# Patient Record
Sex: MALE | Race: BLACK OR AFRICAN AMERICAN | NOT HISPANIC OR LATINO | Employment: OTHER | URBAN - METROPOLITAN AREA
[De-identification: names, ages, dates, MRNs, and addresses within clinical notes are randomized per-mention and may not be internally consistent; named-entity substitution may affect disease eponyms.]

---

## 2023-01-06 ENCOUNTER — TELEPHONE (OUTPATIENT)
Dept: GASTROENTEROLOGY | Facility: CLINIC | Age: 73
End: 2023-01-06
Payer: MEDICARE

## 2023-01-06 ENCOUNTER — TELEPHONE (OUTPATIENT)
Dept: ENDOSCOPY | Facility: HOSPITAL | Age: 73
End: 2023-01-06
Payer: MEDICARE

## 2023-01-06 NOTE — TELEPHONE ENCOUNTER
Milagros,  with [place] requesting to schedule an appt for EUS.  Notified Dr. Carrizales does not do EUS, AES number given and repeated correctly.

## 2023-01-06 NOTE — TELEPHONE ENCOUNTER
----- Message from Noah Silver sent at 1/6/2023  2:06 PM CST -----  Type:  Needs Medical Advice    Who Called: [facility]/ Case Management  Would the patient rather a call back or a response via [facility]? call  Best Call Back Number: [phone]  Additional Information: EUS to eval for pancreatic and abnormal duct--she needs to schedule asap  She needs to speak to someone today.

## 2023-01-06 NOTE — TELEPHONE ENCOUNTER
----- Message from Vianney Love sent at 1/6/2023 12:04 PM CST -----  .Type: Apoointment Request      Name of Caller:[facility] in [place]  Would the patient rather a call back or a response via [facility]? call  Best Call Back Number:[phone]  Additional Information: requesting an appointment

## 2023-01-08 ENCOUNTER — TELEPHONE (OUTPATIENT)
Dept: ENDOSCOPY | Facility: HOSPITAL | Age: 73
End: 2023-01-08
Payer: MEDICARE

## 2023-01-08 NOTE — TELEPHONE ENCOUNTER
----- Message from Noah Silver sent at 1/6/2023  2:06 PM CST -----  Type:  Needs Medical Advice    Who Called: [facility]/ Case Management  Would the patient rather a call back or a response via [facility]? call  Best Call Back Number: [phone]  Additional Information: EUS to eval for pancreatic and abnormal duct--she needs to schedule asap  She needs to speak to someone today.

## 2023-01-11 ENCOUNTER — TELEPHONE (OUTPATIENT)
Dept: ENDOSCOPY | Facility: HOSPITAL | Age: 73
End: 2023-01-11
Payer: MEDICARE

## 2023-01-12 NOTE — PROGRESS NOTES
"  [facility]    Reason for Visit:  The encounter diagnosis was Idiopathic acute pancreatitis without infection or necrosis.    PCP:   Primary Doctor No   No address on file      Initial HPI   This is a 72 y.o. male referred from [place] [facility] for pancreatitis. He presented to [facility] ED 1/5/23 for abdominal pain and found to have lipase 843 and CT exhibiting edema around the head and neck of pancreas, pancreatic duct prominence and an indeterminate mass within the pancreatic tail. During hospitalization, patient was afebrile with normal LFTs and no leukocytosis. He reports that he has had no abdominal pain, n/v/d, appetite changes, or unexplained weight loss since he was released from the hospital. No personal hx of prior pancreaticobiliary d/o. No fhx of pancreatic cancer    Tobacco use- n/a  ETOH intake- 2-3 beers daily  NSAID use- n/a  Blood thinners- n/a     ROS:  Review of Systems   Constitutional:  Negative for chills, diaphoresis, fever and weight loss.   Eyes:  Negative for discharge and redness.   Gastrointestinal:  Negative for abdominal pain, blood in stool, constipation, diarrhea, nausea and vomiting.   Skin: Negative.    Neurological:  Negative for focal weakness, seizures and loss of consciousness.      Medical History: DM II, gout, HTN, high cholesterol    Surgical History:  has no past surgical history on file.    Family History: family history is not on file..       Review of patient's allergies indicates:  Not on File    No current outpatient medications on file prior to visit.     No current facility-administered medications on file prior to visit.         Objective Findings:    Vital Signs:  /70 (BP Location: Left arm, Patient Position: Sitting, BP Method: Medium (Automatic))   Pulse 66   Ht 6' 2" (1.88 m)   Wt 98.8 kg (217 lb 13 oz)   BMI 27.97 kg/m²   Body mass index is 27.97 kg/m².    Physical Exam:  Physical Exam  Vitals reviewed.   Constitutional:       " General: He is not in acute distress.     Appearance: He is not toxic-appearing or diaphoretic.   Eyes:      General: No scleral icterus.     Conjunctiva/sclera: Conjunctivae normal.   Abdominal:      General: Abdomen is flat. Bowel sounds are normal. There is no distension.      Palpations: Abdomen is soft.      Tenderness: There is no abdominal tenderness. There is no rebound.   Skin:     General: Skin is warm and dry.      Coloration: Skin is not jaundiced.   Neurological:      Mental Status: He is alert and oriented to person, place, and time.           Labs:            Imaging reviewed:      Endoscopy reviewed:      Assessment:  1. Idiopathic acute pancreatitis without infection or necrosis          Recommendations:  CT pancreas protocol in 3 weeks  EUS in 4-5 weeks. I have explained the risks and benefits of endoscopy procedures to the patient including but not limited to bleeding, perforation, infection, and anesthesia complications.  ETOH cessation          Thank you so much for allowing me to participate in the care of Feng Thakkar        Emily Womack, MSN, FNP-C  Advanced Endoscopy Nurse Practitioner  [facility]

## 2023-01-13 ENCOUNTER — OFFICE VISIT (OUTPATIENT)
Dept: GASTROENTEROLOGY | Facility: CLINIC | Age: 73
End: 2023-01-13
Payer: MEDICARE

## 2023-01-13 VITALS
BODY MASS INDEX: 27.95 KG/M2 | SYSTOLIC BLOOD PRESSURE: 122 MMHG | HEART RATE: 66 BPM | DIASTOLIC BLOOD PRESSURE: 70 MMHG | WEIGHT: 217.81 LBS | HEIGHT: 74 IN

## 2023-01-13 DIAGNOSIS — K85.00 IDIOPATHIC ACUTE PANCREATITIS WITHOUT INFECTION OR NECROSIS: Primary | ICD-10-CM

## 2023-01-13 PROBLEM — K85.90 ACUTE PANCREATITIS: Status: ACTIVE | Noted: 2023-01-05

## 2023-01-13 PROCEDURE — 99204 PR OFFICE/OUTPT VISIT, NEW, LEVL IV, 45-59 MIN: ICD-10-PCS | Mod: S$PBB,,,

## 2023-01-13 PROCEDURE — 99204 OFFICE O/P NEW MOD 45 MIN: CPT | Mod: S$PBB,,,

## 2023-01-13 PROCEDURE — 99213 OFFICE O/P EST LOW 20 MIN: CPT | Mod: PBBFAC

## 2023-01-13 PROCEDURE — 99999 PR PBB SHADOW E&M-EST. PATIENT-LVL III: CPT | Mod: PBBFAC,,,

## 2023-01-13 PROCEDURE — 99999 PR PBB SHADOW E&M-EST. PATIENT-LVL III: ICD-10-PCS | Mod: PBBFAC,,,

## 2023-01-13 RX ORDER — ALLOPURINOL 100 MG/1
100 TABLET ORAL DAILY
COMMUNITY

## 2023-01-13 RX ORDER — FINASTERIDE 5 MG/1
5 TABLET, FILM COATED ORAL DAILY
COMMUNITY

## 2023-01-13 RX ORDER — AMLODIPINE BESYLATE 2.5 MG/1
2.5 TABLET ORAL DAILY
COMMUNITY

## 2023-01-13 RX ORDER — EZETIMIBE 10 MG/1
10 TABLET ORAL DAILY
COMMUNITY

## 2023-01-13 RX ORDER — ROSUVASTATIN CALCIUM 40 MG/1
10 TABLET, COATED ORAL NIGHTLY
COMMUNITY

## 2023-01-13 RX ORDER — METOPROLOL SUCCINATE 100 MG/1
100 TABLET, EXTENDED RELEASE ORAL DAILY
COMMUNITY

## 2023-01-13 RX ORDER — CLONIDINE HYDROCHLORIDE 0.2 MG/1
0.2 TABLET ORAL ONCE
COMMUNITY

## 2023-02-24 ENCOUNTER — TELEPHONE (OUTPATIENT)
Dept: ENDOSCOPY | Facility: HOSPITAL | Age: 73
End: 2023-02-24
Payer: MEDICARE

## 2023-02-24 ENCOUNTER — HOSPITAL ENCOUNTER (OUTPATIENT)
Dept: RADIOLOGY | Facility: HOSPITAL | Age: 73
Discharge: HOME | End: 2023-02-24
Payer: MEDICARE

## 2023-02-24 ENCOUNTER — TELEPHONE (OUTPATIENT)
Dept: GASTROENTEROLOGY | Facility: HOSPITAL | Age: 73
End: 2023-02-24
Payer: MEDICARE

## 2023-02-24 ENCOUNTER — TELEPHONE (OUTPATIENT)
Dept: ENDOSCOPY | Facility: HOSPITAL | Age: 73
End: 2023-02-24

## 2023-02-24 DIAGNOSIS — K85.00 IDIOPATHIC ACUTE PANCREATITIS WITHOUT INFECTION OR NECROSIS: ICD-10-CM

## 2023-02-24 DIAGNOSIS — R91.8 LUNG MASS: Primary | ICD-10-CM

## 2023-02-24 PROCEDURE — 74177 CT ABDOMEN PELVIS WITH CONTRAST: ICD-10-PCS | Mod: 26,,, | Performed by: RADIOLOGY

## 2023-02-24 PROCEDURE — 74177 CT ABD & PELVIS W/CONTRAST: CPT | Mod: 26,,, | Performed by: RADIOLOGY

## 2023-02-24 PROCEDURE — 25500020 PHARM REV CODE 255: Mod: PO

## 2023-02-24 PROCEDURE — 74177 CT ABD & PELVIS W/CONTRAST: CPT | Mod: TC,PO

## 2023-02-24 RX ADMIN — IOHEXOL 100 ML: 350 INJECTION, SOLUTION INTRAVENOUS at 01:02

## 2023-02-24 NOTE — TELEPHONE ENCOUNTER
Emily Womack, JERAMY Diaz Staff    Mass previously noted to pancreas on outside imaging was again seen on this CT. Recommend EUS with biopsy as soon as possible to rule out neoplasm. Lung mass previously noted on outside imaging study was additionally seen. Recommend follow up with Oncology.

## 2023-02-24 NOTE — TELEPHONE ENCOUNTER
Telephoned pt to schedule EUS.  Spoke with pt and procedure scheduled for 3/3/23 at 9:30am at [facility].  Reviewed history and medications.  Instructed on procedure and preparation.  Pt verbalized understanding.  Instructions mailed to address on file.

## 2023-02-27 ENCOUNTER — TELEPHONE (OUTPATIENT)
Dept: CARDIOTHORACIC SURGERY | Facility: CLINIC | Age: 73
End: 2023-02-27
Payer: MEDICARE

## 2023-02-27 DIAGNOSIS — R91.1 SOLITARY PULMONARY NODULE: Primary | ICD-10-CM

## 2023-03-01 ENCOUNTER — HOSPITAL ENCOUNTER (OUTPATIENT)
Dept: RADIOLOGY | Facility: HOSPITAL | Age: 73
Discharge: HOME | End: 2023-03-01
Attending: INTERNAL MEDICINE
Payer: MEDICARE

## 2023-03-01 ENCOUNTER — TELEPHONE (OUTPATIENT)
Dept: ENDOSCOPY | Facility: HOSPITAL | Age: 73
End: 2023-03-01
Payer: MEDICARE

## 2023-03-01 ENCOUNTER — OFFICE VISIT (OUTPATIENT)
Dept: PULMONOLOGY | Facility: CLINIC | Age: 73
End: 2023-03-01
Payer: MEDICARE

## 2023-03-01 VITALS
OXYGEN SATURATION: 98 % | WEIGHT: 217.81 LBS | BODY MASS INDEX: 27.95 KG/M2 | SYSTOLIC BLOOD PRESSURE: 130 MMHG | HEART RATE: 61 BPM | HEIGHT: 74 IN | DIASTOLIC BLOOD PRESSURE: 70 MMHG

## 2023-03-01 DIAGNOSIS — R91.8 MASS OF LEFT LUNG: Primary | ICD-10-CM

## 2023-03-01 DIAGNOSIS — R91.1 SOLITARY PULMONARY NODULE: ICD-10-CM

## 2023-03-01 DIAGNOSIS — J43.2 CENTRILOBULAR EMPHYSEMA: ICD-10-CM

## 2023-03-01 PROCEDURE — 99204 OFFICE O/P NEW MOD 45 MIN: CPT | Mod: S$PBB,,, | Performed by: INTERNAL MEDICINE

## 2023-03-01 PROCEDURE — 71250 CT THORAX DX C-: CPT | Mod: TC

## 2023-03-01 PROCEDURE — 71250 CT CHEST WITHOUT CONTRAST: ICD-10-PCS | Mod: 26,,, | Performed by: RADIOLOGY

## 2023-03-01 PROCEDURE — 99215 OFFICE O/P EST HI 40 MIN: CPT | Mod: PBBFAC,25 | Performed by: INTERNAL MEDICINE

## 2023-03-01 PROCEDURE — 99204 PR OFFICE/OUTPT VISIT, NEW, LEVL IV, 45-59 MIN: ICD-10-PCS | Mod: S$PBB,,, | Performed by: INTERNAL MEDICINE

## 2023-03-01 PROCEDURE — 71250 CT THORAX DX C-: CPT | Mod: 26,,, | Performed by: RADIOLOGY

## 2023-03-01 PROCEDURE — 99999 PR PBB SHADOW E&M-EST. PATIENT-LVL V: CPT | Mod: PBBFAC,,, | Performed by: INTERNAL MEDICINE

## 2023-03-01 PROCEDURE — 99999 PR PBB SHADOW E&M-EST. PATIENT-LVL V: ICD-10-PCS | Mod: PBBFAC,,, | Performed by: INTERNAL MEDICINE

## 2023-03-01 NOTE — TELEPHONE ENCOUNTER
Spoke with patient about arrival time @ 0830.   Upper EUS    NPO status reviewed: Patient may eat until 7:00pm.  After 7pm, pt may have CLEAR liquids ONLY until 4 hrs prior to procedure per [facility] scheduling inst.    Medications: Do not take Insulin or oral diabetic medications the day of the procedure.    Take as prescribed: heart, seizure and blood pressure medication in the morning with a sip of water (less than an ounce).  Take any breathing medications and bring inhalers to hospital with you.     Leave all valuables and jewelry at home. Wear comfortable clothes to procedure to change into hospital gown.   You cannot drive for 24 hours after your procedure because you will receive sedation for your procedure to make you comfortable.    A ride must be provided at discharge.

## 2023-03-01 NOTE — H&P (VIEW-ONLY)
"Subjective:       Patient ID: Feng Thakkar is a 72 y.o. male.    Chief Complaint: Pulmonary Nodules    72 year old with a history of splenectomy for two cysts.  Perhaps left with splenosis.  Recently hospitalized with pancreatitis and found an incidental lung mass.  Retired from [employer] and now does intermittent yard work.  No magdaleno, no chronic cough, no h/o bronchitis.  Down to 2 beers a day.      No nausea/vomiting.  QUit smoking in 2012    Review of Systems   Constitutional:  Negative for fever, weight loss and weight gain.   HENT:  Negative for voice change.    Respiratory:  Negative for choking, chest tightness and wheezing.    Cardiovascular:  Negative for chest pain and leg swelling.   Endocrine:  Negative for cold intolerance and heat intolerance.    Musculoskeletal:  Positive for arthralgias (bilateral hip).   Gastrointestinal:  Negative for acid reflux.   Neurological:  Negative for headaches.   Hematological:  Negative for adenopathy.   Psychiatric/Behavioral:  Negative for confusion.        Past medical and surgical history reviewed.  Social and family history reviewed.  Allergies and medications reviewed.   Objective:      Vitals:    03/01/23 0919   BP: 130/70   Pulse: 61   SpO2: 98%   Weight: 98.8 kg (217 lb 13 oz)   Height: 6' 2" (1.88 m)      Physical Exam   Constitutional: He is oriented to person, place, and time. He appears well-developed and well-nourished.   HENT:   Right Ear: External ear normal.   Left Ear: External ear normal.   Nose: Nose normal.   Neck: No thyromegaly present.   Cardiovascular: Normal rate and regular rhythm.   Pulmonary/Chest: Normal expansion, effort normal and breath sounds normal. He has no wheezes. He has no rales.   Musculoskeletal:         General: No edema. Normal range of motion.      Cervical back: Normal range of motion and neck supple.   Lymphadenopathy: No supraclavicular adenopathy is present.     He has no cervical adenopathy.   Neurological: He is " alert and oriented to person, place, and time.   Skin: Skin is warm and dry.   Psychiatric: He has a normal mood and affect.     Personal Diagnostic Review  CT of chest performed on 3/1/2023 without contrast revealed LLL mass measuring 4.5 cm.  No visualized adenopathy..    Mild emphysmea.    Possible splenosis adjacent to pancreas.   No flowsheet data found.      Assessment:       1. Mass of left lung    2. Centrilobular emphysema          Outpatient Encounter Medications as of 3/1/2023   Medication Sig Dispense Refill    allopurinoL (ZYLOPRIM) 100 MG tablet Take 100 mg by mouth once daily.      amLODIPine (NORVASC) 2.5 MG tablet Take 2.5 mg by mouth once daily.      cloNIDine (CATAPRES) 0.2 MG tablet Take 0.2 mg by mouth once.      ezetimibe (ZETIA) 10 mg tablet Take 10 mg by mouth once daily.      finasteride (PROSCAR) 5 mg tablet Take 5 mg by mouth once daily.      metoprolol succinate (TOPROL-XL) 100 MG 24 hr tablet Take 100 mg by mouth once daily.      rosuvastatin (CRESTOR) 40 MG Tab Take 10 mg by mouth every evening.       No facility-administered encounter medications on file as of 3/1/2023.     Orders Placed This Encounter   Procedures    Full code     Standing Status:   Standing     Number of Occurrences:   1    Pulse Oximetry Q4H     Standing Status:   Standing     Number of Occurrences:   21    Spirometry with/without bronchodilator     Standing Status:   Future     Standing Expiration Date:   2/29/2024     Order Specific Question:   Release to patient     Answer:   Immediate    DLCO-Carbon Monoxide Diffusing Capacity     Standing Status:   Future     Standing Expiration Date:   2/29/2024     Order Specific Question:   Release to patient     Answer:   Immediate    Case Request Operating Room: ROBOTIC BRONCHOSCOPY     Standing Status:   Standing     Number of Occurrences:   1     Order Specific Question:   Medical Necessity:     Answer:   Medically Urgent [101]     Order Specific Question:   CPT Code:      Answer:   UT BRONCH W/ EBUS, SAMPLING 3 OR MORE NODES, INCL GUIDE [16764]     Order Specific Question:   CPT Code:     Answer:   UT BRONCH W/ EBUS, DIAG OR THERA INTERVENTION PERIPHERAL LESION(S), INCL GUID, ADD ON CODE [30446]     Order Specific Question:   CPT Code:     Answer:   UT BRONCHOSCOPY,TRANSBRON ASPIR BX [21305]     Order Specific Question:   CPT Code:     Answer:   UT BRONCHOSCOPY,COMPUTER ASSIST/IMAGE-GUIDED NAVIGATION [31657]     Order Specific Question:   Post-Procedure Disposition:     Answer:   Amb Surgery/DOSC [2]     Order Specific Question:   Is an on-site pathologist required for this procedure?     Answer:   N/A     Plan:     Problem List Items Addressed This Visit       Centrilobular emphysema    Overview     Mild.  PFTs ordered         Mass of left lung - Primary    Overview     Robotic bronchoscopy scheduled for 3/17/23    I have explained the risks, benefits and alternatives of the procedure in detail.  The patient voices understanding and all questions have been answered.  The patient agrees to proceed as planned.         Relevant Orders    Spirometry with/without bronchodilator    DLCO-Carbon Monoxide Diffusing Capacity    Pulse Oximetry Q4H    Full code    Case Request Operating Room: ROBOTIC BRONCHOSCOPY (Completed)    POCT glucose

## 2023-03-01 NOTE — PROGRESS NOTES
"Subjective:       Patient ID: Feng Thakkar is a 72 y.o. male.    Chief Complaint: Pulmonary Nodules    72 year old with a history of splenectomy for two cysts.  Perhaps left with splenosis.  Recently hospitalized with pancreatitis and found an incidental lung mass.  Retired from [employer] and now does intermittent yard work.  No magdaleno, no chronic cough, no h/o bronchitis.  Down to 2 beers a day.      No nausea/vomiting.  QUit smoking in 2012    Review of Systems   Constitutional:  Negative for fever, weight loss and weight gain.   HENT:  Negative for voice change.    Respiratory:  Negative for choking, chest tightness and wheezing.    Cardiovascular:  Negative for chest pain and leg swelling.   Endocrine:  Negative for cold intolerance and heat intolerance.    Musculoskeletal:  Positive for arthralgias (bilateral hip).   Gastrointestinal:  Negative for acid reflux.   Neurological:  Negative for headaches.   Hematological:  Negative for adenopathy.   Psychiatric/Behavioral:  Negative for confusion.        Past medical and surgical history reviewed.  Social and family history reviewed.  Allergies and medications reviewed.   Objective:      Vitals:    03/01/23 0919   BP: 130/70   Pulse: 61   SpO2: 98%   Weight: 98.8 kg (217 lb 13 oz)   Height: 6' 2" (1.88 m)      Physical Exam   Constitutional: He is oriented to person, place, and time. He appears well-developed and well-nourished.   HENT:   Right Ear: External ear normal.   Left Ear: External ear normal.   Nose: Nose normal.   Neck: No thyromegaly present.   Cardiovascular: Normal rate and regular rhythm.   Pulmonary/Chest: Normal expansion, effort normal and breath sounds normal. He has no wheezes. He has no rales.   Musculoskeletal:         General: No edema. Normal range of motion.      Cervical back: Normal range of motion and neck supple.   Lymphadenopathy: No supraclavicular adenopathy is present.     He has no cervical adenopathy.   Neurological: He is " alert and oriented to person, place, and time.   Skin: Skin is warm and dry.   Psychiatric: He has a normal mood and affect.     Personal Diagnostic Review  CT of chest performed on 3/1/2023 without contrast revealed LLL mass measuring 4.5 cm.  No visualized adenopathy..    Mild emphysmea.    Possible splenosis adjacent to pancreas.   No flowsheet data found.      Assessment:       1. Mass of left lung    2. Centrilobular emphysema          Outpatient Encounter Medications as of 3/1/2023   Medication Sig Dispense Refill    allopurinoL (ZYLOPRIM) 100 MG tablet Take 100 mg by mouth once daily.      amLODIPine (NORVASC) 2.5 MG tablet Take 2.5 mg by mouth once daily.      cloNIDine (CATAPRES) 0.2 MG tablet Take 0.2 mg by mouth once.      ezetimibe (ZETIA) 10 mg tablet Take 10 mg by mouth once daily.      finasteride (PROSCAR) 5 mg tablet Take 5 mg by mouth once daily.      metoprolol succinate (TOPROL-XL) 100 MG 24 hr tablet Take 100 mg by mouth once daily.      rosuvastatin (CRESTOR) 40 MG Tab Take 10 mg by mouth every evening.       No facility-administered encounter medications on file as of 3/1/2023.     Orders Placed This Encounter   Procedures    Full code     Standing Status:   Standing     Number of Occurrences:   1    Pulse Oximetry Q4H     Standing Status:   Standing     Number of Occurrences:   21    Spirometry with/without bronchodilator     Standing Status:   Future     Standing Expiration Date:   2/29/2024     Order Specific Question:   Release to patient     Answer:   Immediate    DLCO-Carbon Monoxide Diffusing Capacity     Standing Status:   Future     Standing Expiration Date:   2/29/2024     Order Specific Question:   Release to patient     Answer:   Immediate    Case Request Operating Room: ROBOTIC BRONCHOSCOPY     Standing Status:   Standing     Number of Occurrences:   1     Order Specific Question:   Medical Necessity:     Answer:   Medically Urgent [101]     Order Specific Question:   CPT Code:      Answer:   NH BRONCH W/ EBUS, SAMPLING 3 OR MORE NODES, INCL GUIDE [55296]     Order Specific Question:   CPT Code:     Answer:   NH BRONCH W/ EBUS, DIAG OR THERA INTERVENTION PERIPHERAL LESION(S), INCL GUID, ADD ON CODE [01301]     Order Specific Question:   CPT Code:     Answer:   NH BRONCHOSCOPY,TRANSBRON ASPIR BX [13894]     Order Specific Question:   CPT Code:     Answer:   NH BRONCHOSCOPY,COMPUTER ASSIST/IMAGE-GUIDED NAVIGATION [14364]     Order Specific Question:   Post-Procedure Disposition:     Answer:   Amb Surgery/DOSC [2]     Order Specific Question:   Is an on-site pathologist required for this procedure?     Answer:   N/A     Plan:     Problem List Items Addressed This Visit       Centrilobular emphysema    Overview     Mild.  PFTs ordered         Mass of left lung - Primary    Overview     Robotic bronchoscopy scheduled for 3/17/23    I have explained the risks, benefits and alternatives of the procedure in detail.  The patient voices understanding and all questions have been answered.  The patient agrees to proceed as planned.         Relevant Orders    Spirometry with/without bronchodilator    DLCO-Carbon Monoxide Diffusing Capacity    Pulse Oximetry Q4H    Full code    Case Request Operating Room: ROBOTIC BRONCHOSCOPY (Completed)    POCT glucose

## 2023-03-03 ENCOUNTER — HOSPITAL ENCOUNTER (OUTPATIENT)
Facility: HOSPITAL | Age: 73
Discharge: HOME | End: 2023-03-03
Attending: INTERNAL MEDICINE | Admitting: INTERNAL MEDICINE
Payer: MEDICARE

## 2023-03-03 ENCOUNTER — ANESTHESIA EVENT (OUTPATIENT)
Dept: ENDOSCOPY | Facility: HOSPITAL | Age: 73
End: 2023-03-03
Payer: MEDICARE

## 2023-03-03 ENCOUNTER — ANESTHESIA (OUTPATIENT)
Dept: ENDOSCOPY | Facility: HOSPITAL | Age: 73
End: 2023-03-03
Payer: MEDICARE

## 2023-03-03 DIAGNOSIS — K86.9 LESION OF PANCREAS: ICD-10-CM

## 2023-03-03 DIAGNOSIS — K85.90 ACUTE PANCREATITIS WITHOUT INFECTION OR NECROSIS, UNSPECIFIED PANCREATITIS TYPE: Primary | ICD-10-CM

## 2023-03-03 LAB — POCT GLUCOSE: 75 MG/DL (ref 70–110)

## 2023-03-03 PROCEDURE — 63600175 PHARM REV CODE 636 W HCPCS: Performed by: NURSE ANESTHETIST, CERTIFIED REGISTERED

## 2023-03-03 PROCEDURE — D9220A PRA ANESTHESIA: ICD-10-PCS | Mod: CRNA,,, | Performed by: NURSE ANESTHETIST, CERTIFIED REGISTERED

## 2023-03-03 PROCEDURE — 27202131 HC NEEDLE, FNB SINGLE (ANY): Performed by: INTERNAL MEDICINE

## 2023-03-03 PROCEDURE — 88341 IMHCHEM/IMCYTCHM EA ADD ANTB: CPT | Mod: 26,,, | Performed by: PATHOLOGY

## 2023-03-03 PROCEDURE — 43242 EGD US FINE NEEDLE BX/ASPIR: CPT | Performed by: INTERNAL MEDICINE

## 2023-03-03 PROCEDURE — 88341 IMHCHEM/IMCYTCHM EA ADD ANTB: CPT | Mod: 59 | Performed by: PATHOLOGY

## 2023-03-03 PROCEDURE — 25000003 PHARM REV CODE 250: Performed by: INTERNAL MEDICINE

## 2023-03-03 PROCEDURE — 88173 PR  INTERPRETATION OF FNA SMEAR: ICD-10-PCS | Mod: 26,,, | Performed by: PATHOLOGY

## 2023-03-03 PROCEDURE — 88342 CHG IMMUNOCYTOCHEMISTRY: ICD-10-PCS | Mod: 26,,, | Performed by: PATHOLOGY

## 2023-03-03 PROCEDURE — 88177 CYTP FNA EVAL EA ADDL: CPT | Mod: 26,,, | Performed by: PATHOLOGY

## 2023-03-03 PROCEDURE — 37000009 HC ANESTHESIA EA ADD 15 MINS: Performed by: INTERNAL MEDICINE

## 2023-03-03 PROCEDURE — 88342 IMHCHEM/IMCYTCHM 1ST ANTB: CPT | Mod: 26,,, | Performed by: PATHOLOGY

## 2023-03-03 PROCEDURE — 88305 TISSUE EXAM BY PATHOLOGIST: ICD-10-PCS | Mod: 26,,, | Performed by: PATHOLOGY

## 2023-03-03 PROCEDURE — 88177 PR  EVALUATION OF FNA SMEAR TO DETERMINE ADEQUACY, EA ADD EVAL: ICD-10-PCS | Mod: 26,,, | Performed by: PATHOLOGY

## 2023-03-03 PROCEDURE — 88172 PR  EVALUATION OF FNA SMEAR TO DETERMINE ADEQUACY, FIRST EVAL: ICD-10-PCS | Mod: 26,,, | Performed by: PATHOLOGY

## 2023-03-03 PROCEDURE — 25000003 PHARM REV CODE 250: Performed by: NURSE ANESTHETIST, CERTIFIED REGISTERED

## 2023-03-03 PROCEDURE — 88341 PR IHC OR ICC EACH ADD'L SINGLE ANTIBODY  STAINPR: ICD-10-PCS | Mod: 26,,, | Performed by: PATHOLOGY

## 2023-03-03 PROCEDURE — 43242 PR UPGI ENDOSCOPY,FN NEEDLE BX,GUIDED: ICD-10-PCS | Mod: ,,, | Performed by: INTERNAL MEDICINE

## 2023-03-03 PROCEDURE — 88305 TISSUE EXAM BY PATHOLOGIST: CPT | Mod: 26,,, | Performed by: PATHOLOGY

## 2023-03-03 PROCEDURE — 88177 CYTP FNA EVAL EA ADDL: CPT | Performed by: PATHOLOGY

## 2023-03-03 PROCEDURE — D9220A PRA ANESTHESIA: Mod: ANES,,, | Performed by: ANESTHESIOLOGY

## 2023-03-03 PROCEDURE — 88305 TISSUE EXAM BY PATHOLOGIST: CPT | Performed by: PATHOLOGY

## 2023-03-03 PROCEDURE — 43242 EGD US FINE NEEDLE BX/ASPIR: CPT | Mod: ,,, | Performed by: INTERNAL MEDICINE

## 2023-03-03 PROCEDURE — 88173 CYTOPATH EVAL FNA REPORT: CPT | Performed by: PATHOLOGY

## 2023-03-03 PROCEDURE — 88342 IMHCHEM/IMCYTCHM 1ST ANTB: CPT | Performed by: PATHOLOGY

## 2023-03-03 PROCEDURE — 88172 CYTP DX EVAL FNA 1ST EA SITE: CPT | Mod: 26,,, | Performed by: PATHOLOGY

## 2023-03-03 PROCEDURE — 37000008 HC ANESTHESIA 1ST 15 MINUTES: Performed by: INTERNAL MEDICINE

## 2023-03-03 PROCEDURE — D9220A PRA ANESTHESIA: ICD-10-PCS | Mod: ANES,,, | Performed by: ANESTHESIOLOGY

## 2023-03-03 PROCEDURE — 88172 CYTP DX EVAL FNA 1ST EA SITE: CPT | Performed by: PATHOLOGY

## 2023-03-03 PROCEDURE — D9220A PRA ANESTHESIA: Mod: CRNA,,, | Performed by: NURSE ANESTHETIST, CERTIFIED REGISTERED

## 2023-03-03 PROCEDURE — 88173 CYTOPATH EVAL FNA REPORT: CPT | Mod: 26,,, | Performed by: PATHOLOGY

## 2023-03-03 RX ORDER — HALOPERIDOL 5 MG/ML
0.5 INJECTION INTRAMUSCULAR EVERY 10 MIN PRN
Status: DISCONTINUED | OUTPATIENT
Start: 2023-03-03 | End: 2023-03-03 | Stop reason: HOSPADM

## 2023-03-03 RX ORDER — SODIUM CHLORIDE 0.9 % (FLUSH) 0.9 %
10 SYRINGE (ML) INJECTION
Status: DISCONTINUED | OUTPATIENT
Start: 2023-03-03 | End: 2023-03-03 | Stop reason: HOSPADM

## 2023-03-03 RX ORDER — LIDOCAINE HYDROCHLORIDE 20 MG/ML
INJECTION INTRAVENOUS
Status: DISCONTINUED | OUTPATIENT
Start: 2023-03-03 | End: 2023-03-03

## 2023-03-03 RX ORDER — SODIUM CHLORIDE 9 MG/ML
INJECTION, SOLUTION INTRAVENOUS CONTINUOUS
Status: DISCONTINUED | OUTPATIENT
Start: 2023-03-03 | End: 2023-03-03 | Stop reason: HOSPADM

## 2023-03-03 RX ORDER — PROPOFOL 10 MG/ML
VIAL (ML) INTRAVENOUS CONTINUOUS PRN
Status: DISCONTINUED | OUTPATIENT
Start: 2023-03-03 | End: 2023-03-03

## 2023-03-03 RX ORDER — PROPOFOL 10 MG/ML
VIAL (ML) INTRAVENOUS
Status: DISCONTINUED | OUTPATIENT
Start: 2023-03-03 | End: 2023-03-03

## 2023-03-03 RX ORDER — ONDANSETRON 2 MG/ML
4 INJECTION INTRAMUSCULAR; INTRAVENOUS ONCE AS NEEDED
Status: DISCONTINUED | OUTPATIENT
Start: 2023-03-03 | End: 2023-03-03 | Stop reason: HOSPADM

## 2023-03-03 RX ADMIN — PROPOFOL 50 MG: 10 INJECTION, EMULSION INTRAVENOUS at 11:03

## 2023-03-03 RX ADMIN — PROPOFOL 175 MCG/KG/MIN: 10 INJECTION, EMULSION INTRAVENOUS at 11:03

## 2023-03-03 RX ADMIN — LIDOCAINE HYDROCHLORIDE 50 MG: 20 INJECTION, SOLUTION INTRAVENOUS at 11:03

## 2023-03-03 RX ADMIN — SODIUM CHLORIDE: 0.9 INJECTION, SOLUTION INTRAVENOUS at 11:03

## 2023-03-03 NOTE — TRANSFER OF CARE
"Anesthesia Transfer of Care Note    Patient: Feng Thakkar    Procedure(s) Performed: Procedure(s) (LRB):  ULTRASOUND, UPPER GI TRACT, ENDOSCOPIC (N/A)    Patient location: GI    Anesthesia Type: general    Transport from OR: Transported from OR on room air with adequate spontaneous ventilation    Post pain: adequate analgesia    Post assessment: no apparent anesthetic complications and tolerated procedure well    Post vital signs: stable    Level of consciousness: responds to stimulation    Nausea/Vomiting: no nausea/vomiting    Complications: none    Transfer of care protocol was followed      Last vitals:   Visit Vitals  BP (!) 186/86   Pulse 67   Temp 36.7 °C (98.1 °F)   Resp 18   Ht 6' 2" (1.88 m)   Wt 99.3 kg (219 lb)   SpO2 100%   BMI 28.12 kg/m²     "

## 2023-03-03 NOTE — H&P
Short Stay Endoscopy History and Physical    PCP - Primary Doctor No  Referring Physician - Emily Womack NP  [address] [place] [address]    Procedure - EUS  ASA - per anesthesia  Mallampati - per anesthesia  History of Anesthesia problems - per anesthesia  Family history Anesthesia problems -  per anesthesia   Plan of anesthesia - per anesthesia    HPI:  This is a 72 y.o. male here for evaluation of: EUS pancreas tail lesion on recent imaging; h/o idiopathic pancreatitis    Reflux - no  Dysphagia - no  Abdominal pain - no  Diarrhea - no    ROS:  Constitutional: No fevers, chills, stable weight recently  CV: No chest pain  Pulm: No cough, No shortness of breath  Ophtho: No vision changes  GI: see HPI  Derm: No rash    Medical History:  has a past medical history of Gout, unspecified, High cholesterol, and HTN (hypertension).    Surgical History:  has a past surgical history that includes Pancreatectomy.    Family History: family history is not on file..    Social History:  reports that he quit smoking about 10 years ago. His smoking use included cigarettes and vaping with nicotine. He started smoking about 54 years ago. He has a 81.00 pack-year smoking history. He has quit using smokeless tobacco. He reports current alcohol use of about 3.0 standard drinks per week. He reports that he does not use drugs.    Review of patient's allergies indicates:  No Known Allergies    Medications:   Medications Prior to Admission   Medication Sig Dispense Refill Last Dose    allopurinoL (ZYLOPRIM) 100 MG tablet Take 100 mg by mouth once daily.   3/2/2023    amLODIPine (NORVASC) 2.5 MG tablet Take 2.5 mg by mouth once daily.   3/2/2023    cloNIDine (CATAPRES) 0.2 MG tablet Take 0.2 mg by mouth once.   3/2/2023    ezetimibe (ZETIA) 10 mg tablet Take 10 mg by mouth once daily.   3/2/2023    finasteride (PROSCAR) 5 mg tablet Take 5 mg by mouth once daily.   3/2/2023    metoprolol succinate (TOPROL-XL) 100 MG  24 hr tablet Take 100 mg by mouth once daily.   3/2/2023    rosuvastatin (CRESTOR) 40 MG Tab Take 10 mg by mouth every evening.   3/2/2023       Physical Exam:    Vital Signs:   Vitals:    03/03/23 0857   BP: (!) 186/86   Pulse: 67   Resp: 18   Temp: 98.1 °F (36.7 °C)       General Appearance: Well appearing in no acute distress    Labs:  Lab Results   Component Value Date    CREATININE 1.4 02/24/2023       I have explained the risks and benefits of this endoscopic procedure to the patient including but not limited to bleeding, inflammation, infection, perforation, pancreatitis, missing a lesion and death.      Cora Mcgrath MD

## 2023-03-03 NOTE — PROVATION PATIENT INSTRUCTIONS
Discharge Summary/Instructions after an Endoscopic Procedure  Patient Name: Feng Thakkar  Patient MRN: [id number]  Patient YOB: 1950  Friday, March 3, 2023  Cora Mcgrath MD  Dear patient,  As a result of recent federal legislation (The Federal Cures Act), you may   receive lab or pathology results from your procedure in your [facility]   account before your physician is able to contact you. Your physician or   their representative will relay the results to you with their   recommendations at their soonest availability.  Thank you,  Your health is very important to us during the Covid Crisis. Following your   procedure today, you will receive a daily text for 2 weeks asking about   signs or symptoms of Covid 19.  Please respond to this text when you   receive it so we can follow up and keep you as safe as possible.   RESTRICTIONS:  During your procedure today, you received medications for sedation.  These   medications may affect your judgment, balance and coordination.  Therefore,   for 24 hours, you have the following restrictions:   - DO NOT drive a car, operate machinery, make legal/financial decisions,   sign important papers or drink alcohol.    ACTIVITY:  Today: no heavy lifting, straining or running due to procedural   sedation/anesthesia.  The following day: return to full activity including work.  DIET:  Eat and drink normally unless instructed otherwise.     TREATMENT FOR COMMON SIDE EFFECTS:  - Mild abdominal pain, nausea, belching, bloating or excessive gas:  rest,   eat lightly and use a heating pad.  - Sore Throat: treat with throat lozenges and/or gargle with warm salt   water.  - Because air was used during the procedure, expelling large amounts of air   from your rectum or belching is normal.  - If a bowel prep was taken, you may not have a bowel movement for 1-3 days.    This is normal.  SYMPTOMS TO WATCH FOR AND REPORT TO YOUR PHYSICIAN:  1. Abdominal pain or bloating, other than gas  cramps.  2. Chest pain.  3. Back pain.  4. Signs of infection such as: chills or fever occurring within 24 hours   after the procedure.  5. Rectal bleeding, which would show as bright red, maroon, or black stools.   (A tablespoon of blood from the rectum is not serious, especially if   hemorrhoids are present.)  6. Vomiting.  7. Weakness or dizziness.  GO DIRECTLY TO THE NEAREST EMERGENCY ROOM IF YOU HAVE ANY OF THE FOLLOWING:      Difficulty breathing              Chills and/or fever over 101 F   Persistent vomiting and/or vomiting blood   Severe abdominal pain   Severe chest pain   Black, tarry stools   Bleeding- more than one tablespoon   Any other symptom or condition that you feel may need urgent attention  Your doctor recommends these additional instructions:  If any biopsies were taken, your doctors clinic will contact you in 1 to 2   weeks with any results.  - Discharge patient to home (ambulatory).   - Patient has a contact number available for emergencies.  The signs and   symptoms of potential delayed complications were discussed with the   patient.  Return to normal activities tomorrow.  Written discharge   instructions were provided to the patient.   - Resume previous diet.   - Await path results.   - Return to referring physician.   - Abstain from alcohol and smoking given above appearance of pancreas   suggestive of chronic injury.  For questions, problems or results please call your physician - Cora Mcgrath MD.  EMERGENCY PHONE NUMBER: [phone],  LAB RESULTS: [phone]  IF A COMPLICATION OR EMERGENCY SITUATION ARISES AND YOU ARE UNABLE TO REACH   YOUR PHYSICIAN - GO DIRECTLY TO THE EMERGENCY ROOM.  Cora Mcgrath MD  3/3/2023 12:42:54 PM  This report has been verified and signed electronically.  Dear patient,  As a result of recent federal legislation (The Federal Cures Act), you may   receive lab or pathology results from your procedure in your [facility]   account before your  physician is able to contact you. Your physician or   their representative will relay the results to you with their   recommendations at their soonest availability.  Thank you,  PROVATION

## 2023-03-03 NOTE — ANESTHESIA PREPROCEDURE EVALUATION
03/03/2023  Feng Thakkar is a 72 y.o., male   Pre-operative evaluation for Procedure(s) (LRB):  ULTRASOUND, UPPER GI TRACT, ENDOSCOPIC (N/A)    Feng Thakkar is a 72 y.o. male     Patient Active Problem List   Diagnosis    Acute pancreatitis    Diabetes mellitus without complication    Essential hypertension    Testicular abscess    Prostate cancer screening    Centrilobular emphysema    Mass of left lung       Review of patient's allergies indicates:  No Known Allergies    No current facility-administered medications on file prior to encounter.     Current Outpatient Medications on File Prior to Encounter   Medication Sig Dispense Refill    allopurinoL (ZYLOPRIM) 100 MG tablet Take 100 mg by mouth once daily.      amLODIPine (NORVASC) 2.5 MG tablet Take 2.5 mg by mouth once daily.      cloNIDine (CATAPRES) 0.2 MG tablet Take 0.2 mg by mouth once.      ezetimibe (ZETIA) 10 mg tablet Take 10 mg by mouth once daily.      finasteride (PROSCAR) 5 mg tablet Take 5 mg by mouth once daily.      metoprolol succinate (TOPROL-XL) 100 MG 24 hr tablet Take 100 mg by mouth once daily.      rosuvastatin (CRESTOR) 40 MG Tab Take 10 mg by mouth every evening.         Past Surgical History:   Procedure Laterality Date    PANCREATECTOMY         Pre-op Assessment    I have reviewed the Patient Summary Reports.     I have reviewed the Nursing Notes. I have reviewed the NPO Status.   I have reviewed the Medications.     Review of Systems  Anesthesia Hx:  No problems with previous Anesthesia  History of prior surgery of interest to airway management or planning: Denies Family Hx of Anesthesia complications.   Denies Personal Hx of Anesthesia complications.   Social:  Alcohol Use, Non-Smoker    Hematology/Oncology:  Hematology Normal   Oncology Normal     EENT/Dental:EENT/Dental Normal   Cardiovascular:   Exercise  tolerance: good Hypertension    Pulmonary:   COPD    Renal/:  Renal/ Normal     Hepatic/GI:  Hepatic/GI Normal    Neurological:  Neurology Normal    Endocrine:   Diabetes, type 2    Psych:  Psychiatric Normal           Physical Exam  General: Well nourished, Cooperative, Alert and Oriented    Airway:  Mallampati: II   Mouth Opening: Normal  TM Distance: Normal  Tongue: Normal  Neck ROM: Normal ROM    Dental:  Intact    Chest/Lungs:  Clear to auscultation, Normal Respiratory Rate    Heart:  Rate: Normal  Rhythm: Regular Rhythm        Anesthesia Plan  Type of Anesthesia, risks & benefits discussed:    Anesthesia Type: Gen ETT, Gen Natural Airway  Intra-op Monitoring Plan: Standard ASA Monitors  Post Op Pain Control Plan: multimodal analgesia and IV/PO Opioids PRN  Induction:  IV  Airway Plan: Direct  Informed Consent: Informed consent signed with the Patient and all parties understand the risks and agree with anesthesia plan.  All questions answered. Patient consented to blood products? No  ASA Score: 3  Day of Surgery Review of History & Physical: H&P Update referred to the surgeon/provider.    Ready For Surgery From Anesthesia Perspective.     .

## 2023-03-03 NOTE — ANESTHESIA POSTPROCEDURE EVALUATION
Anesthesia Post Evaluation    Patient: Feng Thakkar    Procedure(s) Performed: Procedure(s) (LRB):  ULTRASOUND, UPPER GI TRACT, ENDOSCOPIC (N/A)    Final Anesthesia Type: general      Patient location during evaluation: GI PACU  Patient participation: Yes- Able to Participate  Level of consciousness: awake and alert and oriented  Post-procedure vital signs: reviewed and stable  Pain management: adequate  Airway patency: patent    PONV status at discharge: No PONV  Anesthetic complications: no      Cardiovascular status: blood pressure returned to baseline and hemodynamically stable  Respiratory status: unassisted, spontaneous ventilation and room air  Hydration status: euvolemic  Follow-up not needed.          Vitals Value Taken Time   /64 03/03/23 1242   Temp 36.5 °C (97.7 °F) 03/03/23 1215   Pulse 64 03/03/23 1242   Resp 13 03/03/23 1242   SpO2 98 % 03/03/23 1242         Event Time   Out of Recovery 12:45:00         Pain/Genny Score: Genny Score: 10 (3/3/2023 12:42 PM)

## 2023-03-06 VITALS
HEART RATE: 64 BPM | TEMPERATURE: 98 F | SYSTOLIC BLOOD PRESSURE: 119 MMHG | RESPIRATION RATE: 13 BRPM | WEIGHT: 219 LBS | BODY MASS INDEX: 28.11 KG/M2 | HEIGHT: 74 IN | DIASTOLIC BLOOD PRESSURE: 64 MMHG | OXYGEN SATURATION: 98 %

## 2023-03-08 ENCOUNTER — HOSPITAL ENCOUNTER (OUTPATIENT)
Dept: PULMONOLOGY | Facility: CLINIC | Age: 73
Discharge: HOME | End: 2023-03-08
Payer: MEDICARE

## 2023-03-08 DIAGNOSIS — R91.8 MASS OF LEFT LUNG: ICD-10-CM

## 2023-03-08 LAB
DLCO SINGLE BREATH LLN: 22.39
DLCO SINGLE BREATH PRE REF: 58.4 %
DLCO SINGLE BREATH REF: 29.32
DLCOC SBVA LLN: 2.73
DLCOC SBVA REF: 3.79
DLCOC SINGLE BREATH LLN: 22.39
DLCOC SINGLE BREATH REF: 29.32
DLCOCSBVAULN: 4.85
DLCOCSINGLEBREATHULN: 36.24
DLCOSINGLEBREATHULN: 36.24
DLCOVA LLN: 2.73
DLCOVA PRE REF: 90 %
DLCOVA PRE: 3.41 ML/(MIN*MMHG*L) (ref 2.73–4.85)
DLCOVA REF: 3.79
DLCOVAULN: 4.85
FEF 25 75 LLN: 0.75
FEF 25 75 PRE REF: 54.7 %
FEF 25 75 REF: 2.18
FET100 CHG: -0.2 %
FEV05 LLN: 1.76
FEV05 REF: 2.89
FEV1 CHG: 11.1 %
FEV1 FVC LLN: 63
FEV1 FVC PRE REF: 89.5 %
FEV1 FVC REF: 76
FEV1 LLN: 2
FEV1 PRE REF: 72.9 %
FEV1 REF: 2.93
FEV1 VOL CHG: 0.24
FVC CHG: 4.7 %
FVC LLN: 2.78
FVC PRE REF: 81 %
FVC REF: 3.89
FVC VOL CHG: 0.15
IVC PRE: 3 L (ref 2.78–5.01)
IVC SINGLE BREATH LLN: 2.78
IVC SINGLE BREATH PRE REF: 77.1 %
IVC SINGLE BREATH REF: 3.89
IVCSINGLEBREATHULN: 5.01
PEF LLN: 5.8
PEF PRE REF: 83.7 %
PEF REF: 8.68
PHYSICIAN COMMENT: ABNORMAL
POST FEF 25 75: 1.49 L/S (ref 0.75–4.36)
POST FET 100: 6.75 SEC
POST FEV1 FVC: 71.96 % (ref 62.55–87.51)
POST FEV1: 2.37 L (ref 2–3.8)
POST FEV5: 1.84 L (ref 1.76–4.03)
POST FVC: 3.3 L (ref 2.78–5.01)
POST PEF: 7.67 L/S (ref 5.8–11.57)
PRE DLCO: 17.13 ML/(MIN*MMHG) (ref 22.39–36.24)
PRE FEF 25 75: 1.19 L/S (ref 0.75–4.36)
PRE FET 100: 6.77 SEC
PRE FEV05 REF: 59.4 %
PRE FEV1 FVC: 67.8 % (ref 62.55–87.51)
PRE FEV1: 2.14 L (ref 2–3.8)
PRE FEV5: 1.72 L (ref 1.76–4.03)
PRE FVC: 3.15 L (ref 2.78–5.01)
PRE PEF: 7.27 L/S (ref 5.8–11.57)
VA PRE: 5.02 L (ref 7.59–7.59)
VA SINGLE BREATH LLN: 7.59
VA SINGLE BREATH PRE REF: 66.2 %
VA SINGLE BREATH REF: 7.59
VASINGLEBREATHULN: 7.59

## 2023-03-08 PROCEDURE — 94060 EVALUATION OF WHEEZING: CPT | Mod: PBBFAC | Performed by: INTERNAL MEDICINE

## 2023-03-08 PROCEDURE — 94729 PR C02/MEMBANE DIFFUSE CAPACITY: ICD-10-PCS | Mod: 26,S$PBB,, | Performed by: INTERNAL MEDICINE

## 2023-03-08 PROCEDURE — 94729 DIFFUSING CAPACITY: CPT | Mod: PBBFAC | Performed by: INTERNAL MEDICINE

## 2023-03-08 PROCEDURE — 94729 DIFFUSING CAPACITY: CPT | Mod: 26,S$PBB,, | Performed by: INTERNAL MEDICINE

## 2023-03-08 PROCEDURE — 94060 PR EVAL OF BRONCHOSPASM: ICD-10-PCS | Mod: 26,S$PBB,, | Performed by: INTERNAL MEDICINE

## 2023-03-08 PROCEDURE — 94060 EVALUATION OF WHEEZING: CPT | Mod: 26,S$PBB,, | Performed by: INTERNAL MEDICINE

## 2023-03-09 LAB
ADEQUACY: NORMAL
FINAL PATHOLOGIC DIAGNOSIS: NORMAL
Lab: NORMAL

## 2023-03-16 ENCOUNTER — ANESTHESIA EVENT (OUTPATIENT)
Dept: SURGERY | Facility: HOSPITAL | Age: 73
End: 2023-03-16
Payer: MEDICARE

## 2023-03-16 ENCOUNTER — TELEPHONE (OUTPATIENT)
Dept: PULMONOLOGY | Facility: CLINIC | Age: 73
End: 2023-03-16
Payer: MEDICARE

## 2023-03-16 NOTE — ANESTHESIA PREPROCEDURE EVALUATION
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  Recently hospitalized with pancreatitis and found an incidental lung mass.     Patient Active Problem List   Diagnosis    Acute pancreatitis    Diabetes mellitus without complication    Essential hypertension    Testicular abscess    Prostate cancer screening    Centrilobular emphysema    Mass of left lung       Review of patient's allergies indicates:  No Known Allergies    No current facility-administered medications on file prior to encounter.     Current Outpatient Medications on File Prior to Encounter   Medication Sig Dispense Refill    allopurinoL (ZYLOPRIM) 100 MG tablet Take 100 mg by mouth once daily.      amLODIPine (NORVASC) 2.5 MG tablet Take 2.5 mg by mouth once daily.      cloNIDine (CATAPRES) 0.2 MG tablet Take 0.2 mg by mouth once.      ezetimibe (ZETIA) 10 mg tablet Take 10 mg by mouth once daily.      finasteride (PROSCAR) 5 mg tablet Take 5 mg by mouth once daily.      metoprolol succinate (TOPROL-XL) 100 MG 24 hr tablet Take 100 mg by mouth once daily.      rosuvastatin (CRESTOR) 40 MG Tab Take 10 mg by mouth every evening.         Past Surgical History:   Procedure Laterality Date    ENDOSCOPIC ULTRASOUND OF UPPER GASTROINTESTINAL TRACT N/A 3/3/2023    Procedure: ULTRASOUND, UPPER GI TRACT, ENDOSCOPIC;  Surgeon: Cora Mcgrath MD;  Location: [facility];  Service: Endoscopy;  Laterality: N/A;  2/24/23-Instructions mailed to address on file-DS    PANCREATECTOMY         Social History     Socioeconomic History    Marital status: Unknown   Tobacco Use    Smoking status: Former     Packs/day: 1.50     Years: 54.00     Pack years: 81.00     Types: Cigarettes, Vaping with nicotine      Start date: 1968     Quit date: 10/20/2012     Years since quitting: 10.4    Smokeless tobacco: Former   Substance and Sexual Activity    Alcohol use: Yes     Alcohol/week: 3.0 standard drinks     Types: 3 Cans of beer per week     Comment: 3 a day    Drug use: Never         CBC: No results for input(s): WBC, RBC, HGB, HCT, PLT, MCV, MCH, MCHC in the last 72 hours.    CMP: No results for input(s): NA, K, CL, CO2, BUN, CREATININE, GLU, MG, PHOS, CALCIUM, ALBUMIN, PROT, ALKPHOS, ALT, AST, BILITOT in the last 72 hours.    INR  No results for input(s): PT, INR, PROTIME, APTT in the last 72 hours.        Diagnostic Studies:    PFT   Latest Reference Range & Units 23 16:42   Pre FVC 2.78 - 5.01 L 3.15   Pre FEV1 2.00 - 3.80 L 2.14   Pre FEV1 FVC 62.55 - 87.51 % 67.80   Post FVC 2.78 - 5.01 L 3.30   Post FEV1 2.00 - 3.80 L 2.37   Pre DLCO 22.39 - 36.24 ml/(min*mmHg) 17.13 (L)   (L): Data is abnormally low  Spirometry is normal. Spirometry remains unimproved following bronchodilator. DLCO is moderately decreased.     EKD Echo:  No results found for this or any previous visit.        Pre-op Assessment    I have reviewed the Patient Summary Reports.     I have reviewed the Nursing Notes. I have reviewed the NPO Status.   I have reviewed the Medications.     Review of Systems  Anesthesia Hx:  No problems with previous Anesthesia  History of prior surgery of interest to airway management or planning: Denies Family Hx of Anesthesia complications.   Denies Personal Hx of Anesthesia complications.   Social:  Alcohol Use, Non-Smoker    Hematology/Oncology:  Hematology Normal   Oncology Normal     EENT/Dental:EENT/Dental Normal   Cardiovascular:   Exercise tolerance: good Hypertension    Pulmonary:   COPD    Renal/:  Renal/ Normal     Hepatic/GI:  Hepatic/GI Normal    Neurological:  Neurology Normal    Endocrine:   Diabetes, type 2    Psych:  Psychiatric Normal           Physical Exam  General: Well  nourished, Cooperative, Alert and Oriented    Airway:  Mallampati: II   Mouth Opening: Normal  TM Distance: Normal  Tongue: Normal  Neck ROM: Normal ROM    Dental:  Intact        Anesthesia Plan  Type of Anesthesia, risks & benefits discussed:    Anesthesia Type: Gen ETT  Intra-op Monitoring Plan: Standard ASA Monitors  Induction:  IV  ASA Score: 3  Day of Surgery Review of History & Physical: H&P Update referred to the surgeon/provider.    Ready For Surgery From Anesthesia Perspective.     .

## 2023-03-16 NOTE — TELEPHONE ENCOUNTER
Patient advised that he has to arrive for 5:30am for his procedure. Patient states he has to call his ride to make sure he can come in for that time. Patient reminded not to have anything to eat or drink after midnight. Patient verbalized understanding. When asked about ASA and blood thinners patient denied the use.

## 2023-03-17 ENCOUNTER — ANESTHESIA (OUTPATIENT)
Dept: SURGERY | Facility: HOSPITAL | Age: 73
End: 2023-03-17
Payer: MEDICARE

## 2023-03-17 ENCOUNTER — HOSPITAL ENCOUNTER (OUTPATIENT)
Facility: HOSPITAL | Age: 73
Discharge: HOME | End: 2023-03-17
Attending: INTERNAL MEDICINE | Admitting: INTERNAL MEDICINE
Payer: MEDICARE

## 2023-03-17 VITALS
WEIGHT: 218.94 LBS | RESPIRATION RATE: 18 BRPM | HEART RATE: 64 BPM | TEMPERATURE: 98 F | SYSTOLIC BLOOD PRESSURE: 134 MMHG | HEIGHT: 74 IN | OXYGEN SATURATION: 98 % | DIASTOLIC BLOOD PRESSURE: 71 MMHG | BODY MASS INDEX: 28.1 KG/M2

## 2023-03-17 DIAGNOSIS — R91.8 MASS OF LEFT LUNG: ICD-10-CM

## 2023-03-17 LAB
GRAM STN SPEC: NORMAL
GRAM STN SPEC: NORMAL
POCT GLUCOSE: 111 MG/DL (ref 70–110)

## 2023-03-17 PROCEDURE — 87015 SPECIMEN INFECT AGNT CONCNTJ: CPT | Performed by: INTERNAL MEDICINE

## 2023-03-17 PROCEDURE — 31629 BRONCHOSCOPY/NEEDLE BX EACH: CPT | Mod: 59,LT,GC, | Performed by: INTERNAL MEDICINE

## 2023-03-17 PROCEDURE — D9220A PRA ANESTHESIA: Mod: ,,, | Performed by: ANESTHESIOLOGY

## 2023-03-17 PROCEDURE — 31652 BRONCH EBUS SAMPLNG 1/2 NODE: CPT | Mod: GC,,, | Performed by: INTERNAL MEDICINE

## 2023-03-17 PROCEDURE — 87070 CULTURE OTHR SPECIMN AEROBIC: CPT | Performed by: INTERNAL MEDICINE

## 2023-03-17 PROCEDURE — 37000009 HC ANESTHESIA EA ADD 15 MINS: Performed by: INTERNAL MEDICINE

## 2023-03-17 PROCEDURE — 31654 BRONCH EBUS IVNTJ PERPH LES: CPT | Mod: GC,,, | Performed by: INTERNAL MEDICINE

## 2023-03-17 PROCEDURE — 36000709 HC OR TIME LEV III EA ADD 15 MIN: Performed by: INTERNAL MEDICINE

## 2023-03-17 PROCEDURE — 36000708 HC OR TIME LEV III 1ST 15 MIN: Performed by: INTERNAL MEDICINE

## 2023-03-17 PROCEDURE — 31627 NAVIGATIONAL BRONCHOSCOPY: CPT | Mod: GC,,, | Performed by: INTERNAL MEDICINE

## 2023-03-17 PROCEDURE — 31624 DX BRONCHOSCOPE/LAVAGE: CPT | Mod: 51,LT,GC, | Performed by: INTERNAL MEDICINE

## 2023-03-17 PROCEDURE — 31652 PR BRONCH W/ EBUS, SAMPLING 1 OR 2 NODES, INCL GUIDE: ICD-10-PCS | Mod: GC,,, | Performed by: INTERNAL MEDICINE

## 2023-03-17 PROCEDURE — 31628 PR BRONCHOSCOPY,TRANSBRONCH BIOPSY: ICD-10-PCS | Mod: 51,LT,GC, | Performed by: INTERNAL MEDICINE

## 2023-03-17 PROCEDURE — D9220A PRA ANESTHESIA: ICD-10-PCS | Mod: ,,, | Performed by: ANESTHESIOLOGY

## 2023-03-17 PROCEDURE — 63600175 PHARM REV CODE 636 W HCPCS: Performed by: ANESTHESIOLOGY

## 2023-03-17 PROCEDURE — 31624 PR BRONCHOSCOPY,DIAG2STIC W LAVAGE: ICD-10-PCS | Mod: 51,LT,GC, | Performed by: INTERNAL MEDICINE

## 2023-03-17 PROCEDURE — 87206 SMEAR FLUORESCENT/ACID STAI: CPT | Performed by: INTERNAL MEDICINE

## 2023-03-17 PROCEDURE — 87205 SMEAR GRAM STAIN: CPT | Performed by: INTERNAL MEDICINE

## 2023-03-17 PROCEDURE — 82962 GLUCOSE BLOOD TEST: CPT | Performed by: INTERNAL MEDICINE

## 2023-03-17 PROCEDURE — 87116 MYCOBACTERIA CULTURE: CPT | Performed by: INTERNAL MEDICINE

## 2023-03-17 PROCEDURE — 37000008 HC ANESTHESIA 1ST 15 MINUTES: Performed by: INTERNAL MEDICINE

## 2023-03-17 PROCEDURE — 31629 PR BRONCHOSCOPY,TRANSBRON ASPIR BX: ICD-10-PCS | Mod: 59,LT,GC, | Performed by: INTERNAL MEDICINE

## 2023-03-17 PROCEDURE — 31628 BRONCHOSCOPY/LUNG BX EACH: CPT | Mod: 51,LT,GC, | Performed by: INTERNAL MEDICINE

## 2023-03-17 PROCEDURE — 87075 CULTR BACTERIA EXCEPT BLOOD: CPT | Performed by: INTERNAL MEDICINE

## 2023-03-17 PROCEDURE — 87102 FUNGUS ISOLATION CULTURE: CPT | Performed by: INTERNAL MEDICINE

## 2023-03-17 PROCEDURE — 31627 PR BRONCHOSCOPY,COMPUTER ASSIST/IMAGE-GUIDED NAVIGATION: ICD-10-PCS | Mod: GC,,, | Performed by: INTERNAL MEDICINE

## 2023-03-17 PROCEDURE — 71000044 HC DOSC ROUTINE RECOVERY FIRST HOUR: Performed by: INTERNAL MEDICINE

## 2023-03-17 PROCEDURE — 27201423 OPTIME MED/SURG SUP & DEVICES STERILE SUPPLY: Performed by: INTERNAL MEDICINE

## 2023-03-17 PROCEDURE — 25000003 PHARM REV CODE 250: Performed by: ANESTHESIOLOGY

## 2023-03-17 PROCEDURE — 31654 PR BRONCH W/ EBUS, DIAG OR THERA INTERVENTION PERIPHERAL LESION(S), INCL GUID, ADD ON CODE: ICD-10-PCS | Mod: GC,,, | Performed by: INTERNAL MEDICINE

## 2023-03-17 PROCEDURE — 71000015 HC POSTOP RECOV 1ST HR: Performed by: INTERNAL MEDICINE

## 2023-03-17 PROCEDURE — C1726 CATH, BAL DIL, NON-VASCULAR: HCPCS | Performed by: INTERNAL MEDICINE

## 2023-03-17 RX ORDER — FENTANYL CITRATE 50 UG/ML
25 INJECTION, SOLUTION INTRAMUSCULAR; INTRAVENOUS EVERY 5 MIN PRN
Status: DISCONTINUED | OUTPATIENT
Start: 2023-03-17 | End: 2023-03-17 | Stop reason: HOSPADM

## 2023-03-17 RX ORDER — HALOPERIDOL 5 MG/ML
0.5 INJECTION INTRAMUSCULAR EVERY 10 MIN PRN
Status: DISCONTINUED | OUTPATIENT
Start: 2023-03-17 | End: 2023-03-17 | Stop reason: HOSPADM

## 2023-03-17 RX ORDER — DEXAMETHASONE SODIUM PHOSPHATE 4 MG/ML
INJECTION, SOLUTION INTRA-ARTICULAR; INTRALESIONAL; INTRAMUSCULAR; INTRAVENOUS; SOFT TISSUE
Status: DISCONTINUED | OUTPATIENT
Start: 2023-03-17 | End: 2023-03-17

## 2023-03-17 RX ORDER — ONDANSETRON 2 MG/ML
INJECTION INTRAMUSCULAR; INTRAVENOUS
Status: DISCONTINUED | OUTPATIENT
Start: 2023-03-17 | End: 2023-03-17

## 2023-03-17 RX ORDER — PROPOFOL 10 MG/ML
VIAL (ML) INTRAVENOUS CONTINUOUS PRN
Status: DISCONTINUED | OUTPATIENT
Start: 2023-03-17 | End: 2023-03-17

## 2023-03-17 RX ORDER — FENTANYL CITRATE 50 UG/ML
INJECTION, SOLUTION INTRAMUSCULAR; INTRAVENOUS
Status: DISCONTINUED | OUTPATIENT
Start: 2023-03-17 | End: 2023-03-17

## 2023-03-17 RX ORDER — MIDAZOLAM HYDROCHLORIDE 1 MG/ML
INJECTION, SOLUTION INTRAMUSCULAR; INTRAVENOUS
Status: DISCONTINUED | OUTPATIENT
Start: 2023-03-17 | End: 2023-03-17

## 2023-03-17 RX ORDER — PROPOFOL 10 MG/ML
VIAL (ML) INTRAVENOUS
Status: DISCONTINUED | OUTPATIENT
Start: 2023-03-17 | End: 2023-03-17

## 2023-03-17 RX ORDER — LIDOCAINE HYDROCHLORIDE 20 MG/ML
INJECTION INTRAVENOUS
Status: DISCONTINUED | OUTPATIENT
Start: 2023-03-17 | End: 2023-03-17

## 2023-03-17 RX ORDER — SODIUM CHLORIDE 0.9 % (FLUSH) 0.9 %
10 SYRINGE (ML) INJECTION
Status: DISCONTINUED | OUTPATIENT
Start: 2023-03-17 | End: 2023-03-17 | Stop reason: HOSPADM

## 2023-03-17 RX ORDER — METOCLOPRAMIDE HYDROCHLORIDE 5 MG/ML
10 INJECTION INTRAMUSCULAR; INTRAVENOUS EVERY 10 MIN PRN
Status: DISCONTINUED | OUTPATIENT
Start: 2023-03-17 | End: 2023-03-17 | Stop reason: HOSPADM

## 2023-03-17 RX ORDER — ROCURONIUM BROMIDE 10 MG/ML
INJECTION, SOLUTION INTRAVENOUS
Status: DISCONTINUED | OUTPATIENT
Start: 2023-03-17 | End: 2023-03-17

## 2023-03-17 RX ADMIN — ROCURONIUM BROMIDE 50 MG: 10 INJECTION INTRAVENOUS at 07:03

## 2023-03-17 RX ADMIN — DEXAMETHASONE SODIUM PHOSPHATE 8 MG: 4 INJECTION, SOLUTION INTRAMUSCULAR; INTRAVENOUS at 07:03

## 2023-03-17 RX ADMIN — FENTANYL CITRATE 100 MCG: 50 INJECTION, SOLUTION INTRAMUSCULAR; INTRAVENOUS at 07:03

## 2023-03-17 RX ADMIN — LIDOCAINE HYDROCHLORIDE 100 MG: 20 INJECTION INTRAVENOUS at 07:03

## 2023-03-17 RX ADMIN — SUGAMMADEX 200 MG: 100 INJECTION, SOLUTION INTRAVENOUS at 08:03

## 2023-03-17 RX ADMIN — Medication 150 MCG/KG/MIN: at 07:03

## 2023-03-17 RX ADMIN — MIDAZOLAM HYDROCHLORIDE 2 MG: 1 INJECTION, SOLUTION INTRAMUSCULAR; INTRAVENOUS at 06:03

## 2023-03-17 RX ADMIN — PROPOFOL 180 MG: 10 INJECTION, EMULSION INTRAVENOUS at 07:03

## 2023-03-17 RX ADMIN — SODIUM CHLORIDE: 0.9 INJECTION, SOLUTION INTRAVENOUS at 06:03

## 2023-03-17 RX ADMIN — ONDANSETRON 4 MG: 2 INJECTION INTRAMUSCULAR; INTRAVENOUS at 07:03

## 2023-03-17 NOTE — DISCHARGE SUMMARY
[facility] - [facility] (2nd Fl)  Discharge Note  Short Stay    Procedure(s) (LRB):  ROBOTIC BRONCHOSCOPY (N/A)      OUTCOME: Patient tolerated treatment/procedure well without complication and is now ready for discharge.    Patient came in for navigational bronchoscopy with TBNA and TBBx EBUS with TBNA. Patient tolerated procedure well without complications.    DISPOSITION: Home or Self Care    FINAL DIAGNOSIS:  <principal problem not specified>    FOLLOWUP:  Will call patient with results    DISCHARGE INSTRUCTIONS:    Discharge Procedure Orders   Diet Adult Regular     Activity as tolerated        Tamiko Martinez MD  Pulmonary and Critical Care Fellow  03/17/2023  8:43 AM

## 2023-03-17 NOTE — TRANSFER OF CARE
"Anesthesia Transfer of Care Note    Patient: Feng Thakkar    Procedure(s) Performed: Procedure(s) (LRB):  ROBOTIC BRONCHOSCOPY (N/A)    Patient [facility]: [facility]    Anesthesia Type: general    Transport from OR: Transported from OR on 6-10 L/min O2 by face mask with adequate spontaneous ventilation    Post pain: adequate analgesia    Post assessment: no apparent anesthetic complications    Post vital signs: stable    Level of consciousness: awake and alert    Nausea/Vomiting: no nausea/vomiting    Complications: none    Transfer of care protocol was followed      Last vitals:   Visit Vitals  BP (!) 161/90   Pulse 66   Temp 36.7 °C (98.1 °F) (Oral)   Resp 16   Ht 6' 2" (1.88 m)   Wt 99.3 kg (218 lb 14.7 oz)   SpO2 99%   BMI 28.11 kg/m²     "

## 2023-03-17 NOTE — INTERVAL H&P NOTE
The patient has been examined and the H&P has been reviewed:    I concur with the findings and no changes have occurred since H&P was written.    Anesthesia/Surgery risks, benefits and alternative options discussed and understood by patient/family.          I have explained the risks, benefits and alternatives of the procedure in detail.  The patient voices understanding and all questions have been answered.  The patient agrees to proceed as planned.

## 2023-03-17 NOTE — ANESTHESIA PROCEDURE NOTES
Intubation    Date/Time: 3/17/2023 7:11 AM  Performed by: Bill Singletary III, MD  Authorized by: Bill Singletary III, MD     Intubation:     Induction:  Intravenous    Intubated:  Postinduction    Mask Ventilation:  Easy mask    Attempts:  1    Attempted By:  Staff anesthesiologist    Method of Intubation:  Video laryngoscopy    Blade:  Hidalgo 3    Laryngeal View Grade: Grade I - full view of cords      Difficult Airway Encountered?: No      Complications:  None    Airway Device:  Oral endotracheal tube    Airway Device Size:  9.0    Style/Cuff Inflation:  Cuffed (inflated to minimal occlusive pressure)    Secured at:  The lips    Placement Verified By:  Capnometry    Complicating Factors:  None    Findings Post-Intubation:  BS equal bilateral and atraumatic/condition of teeth unchanged

## 2023-03-20 LAB — BACTERIA SPEC AEROBE CULT: NORMAL

## 2023-03-20 NOTE — ANESTHESIA POSTPROCEDURE EVALUATION
Anesthesia Post Evaluation    Patient: Feng Thakkar    Procedure(s) Performed: Procedure(s) (LRB):  ROBOTIC BRONCHOSCOPY (N/A)    Final Anesthesia Type: general      Patient location during evaluation: PACU  Patient participation: Yes- Able to Participate  Level of consciousness: awake and alert  Post-procedure vital signs: reviewed and stable  Pain management: adequate  Airway patency: patent    PONV status at discharge: No PONV  Anesthetic complications: no      Cardiovascular status: blood pressure returned to baseline  Respiratory status: unassisted  Hydration status: euvolemic  Follow-up not needed.          Vitals Value Taken Time   /71 03/17/23 0946   Temp  03/20/23 1013   Pulse 60 03/17/23 0951   Resp 18 03/17/23 0945   SpO2 100 % 03/17/23 0951   Vitals shown include unvalidated device data.      No case tracking events are documented in the log.      Pain/Genny Score: No data recorded

## 2023-03-21 LAB
ADEQUACY: ABNORMAL
BACTERIA SPEC ANAEROBE CULT: NORMAL
FINAL PATHOLOGIC DIAGNOSIS: ABNORMAL
Lab: ABNORMAL

## 2023-03-23 DIAGNOSIS — R91.8 MASS OF LEFT LUNG: Primary | ICD-10-CM

## 2023-03-24 ENCOUNTER — TELEPHONE (OUTPATIENT)
Dept: HEMATOLOGY/ONCOLOGY | Facility: CLINIC | Age: 73
End: 2023-03-24
Payer: MEDICARE

## 2023-03-28 ENCOUNTER — TELEPHONE (OUTPATIENT)
Dept: PULMONOLOGY | Facility: CLINIC | Age: 73
End: 2023-03-28
Payer: MEDICARE

## 2023-03-28 ENCOUNTER — TELEPHONE (OUTPATIENT)
Dept: HEMATOLOGY/ONCOLOGY | Facility: CLINIC | Age: 73
End: 2023-03-28
Payer: MEDICARE

## 2023-03-28 DIAGNOSIS — C34.90 SQUAMOUS CELL CARCINOMA OF LUNG, UNSPECIFIED LATERALITY: Primary | ICD-10-CM

## 2023-03-28 NOTE — TELEPHONE ENCOUNTER
Patient notified of results.  Stage III squamous cell lung cancer.  Patient would prefer to be seen in [facility].  Consult placed for heme/onc and rad/onc.

## 2023-03-29 ENCOUNTER — TELEPHONE (OUTPATIENT)
Dept: HEMATOLOGY/ONCOLOGY | Facility: CLINIC | Age: 73
End: 2023-03-29
Payer: MEDICARE

## 2023-03-29 NOTE — NURSING
[facility] Dr[facility] Alb[facility].  Patient accepted first available appt of Friday, 3/31 at 1400 with Dr. Lawrence.  All questions and concerns were addressed; patient is aware he will be contacted about [facility]  Date, time, and location confirmed.     Oncology Navigation   Intake  Date of Diagnosis: 23  Cancer Type: Thoracic  Internal / External Referral: Internal  Date of Referral: 23  Initial Nurse Navigator Contact: 23  Referral to Initial Contact Timeline (days): 0  Date Worked: 23  First Appointment Available: 23  Appointment Date: 23  First Available Date vs. Scheduled Date (days): 0  Reason if booked > 7 days after scheduling: Specific provider / access     Treatment  Current Status: Active       Medical Oncologist: Dr. Lawrence  Consult Date: 23       Procedures: Bronchoscopy; Genomic testing  Bronchoscopy Schedule Date: 23  CT Schedule Date: 23  EUS / EGD Date: 23  Genomic Testing Date Sent: 23       EGFR mutation: Pending  ALK mutation: Pending  ROS-1: Pending  PD-L1: Positive  BRAF V600 mutations: Pending  NTRK: Pending       Barriers of Care: Other  Other Barriers: Education     Acuity  Treatment Tolerability: Has not started treatment yet/treatment fully completed and side effects resolved  ECO  Comorbidities in Medical History: 1  Hospitalization Within the Past Month: 1   Needed: 0  Support: 1  Verbalizes Financial Concerns: 0  Transportation: 0  History of noncompliance/frequent no shows and cancellations: 2  Verbalizes the need for more education: 1  Navigation Acuity: 5     Follow Up  No follow-ups on file.

## 2023-03-31 ENCOUNTER — OFFICE VISIT (OUTPATIENT)
Dept: HEMATOLOGY/ONCOLOGY | Facility: CLINIC | Age: 73
End: 2023-03-31
Payer: MEDICARE

## 2023-03-31 ENCOUNTER — LAB VISIT (OUTPATIENT)
Dept: LAB | Facility: HOSPITAL | Age: 73
End: 2023-03-31
Attending: INTERNAL MEDICINE
Payer: MEDICARE

## 2023-03-31 VITALS
RESPIRATION RATE: 14 BRPM | WEIGHT: 219.38 LBS | SYSTOLIC BLOOD PRESSURE: 114 MMHG | BODY MASS INDEX: 28.15 KG/M2 | DIASTOLIC BLOOD PRESSURE: 70 MMHG | HEART RATE: 66 BPM | HEIGHT: 74 IN | TEMPERATURE: 97 F | OXYGEN SATURATION: 98 %

## 2023-03-31 DIAGNOSIS — C34.90 SQUAMOUS CELL CARCINOMA OF LUNG, UNSPECIFIED LATERALITY: ICD-10-CM

## 2023-03-31 DIAGNOSIS — I10 HYPERTENSION, UNSPECIFIED TYPE: ICD-10-CM

## 2023-03-31 DIAGNOSIS — Z79.4 TYPE 2 DIABETES MELLITUS WITH DIABETIC POLYNEUROPATHY, WITH LONG-TERM CURRENT USE OF INSULIN: ICD-10-CM

## 2023-03-31 DIAGNOSIS — E11.42 TYPE 2 DIABETES MELLITUS WITH DIABETIC POLYNEUROPATHY, WITH LONG-TERM CURRENT USE OF INSULIN: ICD-10-CM

## 2023-03-31 DIAGNOSIS — M10.9 GOUT, UNSPECIFIED CAUSE, UNSPECIFIED CHRONICITY, UNSPECIFIED SITE: ICD-10-CM

## 2023-03-31 DIAGNOSIS — G62.9 NEUROPATHY: ICD-10-CM

## 2023-03-31 DIAGNOSIS — C34.90 SQUAMOUS CELL CARCINOMA OF LUNG, UNSPECIFIED LATERALITY: Primary | ICD-10-CM

## 2023-03-31 DIAGNOSIS — R91.8 LUNG MASS: ICD-10-CM

## 2023-03-31 DIAGNOSIS — E78.5 HYPERLIPIDEMIA, UNSPECIFIED HYPERLIPIDEMIA TYPE: ICD-10-CM

## 2023-03-31 DIAGNOSIS — C34.90 MALIGNANT NEOPLASM OF UNSPECIFIED PART OF UNSPECIFIED BRONCHUS OR LUNG: ICD-10-CM

## 2023-03-31 DIAGNOSIS — K86.89 PANCREATIC MASS: ICD-10-CM

## 2023-03-31 LAB
ALBUMIN SERPL BCP-MCNC: 3.7 G/DL (ref 3.5–5.2)
ALP SERPL-CCNC: 54 U/L (ref 55–135)
ALT SERPL W/O P-5'-P-CCNC: 27 U/L (ref 10–44)
ANION GAP SERPL CALC-SCNC: 10 MMOL/L (ref 8–16)
AST SERPL-CCNC: 20 U/L (ref 10–40)
BASOPHILS # BLD AUTO: 0.02 K/UL (ref 0–0.2)
BASOPHILS NFR BLD: 0.2 % (ref 0–1.9)
BILIRUB SERPL-MCNC: 0.3 MG/DL (ref 0.1–1)
BUN SERPL-MCNC: 16 MG/DL (ref 8–23)
CALCIUM SERPL-MCNC: 10.4 MG/DL (ref 8.7–10.5)
CHLORIDE SERPL-SCNC: 104 MMOL/L (ref 95–110)
CO2 SERPL-SCNC: 24 MMOL/L (ref 23–29)
CREAT SERPL-MCNC: 1.5 MG/DL (ref 0.5–1.4)
DIFFERENTIAL METHOD: ABNORMAL
EOSINOPHIL # BLD AUTO: 0.4 K/UL (ref 0–0.5)
EOSINOPHIL NFR BLD: 3.3 % (ref 0–8)
ERYTHROCYTE [DISTWIDTH] IN BLOOD BY AUTOMATED COUNT: 14.9 % (ref 11.5–14.5)
EST. GFR  (NO RACE VARIABLE): 49.2 ML/MIN/1.73 M^2
GLUCOSE SERPL-MCNC: 99 MG/DL (ref 70–110)
HCT VFR BLD AUTO: 43.2 % (ref 40–54)
HGB BLD-MCNC: 13.8 G/DL (ref 14–18)
IMM GRANULOCYTES # BLD AUTO: 0.03 K/UL (ref 0–0.04)
IMM GRANULOCYTES NFR BLD AUTO: 0.3 % (ref 0–0.5)
LYMPHOCYTES # BLD AUTO: 4.6 K/UL (ref 1–4.8)
LYMPHOCYTES NFR BLD: 41.6 % (ref 18–48)
MCH RBC QN AUTO: 27.4 PG (ref 27–31)
MCHC RBC AUTO-ENTMCNC: 31.9 G/DL (ref 32–36)
MCV RBC AUTO: 86 FL (ref 82–98)
MONOCYTES # BLD AUTO: 0.8 K/UL (ref 0.3–1)
MONOCYTES NFR BLD: 7.6 % (ref 4–15)
NEUTROPHILS # BLD AUTO: 5.2 K/UL (ref 1.8–7.7)
NEUTROPHILS NFR BLD: 47 % (ref 38–73)
NRBC BLD-RTO: 0 /100 WBC
PLATELET # BLD AUTO: 382 K/UL (ref 150–450)
PMV BLD AUTO: 9.6 FL (ref 9.2–12.9)
POTASSIUM SERPL-SCNC: 4.6 MMOL/L (ref 3.5–5.1)
PROT SERPL-MCNC: 8.1 G/DL (ref 6–8.4)
RBC # BLD AUTO: 5.04 M/UL (ref 4.6–6.2)
SODIUM SERPL-SCNC: 138 MMOL/L (ref 136–145)
WBC # BLD AUTO: 10.99 K/UL (ref 3.9–12.7)

## 2023-03-31 PROCEDURE — 99215 OFFICE O/P EST HI 40 MIN: CPT | Mod: PBBFAC,PN | Performed by: INTERNAL MEDICINE

## 2023-03-31 PROCEDURE — 99205 PR OFFICE/OUTPT VISIT, NEW, LEVL V, 60-74 MIN: ICD-10-PCS | Mod: S$PBB,,, | Performed by: INTERNAL MEDICINE

## 2023-03-31 PROCEDURE — 85025 COMPLETE CBC W/AUTO DIFF WBC: CPT | Mod: PN | Performed by: INTERNAL MEDICINE

## 2023-03-31 PROCEDURE — 99205 OFFICE O/P NEW HI 60 MIN: CPT | Mod: S$PBB,,, | Performed by: INTERNAL MEDICINE

## 2023-03-31 PROCEDURE — 80053 COMPREHEN METABOLIC PANEL: CPT | Mod: PN | Performed by: INTERNAL MEDICINE

## 2023-03-31 PROCEDURE — 99999 PR PBB SHADOW E&M-EST. PATIENT-LVL V: CPT | Mod: PBBFAC,,, | Performed by: INTERNAL MEDICINE

## 2023-03-31 PROCEDURE — 99999 PR PBB SHADOW E&M-EST. PATIENT-LVL V: ICD-10-PCS | Mod: PBBFAC,,, | Performed by: INTERNAL MEDICINE

## 2023-03-31 PROCEDURE — 36415 COLL VENOUS BLD VENIPUNCTURE: CPT | Mod: PN | Performed by: INTERNAL MEDICINE

## 2023-03-31 RX ORDER — DAPAGLIFLOZIN 10 MG/1
10 TABLET, FILM COATED ORAL EVERY MORNING
COMMUNITY
Start: 2023-03-06

## 2023-03-31 RX ORDER — BLOOD SUGAR DIAGNOSTIC
STRIP MISCELLANEOUS
COMMUNITY
Start: 2023-01-02

## 2023-03-31 RX ORDER — EXENATIDE 2 MG/.85ML
2 INJECTION, SUSPENSION, EXTENDED RELEASE SUBCUTANEOUS
COMMUNITY
Start: 2023-03-28

## 2023-03-31 RX ORDER — INSULIN GLARGINE 300 U/ML
30 INJECTION, SOLUTION SUBCUTANEOUS NIGHTLY
COMMUNITY
Start: 2022-12-14 | End: 2023-12-26 | Stop reason: SDUPTHER

## 2023-03-31 NOTE — PROGRESS NOTES
Path returned benign and suggestive of splenic tissue - likely a splenule at tail of pancreas. Probably does not need further evaluation given lack of alarming findings on pathology.

## 2023-03-31 NOTE — PROGRESS NOTES
PATIENT: Feng Thakkar  MRN: [id number]  DATE: 3/31/2023      Diagnosis:   1. Squamous cell carcinoma of lung, unspecified laterality    2. Malignant neoplasm of unspecified part of unspecified bronchus or lung    3. Pancreatic mass    4. Hypertension, unspecified type    5. Gout, unspecified cause, unspecified chronicity, unspecified site    6. Hyperlipidemia, unspecified hyperlipidemia type    7. Type 2 diabetes mellitus with diabetic polyneuropathy, with long-term current use of insulin    8. Neuropathy        Chief Complaint: Establish Care (New pt referred for squamous cell carcinoma of lung, unspecified laterality)      Oncologic History:      Oncologic History     Oncologic Treatment     Pathology           Subjective:    Initial History: Mr. Thakkar is a 72 y.o. male with Gout, HLD, HTN who presents for work up of NSCLC.  The patient initially developed abdominal pain on 01/05/2023 and underwent CT of the abdomen showing a mass in the left lung base measuring 3.9 x 2.9 cm; 3 x 3.8 cm mass along the pancreatic tail; and shotty retroperitoneal lymph nodes.  The patient underwent repeat CT abdomen and pelvis on 02/24/2023 showing a 4.3 x 4 solid enhancing mass of the left lung base; thickened bladder wall; prostate gland measuring 45.1 x 4.6 cm; abnormal sclerosis in the left femoral head measuring 2.8 x 1.4 x 1.3 cm; and additional soft tissue densities in the posterior left subdiaphragmatic region measuring up to 16 mm.  CT chest on 03/01/2023 showed a 2 cm low-density lesion in the right lobe of the thyroid gland; 4.5 x 4.3 x 3.8 cm mass in the left lower lobe; several micro nodules; Na soft tissue mass adjacent to the pancreatic tail.  Patient underwent EUS on 03/03/2023 showing a 3.5 cm and 1.8 cm round pancreatic tail lesion.  Biopsy returned results showing no evidence of malignancy and abundant hematopoietic elements and dendritic cells.  Patient then underwent EBUS under the care of Dr. Albright on 03/17/2023  showing squamous cell carcinoma in biopsy of the left lower lung lesion; squamous cell carcinoma and station 7 lymph node; positive 11 L lymph node for squamous cell carcinoma.    Review of Systems   Constitutional:  Negative for chills and fever.   HENT:  Positive for congestion. Negative for sore throat and trouble swallowing.    Eyes:  Negative for photophobia and visual disturbance.   Respiratory:  Positive for cough (on occasion). Negative for chest tightness and shortness of breath.    Cardiovascular:  Negative for chest pain, palpitations and leg swelling.   Gastrointestinal:  Negative for abdominal pain, constipation, diarrhea, nausea and vomiting.   Endocrine: Negative for cold intolerance and heat intolerance.   Genitourinary:  Negative for difficulty urinating, dysuria and hematuria.   Musculoskeletal:  Negative for arthralgias, back pain and myalgias.   Skin:  Negative for color change and rash.   Neurological:  Positive for numbness (feet). Negative for dizziness, light-headedness and headaches.   Hematological:  Negative for adenopathy. Does not bruise/bleed easily.     Past Medical History:   Past Medical History:   Diagnosis Date    Gout, unspecified     High cholesterol     HTN (hypertension)     Lung cancer        Past Surgical HIstory:   Past Surgical History:   Procedure Laterality Date    ENDOSCOPIC ULTRASOUND OF UPPER GASTROINTESTINAL TRACT N/A 3/3/2023    Procedure: ULTRASOUND, UPPER GI TRACT, ENDOSCOPIC;  Surgeon: Cora Mcgrath MD;  Location: [facility];  Service: Endoscopy;  Laterality: N/A;  2/24/23-Instructions mailed to address on file-DS    PANCREATECTOMY      ROBOTIC BRONCHOSCOPY N/A 3/17/2023    Procedure: ROBOTIC BRONCHOSCOPY;  Surgeon: Cristiane Albright MD;  Location: [address];  Service: Pulmonary;  Laterality: N/A;       Family History:   Family History   Problem Relation Age of Onset    Breast cancer Sister         60       Social History:  reports that he quit smoking about 10  years ago. His smoking use included cigarettes and vaping with nicotine. He started smoking about 54 years ago. He has a 81.00 pack-year smoking history. He has quit using smokeless tobacco. He reports current alcohol use of about 3.0 standard drinks per week. He reports that he does not use drugs.    Allergies:  Review of patient's allergies indicates:  No Known Allergies    Medications:  Current Outpatient Medications   Medication Sig Dispense Refill    allopurinoL (ZYLOPRIM) 100 MG tablet Take 100 mg by mouth once daily.      amLODIPine (NORVASC) 2.5 MG tablet Take 2.5 mg by mouth once daily.      BYDUREON BCISE 2 mg/0.85 mL AtIn Inject 2 mg into the skin every 7 days.      cloNIDine (CATAPRES) 0.2 MG tablet Take 0.2 mg by mouth once.      ezetimibe (ZETIA) 10 mg tablet Take 10 mg by mouth once daily.      FARXIGA 10 mg tablet Take 10 mg by mouth every morning.      finasteride (PROSCAR) 5 mg tablet Take 5 mg by mouth once daily.      FREESTYLE TEST Strp USE 1 STRIP TO CHECK GLUCOSE ONCE DAILY      metoprolol succinate (TOPROL-XL) 100 MG 24 hr tablet Take 100 mg by mouth once daily.      rosuvastatin (CRESTOR) 40 MG Tab Take 10 mg by mouth every evening.      TOUJEO MAX U-300 SOLOSTAR 300 unit/mL (3 mL) insulin pen Inject 30 Units into the skin.       No current facility-administered medications for this visit.       Review of Systems   Constitutional:  Negative for chills and fever.   HENT:  Positive for congestion. Negative for sore throat and trouble swallowing.    Eyes:  Negative for photophobia and visual disturbance.   Respiratory:  Positive for cough (on occasion). Negative for chest tightness and shortness of breath.    Cardiovascular:  Negative for chest pain, palpitations and leg swelling.   Gastrointestinal:  Negative for abdominal pain, constipation, diarrhea, nausea and vomiting.   Endocrine: Negative for cold intolerance and heat intolerance.   Genitourinary:  Negative for difficulty urinating,  "dysuria and hematuria.   Musculoskeletal:  Negative for arthralgias, back pain and myalgias.   Skin:  Negative for color change and rash.   Neurological:  Positive for numbness (feet). Negative for dizziness, light-headedness and headaches.   Hematological:  Negative for adenopathy. Does not bruise/bleed easily.     ECOG Performance Status: 1   Objective:      Vitals:   Vitals:    03/31/23 1351   BP: 114/70   BP Location: Right arm   Patient Position: Sitting   BP Method: Medium (Manual)   Pulse: 66   Resp: 14   Temp: 97.4 °F (36.3 °C)   TempSrc: Temporal   SpO2: 98%   Weight: 99.5 kg (219 lb 5.7 oz)   Height: 6' 2" (1.88 m)     Physical Exam  Constitutional:       General: He is not in acute distress.     Appearance: He is well-developed. He is not diaphoretic.   HENT:      Head: Normocephalic and atraumatic.   Eyes:      General: No scleral icterus.        Right eye: No discharge.         Left eye: No discharge.   Cardiovascular:      Rate and Rhythm: Normal rate and regular rhythm.      Heart sounds: Normal heart sounds. No murmur heard.    No friction rub. No gallop.   Pulmonary:      Effort: Pulmonary effort is normal. No respiratory distress.      Breath sounds: Normal breath sounds. No wheezing or rales.   Chest:      Chest wall: No tenderness.   Abdominal:      General: Bowel sounds are normal. There is no distension.      Palpations: Abdomen is soft. There is no mass.      Tenderness: There is no abdominal tenderness. There is no rebound.   Musculoskeletal:         General: No tenderness. Normal range of motion.   Lymphadenopathy:      Cervical: No cervical adenopathy.      Upper Body:      Right upper body: No supraclavicular or axillary adenopathy.      Left upper body: No supraclavicular or axillary adenopathy.   Skin:     General: Skin is warm and dry.      Findings: No erythema or rash.   Neurological:      Mental Status: He is alert and oriented to person, place, and time.      Coordination: " Coordination normal.   Psychiatric:         Behavior: Behavior normal.       Laboratory Data:  Lab Visit on 03/31/2023   Component Date Value Ref Range Status    WBC 03/31/2023 10.99  3.90 - 12.70 K/uL Final    RBC 03/31/2023 5.04  4.60 - 6.20 M/uL Final    Hemoglobin 03/31/2023 13.8 (L)  14.0 - 18.0 g/dL Final    Hematocrit 03/31/2023 43.2  40.0 - 54.0 % Final    MCV 03/31/2023 86  82 - 98 fL Final    MCH 03/31/2023 27.4  27.0 - 31.0 pg Final    MCHC 03/31/2023 31.9 (L)  32.0 - 36.0 g/dL Final    RDW 03/31/2023 14.9 (H)  11.5 - 14.5 % Final    Platelets 03/31/2023 382  150 - 450 K/uL Final    MPV 03/31/2023 9.6  9.2 - 12.9 fL Final    Immature Granulocytes 03/31/2023 0.3  0.0 - 0.5 % Final    Gran # (ANC) 03/31/2023 5.2  1.8 - 7.7 K/uL Final    Immature Grans (Abs) 03/31/2023 0.03  0.00 - 0.04 K/uL Final    Comment: Mild elevation in immature granulocytes is non specific and   can be seen in a variety of conditions including stress response,   acute inflammation, trauma and pregnancy. Correlation with other   laboratory and clinical findings is essential.      Lymph # 03/31/2023 4.6  1.0 - 4.8 K/uL Final    Mono # 03/31/2023 0.8  0.3 - 1.0 K/uL Final    Eos # 03/31/2023 0.4  0.0 - 0.5 K/uL Final    Baso # 03/31/2023 0.02  0.00 - 0.20 K/uL Final    nRBC 03/31/2023 0  0 /100 WBC Final    Gran % 03/31/2023 47.0  38.0 - 73.0 % Final    Lymph % 03/31/2023 41.6  18.0 - 48.0 % Final    Mono % 03/31/2023 7.6  4.0 - 15.0 % Final    Eosinophil % 03/31/2023 3.3  0.0 - 8.0 % Final    Basophil % 03/31/2023 0.2  0.0 - 1.9 % Final    Differential Method 03/31/2023 Automated   Final    Sodium 03/31/2023 138  136 - 145 mmol/L Final    Potassium 03/31/2023 4.6  3.5 - 5.1 mmol/L Final    Chloride 03/31/2023 104  95 - 110 mmol/L Final    CO2 03/31/2023 24  23 - 29 mmol/L Final    Glucose 03/31/2023 99  70 - 110 mg/dL Final    BUN 03/31/2023 16  8 - 23 mg/dL Final    Creatinine 03/31/2023 1.5 (H)  0.5 - 1.4 mg/dL Final    Calcium  03/31/2023 10.4  8.7 - 10.5 mg/dL Final    Total Protein 03/31/2023 8.1  6.0 - 8.4 g/dL Final    Albumin 03/31/2023 3.7  3.5 - 5.2 g/dL Final    Total Bilirubin 03/31/2023 0.3  0.1 - 1.0 mg/dL Final    Comment: For infants and newborns, interpretation of results should be based  on gestational age, weight and in agreement with clinical  observations.    Premature Infant recommended reference ranges:  Up to 24 hours.............<8.0 mg/dL  Up to 48 hours............<12.0 mg/dL  3-5 days..................<15.0 mg/dL  6-29 days.................<15.0 mg/dL      Alkaline Phosphatase 03/31/2023 54 (L)  55 - 135 U/L Final    AST 03/31/2023 20  10 - 40 U/L Final    ALT 03/31/2023 27  10 - 44 U/L Final    Anion Gap 03/31/2023 10  8 - 16 mmol/L Final    eGFR 03/31/2023 49.2 (A)  >60 mL/min/1.73 m^2 Final         Imaging:     CT abdomen 1/05/23    Lower Chest:   There is a mass in the left lung base measuring 3.9 x 2.9 cm (image 3, series 3). There is pleural contact. The heart is enlarged.     Abdomen/Pelvis:   The liver contains a low-density focus in the right hepatic lobe favored to reflect a cyst. The liver is likely mildly fatty. The gallbladder is unremarkable. There is surgical absence of the spleen. There is a mass along the pancreatic tail measuring   3.0 x 3.8 cm (image 21, series 3,). There is a small splenule within the left upper quadrant. The pancreatic uncinate, head, and neck demonstrate edema with surrounding stranding. There is also mild dilation of the pancreatic duct. Distal dilation   terminates fairly precipitously within the pancreatic neck. There is no well-defined fluid collection. The adrenal glands are unremarkable. There is lobulation of the kidneys with low-density renal lesions favored to reflect cysts. There is scattered   scarring about the kidneys.     The colon is fluid filled but not clearly obstructed, may reflect a diarrheal illness.     The bladder demonstrates mild wall thickening, and  the prostate is enlarged. There is severe atherosclerosis. There are shotty retroperitoneal lymph nodes. The venous system appears patent. There are presumably reactive mesenteric lymph nodes. There is   no clear evidence of a small bowel obstruction. The osseous structures demonstrate DJD. 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     CT abdomen and pelvis 2/24/23    In the left lung base there is a solid irregular enhancing lung mass measuring 4.3 x 4.0 cm.  This is highly concerning for primary bronchogenic neoplasm.  Calcified pleural plaque formation is noted in the left base suggesting previous asbestos exposure.  The heart is mildly enlarged.  There is no evidence of a basal pleural effusion.  Is the patient has reportedly had recent outside imaging and correlation with this would be useful.  The spleen is absent.  There is a solid enhancing irregular pancreatic tail mass which is exophytic and overall measures 4.7 x 3.9 cm.  The pancreatic duct is minimally prominent 2-3 mm and there is no evidence of a pancreatic head or neck mass.  There is some vague increased density in the root of the mesentery.  There is reported history of recent pancreatitis.  There is cortical scarring in both kidneys.  Prominent aortoiliac atherosclerosis is present.  Small renal cysts are seen bilaterally.  The bladder wall is thickened diffusely and there is indentation of the bladder base by a large prostate gland which measures 5.1 x 4.6 cm.  Degenerative changes are seen in the spine.  There is abnormal sclerosis in the nonweightbearing portion of the left femoral head.  This area measures 2.8 by 1.4 by 1.3 cm.  This is not a typical appearance for avascular necrosis.  Prior injury and sclerotic metastatic disease should be considered.  Degenerative changes are seen about the sacroiliac joints.  Prominent degenerative changes are seen in the spine.  Correlation with PET-CT scanning may be useful.  The  adrenal glands are unremarkable.  Surgical clips are seen in the left subdiaphragmatic region.  In the posterior left subdiaphragmatic region additional small soft tissue densities are seen measuring as large as 16 mm.  This could relate to splenosis but given the adjacent pancreatic tail associated soft tissue mass this could also relate to neoplasm.  There is no evidence of regional adenopathy. This report was flagged in Epic as abnormal.     CT Chest 3/01/23  No intravenous contrast was administered for this examination.  Therefore, it may have diminished sensitivity for detection of certain abnormalities.     Thyroid gland: The right lobe is enlarged, and there is a 2.0 cm low-density lesion.     Trachea: Within normal limits.     Esophagus: Mildly patulous     Cardiovascular: Normal heart size.Aortic and coronary artery calcific disease ranging from mild to moderate.     Lymph nodes: None abnormally enlarged.     Lungs/pleura/airways:     Left lower lobe 4.5 x 4.3 x 3.8 cm mass with irregular/lobulated margins.     Scattered pleural calcifications, correlate with previous asbestos exposure.     There are several micro nodules, some with location and morphology consistent with intrapulmonary lymph nodes; others are indeterminate     Mild upper lobe predominant emphysema.     Upper abdomen:     Partially imaged.     The spleen is absent.     There is a soft tissue mass adjacent to the pancreatic tail.  It is unclear whether this represents splenosis or other structure.  Multiphase imaging of the abdomen may be helpful for clarification.     Bones: Unremarkable for stated age.     Other: N/A   Assessment:       1. Squamous cell carcinoma of lung, unspecified laterality    2. Malignant neoplasm of unspecified part of unspecified bronchus or lung    3. Pancreatic mass    4. Hypertension, unspecified type    5. Gout, unspecified cause, unspecified chronicity, unspecified site    6. Hyperlipidemia, unspecified  hyperlipidemia type    7. Type 2 diabetes mellitus with diabetic polyneuropathy, with long-term current use of insulin    8. Neuropathy           Plan:     NSCLC - patient was recently diagnosed with at least stage III [id number] SCC of the left lung  -Will obtain PET/CT and MRI Brain  -[facility] NGS from tissue requested 3/23/23  -Will send for [facility] blood testing  -Pt to see general surgery for PORT placement  -Pt with radiation oncology appt next week with Dr Hadley    Pancreatic Mass - seen on CT scans and biopsy during EUS on 03/03/2023 with path showing no evidence of malignancy  Will assess on PET-CT    HTN - pt on amlodipine, clonidine, metoprolol  -BP controlled  -Will monitor    Gout - pt on allopurinol  -Currently asymptomatic  -Will monitor    HLD - pt on rosuvastatin, zetia  -Management per PCP    DMII - pt on toujeo, farxiga, and bydureon  -Management per PCP    Neuropathy - numbness in feet due to diabetes  -Will monitor    Route Chart for Scheduling    Med Onc Chart Routing      Follow up with physician Other. Pt needs blood work today with [facility] blood test, CBC, CMP.  Pt needs an appt with surgery for PORT palcement.  PT needs a PET/CT adn MRI brain with an appt with me once the scans are done.   Follow up with [facility]    Infusion scheduling note    Injection scheduling note    Labs    Imaging    Pharmacy appointment    Other referrals              Lisandro Lawrence MD  [facility]  Hematology and Oncology  [facility]   [address] [place] [address]   O: [phone]  F: [phone]

## 2023-04-04 ENCOUNTER — OFFICE VISIT (OUTPATIENT)
Dept: RADIATION ONCOLOGY | Facility: CLINIC | Age: 73
End: 2023-04-04
Payer: MEDICARE

## 2023-04-04 VITALS
OXYGEN SATURATION: 98 % | TEMPERATURE: 98 F | HEART RATE: 66 BPM | HEIGHT: 74 IN | BODY MASS INDEX: 27.81 KG/M2 | DIASTOLIC BLOOD PRESSURE: 71 MMHG | SYSTOLIC BLOOD PRESSURE: 142 MMHG | RESPIRATION RATE: 18 BRPM | WEIGHT: 216.69 LBS

## 2023-04-04 DIAGNOSIS — C34.90 SQUAMOUS CELL CARCINOMA OF LUNG, UNSPECIFIED LATERALITY: ICD-10-CM

## 2023-04-04 PROCEDURE — 99213 OFFICE O/P EST LOW 20 MIN: CPT | Mod: PBBFAC,PN | Performed by: RADIOLOGY

## 2023-04-04 PROCEDURE — 99205 OFFICE O/P NEW HI 60 MIN: CPT | Mod: S$PBB,,, | Performed by: RADIOLOGY

## 2023-04-04 PROCEDURE — 99205 PR OFFICE/OUTPT VISIT, NEW, LEVL V, 60-74 MIN: ICD-10-PCS | Mod: S$PBB,,, | Performed by: RADIOLOGY

## 2023-04-04 PROCEDURE — 99999 PR PBB SHADOW E&M-EST. PATIENT-LVL III: ICD-10-PCS | Mod: PBBFAC,,, | Performed by: RADIOLOGY

## 2023-04-04 PROCEDURE — 99999 PR PBB SHADOW E&M-EST. PATIENT-LVL III: CPT | Mod: PBBFAC,,, | Performed by: RADIOLOGY

## 2023-04-04 NOTE — PROGRESS NOTES
04/04/2023    [facility]   Radiation Oncology Consultation    ASSESSMENT  This is a 72 y.o. 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       PLAN    Treatment options were discussed with the patient including concurrent chemotherapy-radiation.  We discussed the goals of treatment to be curatie.  The risks, benefits, scheduling, alternatives to and rationale of radiation therapy were explained in detail.  Potential toxicities were reviewed, including but not exclusive to fatigue, local skin reaction, odynophagia/dysphagia, dyspepsia, cough, increased risk of pulmonary or cardiac disease, spinal cord injury  After this discussion, he elected to proceed with staging work up and treatment planning.    Consent was obtained and all questions were answered to the best of my ability  A CT simulation will be performed on 4/19/23 to begin the planning process for the patient's radiation therapy.     He was given our contact information, and he was told that he could call our clinic at any time if he has any questions or concerns.      Radiation Treatment Details:   Final recommendations pending staging work up  We plan to treat left lower lobe and involved nodes to a dose of 60 Gy in 30 fractions at 2 Gy per fraction delivered daily with concurrent chemotherapy  We will utilize a(n) IMRT technique.    IMRT is medically necessary to treat complex dose painted target volumes in the lung region with concave and convex isodose lines with steep isodose gradients to spare multiple adjacent organs at risk including the heart, normal lung   We will utilize daily CT or orthogonal image guidance due to the need for accurate daily patient alignment to treat the target volumes accurately and avoid radiation overdose to multiple regional organs at risk since we are treating the patient with complex target volumes with multiple steep isodose gradients.         Chief  Complaint   Patient presents with    Lung Cancer       Oncology History   Squamous cell carcinoma of lung   3/31/2023 Initial Diagnosis    Squamous cell carcinoma of lung       3/31/2023 Cancer Staged    Staging form: Lung, AJCC 8th Edition  - Clinical: Stage IIIA (cT2b, cN2, cM0)           HPI: The patient is a 72 year old male how presented in January 2023 with complaint of abdominal pain. Outside imaging noted pancreatic mass and lungmass.      2/24/23 CT A/P: 4.7cm soft tissue mass associated with tail of pancreas; 4.3cm enhancing mass at left lung base.    3/1/23 CT Chest: 4.5 x 4.3 x 3.8cm mass with irregular lobulated margins; no enlarged lymph nodes, several micronodules which could represent intrapulmonary lymph nodes    3/3/23 Pancreatic body mass FNA: no evidence of malignancy; abundant hematopoetic and dendritic cells consistent with accessory splenic tissue    3/17/23 EBUS FNA:  Left lower lobe mass: +squamous cell carcinoma, moderately diff  + metastases Station 7 and 11L    PET and MRI brain pending  TEMPUS (NGS) pending    Possibility of pregnancy: No  History of prior irradiation: No  History of prior systemic anti-cancer therapy: No  History of collagen vascular disease: No  Implanted electronic device (pacer/defib/nerve stimulator): No    Past Medical History:   Diagnosis Date    Gout, unspecified     High cholesterol     HTN (hypertension)     Lung cancer        Past Surgical History:   Procedure Laterality Date    ENDOSCOPIC ULTRASOUND OF UPPER GASTROINTESTINAL TRACT N/A 3/3/2023    Procedure: ULTRASOUND, UPPER GI TRACT, ENDOSCOPIC;  Surgeon: Cora Mcgrath MD;  Location: [facility];  Service: Endoscopy;  Laterality: N/A;  2/24/23-Instructions mailed to address on file-DS    PANCREATECTOMY      ROBOTIC BRONCHOSCOPY N/A 3/17/2023    Procedure: ROBOTIC BRONCHOSCOPY;  Surgeon: Cristiane Albright MD;  Location: [address];  Service: Pulmonary;  Laterality: N/A;       Social History     Tobacco Use     Smoking status: Former     Packs/day: 1.50     Years: 54.00     Pack years: 81.00     Types: Cigarettes, Vaping with nicotine     Start date: 6/1/1968     Quit date: 10/20/2012     Years since quitting: 10.4    Smokeless tobacco: Former   Substance Use Topics    Alcohol use: Yes     Alcohol/week: 3.0 standard drinks     Types: 3 Cans of beer per week     Comment: 3 a day    Drug use: Never       Cancer-related family history includes Breast cancer in his sister.    Current Outpatient Medications on File Prior to Visit   Medication Sig Dispense Refill    allopurinoL (ZYLOPRIM) 100 MG tablet Take 100 mg by mouth once daily.      amLODIPine (NORVASC) 2.5 MG tablet Take 2.5 mg by mouth once daily.      BYDUREON BCISE 2 mg/0.85 mL AtIn Inject 2 mg into the skin every 7 days.      cloNIDine (CATAPRES) 0.2 MG tablet Take 0.2 mg by mouth once.      ezetimibe (ZETIA) 10 mg tablet Take 10 mg by mouth once daily.      FARXIGA 10 mg tablet Take 10 mg by mouth every morning.      finasteride (PROSCAR) 5 mg tablet Take 5 mg by mouth once daily.      FREESTYLE TEST Strp USE 1 STRIP TO CHECK GLUCOSE ONCE DAILY      metoprolol succinate (TOPROL-XL) 100 MG 24 hr tablet Take 100 mg by mouth once daily.      rosuvastatin (CRESTOR) 40 MG Tab Take 10 mg by mouth every evening.      TOUJEO MAX U-300 SOLOSTAR 300 unit/mL (3 mL) insulin pen Inject 30 Units into the skin.       No current facility-administered medications on file prior to visit.       Review of patient's allergies indicates:  No Known Allergies    Review of Systems   Constitutional:  Positive for weight loss (8-9 lb).   Eyes:  Negative for blurred vision and double vision.   Respiratory:  Positive for cough (occasional (mainly since scopes)). Negative for hemoptysis, sputum production, shortness of breath and wheezing.    Cardiovascular:  Negative for chest pain.   Gastrointestinal:  Negative for nausea.   Neurological:  Positive for headaches (tension HA, on waking,  "3x/week). Negative for sensory change and focal weakness.      Vital Signs: BP (!) 142/71 (BP Location: Right arm, Patient Position: Sitting, BP Method: Medium (Automatic))   Pulse 66   Temp 98.4 °F (36.9 °C) (Temporal)   Resp 18   Ht 6' 2" (1.88 m)   Wt 98.3 kg (216 lb 11.4 oz)   SpO2 98%   BMI 27.82 kg/m²     ECOG Performance Status: 0 - Fully Active    Physical Exam  Constitutional:       General: He is not in acute distress.     Appearance: Normal appearance. He is normal weight. He is not ill-appearing or toxic-appearing.   HENT:      Head: Normocephalic and atraumatic.      Nose: Nose normal.   Eyes:      Conjunctiva/sclera: Conjunctivae normal.      Pupils: Pupils are equal, round, and reactive to light.   Pulmonary:      Breath sounds: Examination of the left-middle field reveals wheezing. Wheezing present.   Neurological:      Mental Status: He is alert and oriented to person, place, and time.      Cranial Nerves: No cranial nerve deficit, dysarthria or facial asymmetry.      Sensory: Sensation is intact. No sensory deficit.      Motor: No weakness.      Coordination: Romberg sign positive (slight). Coordination normal. Finger-Nose-Finger Test normal. Rapid alternating movements normal.          Imaging: I have personally reviewed the patient's available images and reports and summarized pertinent findings above in HPI.     Pathology: I have personally reviewed the patient's available pathology and summarized pertinent findings above in HPI.         - Thank you for allowing me to participate in the care of your patient.    Leonor Hadley MD    "

## 2023-04-10 LAB
DNA RANGE(S) EXAMINED NAR: NORMAL
GENE DIS ANL INTERP-IMP: POSITIVE
GENE DIS ASSESSED: NORMAL
MSI CA SPEC-IMP: NOT DETECTED
REASON FOR STUDY: NORMAL
TEMPUS LCA: NORMAL
TEMPUS PORTAL: NORMAL

## 2023-04-13 DIAGNOSIS — C34.90 SQUAMOUS CELL CARCINOMA OF LUNG, UNSPECIFIED LATERALITY: Primary | ICD-10-CM

## 2023-04-13 NOTE — PLAN OF CARE
START ON PATHWAY REGIMEN - Non-Small Cell Lung    [id number]        Paclitaxel       Carboplatin           Additional Orders: * All AUC calculations intended to be used in Glo   formula    **Always confirm dose/schedule in your pharmacy ordering system**    Patient Characteristics:  Preoperative or Nonsurgical Candidate (Clinical Staging), Stage III -   Nonsurgical Candidate (Nonsquamous and Squamous), PS = 0, 1  Therapeutic Status: Preoperative or Nonsurgical Candidate (Clinical Staging)  AJCC T Category: cT2b  AJCC N Category: cN2  AJCC M Category: cM0  AJCC 8 Stage Grouping: IIIA  ECOG Performance Status: 1  Intent of Therapy:  Curative Intent, Not Discussed with Patient

## 2023-04-18 ENCOUNTER — HOSPITAL ENCOUNTER (OUTPATIENT)
Dept: RADIOLOGY | Facility: HOSPITAL | Age: 73
Discharge: HOME | End: 2023-04-18
Attending: INTERNAL MEDICINE
Payer: MEDICARE

## 2023-04-18 DIAGNOSIS — C34.90 MALIGNANT NEOPLASM OF UNSPECIFIED PART OF UNSPECIFIED BRONCHUS OR LUNG: ICD-10-CM

## 2023-04-18 LAB — GLUCOSE SERPL-MCNC: 102 MG/DL (ref 70–110)

## 2023-04-18 PROCEDURE — 70553 MRI BRAIN STEM W/O & W/DYE: CPT | Mod: TC,PO

## 2023-04-18 PROCEDURE — 78815 NM PET CT ROUTINE: ICD-10-PCS | Mod: 26,PI,, | Performed by: RADIOLOGY

## 2023-04-18 PROCEDURE — A9585 GADOBUTROL INJECTION: HCPCS | Mod: PO | Performed by: INTERNAL MEDICINE

## 2023-04-18 PROCEDURE — 25500020 PHARM REV CODE 255: Mod: PO | Performed by: INTERNAL MEDICINE

## 2023-04-18 PROCEDURE — 70553 MRI BRAIN W WO CONTRAST: ICD-10-PCS | Mod: 26,,, | Performed by: RADIOLOGY

## 2023-04-18 PROCEDURE — 70553 MRI BRAIN STEM W/O & W/DYE: CPT | Mod: 26,,, | Performed by: RADIOLOGY

## 2023-04-18 PROCEDURE — 78815 PET IMAGE W/CT SKULL-THIGH: CPT | Mod: 26,PI,, | Performed by: RADIOLOGY

## 2023-04-18 PROCEDURE — A9552 F18 FDG: HCPCS | Mod: PN

## 2023-04-18 RX ORDER — GADOBUTROL 604.72 MG/ML
10 INJECTION INTRAVENOUS
Status: COMPLETED | OUTPATIENT
Start: 2023-04-18 | End: 2023-04-18

## 2023-04-18 RX ADMIN — GADOBUTROL 10 ML: 604.72 INJECTION INTRAVENOUS at 10:04

## 2023-04-18 NOTE — PROGRESS NOTES
PET Imaging Questionnaire    Are you a Diabetic? Recent Blood Sugar level? Yes    Are you anemic? Bone Marrow Stimulation Meds? No    Have you had a CT Scan, if so when & where was your last one? Yes -     Have you had a PET Scan, if so when & where was your last one? No    Chemotherapy or currently on Chemotherapy? No    Radiation therapy? No    Surgical History:   Past Surgical History:   Procedure Laterality Date    ENDOSCOPIC ULTRASOUND OF UPPER GASTROINTESTINAL TRACT N/A 3/3/2023    Procedure: ULTRASOUND, UPPER GI TRACT, ENDOSCOPIC;  Surgeon: Cora Mcgrath MD;  Location: [facility];  Service: Endoscopy;  Laterality: N/A;  2/24/23-Instructions mailed to address on file-DS    PANCREATECTOMY      ROBOTIC BRONCHOSCOPY N/A 3/17/2023    Procedure: ROBOTIC BRONCHOSCOPY;  Surgeon: Cristiane Albright MD;  Location: [address];  Service: Pulmonary;  Laterality: N/A;        Have you been fasting for at least 6 hours? Yes    Is there any chance you may be pregnant or breastfeeding? No    Assay: 13.92 MCi@:8.04   Injection Site:rt ac     Residual: .853 mCi@: 8.06   Technologist: Aisha Navarro Injected:13.06mCi

## 2023-04-19 ENCOUNTER — HOSPITAL ENCOUNTER (OUTPATIENT)
Dept: RADIATION THERAPY | Facility: HOSPITAL | Age: 73
Discharge: HOME | End: 2023-04-19
Attending: INTERNAL MEDICINE
Payer: MEDICARE

## 2023-04-19 LAB — FUNGUS SPEC CULT: NORMAL

## 2023-04-19 PROCEDURE — 77014 PR  CT GUIDANCE PLACEMENT RAD THERAPY FIELDS: CPT | Mod: 26,,, | Performed by: RADIOLOGY

## 2023-04-19 PROCEDURE — 77334 PR  RADN TREATMENT AID(S) COMPLX: ICD-10-PCS | Mod: 26,,, | Performed by: RADIOLOGY

## 2023-04-19 PROCEDURE — 77263 THER RADIOLOGY TX PLNG CPLX: CPT | Mod: ,,, | Performed by: RADIOLOGY

## 2023-04-19 PROCEDURE — 77334 RADIATION TREATMENT AID(S): CPT | Mod: TC,PN | Performed by: RADIOLOGY

## 2023-04-19 PROCEDURE — 77263 PR  RADIATION THERAPY PLAN COMPLEX: ICD-10-PCS | Mod: ,,, | Performed by: RADIOLOGY

## 2023-04-19 PROCEDURE — 77334 RADIATION TREATMENT AID(S): CPT | Mod: 26,,, | Performed by: RADIOLOGY

## 2023-04-19 PROCEDURE — 77014 HC CT GUIDANCE RADIATION THERAPY FLDS PLACEMENT: CPT | Mod: TC,PN | Performed by: RADIOLOGY

## 2023-04-19 PROCEDURE — 77014 PR  CT GUIDANCE PLACEMENT RAD THERAPY FIELDS: ICD-10-PCS | Mod: 26,,, | Performed by: RADIOLOGY

## 2023-04-20 ENCOUNTER — TELEPHONE (OUTPATIENT)
Dept: HEMATOLOGY/ONCOLOGY | Facility: CLINIC | Age: 73
End: 2023-04-20
Payer: MEDICARE

## 2023-04-20 DIAGNOSIS — C34.90 SQUAMOUS CELL CARCINOMA OF LUNG, UNSPECIFIED LATERALITY: Primary | ICD-10-CM

## 2023-04-20 NOTE — NURSING
Spoke with patient and patient's daughter, Shanna, and confirmed upcoming appts. Patient's port is being placed on Monday, 4/24. Chemo school confirmed for Wednesday, 4/26 at 1030. 1st chemo infusion confirmed for Friday, 4/28.

## 2023-04-20 NOTE — PROGRESS NOTES
PATIENT: Feng Thakkar  MRN: [id number]  DATE: 4/21/2023      Diagnosis:   1. Squamous cell carcinoma of lung, unspecified laterality    2. Malignant neoplasm of unspecified part of unspecified bronchus or lung    3. Pancreatic mass    4. Hypertension, unspecified type    5. Gout, unspecified cause, unspecified chronicity, unspecified site    6. Hyperlipidemia, unspecified hyperlipidemia type    7. Type 2 diabetes mellitus with diabetic polyneuropathy, with long-term current use of insulin    8. Neuropathy          Chief Complaint: Follow-up (Squamous cell carcinoma of lung, unspecified laterality)      Oncologic History:      Oncologic History     Oncologic Treatment     Pathology           Subjective:    Initial History: Mr. Thakkar is a 72 y.o. male with Gout, HLD, HTN who presents for work up of NSCLC.  Since the last clinic visit the patient underwent PET-CT on 04/18/2023 showing a markedly hypermetabolic lobulated subpleural left lower lobe mass measuring 4.5 x 3.9 cm with hypermetabolic lymph nodes in the left hilar region measuring 2.2 x 1.9 cm; and a 4.2 x 3.6 walk circumscribed mass in the pancreatic tail.  MRI brain on 04/18/2023 showed no acute findings.  The patient denies CP, cough, SOB, abdominal pain, nausea, vomiting, constipation, diarrhea.  The patient denies fever, chills, night sweats, weight loss, new lumps or bumps, easy bruising or bleeding.    Prior History:  The patient initially developed abdominal pain on 01/05/2023 and underwent CT of the abdomen showing a mass in the left lung base measuring 3.9 x 2.9 cm; 3 x 3.8 cm mass along the pancreatic tail; and shotty retroperitoneal lymph nodes.  The patient underwent repeat CT abdomen and pelvis on 02/24/2023 showing a 4.3 x 4 solid enhancing mass of the left lung base; thickened bladder wall; prostate gland measuring 45.1 x 4.6 cm; abnormal sclerosis in the left femoral head measuring 2.8 x 1.4 x 1.3 cm; and additional soft tissue densities in  the posterior left subdiaphragmatic region measuring up to 16 mm.  CT chest on 03/01/2023 showed a 2 cm low-density lesion in the right lobe of the thyroid gland; 4.5 x 4.3 x 3.8 cm mass in the left lower lobe; several micro nodules; Na soft tissue mass adjacent to the pancreatic tail.  Patient underwent EUS on 03/03/2023 showing a 3.5 cm and 1.8 cm round pancreatic tail lesion.  Biopsy returned results showing no evidence of malignancy and abundant hematopoietic elements and dendritic cells.  Patient then underwent EBUS under the care of Dr. Albright on 03/17/2023 showing squamous cell carcinoma in biopsy of the left lower lung lesion; squamous cell carcinoma and station 7 lymph node; positive 11 L lymph node for squamous cell carcinoma.    Past Medical History:   Past Medical History:   Diagnosis Date    Diabetes mellitus     Gout, unspecified     High cholesterol     HTN (hypertension)     Lung cancer        Past Surgical HIstory:   Past Surgical History:   Procedure Laterality Date    ENDOSCOPIC ULTRASOUND OF UPPER GASTROINTESTINAL TRACT N/A 3/3/2023    Procedure: ULTRASOUND, UPPER GI TRACT, ENDOSCOPIC;  Surgeon: Cora Mcgrath MD;  Location: [facility];  Service: Endoscopy;  Laterality: N/A;  2/24/23-Instructions mailed to address on file-DS    PANCREATECTOMY      ROBOTIC BRONCHOSCOPY N/A 3/17/2023    Procedure: ROBOTIC BRONCHOSCOPY;  Surgeon: Cristiane Albright MD;  Location: [address];  Service: Pulmonary;  Laterality: N/A;       Family History:   Family History   Problem Relation Age of Onset    Breast cancer Sister         60       Social History:  reports that he quit smoking about 10 years ago. His smoking use included cigarettes and vaping with nicotine. He started smoking about 54 years ago. He has a 81.00 pack-year smoking history. He has quit using smokeless tobacco. He reports current alcohol use of about 3.0 standard drinks per week. He reports that he does not use drugs.    Allergies:  Review of  patient's allergies indicates:  No Known Allergies    Medications:  Current Outpatient Medications   Medication Sig Dispense Refill    allopurinoL (ZYLOPRIM) 100 MG tablet Take 100 mg by mouth once daily.      amLODIPine (NORVASC) 2.5 MG tablet Take 2.5 mg by mouth once daily.      BYDUREON BCISE 2 mg/0.85 mL AtIn Inject 2 mg into the skin every 7 days. Every Friday      [START ON 4/22/2023] chlorhexidine (PERIDEX) 0.12 % solution Use as directed 15 mLs in the mouth or throat 2 (two) times daily.      cloNIDine (CATAPRES) 0.2 MG tablet Take 0.2 mg by mouth once. Daily      ezetimibe (ZETIA) 10 mg tablet Take 10 mg by mouth once daily.      FARXIGA 10 mg tablet Take 10 mg by mouth every morning.      finasteride (PROSCAR) 5 mg tablet Take 5 mg by mouth once daily.      FREESTYLE TEST Strp USE 1 STRIP TO CHECK GLUCOSE ONCE DAILY      metoprolol succinate (TOPROL-XL) 100 MG 24 hr tablet Take 100 mg by mouth once daily.      [START ON 4/22/2023] mupirocin (BACTROBAN) 2 % ointment by Nasal route 2 (two) times daily.      rosuvastatin (CRESTOR) 40 MG Tab Take 10 mg by mouth every evening.      TOUJEO MAX U-300 SOLOSTAR 300 unit/mL (3 mL) insulin pen Inject 30 Units into the skin every evening.       No current facility-administered medications for this visit.       Review of Systems   Constitutional:  Negative for chills, diaphoresis, fatigue, fever and unexpected weight change.   Respiratory:  Negative for cough and shortness of breath.    Cardiovascular:  Negative for chest pain and palpitations.   Gastrointestinal:  Negative for abdominal pain, constipation, diarrhea, nausea and vomiting.   Skin:  Negative for color change and rash.   Neurological:  Negative for headaches.   Hematological:  Negative for adenopathy. Does not bruise/bleed easily.     ECOG Performance Status: 1   Objective:      Vitals:   Vitals:    04/21/23 1250   BP: 128/70   BP Location: Left arm   Patient Position: Sitting   BP Method: Large (Manual)  "  Pulse: 64   Temp: 97 °F (36.1 °C)   TempSrc: Temporal   SpO2: 99%   Weight: 99.6 kg (219 lb 9.3 oz)   Height: 5' 10.5" (1.791 m)       Physical Exam  Constitutional:       General: He is not in acute distress.     Appearance: He is well-developed. He is not diaphoretic.   HENT:      Head: Normocephalic and atraumatic.   Cardiovascular:      Rate and Rhythm: Normal rate and regular rhythm.      Heart sounds: Normal heart sounds. No murmur heard.    No friction rub. No gallop.   Pulmonary:      Effort: Pulmonary effort is normal. No respiratory distress.      Breath sounds: Normal breath sounds. No wheezing or rales.   Chest:      Chest wall: No tenderness.   Abdominal:      General: Bowel sounds are normal. There is no distension.      Palpations: Abdomen is soft. There is no mass.      Tenderness: There is no abdominal tenderness. There is no rebound.   Musculoskeletal:      Cervical back: Normal range of motion.   Lymphadenopathy:      Cervical: No cervical adenopathy.      Upper Body:      Right upper body: No supraclavicular or axillary adenopathy.      Left upper body: No supraclavicular or axillary adenopathy.   Skin:     General: Skin is warm and dry.      Findings: No erythema or rash.   Neurological:      Mental Status: He is alert and oriented to person, place, and time.   Psychiatric:         Behavior: Behavior normal.       Laboratory Data:  Hospital Outpatient Visit on 04/18/2023   Component Date Value Ref Range Status    POC Glucose 04/18/2023 102  70 - 110 MG/DL Final         Imaging:     PET/CT 4/18/23  Head/neck:     No significant abnormal hypermetabolic foci are identified within the head and neck region.  No lymphadenopathy is present.     Chest:     There is a markedly hypermetabolic lobulated subpleural left lower lobe mass consistent with the patient's known squamous cell carcinoma.  The mass is best measured on the fused PET-CT axial images and on image 115 measures 4.5 x 3.9 cm with a max " SUV of 14.0.  There also hypermetabolic lymph nodes in the left hilar region and subcarinal region most consistent with neoplastic adenopathy.  A left hilar node on image 111 measures 2.2 x 1.9 cm with a max SUV of 14.3.     Abdomen/pelvis:     There is a well-circumscribed bulb this hypermetabolic mass arising from the pancreatic tail.  On image 142 the mass measures 4.2 x 3.6 cm and has a max SUV of 5.7.  No other significant abnormal hypermetabolic foci are identified within the abdomen and pelvis.  No lymphadenopathy is present.  The adrenal glands are normal.     Skeleton:     No significant abnormal hypermetabolic foci are identified within the skeleton.  There are no findings to suggest osseous metastatic disease.    MRI Brain 4/18/23  Prominence of the ventricles and sulci compatible with mild generalized cerebral volume loss.  No hydrocephalus.     Moderate confluent and scattered T2/FLAIR hyperintense signal within the periventricular deep subcortical and pontine white matter, nonspecific and may reflect sequelae of chronic microvascular ischemic change.    Diffusion-weighted images demonstrate no evidence of an acute infarct.   Susceptibility weighted images demonstrate no evidence of acute or chronic hemorrhage. No mass effect or midline shift.     No abnormal intracranial enhancement.     Normal vascular flow voids are preserved.     Bone marrow signal intensity is normal.  The paranasal sinuses are normal.  The visualized portions of the mastoids are unremarkable.  Orbits are unremarkable.   Assessment:       1. Squamous cell carcinoma of lung, unspecified laterality    2. Malignant neoplasm of unspecified part of unspecified bronchus or lung    3. Pancreatic mass    4. Hypertension, unspecified type    5. Gout, unspecified cause, unspecified chronicity, unspecified site    6. Hyperlipidemia, unspecified hyperlipidemia type    7. Type 2 diabetes mellitus with diabetic polyneuropathy, with long-term  current use of insulin    8. Neuropathy             Plan:     NSCLC - patient was recently diagnosed with at least stage III [id number] SCC of the left lung  -PET/CT 4/18/23 shows continued left lung mass, mediastinal LAD  -MRI brain 4/18/23 showed no acute findings  -TEMPUS Blood testing showed TP 53 mutation  -TEMPUS tissue testing showed PD-L1 of 20%, TP53, KDM6A, CDKN2a and MTAP  -PORT to be placed 4/24/23    Pancreatic Mass - seen on CT scans and biopsy during EUS on 03/03/2023 with path showing no evidence of malignancy  -PET/CT 4/18/23 showed uptake in the tail of the pancreas  -Possibly due to chronic pancreatitis    HTN - pt on amlodipine, clonidine, metoprolol  -BP controlled  -Will monitor    Gout - pt on allopurinol  -Currently asymptomatic  -Will monitor    HLD - pt on rosuvastatin, zetia  -Management per PCP    DMII - pt on toujeo, farxiga, and bydureon  -Management per PCP    Neuropathy - numbness in feet due to diabetes  -Will monitor    Route Chart for Scheduling    Med Onc Chart Routing      Follow up with physician 1 week. Pt needs to knw where to go and at what time for his PORT palcement on 4/24/23.  The patient needs a CBC adn CMP with appt with me on 4/27/23 with chemo the next day.   Follow up with [facility]KA    Infusion scheduling note    Injection scheduling note    Labs    Imaging    Pharmacy appointment    Other referrals         Treatment Plan Information   OP NSCLC PACLITAXEL + CARBOPLATIN (AUC) QW + RADIATION   Lisandro Lawrence MD   Upcoming Treatment Dates - OP NSCLC PACLITAXEL + CARBOPLATIN (AUC) QW + RADIATION    4/24/2023       Pre-Medications       diphenhydrAMINE (BENADRYL) 50 mg in NS 50 mL IVPB       famotidine (PF) injection 20 mg       Chemotherapy       PACLitaxeL (TAXOL) 45 mg/m2 = 102 mg in sodium chloride 0.9% chemo infusion       CARBOplatin (PARAPLATIN) in sodium chloride 0.9% 250 mL chemo infusion       Antiemetics       palonosetron 0.25mg/dexAMETHasone 12mg in NS IVPB 0.25 mg  50 mL  5/1/2023       Pre-Medications       DIPHENHYDRAMINE IV ORDERABLE       FAMOTIDINE (PF) 20 MG/2 ML IV SOLN       Chemotherapy       PACLitaxeL (TAXOL) 45 mg/m2 = 102 mg in sodium chloride 0.9% chemo infusion       CARBOplatin (PARAPLATIN) in sodium chloride 0.9% 250 mL chemo infusion       Antiemetics       PALONOSETRON 0.25MG/DEXAMETHASONE 12MG ORDERABLE  5/8/2023       Pre-Medications       DIPHENHYDRAMINE IV ORDERABLE       FAMOTIDINE (PF) 20 MG/2 ML IV SOLN       Chemotherapy       PACLitaxeL (TAXOL) 45 mg/m2 = 102 mg in sodium chloride 0.9% chemo infusion       CARBOplatin (PARAPLATIN) in sodium chloride 0.9% 250 mL chemo infusion       Antiemetics       PALONOSETRON 0.25MG/DEXAMETHASONE 12MG ORDERABLE  5/15/2023       Pre-Medications       DIPHENHYDRAMINE IV ORDERABLE       FAMOTIDINE (PF) 20 MG/2 ML IV SOLN       Chemotherapy       PACLitaxeL (TAXOL) 45 mg/m2 = 102 mg in sodium chloride 0.9% chemo infusion       CARBOplatin (PARAPLATIN) in sodium chloride 0.9% 250 mL chemo infusion       Antiemetics       PALONOSETRON 0.25MG/DEXAMETHASONE 12MG ORDERABLE      Lisandro Lawrence MD  [facility]  Hematology and Oncology  [facility]   [address] [facility] [place] [address]   O: [phone]  F: [phone]

## 2023-04-20 NOTE — NURSING
Spoke with patient to see if he has a port surgery date yet. Patient states his daughter is supposed to be getting that scheduled. Made patient aware that I have his treatment start date tentatively booked for next week in the hopes he gets his port next week.   Patient states he is going to call his daughter and give me a call back.  Contact information provided.

## 2023-04-21 ENCOUNTER — OFFICE VISIT (OUTPATIENT)
Dept: HEMATOLOGY/ONCOLOGY | Facility: CLINIC | Age: 73
End: 2023-04-21
Payer: MEDICARE

## 2023-04-21 VITALS
DIASTOLIC BLOOD PRESSURE: 70 MMHG | BODY MASS INDEX: 30.74 KG/M2 | WEIGHT: 219.56 LBS | HEART RATE: 64 BPM | SYSTOLIC BLOOD PRESSURE: 128 MMHG | OXYGEN SATURATION: 99 % | HEIGHT: 71 IN | TEMPERATURE: 97 F

## 2023-04-21 DIAGNOSIS — Z79.4 TYPE 2 DIABETES MELLITUS WITH DIABETIC POLYNEUROPATHY, WITH LONG-TERM CURRENT USE OF INSULIN: ICD-10-CM

## 2023-04-21 DIAGNOSIS — G62.9 NEUROPATHY: ICD-10-CM

## 2023-04-21 DIAGNOSIS — C34.90 MALIGNANT NEOPLASM OF UNSPECIFIED PART OF UNSPECIFIED BRONCHUS OR LUNG: ICD-10-CM

## 2023-04-21 DIAGNOSIS — E78.5 HYPERLIPIDEMIA, UNSPECIFIED HYPERLIPIDEMIA TYPE: ICD-10-CM

## 2023-04-21 DIAGNOSIS — C34.90 SQUAMOUS CELL CARCINOMA OF LUNG, UNSPECIFIED LATERALITY: Primary | ICD-10-CM

## 2023-04-21 DIAGNOSIS — E11.42 TYPE 2 DIABETES MELLITUS WITH DIABETIC POLYNEUROPATHY, WITH LONG-TERM CURRENT USE OF INSULIN: ICD-10-CM

## 2023-04-21 DIAGNOSIS — I10 HYPERTENSION, UNSPECIFIED TYPE: ICD-10-CM

## 2023-04-21 DIAGNOSIS — M10.9 GOUT, UNSPECIFIED CAUSE, UNSPECIFIED CHRONICITY, UNSPECIFIED SITE: ICD-10-CM

## 2023-04-21 DIAGNOSIS — K86.89 PANCREATIC MASS: ICD-10-CM

## 2023-04-21 PROCEDURE — 99214 PR OFFICE/OUTPT VISIT, EST, LEVL IV, 30-39 MIN: ICD-10-PCS | Mod: S$PBB,,, | Performed by: INTERNAL MEDICINE

## 2023-04-21 PROCEDURE — 99999 PR PBB SHADOW E&M-EST. PATIENT-LVL III: ICD-10-PCS | Mod: PBBFAC,,, | Performed by: INTERNAL MEDICINE

## 2023-04-21 PROCEDURE — 99213 OFFICE O/P EST LOW 20 MIN: CPT | Mod: PBBFAC,PN | Performed by: INTERNAL MEDICINE

## 2023-04-21 PROCEDURE — 99214 OFFICE O/P EST MOD 30 MIN: CPT | Mod: S$PBB,,, | Performed by: INTERNAL MEDICINE

## 2023-04-21 PROCEDURE — 99999 PR PBB SHADOW E&M-EST. PATIENT-LVL III: CPT | Mod: PBBFAC,,, | Performed by: INTERNAL MEDICINE

## 2023-04-25 NOTE — PROGRESS NOTES
PATIENT: Feng Thakkar  MRN: [id number]  DATE: 4/26/2023      Diagnosis:   1. Squamous cell carcinoma of lung, unspecified laterality    2. Malignant neoplasm of unspecified part of unspecified bronchus or lung        Chief Complaint: Squamous cell carcinoma of lung, unspecified laterality (Chemo class)    Subjective:   HPI: Mr. Thakkar is a 72 y.o. male with Gout, HLD, HTN who presents for education and discussion prior to starting treatment with Carboplatin/Paclitaxel for a diagnosis of NSCLC. He is accompanied by his daughter and daughter-in-law.     Patient's port was placed 4/24/23 and is healing well, steri-strips in place. He is ready to get started on treatment.   The patient denies CP, cough, SOB, abdominal pain, nausea, vomiting, constipation, diarrhea.  The patient denies fever, chills, night sweats, weight loss, new lumps or bumps, easy bruising or bleeding.       Prior History:    01/05/2023 The patient initially developed abdominal pain on and underwent CT of the abdomen showing a mass in the left lung base measuring 3.9 x 2.9 cm; 3 x 3.8 cm mass along the pancreatic tail; and shotty retroperitoneal lymph nodes.    02/24/2023 The patient underwent repeat CT abdomen and pelvis on howing a 4.3 x 4 solid enhancing mass of the left lung base; thickened bladder wall; prostate gland measuring 45.1 x 4.6 cm; abnormal sclerosis in the left femoral head measuring 2.8 x 1.4 x 1.3 cm; and additional soft tissue densities in the posterior left subdiaphragmatic region measuring up to 16 mm.    03/01/2023 CT chest showed a 2 cm low-density lesion in the right lobe of the thyroid gland; 4.5 x 4.3 x 3.8 cm mass in the left lower lobe; several micro nodules; Na soft tissue mass adjacent to the pancreatic tail.  Patient underwent 03/03/2023 EUS showing a 3.5 cm and 1.8 cm round pancreatic tail lesion.  Biopsy returned results showing no evidence of malignancy and abundant hematopoietic elements and dendritic cells.     03/17/2023 Patient then underwent EBUS under the care of Dr. Albright showing squamous cell carcinoma in biopsy of the left lower lung lesion; squamous cell carcinoma and station 7 lymph node; positive 11 L lymph node for squamous cell carcinoma.  04/18/2023 PET/CT showing a markedly hypermetabolic lobulated subpleural left lower lobe mass measuring 4.5 x 3.9 cm with hypermetabolic lymph nodes in the left hilar region measuring 2.2 x 1.9 cm; and a 4.2 x 3.6 walk circumscribed mass in the pancreatic tail.    04/18/2023  MRI brain showed no acute findings.  Oncology History   Squamous cell carcinoma of lung   3/31/2023 Initial Diagnosis    Squamous cell carcinoma of lung       3/31/2023 Cancer Staged    Staging form: Lung, AJCC 8th Edition  - Clinical: Stage IIIA (cT2b, cN2, cM0)       4/24/2023 -  Chemotherapy    Treatment Summary   Plan Name: OP NSCLC PACLITAXEL + CARBOPLATIN (AUC) QW + RADIATION  Treatment Goal: Curative  Status: Active  Start Date: 4/24/2023 (Planned)  End Date: 5/29/2023 (Planned)  Provider: Lisandro Lawrence MD  Chemotherapy: CARBOplatin (PARAPLATIN) in sodium chloride 0.9% 250 mL chemo infusion, , Intravenous, Clinic/HOD 1 time, 0 of 6 cycles  PACLitaxeL (TAXOL) 45 mg/m2 = 102 mg in sodium chloride 0.9% chemo infusion, 45 mg/m2, Intravenous, Clinic/HOD 1 time, 0 of 6 cycles          Past Medical History:   Past Medical History:   Diagnosis Date    Diabetes mellitus     Gout, unspecified     High cholesterol     HTN (hypertension)     Lung cancer        Past Surgical HIstory:   Past Surgical History:   Procedure Laterality Date    ENDOSCOPIC ULTRASOUND OF UPPER GASTROINTESTINAL TRACT N/A 3/3/2023    Procedure: ULTRASOUND, UPPER GI TRACT, ENDOSCOPIC;  Surgeon: Cora Mcgrath MD;  Location: [facility];  Service: Endoscopy;  Laterality: N/A;  2/24/23-Instructions mailed to address on file-DS    INSERTION OF TUNNELED CENTRAL VENOUS CATHETER (CVC) WITH SUBCUTANEOUS PORT N/A 4/24/2023    Procedure:  [id number];  Surgeon: Paulino Love MD;  Location: [facility] OR;  Service: General;  Laterality: N/A;    PANCREATECTOMY      ROBOTIC BRONCHOSCOPY N/A 3/17/2023    Procedure: ROBOTIC BRONCHOSCOPY;  Surgeon: Cristiane Albright MD;  [facility];  Service: Pulmonary;  Laterality: N/A;       Family History:   Family History   Problem Relation Age of Onset    Breast cancer Sister         60       Social History:  reports that he quit smoking about 10 years ago. His smoking use included cigarettes and vaping with nicotine. He started smoking about 54 years ago. He has a 81.00 pack-year smoking history. He has quit using smokeless tobacco. He reports current alcohol use of about 3.0 standard drinks per week. He reports that he does not use drugs.    Allergies:  Review of patient's allergies indicates:  No Known Allergies    Medications:  Current Outpatient Medications   Medication Sig Dispense Refill    allopurinoL (ZYLOPRIM) 100 MG tablet Take 100 mg by mouth once daily.      amLODIPine (NORVASC) 2.5 MG tablet Take 2.5 mg by mouth once daily.      BYDUREON BCISE 2 mg/0.85 mL AtIn Inject 2 mg into the skin every 7 days. Every Friday      cloNIDine (CATAPRES) 0.2 MG tablet Take 0.2 mg by mouth once. Daily      ezetimibe (ZETIA) 10 mg tablet Take 10 mg by mouth once daily.      FARXIGA 10 mg tablet Take 10 mg by mouth every morning.      finasteride (PROSCAR) 5 mg tablet Take 5 mg by mouth once daily.      FREESTYLE TEST Strp USE 1 STRIP TO CHECK GLUCOSE ONCE DAILY      HYDROcodone-acetaminophen (NORCO) 5-325 mg per tablet Take 1 tablet by mouth every 6 (six) hours as needed for Pain. 10 tablet 0    metoprolol succinate (TOPROL-XL) 100 MG 24 hr tablet Take 100 mg by mouth once daily.      rosuvastatin (CRESTOR) 40 MG Tab Take 10 mg by mouth every evening.      TOUJEO MAX U-300 SOLOSTAR 300 unit/mL (3 mL) insulin pen Inject 30 Units into the skin every evening.      duke's soln (benadryl 30 mL, mylanta  "30 mL, LIDOcaine 30 mL, nystatin 30 mL) 120mL Take 10 mLs by mouth 4 (four) times daily. 120 mL 1    LIDOcaine-prilocaine (EMLA) cream Apply topically to port site one hour prior to access, cover 30 g 1    ondansetron (ZOFRAN) 8 MG tablet Take 1 tablet (8 mg total) by mouth every 8 (eight) hours as needed for Nausea. 30 tablet 2    promethazine (PHENERGAN) 25 MG tablet Take 1 tablet (25 mg total) by mouth every 6 (six) hours as needed for Nausea. 30 tablet 0     No current facility-administered medications for this visit.     Facility-Administered Medications Ordered in Other Visits   Medication Dose Route Frequency Provider Last Rate Last Admin    chlorhexidine 0.12 % solution 15 mL  15 mL Mouth/Throat BID Paulino Love MD        mupirocin 2 % ointment   Nasal BID Paulino Love MD           Review of Systems   Constitutional:  Negative for appetite change, fatigue and fever.   Respiratory:  Negative for cough and shortness of breath.    Cardiovascular:  Negative for chest pain.   Gastrointestinal:  Negative for abdominal pain, constipation, diarrhea and nausea.   Genitourinary:  Negative for dysuria.   Musculoskeletal:  Negative for arthralgias.   Skin:  Negative for rash.   Neurological:  Negative for headaches.   Hematological:  Does not bruise/bleed easily.   Psychiatric/Behavioral:  The patient is not nervous/anxious.      ECOG Performance Status:   ECOG SCORE    0 - Fully active-able to carry on all pre-disease performance without restriction         Objective:      Vitals:   Vitals:    04/26/23 1016   BP: 118/75   BP Location: Left arm   Patient Position: Sitting   BP Method: Medium (Automatic)   Pulse: 66   Resp: 16   Temp: 97.8 °F (36.6 °C)   TempSrc: Temporal   SpO2: 97%   Weight: 99.1 kg (218 lb 7.6 oz)   Height: 6' 2" (1.88 m)     BMI: Body mass index is 28.05 kg/m².    Physical Exam  Vitals reviewed.   Constitutional:       Appearance: He is not diaphoretic.   HENT:      Head: " Normocephalic and atraumatic.   Eyes:      General: No scleral icterus.        Right eye: No discharge.         Left eye: No discharge.   Pulmonary:      Effort: Pulmonary effort is normal. No respiratory distress.   Musculoskeletal:         General: No swelling.   Skin:     General: Skin is dry.      Coloration: Skin is not jaundiced.      Findings: No rash.   Neurological:      Mental Status: He is alert and oriented to person, place, and time.   Psychiatric:         Mood and Affect: Mood normal.         Behavior: Behavior normal.       Laboratory Data:  Lab Results   Component Value Date    WBC 9.92 2023    HGB 13.9 (L) 2023    HCT 43.7 2023    MCV 85 2023     2023          Imagin/1823  EXAMINATION:  NM PET CT ROUTINE     CLINICAL HISTORY:  Non-small cell lung cancer (NSCLC), staging;  Malignant neoplasm of unspecified part of unspecified bronchus or lung     72-year-old male with left lower lobe squamous cell carcinoma.  The patient also has a pancreatic tail mass which is undergone biopsy which yielded benign results.     TECHNIQUE:  Following IV injection of 13.06 mCi F-18 FDG, 3D PET imaging was performed from the skull base to the mid thighs.  A noncontrast non breath hold CT was obtained in conjunction with the PET scan for attenuation correction and anatomic correlation.  PET-CT fusion images were generated.     COMPARISON:  Correlation is made with the CT of the chest dated 2023 and the CT of the abdomen/pelvis dated 2023.     FINDINGS:  Head/neck:     No significant abnormal hypermetabolic foci are identified within the head and neck region.  No lymphadenopathy is present.     Chest:     There is a markedly hypermetabolic lobulated subpleural left lower lobe mass consistent with the patient's known squamous cell carcinoma.  The mass is best measured on the fused PET-CT axial images and on image 115 measures 4.5 x 3.9 cm with a max SUV of 14.0.  There  also hypermetabolic lymph nodes in the left hilar region and subcarinal region most consistent with neoplastic adenopathy.  A left hilar node on image 111 measures 2.2 x 1.9 cm with a max SUV of 14.3.     Abdomen/pelvis:     There is a well-circumscribed bulb this hypermetabolic mass arising from the pancreatic tail.  On image 142 the mass measures 4.2 x 3.6 cm and has a max SUV of 5.7.  No other significant abnormal hypermetabolic foci are identified within the abdomen and pelvis.  No lymphadenopathy is present.  The adrenal glands are normal.     Skeleton:     No significant abnormal hypermetabolic foci are identified within the skeleton.  There are no findings to suggest osseous metastatic disease.     Impression:     1. Markedly hypermetabolic left lower lobe mass consistent with the patient's known squamous cell carcinoma.  2. Hypermetabolic lymphadenopathy involving the left pulmonary hilum and subcarinal region most consistent with neoplastic adenopathy.  3. Hypermetabolic pancreatic tail mass.  Though this mass has undergone biopsy which yielded benign results, it must still be regarded as suspicious for primary or metastatic neoplasm.        Electronically signed by: Clemente Armstrong MD  Date:                                            04/18/2023  Time:                                           11:01   Assessment:       1. Squamous cell carcinoma of lung, unspecified laterality    2. Malignant neoplasm of unspecified part of unspecified bronchus or lung         Plan:   NSCLC  Chemotherapy education   -Treatment plan of weekly Carboplatin/Paclitaxel with XRT discussed with patient and family.  -Handouts provided on chemotherapy agents.    -Discussed the mechanism of action, potential side effects of this treatment as well as ways to manage them at home. Some of these side effects include but not limited to fever, nausea, vomiting, decreased appetite, fatigue, weakness, cytopenia's, myalgia/arthralgia,  constipation, diarrhea, bleeding, headache, nail changes, taste change, hair thinning/loss, peripheral neuropathy, kidney toxicity, or ototoxicity.   -Dietary modifications discussed.  Detail instructions to be provided by dietician.   -Chemotherapy Binder supplied with contact information.   -Discussed follow-up with the physician for toxicity monitoring throughout treatment.    -Prescriptions for EMLA cream to be used topically one hour prior to port access, Ondansetron 8 mg po every 8 hours PRN N/V, Phenergan 25 mg po every 6 hours PRN N/V, [place] MW to be used 4 x daily for tenderness in mouth/mucositis all sent to  in [place] per patient request.   -Chemo  referral was not placed, patient would like to think this over  -Referral to Integrative Oncology   -The patient and family verbalized understanding. Consented the patient to the treatment plan and the patient was educated on the planned duration of the treatment and schedule of the treatment administration.      60 minutes were spent in coordination of patient's care, record review and counseling.    [facility]    Integrative Referral placed 4/26/23   Palliative Care    Psychology    ACP    Chemo Care Companion Declined 4/26/23; will think over       Route Chart for Scheduling    Med Onc Chart Routing      Follow up with physician Other. keep appointments   Follow up with [facility]    Infusion scheduling note    Injection scheduling note    Labs    Imaging    Pharmacy appointment    Other referrals           Treatment Plan Information   OP NSCLC PACLITAXEL + CARBOPLATIN (AUC) QW + RADIATION   Lisandro Lawrence MD   Upcoming Treatment Dates - OP NSCLC PACLITAXEL + CARBOPLATIN (AUC) QW + RADIATION    4/24/2023       Pre-Medications       diphenhydrAMINE (BENADRYL) 50 mg in NS 50 mL IVPB       famotidine (PF) injection 20 mg       Chemotherapy       PACLitaxeL (TAXOL) 45 mg/m2 = 102 mg in sodium chloride 0.9% chemo infusion        CARBOplatin (PARAPLATIN) in sodium chloride 0.9% 250 mL chemo infusion       Antiemetics       palonosetron 0.25mg/dexAMETHasone 12mg in NS IVPB 0.25 mg 50 mL  5/1/2023       Pre-Medications       DIPHENHYDRAMINE IV ORDERABLE       FAMOTIDINE (PF) 20 MG/2 ML IV SOLN       Chemotherapy       PACLitaxeL (TAXOL) 45 mg/m2 = 102 mg in sodium chloride 0.9% chemo infusion       CARBOplatin (PARAPLATIN) in sodium chloride 0.9% 250 mL chemo infusion       Antiemetics       PALONOSETRON 0.25MG/DEXAMETHASONE 12MG ORDERABLE  5/8/2023       Pre-Medications       DIPHENHYDRAMINE IV ORDERABLE       FAMOTIDINE (PF) 20 MG/2 ML IV SOLN       Chemotherapy       PACLitaxeL (TAXOL) 45 mg/m2 = 102 mg in sodium chloride 0.9% chemo infusion       CARBOplatin (PARAPLATIN) in sodium chloride 0.9% 250 mL chemo infusion       Antiemetics       PALONOSETRON 0.25MG/DEXAMETHASONE 12MG ORDERABLE  5/15/2023       Pre-Medications       DIPHENHYDRAMINE IV ORDERABLE       FAMOTIDINE (PF) 20 MG/2 ML IV SOLN       Chemotherapy       PACLitaxeL (TAXOL) 45 mg/m2 = 102 mg in sodium chloride 0.9% chemo infusion       CARBOplatin (PARAPLATIN) in sodium chloride 0.9% 250 mL chemo infusion       Antiemetics       PALONOSETRON 0.25MG/DEXAMETHASONE 12MG ORDERABLE

## 2023-04-26 ENCOUNTER — CLINICAL SUPPORT (OUTPATIENT)
Dept: HEMATOLOGY/ONCOLOGY | Facility: CLINIC | Age: 73
End: 2023-04-26
Payer: MEDICARE

## 2023-04-26 ENCOUNTER — DOCUMENTATION ONLY (OUTPATIENT)
Dept: INFUSION THERAPY | Facility: HOSPITAL | Age: 73
End: 2023-04-26
Payer: MEDICARE

## 2023-04-26 VITALS
SYSTOLIC BLOOD PRESSURE: 118 MMHG | TEMPERATURE: 98 F | OXYGEN SATURATION: 97 % | HEIGHT: 74 IN | WEIGHT: 218.5 LBS | HEART RATE: 66 BPM | BODY MASS INDEX: 28.04 KG/M2 | DIASTOLIC BLOOD PRESSURE: 75 MMHG | RESPIRATION RATE: 16 BRPM

## 2023-04-26 DIAGNOSIS — C34.90 MALIGNANT NEOPLASM OF UNSPECIFIED PART OF UNSPECIFIED BRONCHUS OR LUNG: ICD-10-CM

## 2023-04-26 DIAGNOSIS — C34.90 SQUAMOUS CELL CARCINOMA OF LUNG, UNSPECIFIED LATERALITY: ICD-10-CM

## 2023-04-26 DIAGNOSIS — C34.90 SQUAMOUS CELL CARCINOMA OF LUNG, UNSPECIFIED LATERALITY: Primary | ICD-10-CM

## 2023-04-26 PROCEDURE — 99215 OFFICE O/P EST HI 40 MIN: CPT | Mod: S$PBB,,,

## 2023-04-26 PROCEDURE — 99999 PR PBB SHADOW E&M-EST. PATIENT-LVL V: ICD-10-PCS | Mod: PBBFAC,,,

## 2023-04-26 PROCEDURE — 99215 PR OFFICE/OUTPT VISIT, EST, LEVL V, 40-54 MIN: ICD-10-PCS | Mod: S$PBB,,,

## 2023-04-26 PROCEDURE — 99999 PR PBB SHADOW E&M-EST. PATIENT-LVL V: CPT | Mod: PBBFAC,,,

## 2023-04-26 PROCEDURE — 77301 PR  INTEN MOD RADIOTHER PLAN W/DOSE VOL HIST: ICD-10-PCS | Mod: 26,,, | Performed by: RADIOLOGY

## 2023-04-26 PROCEDURE — 99215 OFFICE O/P EST HI 40 MIN: CPT | Mod: PBBFAC,PN

## 2023-04-26 PROCEDURE — 77301 RADIOTHERAPY DOSE PLAN IMRT: CPT | Mod: 26,,, | Performed by: RADIOLOGY

## 2023-04-26 PROCEDURE — 77301 RADIOTHERAPY DOSE PLAN IMRT: CPT | Mod: TC,PN | Performed by: RADIOLOGY

## 2023-04-26 RX ORDER — LIDOCAINE AND PRILOCAINE 25; 25 MG/G; MG/G
CREAM TOPICAL
Qty: 30 G | Refills: 1 | Status: SHIPPED | OUTPATIENT
Start: 2023-04-26

## 2023-04-26 RX ORDER — PROMETHAZINE HYDROCHLORIDE 25 MG/1
25 TABLET ORAL EVERY 6 HOURS PRN
Qty: 30 TABLET | Refills: 0 | Status: SHIPPED | OUTPATIENT
Start: 2023-04-26 | End: 2023-12-26

## 2023-04-26 RX ORDER — ONDANSETRON HYDROCHLORIDE 8 MG/1
8 TABLET, FILM COATED ORAL EVERY 8 HOURS PRN
Qty: 30 TABLET | Refills: 2 | Status: SHIPPED | OUTPATIENT
Start: 2023-04-26 | End: 2023-12-26

## 2023-04-26 NOTE — TELEPHONE ENCOUNTER
----- Message from Homa Blunt sent at 4/26/2023  4:20 PM CDT -----  Type: Need Medical Advice   Who Called: [facility]  Best callback number:   Additional Information: [facility] called stating they can fill the prescription duke's soln (benadryl 30 mL, mylanta 30 mL, LIDOcaine 30 mL, nystatin 30 mL) 120mL and would like for it to be sent to [facility] fax    Please call to further assist, Thanks

## 2023-04-26 NOTE — PROGRESS NOTES
Oncology Nutrition   New Patient Education  Feng Thakkar   1950    Nutrition Education   This is a 72 y.o.male with a medical diagnosis of lung cancer.     Met w/ pt to discuss current nutritional status and nutrition as it relates to cancer and cancer treatment. Pt currently low risk. Pt present with daughter and daughter in law for education. Pt reports he does drink ~3-4 beers/day. Reinforced importance of hydration during chemotherapy treatment. Pt VU. Pt denies any nutrition concerns at this time. RD discussed role in POC.     RD offered pt TFP. Pt denied need at this time. Explained to pt if anything changes to please ask to speak to RD.     RD and LCSW, Lyn, provided pt and family a tour of infusion.     Wt Readings from Last 10 Encounters:   04/26/23 99.1 kg (218 lb 7.6 oz)   04/24/23 98 kg (216 lb)   04/21/23 99.6 kg (219 lb 9.3 oz)   04/06/23 98.3 kg (216 lb 11.4 oz)   04/04/23 98.3 kg (216 lb 11.4 oz)   03/31/23 99.5 kg (219 lb 5.7 oz)   03/17/23 99.3 kg (218 lb 14.7 oz)   03/03/23 99.3 kg (219 lb)   03/01/23 98.8 kg (217 lb 13 oz)   01/13/23 98.8 kg (217 lb 13 oz)      [x] PMHx reviewed  [x] Labs reviewed    Educated on food safety and common nutrition impact symptoms associated with chemotherapy treatment. Reinforced the importance of good hydration. Handouts provided.    Answered all nutrition related questions.     Patient provided with dietitian contact number and advised to call with questions or make future appointment if further intervention is needed. RD to follow throughout tx prn.    Machelle Gruber, CAROL, LDN  04/26/2023  1:27 PM

## 2023-04-26 NOTE — PROGRESS NOTES
Oncology   Chemotherapy School Education  Feng Thakkar   1950    Social Service Education   This is a 72 y.o.male with a medical diagnosis of lung cancer.    Current Support System: The pt was accompanied by his daughter, Ruthann and daughter-in-law.   The pt reported his not having an issue with transportation bc his daughter-in-law plans to bring him to his treatments.     Met with pt for new pt education. Reviewed role of  during cancer treatment. Educated on role of SW on care team and resources available at the cancer center.     Answered all psychosocial/socioeconomic related questions.     The pt was given a tour of the infusion suite and provided with a port pillow.    Patient provided with social contact number and advised to call with questions or make future appointment if further intervention is needed. SW to follow throughout tx.    Lyn Contreras LCSW  04/26/2023  2:04 PM

## 2023-04-27 ENCOUNTER — TELEPHONE (OUTPATIENT)
Dept: HEMATOLOGY/ONCOLOGY | Facility: CLINIC | Age: 73
End: 2023-04-27
Payer: MEDICARE

## 2023-04-27 ENCOUNTER — DOCUMENTATION ONLY (OUTPATIENT)
Dept: PHARMACY | Facility: AMBULARY SURGERY CENTER | Age: 73
End: 2023-04-27
Payer: MEDICARE

## 2023-04-27 ENCOUNTER — LAB VISIT (OUTPATIENT)
Dept: LAB | Facility: HOSPITAL | Age: 73
End: 2023-04-27
Attending: INTERNAL MEDICINE
Payer: MEDICARE

## 2023-04-27 ENCOUNTER — DOCUMENTATION ONLY (OUTPATIENT)
Dept: HEMATOLOGY/ONCOLOGY | Facility: CLINIC | Age: 73
End: 2023-04-27
Payer: MEDICARE

## 2023-04-27 DIAGNOSIS — C34.90 SQUAMOUS CELL CARCINOMA OF LUNG, UNSPECIFIED LATERALITY: ICD-10-CM

## 2023-04-27 LAB
ALBUMIN SERPL BCP-MCNC: 3.7 G/DL (ref 3.5–5.2)
ALP SERPL-CCNC: 62 U/L (ref 55–135)
ALT SERPL W/O P-5'-P-CCNC: 20 U/L (ref 10–44)
ANION GAP SERPL CALC-SCNC: 14 MMOL/L (ref 8–16)
AST SERPL-CCNC: 19 U/L (ref 10–40)
BASOPHILS # BLD AUTO: 0.04 K/UL (ref 0–0.2)
BASOPHILS NFR BLD: 0.4 % (ref 0–1.9)
BILIRUB SERPL-MCNC: 0.4 MG/DL (ref 0.1–1)
BUN SERPL-MCNC: 16 MG/DL (ref 8–23)
CALCIUM SERPL-MCNC: 10.4 MG/DL (ref 8.7–10.5)
CHLORIDE SERPL-SCNC: 101 MMOL/L (ref 95–110)
CO2 SERPL-SCNC: 22 MMOL/L (ref 23–29)
CREAT SERPL-MCNC: 1.4 MG/DL (ref 0.5–1.4)
DIFFERENTIAL METHOD: ABNORMAL
EOSINOPHIL # BLD AUTO: 0.3 K/UL (ref 0–0.5)
EOSINOPHIL NFR BLD: 3.4 % (ref 0–8)
ERYTHROCYTE [DISTWIDTH] IN BLOOD BY AUTOMATED COUNT: 15 % (ref 11.5–14.5)
EST. GFR  (NO RACE VARIABLE): 53.4 ML/MIN/1.73 M^2
GLUCOSE SERPL-MCNC: 92 MG/DL (ref 70–110)
HCT VFR BLD AUTO: 43.3 % (ref 40–54)
HGB BLD-MCNC: 13.7 G/DL (ref 14–18)
IMM GRANULOCYTES # BLD AUTO: 0.06 K/UL (ref 0–0.04)
IMM GRANULOCYTES NFR BLD AUTO: 0.6 % (ref 0–0.5)
LYMPHOCYTES # BLD AUTO: 3.8 K/UL (ref 1–4.8)
LYMPHOCYTES NFR BLD: 37 % (ref 18–48)
MCH RBC QN AUTO: 27.6 PG (ref 27–31)
MCHC RBC AUTO-ENTMCNC: 31.6 G/DL (ref 32–36)
MCV RBC AUTO: 87 FL (ref 82–98)
MONOCYTES # BLD AUTO: 1.2 K/UL (ref 0.3–1)
MONOCYTES NFR BLD: 11.5 % (ref 4–15)
NEUTROPHILS # BLD AUTO: 4.8 K/UL (ref 1.8–7.7)
NEUTROPHILS NFR BLD: 47.1 % (ref 38–73)
NRBC BLD-RTO: 0 /100 WBC
PLATELET # BLD AUTO: 408 K/UL (ref 150–450)
PMV BLD AUTO: 9.1 FL (ref 9.2–12.9)
POTASSIUM SERPL-SCNC: 4.5 MMOL/L (ref 3.5–5.1)
PROT SERPL-MCNC: 8.4 G/DL (ref 6–8.4)
RBC # BLD AUTO: 4.97 M/UL (ref 4.6–6.2)
SODIUM SERPL-SCNC: 137 MMOL/L (ref 136–145)
WBC # BLD AUTO: 10.13 K/UL (ref 3.9–12.7)

## 2023-04-27 PROCEDURE — 77470 SPECIAL RADIATION TREATMENT: CPT | Mod: 59,TC,PN | Performed by: RADIOLOGY

## 2023-04-27 PROCEDURE — 77014 HC CT GUIDANCE RADIATION THERAPY FLDS PLACEMENT: CPT | Mod: TC,PN | Performed by: RADIOLOGY

## 2023-04-27 PROCEDURE — 77290 PR  SET RADN THERAPY FIELD COMPLEX: ICD-10-PCS | Mod: 26,,, | Performed by: RADIOLOGY

## 2023-04-27 PROCEDURE — 77338 DESIGN MLC DEVICE FOR IMRT: CPT | Mod: 26,,, | Performed by: RADIOLOGY

## 2023-04-27 PROCEDURE — 77300 RADIATION THERAPY DOSE PLAN: CPT | Mod: TC,PN | Performed by: RADIOLOGY

## 2023-04-27 PROCEDURE — 77300 PR RADIATION THERAPY,DOSIMETRY PLAN: ICD-10-PCS | Mod: 26,,, | Performed by: RADIOLOGY

## 2023-04-27 PROCEDURE — 77338 DESIGN MLC DEVICE FOR IMRT: CPT | Mod: TC,59,PN | Performed by: RADIOLOGY

## 2023-04-27 PROCEDURE — 85025 COMPLETE CBC W/AUTO DIFF WBC: CPT | Mod: PN | Performed by: INTERNAL MEDICINE

## 2023-04-27 PROCEDURE — 77300 RADIATION THERAPY DOSE PLAN: CPT | Mod: 26,,, | Performed by: RADIOLOGY

## 2023-04-27 PROCEDURE — 77290 THER RAD SIMULAJ FIELD CPLX: CPT | Mod: 26,,, | Performed by: RADIOLOGY

## 2023-04-27 PROCEDURE — 77470 PR  SPECIAL RADIATION TREATMENT: ICD-10-PCS | Mod: 26,59,, | Performed by: RADIOLOGY

## 2023-04-27 PROCEDURE — 77338 PR  MLC IMRT DESIGN & CONSTRUCTION PER IMRT PLAN: ICD-10-PCS | Mod: 26,,, | Performed by: RADIOLOGY

## 2023-04-27 PROCEDURE — 77290 THER RAD SIMULAJ FIELD CPLX: CPT | Mod: TC,PN | Performed by: RADIOLOGY

## 2023-04-27 PROCEDURE — 80053 COMPREHEN METABOLIC PANEL: CPT | Mod: PN | Performed by: INTERNAL MEDICINE

## 2023-04-27 PROCEDURE — 77386 HC IMRT, COMPLEX: CPT | Mod: PN | Performed by: RADIOLOGY

## 2023-04-27 PROCEDURE — 36415 COLL VENOUS BLD VENIPUNCTURE: CPT | Mod: PN | Performed by: INTERNAL MEDICINE

## 2023-04-27 PROCEDURE — 77470 SPECIAL RADIATION TREATMENT: CPT | Mod: 26,59,, | Performed by: RADIOLOGY

## 2023-04-27 NOTE — PLAN OF CARE
Completed 1/30 outpatient radiation treatments without difficulty, visited with Dr Hadley and all questions and concerns addressed

## 2023-04-27 NOTE — PROGRESS NOTES
Pharmacy Treatment Plan Review    Patient  Feng Thakkar, 72 y.o.  1950    Indication: NSCLC     History:    recently diagnosed with at least stage III [id number] SCC of the left lung    Labs:  CBC  Lab Results   Component Value Date    WBC 9.92 04/24/2023    HGB 13.9 (L) 04/24/2023    HCT 43.7 04/24/2023    MCV 85 04/24/2023     04/24/2023       BMP  Lab Results   Component Value Date     03/31/2023    K 4.6 03/31/2023     03/31/2023    CO2 24 03/31/2023    BUN 16 03/31/2023    CREATININE 1.5 (H) 03/31/2023    CALCIUM 10.4 03/31/2023    ANIONGAP 10 03/31/2023       LFTs  Lab Results   Component Value Date    ALT 27 03/31/2023    AST 20 03/31/2023    ALKPHOS 54 (L) 03/31/2023    BILITOT 0.3 03/31/2023       The patient is scheduled to start the following infusion:   OP NSCLC PACLITAXEL + CARBOPLATIN (AUC) QW + RADIATION    7-day cycle for 6 cycles of:    -Paclitaxel 45 mg/m2 in sodium chloride 0.9% 100 mL, infusion, intravneous, on day 1    -Carboplatin AUC of 2 in sodium chloride 0.9% 250 mL, infusion, intravneous, on day 1    Premeds  -Diphenhydramine 50 mg IVPB  -Famotidine 20 mg IV  -Palonosetron 0.25 mg/Dexamethasone 12 mg     Supportive meds  -Ondansetron and promethazine prn home nausea medicaitons    The treatment plan is per NCCN guidelines    Ab Mae  PharmD

## 2023-04-27 NOTE — TELEPHONE ENCOUNTER
I spoke with the patient about getting an IO appt scheduled per the recommendation of his Hem/Onc MD. I explained in detail what we offer and listed some symptoms/side effects that we can help address using our support services. He verbalized understanding and accepted appt chairside tomorrow.

## 2023-04-27 NOTE — PROGRESS NOTES
PATIENT: Feng Thakkar  MRN: [id number]  DATE: 4/28/2023      Diagnosis:   1. Squamous cell carcinoma of lung, unspecified laterality    2. Malignant neoplasm of unspecified part of unspecified bronchus or lung    3. Pancreatic mass    4. Hypertension, unspecified type    5. Gout, unspecified cause, unspecified chronicity, unspecified site    6. Hyperlipidemia, unspecified hyperlipidemia type    7. Type 2 diabetes mellitus with diabetic polyneuropathy, with long-term current use of insulin    8. Neuropathy    9. Normocytic anemia            Chief Complaint: Lung Cancer (Squamous cell carcinoma of lung, unspecified laterality, 1 week follow up )      Oncologic History:      Oncologic History     Oncologic Treatment     Pathology           Subjective:    Initial History: Mr. Thakkar is a 72 y.o. male with Gout, HLD, HTN who presents for work up of NSCLC.  Since the last clinic visit the patient states he feels well.  The patient denies CP, cough, SOB, abdominal pain, nausea, vomiting, constipation, diarrhea.  The patient denies fever, chills, night sweats, weight loss, new lumps or bumps, easy bruising or bleeding.    Prior History:  The patient initially developed abdominal pain on 01/05/2023 and underwent CT of the abdomen showing a mass in the left lung base measuring 3.9 x 2.9 cm; 3 x 3.8 cm mass along the pancreatic tail; and shotty retroperitoneal lymph nodes.  The patient underwent repeat CT abdomen and pelvis on 02/24/2023 showing a 4.3 x 4 solid enhancing mass of the left lung base; thickened bladder wall; prostate gland measuring 45.1 x 4.6 cm; abnormal sclerosis in the left femoral head measuring 2.8 x 1.4 x 1.3 cm; and additional soft tissue densities in the posterior left subdiaphragmatic region measuring up to 16 mm.  CT chest on 03/01/2023 showed a 2 cm low-density lesion in the right lobe of the thyroid gland; 4.5 x 4.3 x 3.8 cm mass in the left lower lobe; several micro nodules; Na soft tissue mass  adjacent to the pancreatic tail.  Patient underwent EUS on 03/03/2023 showing a 3.5 cm and 1.8 cm round pancreatic tail lesion.  Biopsy returned results showing no evidence of malignancy and abundant hematopoietic elements and dendritic cells.  Patient then underwent EBUS under the care of Dr. Albright on 03/17/2023 showing squamous cell carcinoma in biopsy of the left lower lung lesion; squamous cell carcinoma and station 7 lymph node; positive 11 L lymph node for squamous cell carcinoma.   The patient underwent PET-CT on 04/18/2023 showing a markedly hypermetabolic lobulated subpleural left lower lobe mass measuring 4.5 x 3.9 cm with hypermetabolic lymph nodes in the left hilar region measuring 2.2 x 1.9 cm; and a 4.2 x 3.6 walk circumscribed mass in the pancreatic tail.  MRI brain on 04/18/2023 showed no acute findings.      Past Medical History:   Past Medical History:   Diagnosis Date    Diabetes mellitus     Gout, unspecified     High cholesterol     HTN (hypertension)     Lung cancer        Past Surgical HIstory:   Past Surgical History:   Procedure Laterality Date    ENDOSCOPIC ULTRASOUND OF UPPER GASTROINTESTINAL TRACT N/A 3/3/2023    Procedure: ULTRASOUND, UPPER GI TRACT, ENDOSCOPIC;  Surgeon: Cora Mcgrath MD;  Location: [facility];  Service: Endoscopy;  Laterality: N/A;  2/24/23-Instructions mailed to address on file-    INSERTION OF TUNNELED CENTRAL VENOUS CATHETER (CVC) WITH SUBCUTANEOUS PORT N/A 4/24/2023    Procedure: [id number];  Surgeon: Paulino Love MD;  Location: [facility];  Service: General;  Laterality: N/A;    PANCREATECTOMY      ROBOTIC BRONCHOSCOPY N/A 3/17/2023    Procedure: ROBOTIC BRONCHOSCOPY;  Surgeon: Cristiane Albright MD;  Location: [address];  Service: Pulmonary;  Laterality: N/A;       Family History:   Family History   Problem Relation Age of Onset    Breast cancer Sister         60       Social History:  reports that he quit smoking about 10 years ago. His  smoking use included cigarettes. He started smoking about 54 years ago. He has a 81.00 pack-year smoking history. He has quit using smokeless tobacco. He reports current alcohol use of about 3.0 standard drinks per week. He reports that he does not use drugs.    Allergies:  Review of patient's allergies indicates:  No Known Allergies    Medications:  Current Outpatient Medications   Medication Sig Dispense Refill    allopurinoL (ZYLOPRIM) 100 MG tablet Take 100 mg by mouth once daily.      amLODIPine (NORVASC) 2.5 MG tablet Take 2.5 mg by mouth once daily.      BYDUREON BCISE 2 mg/0.85 mL AtIn Inject 2 mg into the skin every 7 days. Every Friday      cloNIDine (CATAPRES) 0.2 MG tablet Take 0.2 mg by mouth once. Daily      duke's soln (benadryl 30 mL, mylanta 30 mL, LIDOcaine 30 mL, nystatin 30 mL) 120mL Take 10 mLs by mouth 4 (four) times daily. 120 mL 1    ezetimibe (ZETIA) 10 mg tablet Take 10 mg by mouth once daily.      FARXIGA 10 mg tablet Take 10 mg by mouth every morning.      finasteride (PROSCAR) 5 mg tablet Take 5 mg by mouth once daily.      FREESTYLE TEST Strp USE 1 STRIP TO CHECK GLUCOSE ONCE DAILY      HYDROcodone-acetaminophen (NORCO) 5-325 mg per tablet Take 1 tablet by mouth every 6 (six) hours as needed for Pain. 10 tablet 0    LIDOCAINE VISCOUS 2 % solution       LIDOcaine-prilocaine (EMLA) cream Apply topically to port site one hour prior to access, cover 30 g 1    metoprolol succinate (TOPROL-XL) 100 MG 24 hr tablet Take 100 mg by mouth once daily.      ondansetron (ZOFRAN) 8 MG tablet Take 1 tablet (8 mg total) by mouth every 8 (eight) hours as needed for Nausea. 30 tablet 2    promethazine (PHENERGAN) 25 MG tablet Take 1 tablet (25 mg total) by mouth every 6 (six) hours as needed for Nausea. 30 tablet 0    rosuvastatin (CRESTOR) 40 MG Tab Take 10 mg by mouth every evening.      TOUJEO MAX U-300 SOLOSTAR 300 unit/mL (3 mL) insulin pen Inject 30 Units into the skin every evening.       No  "current facility-administered medications for this visit.     Facility-Administered Medications Ordered in Other Visits   Medication Dose Route Frequency Provider Last Rate Last Admin    alteplase injection 2 mg  2 mg Intra-Catheter PRN Lisandro Lawrence MD        CARBOplatin (PARAPLATIN) 185 mg in sodium chloride 0.9% 303.5 mL chemo infusion  185 mg Intravenous 1 time in Clinic/HOD Lisandro Lawrence .5 mL/hr at 04/28/23 1235 185 mg at 04/28/23 1235    chlorhexidine 0.12 % solution 15 mL  15 mL Mouth/Throat BID Paulino Love MD        diphenhydrAMINE injection 50 mg  50 mg Intravenous Once PRN Lisandro Lawrence MD        EPINEPHrine (EPIPEN) 0.3 mg/0.3 mL pen injection 0.3 mg  0.3 mg Intramuscular Once PRN Lisandro Lawrence MD        heparin, porcine (PF) 100 unit/mL injection flush 500 Units  500 Units Intravenous PRN Lisandro Lawrence MD        hydrocortisone sodium succinate injection 100 mg  100 mg Intravenous Once PRN Lisandro Lawrence MD        mupirocin 2 % ointment   Nasal BID Paulino Love MD        sodium chloride 0.9% flush 10 mL  10 mL Intravenous PRN Lisandro Lawrence MD           Review of Systems   Constitutional:  Negative for chills, diaphoresis, fatigue, fever and unexpected weight change.   Respiratory:  Negative for cough and shortness of breath.    Cardiovascular:  Negative for chest pain and palpitations.   Gastrointestinal:  Negative for abdominal pain, constipation, diarrhea, nausea and vomiting.   Skin:  Negative for color change and rash.   Neurological:  Negative for headaches.   Hematological:  Negative for adenopathy. Does not bruise/bleed easily.     ECOG Performance Status: 1   Objective:      Vitals:   Vitals:    04/28/23 0900   BP: 122/70   BP Location: Right arm   Patient Position: Sitting   BP Method: Large (Manual)   Pulse: 69   Resp: 16   Temp: 96.9 °F (36.1 °C)   TempSrc: Temporal   SpO2: 98%   Weight: 98.8 kg (217 lb 13 oz)   Height: 6' 2" (1.88 m) "         Physical Exam  Constitutional:       General: He is not in acute distress.     Appearance: He is well-developed. He is not diaphoretic.   HENT:      Head: Normocephalic and atraumatic.   Cardiovascular:      Rate and Rhythm: Normal rate and regular rhythm.      Heart sounds: Normal heart sounds. No murmur heard.    No friction rub. No gallop.   Pulmonary:      Effort: Pulmonary effort is normal. No respiratory distress.      Breath sounds: Normal breath sounds. No wheezing or rales.   Chest:      Chest wall: No tenderness.   Abdominal:      General: Bowel sounds are normal. There is no distension.      Palpations: Abdomen is soft. There is no mass.      Tenderness: There is no abdominal tenderness. There is no rebound.   Musculoskeletal:      Cervical back: Normal range of motion.   Lymphadenopathy:      Cervical: No cervical adenopathy.      Upper Body:      Right upper body: No supraclavicular or axillary adenopathy.      Left upper body: No supraclavicular or axillary adenopathy.   Skin:     General: Skin is warm and dry.      Findings: No erythema or rash.   Neurological:      Mental Status: He is alert and oriented to person, place, and time.   Psychiatric:         Behavior: Behavior normal.       Laboratory Data:  Lab Visit on 04/27/2023   Component Date Value Ref Range Status    WBC 04/27/2023 10.13  3.90 - 12.70 K/uL Final    RBC 04/27/2023 4.97  4.60 - 6.20 M/uL Final    Hemoglobin 04/27/2023 13.7 (L)  14.0 - 18.0 g/dL Final    Hematocrit 04/27/2023 43.3  40.0 - 54.0 % Final    MCV 04/27/2023 87  82 - 98 fL Final    MCH 04/27/2023 27.6  27.0 - 31.0 pg Final    MCHC 04/27/2023 31.6 (L)  32.0 - 36.0 g/dL Final    RDW 04/27/2023 15.0 (H)  11.5 - 14.5 % Final    Platelets 04/27/2023 408  150 - 450 K/uL Final    MPV 04/27/2023 9.1 (L)  9.2 - 12.9 fL Final    Immature Granulocytes 04/27/2023 0.6 (H)  0.0 - 0.5 % Final    Gran # (ANC) 04/27/2023 4.8  1.8 - 7.7 K/uL Final    Immature Grans (Abs) 04/27/2023  0.06 (H)  0.00 - 0.04 K/uL Final    Comment: Mild elevation in immature granulocytes is non specific and   can be seen in a variety of conditions including stress response,   acute inflammation, trauma and pregnancy. Correlation with other   laboratory and clinical findings is essential.      Lymph # 04/27/2023 3.8  1.0 - 4.8 K/uL Final    Mono # 04/27/2023 1.2 (H)  0.3 - 1.0 K/uL Final    Eos # 04/27/2023 0.3  0.0 - 0.5 K/uL Final    Baso # 04/27/2023 0.04  0.00 - 0.20 K/uL Final    nRBC 04/27/2023 0  0 /100 WBC Final    Gran % 04/27/2023 47.1  38.0 - 73.0 % Final    Lymph % 04/27/2023 37.0  18.0 - 48.0 % Final    Mono % 04/27/2023 11.5  4.0 - 15.0 % Final    Eosinophil % 04/27/2023 3.4  0.0 - 8.0 % Final    Basophil % 04/27/2023 0.4  0.0 - 1.9 % Final    Differential Method 04/27/2023 Automated   Final    Sodium 04/27/2023 137  136 - 145 mmol/L Final    Potassium 04/27/2023 4.5  3.5 - 5.1 mmol/L Final    Chloride 04/27/2023 101  95 - 110 mmol/L Final    CO2 04/27/2023 22 (L)  23 - 29 mmol/L Final    Glucose 04/27/2023 92  70 - 110 mg/dL Final    BUN 04/27/2023 16  8 - 23 mg/dL Final    Creatinine 04/27/2023 1.4  0.5 - 1.4 mg/dL Final    Calcium 04/27/2023 10.4  8.7 - 10.5 mg/dL Final    Total Protein 04/27/2023 8.4  6.0 - 8.4 g/dL Final    Albumin 04/27/2023 3.7  3.5 - 5.2 g/dL Final    Total Bilirubin 04/27/2023 0.4  0.1 - 1.0 mg/dL Final    Comment: For infants and newborns, interpretation of results should be based  on gestational age, weight and in agreement with clinical  observations.    Premature Infant recommended reference ranges:  Up to 24 hours.............<8.0 mg/dL  Up to 48 hours............<12.0 mg/dL  3-5 days..................<15.0 mg/dL  6-29 days.................<15.0 mg/dL      Alkaline Phosphatase 04/27/2023 62  55 - 135 U/L Final    AST 04/27/2023 19  10 - 40 U/L Final    ALT 04/27/2023 20  10 - 44 U/L Final    Anion Gap 04/27/2023 14  8 - 16 mmol/L Final    eGFR 04/27/2023 53.4 (A)  >60  mL/min/1.73 m^2 Final   Admission on 04/24/2023, Discharged on 04/24/2023   Component Date Value Ref Range Status    aPTT 04/24/2023 31.3  24.6 - 36.7 sec Final    PTT normal range is not established for pediatrics.    PT 04/24/2023 13.4  11.8 - 14.7 sec Final    PT normal range is not established for pediatrics.    INR 04/24/2023 1.0   Final    WBC 04/24/2023 9.92  3.90 - 12.70 K/uL Final    RBC 04/24/2023 5.17  4.60 - 6.20 M/uL Final    Hemoglobin 04/24/2023 13.9 (L)  14.0 - 18.0 g/dL Final    Hematocrit 04/24/2023 43.7  40.0 - 54.0 % Final    MCV 04/24/2023 85  82 - 98 fL Final    MCH 04/24/2023 26.9 (L)  27.0 - 31.0 pg Final    MCHC 04/24/2023 31.8 (L)  32.0 - 36.0 g/dL Final    RDW 04/24/2023 14.6 (H)  11.5 - 14.5 % Final    Platelets 04/24/2023 431  150 - 450 K/uL Final    MPV 04/24/2023 9.2  9.2 - 12.9 fL Final    Hemoglobin A1C 04/24/2023 7.9 (H)  0.0 - 5.6 % Final    Comment: Reference Interval:  5.0 - 5.6 Normal   5.7 - 6.4 High Risk   > 6.5 Diabetic       Hgb A1c results are standardized based on the (NGSP) National   Glycohemoglobin Standardization Program.      Hemoglobin A1C levels are related to mean serum/plasma glucose   during the preceding 2-3 months.          Estimated Avg Glucose 04/24/2023 180 (H)  68 - 131 mg/dL Final    POCT Glucose 04/24/2023 88  70 - 110 mg/dL Final    POCT Glucose 04/24/2023 74  70 - 110 mg/dL Final         Imaging:     PET/CT 4/18/23  Head/neck:     No significant abnormal hypermetabolic foci are identified within the head and neck region.  No lymphadenopathy is present.     Chest:     There is a markedly hypermetabolic lobulated subpleural left lower lobe mass consistent with the patient's known squamous cell carcinoma.  The mass is best measured on the fused PET-CT axial images and on image 115 measures 4.5 x 3.9 cm with a max SUV of 14.0.  There also hypermetabolic lymph nodes in the left hilar region and subcarinal region most consistent with neoplastic adenopathy.  A  left hilar node on image 111 measures 2.2 x 1.9 cm with a max SUV of 14.3.     Abdomen/pelvis:     There is a well-circumscribed bulb this hypermetabolic mass arising from the pancreatic tail.  On image 142 the mass measures 4.2 x 3.6 cm and has a max SUV of 5.7.  No other significant abnormal hypermetabolic foci are identified within the abdomen and pelvis.  No lymphadenopathy is present.  The adrenal glands are normal.     Skeleton:     No significant abnormal hypermetabolic foci are identified within the skeleton.  There are no findings to suggest osseous metastatic disease.    MRI Brain 4/18/23  Prominence of the ventricles and sulci compatible with mild generalized cerebral volume loss.  No hydrocephalus.     Moderate confluent and scattered T2/FLAIR hyperintense signal within the periventricular deep subcortical and pontine white matter, nonspecific and may reflect sequelae of chronic microvascular ischemic change.    Diffusion-weighted images demonstrate no evidence of an acute infarct.   Susceptibility weighted images demonstrate no evidence of acute or chronic hemorrhage. No mass effect or midline shift.     No abnormal intracranial enhancement.     Normal vascular flow voids are preserved.     Bone marrow signal intensity is normal.  The paranasal sinuses are normal.  The visualized portions of the mastoids are unremarkable.  Orbits are unremarkable.   Assessment:       1. Squamous cell carcinoma of lung, unspecified laterality    2. Malignant neoplasm of unspecified part of unspecified bronchus or lung    3. Pancreatic mass    4. Hypertension, unspecified type    5. Gout, unspecified cause, unspecified chronicity, unspecified site    6. Hyperlipidemia, unspecified hyperlipidemia type    7. Type 2 diabetes mellitus with diabetic polyneuropathy, with long-term current use of insulin    8. Neuropathy    9. Normocytic anemia               Plan:     NSCLC - patient was recently diagnosed with at least stage  III [id number] SCC of the left lung  -PET/CT 4/18/23 shows continued left lung mass, mediastinal LAD  -MRI brain 4/18/23 showed no acute findings  -[facility] Blood testing showed TP 53 mutation  -[facility] tissue testing showed PD-L1 of 20%, TP53, KDM6A, CDKN2a and MTAP  -PORT placed 4/24/23 under the care of Dr. Love  -Will proceed with treatment with Carboplatin/Paclitaxel and radiation    Pancreatic Mass - seen on CT scans and biopsy during EUS on 03/03/2023 with path showing no evidence of malignancy  -PET/CT 4/18/23 showed uptake in the tail of the pancreas  -Possibly due to chronic pancreatitis  -Will monitor    HTN - pt on amlodipine, clonidine, metoprolol  -BP controlled  -Will monitor    Gout - pt on allopurinol  -Currently asymptomatic  -Will monitor    HLD - pt on rosuvastatin, zetia  -Management per PCP    DMII - pt on toujeo, farxiga, and bydureon  -Management per PCP    Neuropathy - numbness in feet due to diabetes  -Will monitor    Normocytic Anemia - hemoglobin 13.7g/dL on 4/27/23  -Possibly due to chronic disease  -Will monitor    Route Chart for Scheduling    Med Onc Chart Routing      Follow up with physician 2 weeks. Pt can proceed with treatment.  Pt will need a CBC, Mg and CMP prior to his appt in a week with Patrick Levine.  Pt needs an appt with me in 2 weeks with labs and an appt.  Pt needs alternatign appts between me and NP in the next 6 weeks.  Please schedule.   Follow up with RADHIKA 1 week.   Infusion scheduling note    Injection scheduling note    Labs    Imaging    Pharmacy appointment    Other referrals         Treatment Plan Information   OP NSCLC PACLITAXEL + CARBOPLATIN (AUC) QW + RADIATION   Lisandro Lawrence MD   Upcoming Treatment Dates - OP NSCLC PACLITAXEL + CARBOPLATIN (AUC) QW + RADIATION    5/5/2023       Pre-Medications       diphenhydrAMINE (BENADRYL) 50 mg in NS 50 mL IVPB       FAMOTIDINE (PF) 20 MG/2 ML IV SOLN       Chemotherapy       PACLitaxeL (TAXOL) 45 mg/m2 = 102 mg in  sodium chloride 0.9% 250 mL chemo infusion       CARBOplatin (PARAPLATIN) 185 mg in sodium chloride 0.9% 268.5 mL chemo infusion       Antiemetics       palonosetron 0.25mg/dexAMETHasone 12mg in NS IVPB 0.25 mg 50 mL  5/12/2023       Pre-Medications       diphenhydrAMINE (BENADRYL) 50 mg in NS 50 mL IVPB       FAMOTIDINE (PF) 20 MG/2 ML IV SOLN       Chemotherapy       PACLitaxeL (TAXOL) 45 mg/m2 = 102 mg in sodium chloride 0.9% 250 mL chemo infusion       CARBOplatin (PARAPLATIN) 185 mg in sodium chloride 0.9% 268.5 mL chemo infusion       Antiemetics       palonosetron 0.25mg/dexAMETHasone 12mg in NS IVPB 0.25 mg 50 mL  5/19/2023       Pre-Medications       diphenhydrAMINE (BENADRYL) 50 mg in NS 50 mL IVPB       FAMOTIDINE (PF) 20 MG/2 ML IV SOLN       Chemotherapy       PACLitaxeL (TAXOL) 45 mg/m2 = 102 mg in sodium chloride 0.9% 250 mL chemo infusion       CARBOplatin (PARAPLATIN) 185 mg in sodium chloride 0.9% 268.5 mL chemo infusion       Antiemetics       palonosetron 0.25mg/dexAMETHasone 12mg in NS IVPB 0.25 mg 50 mL  5/26/2023       Pre-Medications       diphenhydrAMINE (BENADRYL) 50 mg in NS 50 mL IVPB       FAMOTIDINE (PF) 20 MG/2 ML IV SOLN       Chemotherapy       PACLitaxeL (TAXOL) 45 mg/m2 = 102 mg in sodium chloride 0.9% 250 mL chemo infusion       CARBOplatin (PARAPLATIN) 185 mg in sodium chloride 0.9% 268.5 mL chemo infusion       Antiemetics       palonosetron 0.25mg/dexAMETHasone 12mg in NS IVPB 0.25 mg 50 mL      Lisandro Lawrence MD  [facility]  Hematology and Oncology  [facility]   [address] [facility] [place] [address]   O: [phone]  F: [phone]

## 2023-04-28 ENCOUNTER — OFFICE VISIT (OUTPATIENT)
Dept: HEMATOLOGY/ONCOLOGY | Facility: CLINIC | Age: 73
End: 2023-04-28
Payer: MEDICARE

## 2023-04-28 ENCOUNTER — INFUSION (OUTPATIENT)
Dept: INFUSION THERAPY | Facility: HOSPITAL | Age: 73
End: 2023-04-28
Attending: INTERNAL MEDICINE
Payer: MEDICARE

## 2023-04-28 ENCOUNTER — DOCUMENTATION ONLY (OUTPATIENT)
Dept: INFUSION THERAPY | Facility: HOSPITAL | Age: 73
End: 2023-04-28
Payer: MEDICARE

## 2023-04-28 ENCOUNTER — TELEPHONE (OUTPATIENT)
Dept: HEMATOLOGY/ONCOLOGY | Facility: CLINIC | Age: 73
End: 2023-04-28
Payer: MEDICARE

## 2023-04-28 VITALS
BODY MASS INDEX: 27.95 KG/M2 | TEMPERATURE: 97 F | OXYGEN SATURATION: 98 % | HEART RATE: 69 BPM | RESPIRATION RATE: 16 BRPM | HEIGHT: 74 IN | SYSTOLIC BLOOD PRESSURE: 122 MMHG | WEIGHT: 217.81 LBS | DIASTOLIC BLOOD PRESSURE: 70 MMHG

## 2023-04-28 VITALS
HEART RATE: 69 BPM | WEIGHT: 217 LBS | RESPIRATION RATE: 16 BRPM | SYSTOLIC BLOOD PRESSURE: 122 MMHG | TEMPERATURE: 97 F | DIASTOLIC BLOOD PRESSURE: 70 MMHG | BODY MASS INDEX: 27.85 KG/M2 | HEIGHT: 74 IN | OXYGEN SATURATION: 98 %

## 2023-04-28 VITALS
DIASTOLIC BLOOD PRESSURE: 87 MMHG | HEART RATE: 74 BPM | RESPIRATION RATE: 16 BRPM | BODY MASS INDEX: 27.95 KG/M2 | HEIGHT: 74 IN | WEIGHT: 217.81 LBS | SYSTOLIC BLOOD PRESSURE: 173 MMHG | TEMPERATURE: 97 F

## 2023-04-28 DIAGNOSIS — D64.9 NORMOCYTIC ANEMIA: ICD-10-CM

## 2023-04-28 DIAGNOSIS — E78.5 HYPERLIPIDEMIA, UNSPECIFIED HYPERLIPIDEMIA TYPE: ICD-10-CM

## 2023-04-28 DIAGNOSIS — G62.9 NEUROPATHY: ICD-10-CM

## 2023-04-28 DIAGNOSIS — C34.90 SQUAMOUS CELL CARCINOMA OF LUNG, UNSPECIFIED LATERALITY: Primary | ICD-10-CM

## 2023-04-28 DIAGNOSIS — M10.9 GOUT, UNSPECIFIED CAUSE, UNSPECIFIED CHRONICITY, UNSPECIFIED SITE: ICD-10-CM

## 2023-04-28 DIAGNOSIS — E11.42 TYPE 2 DIABETES MELLITUS WITH DIABETIC POLYNEUROPATHY, WITH LONG-TERM CURRENT USE OF INSULIN: ICD-10-CM

## 2023-04-28 DIAGNOSIS — C34.90 MALIGNANT NEOPLASM OF UNSPECIFIED PART OF UNSPECIFIED BRONCHUS OR LUNG: ICD-10-CM

## 2023-04-28 DIAGNOSIS — C34.90 SQUAMOUS CELL CARCINOMA OF LUNG, UNSPECIFIED LATERALITY: ICD-10-CM

## 2023-04-28 DIAGNOSIS — I10 HYPERTENSION, UNSPECIFIED TYPE: ICD-10-CM

## 2023-04-28 DIAGNOSIS — K86.89 PANCREATIC MASS: ICD-10-CM

## 2023-04-28 DIAGNOSIS — Z79.4 TYPE 2 DIABETES MELLITUS WITH DIABETIC POLYNEUROPATHY, WITH LONG-TERM CURRENT USE OF INSULIN: ICD-10-CM

## 2023-04-28 PROCEDURE — 96375 TX/PRO/DX INJ NEW DRUG ADDON: CPT | Mod: PN

## 2023-04-28 PROCEDURE — 99999 PR PBB SHADOW E&M-EST. PATIENT-LVL IV: CPT | Mod: PBBFAC,,, | Performed by: INTERNAL MEDICINE

## 2023-04-28 PROCEDURE — 77386 HC IMRT, COMPLEX: CPT | Mod: PN | Performed by: RADIOLOGY

## 2023-04-28 PROCEDURE — 77014 PR  CT GUIDANCE PLACEMENT RAD THERAPY FIELDS: ICD-10-PCS | Mod: 26,,, | Performed by: RADIOLOGY

## 2023-04-28 PROCEDURE — 99999 PR PBB SHADOW E&M-EST. PATIENT-LVL IV: ICD-10-PCS | Mod: PBBFAC,,, | Performed by: INTERNAL MEDICINE

## 2023-04-28 PROCEDURE — 96413 CHEMO IV INFUSION 1 HR: CPT | Mod: PN

## 2023-04-28 PROCEDURE — 77014 PR  CT GUIDANCE PLACEMENT RAD THERAPY FIELDS: CPT | Mod: 26,,, | Performed by: RADIOLOGY

## 2023-04-28 PROCEDURE — 99999 PR PBB SHADOW E&M-EST. PATIENT-LVL V: CPT | Mod: PBBFAC,,, | Performed by: NURSE PRACTITIONER

## 2023-04-28 PROCEDURE — 96417 CHEMO IV INFUS EACH ADDL SEQ: CPT | Mod: PN

## 2023-04-28 PROCEDURE — 63600175 PHARM REV CODE 636 W HCPCS: Mod: PN | Performed by: INTERNAL MEDICINE

## 2023-04-28 PROCEDURE — 99215 PR OFFICE/OUTPT VISIT, EST, LEVL V, 40-54 MIN: ICD-10-PCS | Mod: S$PBB,,, | Performed by: NURSE PRACTITIONER

## 2023-04-28 PROCEDURE — 77014 HC CT GUIDANCE RADIATION THERAPY FLDS PLACEMENT: CPT | Mod: TC,PN | Performed by: RADIOLOGY

## 2023-04-28 PROCEDURE — 99215 OFFICE O/P EST HI 40 MIN: CPT | Mod: PBBFAC,25,PN | Performed by: NURSE PRACTITIONER

## 2023-04-28 PROCEDURE — 96367 TX/PROPH/DG ADDL SEQ IV INF: CPT | Mod: PN

## 2023-04-28 PROCEDURE — 99999 PR PBB SHADOW E&M-EST. PATIENT-LVL V: ICD-10-PCS | Mod: PBBFAC,,, | Performed by: NURSE PRACTITIONER

## 2023-04-28 PROCEDURE — 99214 OFFICE O/P EST MOD 30 MIN: CPT | Mod: PBBFAC,PN,25 | Performed by: INTERNAL MEDICINE

## 2023-04-28 PROCEDURE — 99215 OFFICE O/P EST HI 40 MIN: CPT | Mod: S$PBB,,, | Performed by: NURSE PRACTITIONER

## 2023-04-28 PROCEDURE — 99215 PR OFFICE/OUTPT VISIT, EST, LEVL V, 40-54 MIN: ICD-10-PCS | Mod: S$PBB,,, | Performed by: INTERNAL MEDICINE

## 2023-04-28 PROCEDURE — 99215 OFFICE O/P EST HI 40 MIN: CPT | Mod: S$PBB,,, | Performed by: INTERNAL MEDICINE

## 2023-04-28 PROCEDURE — 25000003 PHARM REV CODE 250: Mod: PN | Performed by: INTERNAL MEDICINE

## 2023-04-28 RX ORDER — HEPARIN 100 UNIT/ML
500 SYRINGE INTRAVENOUS
Status: CANCELLED | OUTPATIENT
Start: 2023-04-28

## 2023-04-28 RX ORDER — EPINEPHRINE 0.3 MG/.3ML
0.3 INJECTION SUBCUTANEOUS ONCE AS NEEDED
Status: CANCELLED | OUTPATIENT
Start: 2023-04-28

## 2023-04-28 RX ORDER — FAMOTIDINE 10 MG/ML
20 INJECTION INTRAVENOUS
Status: COMPLETED | OUTPATIENT
Start: 2023-04-28 | End: 2023-04-28

## 2023-04-28 RX ORDER — FAMOTIDINE 10 MG/ML
20 INJECTION INTRAVENOUS
Status: CANCELLED | OUTPATIENT
Start: 2023-04-28

## 2023-04-28 RX ORDER — HEPARIN 100 UNIT/ML
500 SYRINGE INTRAVENOUS
Status: DISCONTINUED | OUTPATIENT
Start: 2023-04-28 | End: 2023-04-28 | Stop reason: HOSPADM

## 2023-04-28 RX ORDER — SODIUM CHLORIDE 0.9 % (FLUSH) 0.9 %
10 SYRINGE (ML) INJECTION
Status: DISCONTINUED | OUTPATIENT
Start: 2023-04-28 | End: 2023-04-28 | Stop reason: HOSPADM

## 2023-04-28 RX ORDER — LIDOCAINE HYDROCHLORIDE 20 MG/ML
SOLUTION ORAL; TOPICAL
COMMUNITY
Start: 2023-04-27 | End: 2023-12-26 | Stop reason: ALTCHOICE

## 2023-04-28 RX ORDER — DIPHENHYDRAMINE HYDROCHLORIDE 50 MG/ML
50 INJECTION INTRAMUSCULAR; INTRAVENOUS ONCE AS NEEDED
Status: DISCONTINUED | OUTPATIENT
Start: 2023-04-28 | End: 2023-04-28 | Stop reason: HOSPADM

## 2023-04-28 RX ORDER — DIPHENHYDRAMINE HYDROCHLORIDE 50 MG/ML
50 INJECTION INTRAMUSCULAR; INTRAVENOUS ONCE AS NEEDED
Status: CANCELLED | OUTPATIENT
Start: 2023-04-28

## 2023-04-28 RX ORDER — EPINEPHRINE 0.3 MG/.3ML
0.3 INJECTION SUBCUTANEOUS ONCE AS NEEDED
Status: DISCONTINUED | OUTPATIENT
Start: 2023-04-28 | End: 2023-04-28 | Stop reason: HOSPADM

## 2023-04-28 RX ORDER — SODIUM CHLORIDE 0.9 % (FLUSH) 0.9 %
10 SYRINGE (ML) INJECTION
Status: CANCELLED | OUTPATIENT
Start: 2023-04-28

## 2023-04-28 RX ADMIN — PALONOSETRON 0.25 MG: 0.05 INJECTION, SOLUTION INTRAVENOUS at 10:04

## 2023-04-28 RX ADMIN — DIPHENHYDRAMINE HYDROCHLORIDE 50 MG: 50 INJECTION INTRAMUSCULAR; INTRAVENOUS at 10:04

## 2023-04-28 RX ADMIN — FAMOTIDINE 20 MG: 10 INJECTION INTRAVENOUS at 10:04

## 2023-04-28 RX ADMIN — CARBOPLATIN 185 MG: 10 INJECTION, SOLUTION INTRAVENOUS at 12:04

## 2023-04-28 RX ADMIN — SODIUM CHLORIDE: 9 INJECTION, SOLUTION INTRAVENOUS at 10:04

## 2023-04-28 RX ADMIN — PACLITAXEL 102 MG: 6 INJECTION, SOLUTION, CONCENTRATE INTRAVENOUS at 11:04

## 2023-04-28 NOTE — PROGRESS NOTES
Feng Thakkar  72 y.o. is here to seek an integrative approach to discuss side effects related to lung cancer treatment. Feng Thakkar  was referred by MARGARET Morales NP     HPI  Mr. Thakkar is here today for his first chemotherapy. He reports he only sleeps approximately 4-5 hours at night and he naps during the day after his meals. This has been his routine for about 7-8 years. He reports he is fine with his sleep routine and he has the energy to do what he wants. He reports a good appetite, but he does eat less. He reports he is active, but not as active as he used to be.   His wife of 21 years  in 2019. He has 2 children. He has a good support sytem. He is retired.     Pillars Assessment    Sleep  How many hours of sleep per night? 5 hours  Do you have trouble falling asleep, staying asleep or waking up earlier than you need to? yes  Do you have daytime fatigue? no  Do you need medication for sleep? no  Do you use any supplements or other interventions for sleep? no    Resilience  Rate your current level of stress- low    Environment  Any exposures:no known exposures    Spirituality-  Judaism, he reads his Bible    Nutrition   Food allergies or sensitivities: no  Do you adhere to a particular type of diet? no  Do you have any concerns with your eating habits? no    Exercise  How would you describe your physical activity level? moderate  What do you do for physical activity? Lift weights and jogs in place     Past Medical History  Past Medical History:   Diagnosis Date    Diabetes mellitus     Gout, unspecified     High cholesterol     HTN (hypertension)     Lung cancer         Past Surgical History   Past Surgical History:   Procedure Laterality Date    ENDOSCOPIC ULTRASOUND OF UPPER GASTROINTESTINAL TRACT N/A 3/3/2023    Procedure: ULTRASOUND, UPPER GI TRACT, ENDOSCOPIC;  Surgeon: Cora Mcgrath MD;  Location: [facility] ENDO;  Service: Endoscopy;  Laterality: N/A;  23-Instructions mailed to address on file-DS     INSERTION OF TUNNELED CENTRAL VENOUS CATHETER (CVC) WITH SUBCUTANEOUS PORT N/A 4/24/2023    Procedure: [id number];  Surgeon: Paulino Love MD;  Location: [facility] OR;  Service: General;  Laterality: N/A;    PANCREATECTOMY      ROBOTIC BRONCHOSCOPY N/A 3/17/2023    Procedure: ROBOTIC BRONCHOSCOPY;  Surgeon: Cristiane Albright MD;  Location: [facility] OR 2ND FLR;  Service: Pulmonary;  Laterality: N/A;      Family History   Family History   Problem Relation Age of Onset    Breast cancer Sister         60      Social History  Social History     Socioeconomic History    Marital status:    Tobacco Use    Smoking status: Former     Packs/day: 1.50     Years: 54.00     Pack years: 81.00     Types: Cigarettes     Start date: 6/1/1968     Quit date: 10/20/2012     Years since quitting: 10.5    Smokeless tobacco: Former   Substance and Sexual Activity    Alcohol use: Yes     Alcohol/week: 3.0 standard drinks     Types: 3 Cans of beer per week     Comment: 3 a day    Drug use: Never      Allergies  Review of patient's allergies indicates:  No Known Allergies   Current Medications:    Current Outpatient Medications:     allopurinoL (ZYLOPRIM) 100 MG tablet, Take 100 mg by mouth once daily., Disp: , Rfl:     amLODIPine (NORVASC) 2.5 MG tablet, Take 2.5 mg by mouth once daily., Disp: , Rfl:     BYDUREON BCISE 2 mg/0.85 mL AtIn, Inject 2 mg into the skin every 7 days. Every Friday, Disp: , Rfl:     cloNIDine (CATAPRES) 0.2 MG tablet, Take 0.2 mg by mouth once. Daily, Disp: , Rfl:     duke's soln (benadryl 30 mL, mylanta 30 mL, LIDOcaine 30 mL, nystatin 30 mL) 120mL, Take 10 mLs by mouth 4 (four) times daily., Disp: 120 mL, Rfl: 1    ezetimibe (ZETIA) 10 mg tablet, Take 10 mg by mouth once daily., Disp: , Rfl:     FARXIGA 10 mg tablet, Take 10 mg by mouth every morning., Disp: , Rfl:     finasteride (PROSCAR) 5 mg tablet, Take 5 mg by mouth once daily., Disp: , Rfl:     FREESTYLE TEST Strp, USE 1 STRIP TO CHECK  GLUCOSE ONCE DAILY, Disp: , Rfl:     HYDROcodone-acetaminophen (NORCO) 5-325 mg per tablet, Take 1 tablet by mouth every 6 (six) hours as needed for Pain., Disp: 10 tablet, Rfl: 0    LIDOCAINE VISCOUS 2 % solution, , Disp: , Rfl:     LIDOcaine-prilocaine (EMLA) cream, Apply topically to port site one hour prior to access, cover, Disp: 30 g, Rfl: 1    metoprolol succinate (TOPROL-XL) 100 MG 24 hr tablet, Take 100 mg by mouth once daily., Disp: , Rfl:     ondansetron (ZOFRAN) 8 MG tablet, Take 1 tablet (8 mg total) by mouth every 8 (eight) hours as needed for Nausea., Disp: 30 tablet, Rfl: 2    promethazine (PHENERGAN) 25 MG tablet, Take 1 tablet (25 mg total) by mouth every 6 (six) hours as needed for Nausea., Disp: 30 tablet, Rfl: 0    rosuvastatin (CRESTOR) 40 MG Tab, Take 10 mg by mouth every evening., Disp: , Rfl:     TOUJEO MAX U-300 SOLOSTAR 300 unit/mL (3 mL) insulin pen, Inject 30 Units into the skin every evening., Disp: , Rfl:   No current facility-administered medications for this visit.    Facility-Administered Medications Ordered in Other Visits:     alteplase injection 2 mg, 2 mg, Intra-Catheter, PRN, Lisandro Lawrence MD    CARBOplatin (PARAPLATIN) 185 mg in sodium chloride 0.9% 303.5 mL chemo infusion, 185 mg, Intravenous, 1 time in Clinic/HOD, Lisandro Lawrence MD    chlorhexidine 0.12 % solution 15 mL, 15 mL, Mouth/Throat, BID, Paulino Love MD    diphenhydrAMINE (BENADRYL) 50 mg in NS 50 mL IVPB, 50 mg, Intravenous, 1 time in Clinic/HOD, Lisandro Lawrence MD    diphenhydrAMINE injection 50 mg, 50 mg, Intravenous, Once PRN, Lisandro Lawrence MD    EPINEPHrine (EPIPEN) 0.3 mg/0.3 mL pen injection 0.3 mg, 0.3 mg, Intramuscular, Once PRN, Lisandro Lawrence MD    heparin, porcine (PF) 100 unit/mL injection flush 500 Units, 500 Units, Intravenous, PRN, Lisandro Lawrence MD    hydrocortisone sodium succinate injection 100 mg, 100 mg, Intravenous, Once PRN, Lisandro Lawrence MD    mupirocin 2 % ointment,  ", Nasal, BID, Paulino Love MD    PACLitaxeL (TAXOL) 45 mg/m2 = 102 mg in sodium chloride 0.9% 250 mL chemo infusion, 45 mg/m2 (Treatment Plan Recorded), Intravenous, 1 time in Clinic/HOD, Lisandro Lawrence MD    sodium chloride 0.9% flush 10 mL, 10 mL, Intravenous, PRN, Lisandro Lawrence MD     Review of Systems  Review of Systems   Constitutional: Negative.    HENT: Negative.     Eyes: Negative.    Respiratory: Negative.     Cardiovascular: Negative.    Gastrointestinal: Negative.    Genitourinary: Negative.    Musculoskeletal: Negative.    Skin: Negative.    Neurological: Negative.    Endo/Heme/Allergies: Negative.    Psychiatric/Behavioral: Negative.        Physical Exam      Vitals:    04/28/23 1525   BP: 122/70   Pulse: 69   Resp: 16   Temp: 96.9 °F (36.1 °C)   SpO2: 98%   Weight: 98.4 kg (217 lb)   Height: 6' 2" (1.88 m)   Body mass index is 27.86 kg/m².        Physical Exam  Vitals reviewed.   Constitutional:       Appearance: Normal appearance.   Neurological:      Mental Status: He is alert.   Psychiatric:         Mood and Affect: Mood normal.         Behavior: Behavior normal.        ASSESSMENT :  1. Squamous cell carcinoma of lung, unspecified laterality         PLAN:  Reviewed all information discussed at today's visit and all questions were answered.    Counseled on healthy lifestyle and behavior modifications: Encouraged an increase in physical activity, encouraged light weights/resistance, walking. Encouraged to Maintain a healthy weight, Limit red meat to no more than 2-3x per week, Reduce processed foods and meat. Also encouraged wide variety of fruits and vegetables    See Nutrition as needed during treatment.    I discussed and recommended the following support services:  [employer] [employer] [facility] [employer]   Music and Relaxation therapy   Meditation    Follow up with Integrative Services in 4-6 weeks chairside    I spent a total of 45 minutes on the day of the visit.This includes face to face time and " non-face to face time preparing to see the patient (eg, review of tests), obtaining and/or reviewing separately obtained history, documenting clinical information in the electronic or other health record, independently interpreting results and communicating results to the patient/family/caregiver, or care coordinator.

## 2023-04-28 NOTE — PLAN OF CARE
Problem: Adult Inpatient Plan of Care  Goal: Patient-Specific Goal (Individualized)  Outcome: Ongoing, Progressing  Flowsheets (Taken 4/28/2023 1003)  Anxieties, Fears or Concerns: none  Individualized Care Needs: recliner     Problem: Fatigue  Goal: Improved Activity Tolerance  Outcome: Ongoing, Progressing  Intervention: Promote Improved Energy  Flowsheets (Taken 4/28/2023 1003)  Fatigue Management: frequent rest breaks encouraged  Sleep/Rest Enhancement: relaxation techniques promoted  Activity Management: Ambulated -L4

## 2023-04-28 NOTE — PLAN OF CARE
Problem: Adult Inpatient Plan of Care  Goal: Plan of Care Review  Outcome: Ongoing, Progressing  Flowsheets (Taken 4/28/2023 1353)  Plan of Care Reviewed With:   patient   family   Pt tolerated taxol/carbo infusion well.  No adverse reaction noted.   PAD flushed with NS and de-accessed per protocol.  Patient left clinic in no acute distress.

## 2023-04-28 NOTE — NURSING
Met with patient and relative in infusion today during his first treatment.  Explained my role as navigator.  Reviewed plan of care and answered questions.  My contact information was provided and pt was encouraged to call with any questions or concerns.  Pt and relative verbalized an understanding.  Will continue to monitor for any navigational needs.    Oncology Navigation   Intake  Date of Diagnosis: 23  Cancer Type: Thoracic  Internal / External Referral: Internal  Date of Referral: 23  Initial Nurse Navigator Contact: 23  Referral to Initial Contact Timeline (days): 0  Date Worked: 23  First Appointment Available: 23  Appointment Date: 23  First Available Date vs. Scheduled Date (days): 0  Reason if booked > 7 days after scheduling: Specific provider / access     Treatment  Current Status: Active       Medical Oncologist: Dr. Lawrence  Consult Date: 23  Chemotherapy: Initiated  Chemotherapy Regimen: taxol/ carbo    Radiation Therapy: Planned  Radiation Oncologist: Dr. Hadley  Start Date: 23  Total Treatments: 30    Procedures: PET scan; MRI  Bronchoscopy Schedule Date: 23  CT Schedule Date: 23  EUS / EGD Date: 23  MRI Schedule Date: 23  Genomic Testing Date Sent: 23  PET Scan Schedule Date: 23       EGFR mutation: Pending  ALK mutation: Pending  ROS-1: Pending  PD-L1: Positive  BRAF V600 mutations: Pending  NTRK: Pending    Radiation Oncologist: Dr. Hadley    Support Systems: Family members  Barriers of Care: Other  Other Barriers: Education     Acuity  Stage: 2  Systemic Treatment - predicted or initiated: More than one treatment modality concurrently (chemotherapy, radiation, etc.) (+2)  Treatment Tolerability: Minimal symptoms  ECO  Comorbidities in Medical History: 1  Hospitalization Within the Past Month: 0   Needed: 0  Support: 1  Verbalizes Financial Concerns: 0  Transportation: 0  History of  noncompliance/frequent no shows and cancellations: 2  Verbalizes the need for more education: 1  Navigation Acuity: 10     Follow Up  No follow-ups on file.

## 2023-04-28 NOTE — PROGRESS NOTES
Nutrition Note  RD met with pt at chairside on first day of chemotherapy infusion tx. RD reinforced role in POC. RD answered all questions to patients satisfactions. RD will follow pt PRN throughout tx.    Machelle Gruber, CAROL, LDN

## 2023-05-01 ENCOUNTER — HOSPITAL ENCOUNTER (OUTPATIENT)
Dept: RADIATION THERAPY | Facility: HOSPITAL | Age: 73
Discharge: HOME | End: 2023-05-01
Attending: RADIOLOGY
Payer: MEDICARE

## 2023-05-01 PROCEDURE — 77014 PR  CT GUIDANCE PLACEMENT RAD THERAPY FIELDS: ICD-10-PCS | Mod: 26,,, | Performed by: RADIOLOGY

## 2023-05-01 PROCEDURE — 77386 HC IMRT, COMPLEX: CPT | Mod: PN | Performed by: RADIOLOGY

## 2023-05-01 PROCEDURE — 77014 PR  CT GUIDANCE PLACEMENT RAD THERAPY FIELDS: CPT | Mod: 26,,, | Performed by: RADIOLOGY

## 2023-05-01 PROCEDURE — 77014 HC CT GUIDANCE RADIATION THERAPY FLDS PLACEMENT: CPT | Mod: TC,PN | Performed by: RADIOLOGY

## 2023-05-02 ENCOUNTER — DOCUMENTATION ONLY (OUTPATIENT)
Dept: RADIATION ONCOLOGY | Facility: CLINIC | Age: 73
End: 2023-05-02
Payer: MEDICARE

## 2023-05-02 PROCEDURE — 77014 PR  CT GUIDANCE PLACEMENT RAD THERAPY FIELDS: ICD-10-PCS | Mod: 26,,, | Performed by: RADIOLOGY

## 2023-05-02 PROCEDURE — 77014 HC CT GUIDANCE RADIATION THERAPY FLDS PLACEMENT: CPT | Mod: TC,PN | Performed by: RADIOLOGY

## 2023-05-02 PROCEDURE — 77386 HC IMRT, COMPLEX: CPT | Mod: PN | Performed by: RADIOLOGY

## 2023-05-02 PROCEDURE — 77014 PR  CT GUIDANCE PLACEMENT RAD THERAPY FIELDS: CPT | Mod: 26,,, | Performed by: RADIOLOGY

## 2023-05-02 NOTE — PLAN OF CARE
Completed 4th outpatient radiation treatment without difficulty, visited with Dr Hadley and all questions and concerns addressed

## 2023-05-03 PROCEDURE — 77014 HC CT GUIDANCE RADIATION THERAPY FLDS PLACEMENT: CPT | Mod: TC,PN | Performed by: RADIOLOGY

## 2023-05-03 PROCEDURE — 77014 PR  CT GUIDANCE PLACEMENT RAD THERAPY FIELDS: ICD-10-PCS | Mod: 26,,, | Performed by: RADIOLOGY

## 2023-05-03 PROCEDURE — 77014 PR  CT GUIDANCE PLACEMENT RAD THERAPY FIELDS: CPT | Mod: 26,,, | Performed by: RADIOLOGY

## 2023-05-03 PROCEDURE — 77336 RADIATION PHYSICS CONSULT: CPT | Mod: PN | Performed by: RADIOLOGY

## 2023-05-03 PROCEDURE — 77386 HC IMRT, COMPLEX: CPT | Mod: PN | Performed by: RADIOLOGY

## 2023-05-04 PROCEDURE — 77014 HC CT GUIDANCE RADIATION THERAPY FLDS PLACEMENT: CPT | Mod: TC,PN | Performed by: RADIOLOGY

## 2023-05-04 PROCEDURE — 77338 PR  MLC IMRT DESIGN & CONSTRUCTION PER IMRT PLAN: ICD-10-PCS | Mod: 26,,, | Performed by: RADIOLOGY

## 2023-05-04 PROCEDURE — 77014 PR  CT GUIDANCE PLACEMENT RAD THERAPY FIELDS: ICD-10-PCS | Mod: 26,,, | Performed by: RADIOLOGY

## 2023-05-04 PROCEDURE — 77338 DESIGN MLC DEVICE FOR IMRT: CPT | Mod: 26,,, | Performed by: RADIOLOGY

## 2023-05-04 PROCEDURE — 77338 DESIGN MLC DEVICE FOR IMRT: CPT | Mod: TC,PN | Performed by: RADIOLOGY

## 2023-05-04 PROCEDURE — 77386 HC IMRT, COMPLEX: CPT | Mod: PN | Performed by: RADIOLOGY

## 2023-05-04 PROCEDURE — 77300 RADIATION THERAPY DOSE PLAN: CPT | Mod: 26,,, | Performed by: RADIOLOGY

## 2023-05-04 PROCEDURE — 77300 PR RADIATION THERAPY,DOSIMETRY PLAN: ICD-10-PCS | Mod: 26,,, | Performed by: RADIOLOGY

## 2023-05-04 PROCEDURE — 77014 PR  CT GUIDANCE PLACEMENT RAD THERAPY FIELDS: CPT | Mod: 26,,, | Performed by: RADIOLOGY

## 2023-05-04 PROCEDURE — 77300 RADIATION THERAPY DOSE PLAN: CPT | Mod: TC,PN | Performed by: RADIOLOGY

## 2023-05-05 ENCOUNTER — INFUSION (OUTPATIENT)
Dept: INFUSION THERAPY | Facility: HOSPITAL | Age: 73
End: 2023-05-05
Attending: INTERNAL MEDICINE
Payer: MEDICARE

## 2023-05-05 ENCOUNTER — DOCUMENTATION ONLY (OUTPATIENT)
Dept: INFUSION THERAPY | Facility: HOSPITAL | Age: 73
End: 2023-05-05
Payer: MEDICARE

## 2023-05-05 ENCOUNTER — OFFICE VISIT (OUTPATIENT)
Dept: HEMATOLOGY/ONCOLOGY | Facility: CLINIC | Age: 73
End: 2023-05-05
Payer: MEDICARE

## 2023-05-05 VITALS
TEMPERATURE: 97 F | OXYGEN SATURATION: 98 % | SYSTOLIC BLOOD PRESSURE: 115 MMHG | DIASTOLIC BLOOD PRESSURE: 76 MMHG | HEART RATE: 79 BPM | WEIGHT: 217.63 LBS | RESPIRATION RATE: 18 BRPM | BODY MASS INDEX: 27.93 KG/M2 | HEIGHT: 74 IN

## 2023-05-05 VITALS
WEIGHT: 217.63 LBS | SYSTOLIC BLOOD PRESSURE: 153 MMHG | HEIGHT: 74 IN | BODY MASS INDEX: 27.93 KG/M2 | DIASTOLIC BLOOD PRESSURE: 91 MMHG | HEART RATE: 75 BPM | RESPIRATION RATE: 18 BRPM | TEMPERATURE: 97 F

## 2023-05-05 DIAGNOSIS — E87.1 HYPONATREMIA: ICD-10-CM

## 2023-05-05 DIAGNOSIS — E11.9 DIABETES MELLITUS WITHOUT COMPLICATION: ICD-10-CM

## 2023-05-05 DIAGNOSIS — D64.81 ANEMIA ASSOCIATED WITH CHEMOTHERAPY: ICD-10-CM

## 2023-05-05 DIAGNOSIS — T45.1X5A ANEMIA ASSOCIATED WITH CHEMOTHERAPY: ICD-10-CM

## 2023-05-05 DIAGNOSIS — C34.90 SQUAMOUS CELL CARCINOMA OF LUNG, UNSPECIFIED LATERALITY: Primary | ICD-10-CM

## 2023-05-05 LAB
ACID FAST MOD KINY STN SPEC: NORMAL
MYCOBACTERIUM SPEC QL CULT: NORMAL

## 2023-05-05 PROCEDURE — 96413 CHEMO IV INFUSION 1 HR: CPT | Mod: PN

## 2023-05-05 PROCEDURE — 99213 PR OFFICE/OUTPT VISIT, EST, LEVL III, 20-29 MIN: ICD-10-PCS | Mod: S$PBB,,, | Performed by: NURSE PRACTITIONER

## 2023-05-05 PROCEDURE — 99214 OFFICE O/P EST MOD 30 MIN: CPT | Mod: PBBFAC,PN | Performed by: NURSE PRACTITIONER

## 2023-05-05 PROCEDURE — 77386 HC IMRT, COMPLEX: CPT | Mod: PN | Performed by: RADIOLOGY

## 2023-05-05 PROCEDURE — 77014 HC CT GUIDANCE RADIATION THERAPY FLDS PLACEMENT: CPT | Mod: TC,PN | Performed by: RADIOLOGY

## 2023-05-05 PROCEDURE — 96417 CHEMO IV INFUS EACH ADDL SEQ: CPT | Mod: PN

## 2023-05-05 PROCEDURE — 25000003 PHARM REV CODE 250: Mod: PN | Performed by: NURSE PRACTITIONER

## 2023-05-05 PROCEDURE — 96367 TX/PROPH/DG ADDL SEQ IV INF: CPT | Mod: PN

## 2023-05-05 PROCEDURE — 96375 TX/PRO/DX INJ NEW DRUG ADDON: CPT | Mod: PN

## 2023-05-05 PROCEDURE — A4216 STERILE WATER/SALINE, 10 ML: HCPCS | Mod: PN | Performed by: NURSE PRACTITIONER

## 2023-05-05 PROCEDURE — 99999 PR PBB SHADOW E&M-EST. PATIENT-LVL IV: ICD-10-PCS | Mod: PBBFAC,,, | Performed by: NURSE PRACTITIONER

## 2023-05-05 PROCEDURE — 77014 PR  CT GUIDANCE PLACEMENT RAD THERAPY FIELDS: ICD-10-PCS | Mod: 26,,, | Performed by: RADIOLOGY

## 2023-05-05 PROCEDURE — 77014 PR  CT GUIDANCE PLACEMENT RAD THERAPY FIELDS: CPT | Mod: 26,,, | Performed by: RADIOLOGY

## 2023-05-05 PROCEDURE — 99999 PR PBB SHADOW E&M-EST. PATIENT-LVL IV: CPT | Mod: PBBFAC,,, | Performed by: NURSE PRACTITIONER

## 2023-05-05 PROCEDURE — 99213 OFFICE O/P EST LOW 20 MIN: CPT | Mod: S$PBB,,, | Performed by: NURSE PRACTITIONER

## 2023-05-05 PROCEDURE — 63600175 PHARM REV CODE 636 W HCPCS: Mod: PN | Performed by: NURSE PRACTITIONER

## 2023-05-05 RX ORDER — DIPHENHYDRAMINE HYDROCHLORIDE 50 MG/ML
50 INJECTION INTRAMUSCULAR; INTRAVENOUS ONCE AS NEEDED
Status: CANCELLED | OUTPATIENT
Start: 2023-05-05

## 2023-05-05 RX ORDER — HEPARIN 100 UNIT/ML
500 SYRINGE INTRAVENOUS
Status: CANCELLED | OUTPATIENT
Start: 2023-05-05

## 2023-05-05 RX ORDER — EPINEPHRINE 0.3 MG/.3ML
0.3 INJECTION SUBCUTANEOUS ONCE AS NEEDED
Status: DISCONTINUED | OUTPATIENT
Start: 2023-05-05 | End: 2023-05-05 | Stop reason: HOSPADM

## 2023-05-05 RX ORDER — FAMOTIDINE 10 MG/ML
20 INJECTION INTRAVENOUS
Status: CANCELLED | OUTPATIENT
Start: 2023-05-05

## 2023-05-05 RX ORDER — FAMOTIDINE 10 MG/ML
20 INJECTION INTRAVENOUS
Status: COMPLETED | OUTPATIENT
Start: 2023-05-05 | End: 2023-05-05

## 2023-05-05 RX ORDER — EPINEPHRINE 0.3 MG/.3ML
0.3 INJECTION SUBCUTANEOUS ONCE AS NEEDED
Status: CANCELLED | OUTPATIENT
Start: 2023-05-05

## 2023-05-05 RX ORDER — SODIUM CHLORIDE 0.9 % (FLUSH) 0.9 %
10 SYRINGE (ML) INJECTION
Status: CANCELLED | OUTPATIENT
Start: 2023-05-05

## 2023-05-05 RX ORDER — SODIUM CHLORIDE 0.9 % (FLUSH) 0.9 %
10 SYRINGE (ML) INJECTION
Status: DISCONTINUED | OUTPATIENT
Start: 2023-05-05 | End: 2023-05-05 | Stop reason: HOSPADM

## 2023-05-05 RX ORDER — DIPHENHYDRAMINE HYDROCHLORIDE 50 MG/ML
50 INJECTION INTRAMUSCULAR; INTRAVENOUS ONCE AS NEEDED
Status: DISCONTINUED | OUTPATIENT
Start: 2023-05-05 | End: 2023-05-05 | Stop reason: HOSPADM

## 2023-05-05 RX ADMIN — DIPHENHYDRAMINE HYDROCHLORIDE 50 MG: 50 INJECTION INTRAMUSCULAR; INTRAVENOUS at 10:05

## 2023-05-05 RX ADMIN — CARBOPLATIN 185 MG: 10 INJECTION, SOLUTION INTRAVENOUS at 11:05

## 2023-05-05 RX ADMIN — PACLITAXEL 102 MG: 6 INJECTION, SOLUTION, CONCENTRATE INTRAVENOUS at 10:05

## 2023-05-05 RX ADMIN — PALONOSETRON 0.25 MG: 0.05 INJECTION, SOLUTION INTRAVENOUS at 09:05

## 2023-05-05 RX ADMIN — SODIUM CHLORIDE: 9 INJECTION, SOLUTION INTRAVENOUS at 09:05

## 2023-05-05 RX ADMIN — Medication 10 ML: at 12:05

## 2023-05-05 RX ADMIN — FAMOTIDINE 20 MG: 10 INJECTION INTRAVENOUS at 09:05

## 2023-05-05 NOTE — PLAN OF CARE
Problem: Fatigue (Oncology Care)  Goal: Improved Activity Tolerance  Intervention: Promote Improved Energy  Flowsheets (Taken 5/5/2023 0930)  Fatigue Management:   frequent rest breaks encouraged   activity schedule adjusted   paced activity encouraged   fatigue-related activity identified  Sleep/Rest Enhancement:   relaxation techniques promoted   natural light exposure provided   regular sleep/rest pattern promoted  Activity Management:   Ambulated -L4   Ambulated to bathroom - L4   Ambulated in lutz - L4   Up in stretcher chair - L1     Problem: Adult Inpatient Plan of Care  Goal: Patient-Specific Goal (Individualized)  Outcome: Ongoing, Progressing  Flowsheets (Taken 5/5/2023 0930)  Anxieties, Fears or Concerns: none  Individualized Care Needs: recliner, dim lights, tv     Problem: Adult Inpatient Plan of Care  Goal: Plan of Care Review  Outcome: Ongoing, Progressing  Flowsheets (Taken 5/5/2023 1245)  Plan of Care Reviewed With:   patient   daughter   Pt tolerated his Taxol and Carbo infusions well, NAD. No new c/o voiced. Pt given a schedule and reviewed, pt verbalized understanding. Pt ambulated out of the clinic without difficulty.

## 2023-05-05 NOTE — PROGRESS NOTES
ONCOLOGY NUTRITION   FOLLOW UP VISIT        Feng Thakkar is a 72 y.o. male.  DATE: 05/05/2023        Oncology Diagnosis: Lung cancer     REFERRAL FROM:   [] Integrative Oncology   [] Med/Heme Oncology  [] Radiation Oncology  [] Surgical Oncology   [] Infusion Nurse    [x] Routine Nutrition follow up    TREATMENT PLAN:   [] Full treatment plan pending  [] Chemotherapy  [] Immunotherapy  [] Radiation  [x] Concurrent  [] Surgery  [] Treatment complete/post-treatment    ANTHROPOMETRICS:  Wt Readings from Last 10 Encounters:   05/05/23 98.7 kg (217 lb 9.5 oz)   05/05/23 98.7 kg (217 lb 9.5 oz)   04/28/23 98.4 kg (217 lb)   04/28/23 98.8 kg (217 lb 13 oz)   04/28/23 98.8 kg (217 lb 13 oz)   04/26/23 99.1 kg (218 lb 7.6 oz)   04/24/23 98 kg (216 lb)   04/21/23 99.6 kg (219 lb 9.3 oz)   04/06/23 98.3 kg (216 lb 11.4 oz)   04/04/23 98.3 kg (216 lb 11.4 oz)      Weight Changes: has been stable    PHYSICAL EXAM:  Muscle Wasting Observed:  [] No Deficit   [x] Mild Deficit   [] Moderate   [] Severe    INTAKE:  [x] PO Intake [] TF Intake  Current Diet: regular diet  Dietary Patterns:  Eating meals/snacks as tolerated  [] Oral nutritional supplements:     SYMPTOMS/COMPLAINTS:   [x] No nutritional concerns at current  [] Diarrhea                    [] Constipation           [] Nausea                 [] Vomiting                [] Indigestion                [] Reflux              [] Poor Appetite            [] Anorexia                 [] Early Satiety         [] Gas                       [] Bloating                     [] Dry Mouth    [] Mucositis                   [] Mouth Sores           [] Poor Dentition      [] Difficulty chewing  [] Difficulty Swallowing   [] Pain with swallowing [] Change in taste      [] Change in smell   [] Pain (general)       [] Fatigue                      [] Sleep issues    [] Weight loss  [] other, please specify-     Nutrition Re-Assessment Risk: Low    [x] Labs reviewed   [x] Meds  reviewed    Education Provided:   [x] No Education Needed at this time  [] Diarrhea                                              [] Constipation                          [] Nausea/Vomiting  [] Mucositis                [] Dry Mouth    [] Dealing with changes in Taste/Smell  [] Dealing with Poor Appetite   [] Soft/moist Diet      [] Weight Loss/Gain     [] Weight Maintenance                           [] Indigestion/GERD                 [] Gas/Bloating          [] Foods High/ Low in specific nutrients [] Increasing Calories/Protein   [] Milkshake/Smoothies Recipes   [] Nutrition Supplements                        [] Increasing Fluid Intakes         [] Foods that fight cancer    [] Evidence bases resources                 [] Fermented Foods/Probiotics  [] Mediterranean/Plant Based Diet     [] Other, specify                                   [] Handouts provided      [] Samples provided     RD NOTE:  RD met with pt at chairside during infusion tx. Pt present for cycle 2 taxol/carbo and has completed 7 XRT tx's. Pt states he continues to do well nutritionally. He denies any nutrition related side effects, difficulty chewing or any food intolerances. Pt weight has been stable.       RD Goals:   [x] Weight stable                  [] Weight gain                      [] Weight Loss                               [] Continue adequate Kcal/protein   [] Increase Kcal/protein      [] Adjust Tube-feeding Rx   [] Tolerate Tube Feedings             [] Increase tube feedings to goal     [] Tolerate Supplements     [] Symptom Improvement   [] Understand nutrition Education  [x] Offer supportive visits   [] other, please specify    RECOMMENDATIONS:  Continue current calorie/protein intake to maintain body weight   Continue current fluid intake to maintain proper hydration     Follow up: Next infusion or PRN throughout tx     Machelle Gruber RDN, LDN  05/05/2023  2:18 PM

## 2023-05-05 NOTE — PROGRESS NOTES
PATIENT: Feng Thakkar  MRN: [id number]  DATE: 5/5/2023      Diagnosis:   1. Squamous cell carcinoma of lung, unspecified laterality      2. Malignant neoplasm of unspecified part of unspecified bronchus or lung    3. Pancreatic mass    4. Hypertension, unspecified type    5. Gout, unspecified cause, unspecified chronicity, unspecified site    6. Hyperlipidemia, unspecified hyperlipidemia type    7. Type 2 diabetes mellitus with diabetic polyneuropathy, with long-term current use of insulin    8. Neuropathy    9. Normocytic anemia        Chief Complaint: Week #2 clearance for chemo/XRT    Subjective:   HPI: Mr. Thakkar is a 72 y.o. male with Gout, HLD, HTN who presents to the clinic today with his DIL for clearance prior to Week #2 of Carboplatin/Paclitaxel for a diagnosis of NSCLC.   He denies any difficulties with his first tx of chemo.  Tolerating radiation well.  Remains active; appetite good.  Remains with a non-productive cough.  He denies CP, SOB, abdominal pain, nausea, vomiting, constipation, diarrhea.  The patient denies fever, chills, night sweats, weight loss, new lumps or bumps, easy bruising or bleeding.       Prior History:    01/05/2023 The patient initially developed abdominal pain on and underwent CT of the abdomen showing a mass in the left lung base measuring 3.9 x 2.9 cm; 3 x 3.8 cm mass along the pancreatic tail; and shotty retroperitoneal lymph nodes.    02/24/2023 The patient underwent repeat CT abdomen and pelvis on howing a 4.3 x 4 solid enhancing mass of the left lung base; thickened bladder wall; prostate gland measuring 45.1 x 4.6 cm; abnormal sclerosis in the left femoral head measuring 2.8 x 1.4 x 1.3 cm; and additional soft tissue densities in the posterior left subdiaphragmatic region measuring up to 16 mm.    03/01/2023 CT chest showed a 2 cm low-density lesion in the right lobe of the thyroid gland; 4.5 x 4.3 x 3.8 cm mass in the left lower lobe; several micro nodules; Na soft tissue  mass adjacent to the pancreatic tail.  Patient underwent 03/03/2023 EUS showing a 3.5 cm and 1.8 cm round pancreatic tail lesion.  Biopsy returned results showing no evidence of malignancy and abundant hematopoietic elements and dendritic cells.    03/17/2023 Patient then underwent EBUS under the care of Dr. Albright showing squamous cell carcinoma in biopsy of the left lower lung lesion; squamous cell carcinoma and station 7 lymph node; positive 11 L lymph node for squamous cell carcinoma.  04/18/2023 PET/CT showing a markedly hypermetabolic lobulated subpleural left lower lobe mass measuring 4.5 x 3.9 cm with hypermetabolic lymph nodes in the left hilar region measuring 2.2 x 1.9 cm; and a 4.2 x 3.6 walk circumscribed mass in the pancreatic tail.    04/18/2023  MRI brain showed no acute findings.  Oncology History   Squamous cell carcinoma of lung   3/31/2023 Initial Diagnosis    Squamous cell carcinoma of lung     3/31/2023 Cancer Staged    Staging form: Lung, AJCC 8th Edition  - Clinical: Stage IIIA (cT2b, cN2, cM0)     4/28/2023 -  Chemotherapy    Treatment Summary   Plan Name: OP NSCLC PACLITAXEL + CARBOPLATIN (AUC) QW + RADIATION  Treatment Goal: Curative  Status: Active  Start Date: 4/28/2023  End Date: 6/2/2023 (Planned)  Provider: Lisandro Lawrence MD  Chemotherapy: CARBOplatin (PARAPLATIN) 185 mg in sodium chloride 0.9% 303.5 mL chemo infusion, 185 mg (100 % of original dose 183.4 mg), Intravenous, Clinic/HOD 1 time, 1 of 6 cycles  Dose modification:   (original dose 183.4 mg, Cycle 1)  Administration: 185 mg (4/28/2023)  PACLitaxeL (TAXOL) 45 mg/m2 = 102 mg in sodium chloride 0.9% 250 mL chemo infusion, 45 mg/m2 = 102 mg, Intravenous, Clinic/HOD 1 time, 1 of 6 cycles  Administration: 102 mg (4/28/2023)        Past Medical History:   Past Medical History:   Diagnosis Date    Diabetes mellitus     Gout, unspecified     High cholesterol     HTN (hypertension)     Lung cancer        Past Surgical HIstory:   Past  Surgical History:   Procedure Laterality Date    ENDOSCOPIC ULTRASOUND OF UPPER GASTROINTESTINAL TRACT N/A 3/3/2023    Procedure: ULTRASOUND, UPPER GI TRACT, ENDOSCOPIC;  Surgeon: Cora Mcgrath MD;  Location: [facility] ENDO;  Service: Endoscopy;  Laterality: N/A;  2/24/23-Instructions mailed to address on file-DS    INSERTION OF TUNNELED CENTRAL VENOUS CATHETER (CVC) WITH SUBCUTANEOUS PORT N/A 4/24/2023    Procedure: [id number];  Surgeon: Paulino Love MD;  Location: [facility] OR;  Service: General;  Laterality: N/A;    PANCREATECTOMY      ROBOTIC BRONCHOSCOPY N/A 3/17/2023    Procedure: ROBOTIC BRONCHOSCOPY;  Surgeon: Cristiane Albright MD;  Location: [facility] OR [address] [place] [address];  Service: Pulmonary;  Laterality: N/A;       Family History:   Family History   Problem Relation Age of Onset    Breast cancer Sister         60       Social History:  reports that he quit smoking about 10 years ago. His smoking use included cigarettes. He started smoking about 54 years ago. He has a 81.00 pack-year smoking history. He has quit using smokeless tobacco. He reports current alcohol use of about 3.0 standard drinks per week. He reports that he does not use drugs.    Allergies:  Review of patient's allergies indicates:  No Known Allergies    Medications:  Current Outpatient Medications   Medication Sig Dispense Refill    allopurinoL (ZYLOPRIM) 100 MG tablet Take 100 mg by mouth once daily.      amLODIPine (NORVASC) 2.5 MG tablet Take 2.5 mg by mouth once daily.      BYDUREON BCISE 2 mg/0.85 mL AtIn Inject 2 mg into the skin every 7 days. Every Friday      cloNIDine (CATAPRES) 0.2 MG tablet Take 0.2 mg by mouth once. Daily      duke's soln (benadryl 30 mL, mylanta 30 mL, LIDOcaine 30 mL, nystatin 30 mL) 120mL Take 10 mLs by mouth 4 (four) times daily. 120 mL 1    ezetimibe (ZETIA) 10 mg tablet Take 10 mg by mouth once daily.      FARXIGA 10 mg tablet Take 10 mg by mouth every morning.      finasteride (PROSCAR) 5 mg tablet Take 5 mg  "by mouth once daily.      FREESTYLE TEST Strp USE 1 STRIP TO CHECK GLUCOSE ONCE DAILY      HYDROcodone-acetaminophen (NORCO) 5-325 mg per tablet Take 1 tablet by mouth every 6 (six) hours as needed for Pain. 10 tablet 0    LIDOCAINE VISCOUS 2 % solution       LIDOcaine-prilocaine (EMLA) cream Apply topically to port site one hour prior to access, cover 30 g 1    metoprolol succinate (TOPROL-XL) 100 MG 24 hr tablet Take 100 mg by mouth once daily.      ondansetron (ZOFRAN) 8 MG tablet Take 1 tablet (8 mg total) by mouth every 8 (eight) hours as needed for Nausea. 30 tablet 2    promethazine (PHENERGAN) 25 MG tablet Take 1 tablet (25 mg total) by mouth every 6 (six) hours as needed for Nausea. 30 tablet 0    rosuvastatin (CRESTOR) 40 MG Tab Take 10 mg by mouth every evening.      TOUJEO MAX U-300 SOLOSTAR 300 unit/mL (3 mL) insulin pen Inject 30 Units into the skin every evening.       No current facility-administered medications for this visit.       Review of Systems   Constitutional:  Negative for appetite change, fatigue and fever.   Respiratory:  Negative for cough and shortness of breath.    Cardiovascular:  Negative for chest pain.   Gastrointestinal:  Negative for abdominal pain, constipation, diarrhea and nausea.   Genitourinary:  Negative for dysuria.   Musculoskeletal:  Negative for arthralgias.   Skin:  Negative for rash.   Neurological:  Negative for headaches.   Hematological:  Does not bruise/bleed easily.   Psychiatric/Behavioral:  The patient is not nervous/anxious.      ECOG Performance Status: 0    Objective:      Vitals:   Weight:  stable  Vitals:    05/05/23 0834   BP: 115/76   BP Location: Left arm   Patient Position: Sitting   BP Method: Large (Automatic)   Pulse: 79   Resp: 18   Temp: 97.2 °F (36.2 °C)   TempSrc: Tympanic   SpO2: 98%   Weight: 98.7 kg (217 lb 9.5 oz)   Height: 6' 2" (1.88 m)       BMI: Body mass index is 27.94 kg/m².    Physical Exam  Vitals reviewed.   Constitutional:       " General: He is not in acute distress.     Appearance: He is not diaphoretic.   HENT:      Head: Normocephalic and atraumatic.      Mouth/Throat:      Comments: Tongue with white coating.  Eyes:      General: No scleral icterus.        Right eye: No discharge.         Left eye: No discharge.      Conjunctiva/sclera: Conjunctivae normal.   Cardiovascular:      Rate and Rhythm: Normal rate and regular rhythm.      Pulses: Normal pulses.   Pulmonary:      Effort: Pulmonary effort is normal. No respiratory distress.      Breath sounds: No wheezing.   Abdominal:      General: Bowel sounds are normal. There is no distension.      Palpations: Abdomen is soft.      Tenderness: There is no abdominal tenderness.   Musculoskeletal:         General: No swelling.      Cervical back: Neck supple.      Right lower leg: No edema.      Left lower leg: No edema.   Lymphadenopathy:      Cervical: No cervical adenopathy.      Upper Body:      Right upper body: No supraclavicular or axillary adenopathy.      Left upper body: No supraclavicular or axillary adenopathy.      Lower Body: No right inguinal adenopathy. No left inguinal adenopathy.   Skin:     General: Skin is dry.      Coloration: Skin is not jaundiced.      Findings: No rash.   Neurological:      Mental Status: He is alert and oriented to person, place, and time.   Psychiatric:         Mood and Affect: Mood normal.         Behavior: Behavior normal.         Thought Content: Thought content normal.       Laboratory Data:  Lab Results   Component Value Date    WBC 6.97 05/05/2023    HGB 12.8 (L) 05/05/2023    HCT 40.2 05/05/2023    MCV 85 05/05/2023     05/05/2023      ANC = 4349    CMP  Sodium   Date Value Ref Range Status   05/05/2023 132 (L) 136 - 145 mmol/L Final     Potassium   Date Value Ref Range Status   05/05/2023 5.0 3.5 - 5.1 mmol/L Final     Chloride   Date Value Ref Range Status   05/05/2023 102 95 - 110 mmol/L Final     CO2   Date Value Ref Range Status    2023 22 (L) 23 - 29 mmol/L Final     Glucose   Date Value Ref Range Status   2023 120 (H) 70 - 110 mg/dL Final     BUN   Date Value Ref Range Status   2023 13 8 - 23 mg/dL Final     Creatinine   Date Value Ref Range Status   2023 1.4 0.5 - 1.4 mg/dL Final     Calcium   Date Value Ref Range Status   2023 9.8 8.7 - 10.5 mg/dL Final     Total Protein   Date Value Ref Range Status   2023 7.6 6.0 - 8.4 g/dL Final     Albumin   Date Value Ref Range Status   2023 3.3 (L) 3.5 - 5.2 g/dL Final     Total Bilirubin   Date Value Ref Range Status   2023 0.3 0.1 - 1.0 mg/dL Final     Comment:     For infants and newborns, interpretation of results should be based  on gestational age, weight and in agreement with clinical  observations.    Premature Infant recommended reference ranges:  Up to 24 hours.............<8.0 mg/dL  Up to 48 hours............<12.0 mg/dL  3-5 days..................<15.0 mg/dL  6-29 days.................<15.0 mg/dL       Alkaline Phosphatase   Date Value Ref Range Status   2023 62 55 - 135 U/L Final     AST   Date Value Ref Range Status   2023 18 10 - 40 U/L Final     ALT   Date Value Ref Range Status   2023 19 10 - 44 U/L Final     Anion Gap   Date Value Ref Range Status   2023 8 8 - 16 mmol/L Final     eGFR   Date Value Ref Range Status   2023 53.4 (A) >60 mL/min/1.73 m^2 Final     Magnesium:  2.1      Imagin/1823  EXAMINATION:  NM PET CT ROUTINE     CLINICAL HISTORY:  Non-small cell lung cancer (NSCLC), staging;  Malignant neoplasm of unspecified part of unspecified bronchus or lung     72-year-old male with left lower lobe squamous cell carcinoma.  The patient also has a pancreatic tail mass which is undergone biopsy which yielded benign results.     TECHNIQUE:  Following IV injection of 13.06 mCi F-18 FDG, 3D PET imaging was performed from the skull base to the mid thighs.  A noncontrast non breath hold CT was  obtained in conjunction with the PET scan for attenuation correction and anatomic correlation.  PET-CT fusion images were generated.     COMPARISON:  Correlation is made with the CT of the chest dated 03/01/2023 and the CT of the abdomen/pelvis dated 02/24/2023.     FINDINGS:  Head/neck:     No significant abnormal hypermetabolic foci are identified within the head and neck region.  No lymphadenopathy is present.     Chest:     There is a markedly hypermetabolic lobulated subpleural left lower lobe mass consistent with the patient's known squamous cell carcinoma.  The mass is best measured on the fused PET-CT axial images and on image 115 measures 4.5 x 3.9 cm with a max SUV of 14.0.  There also hypermetabolic lymph nodes in the left hilar region and subcarinal region most consistent with neoplastic adenopathy.  A left hilar node on image 111 measures 2.2 x 1.9 cm with a max SUV of 14.3.     Abdomen/pelvis:     There is a well-circumscribed bulb this hypermetabolic mass arising from the pancreatic tail.  On image 142 the mass measures 4.2 x 3.6 cm and has a max SUV of 5.7.  No other significant abnormal hypermetabolic foci are identified within the abdomen and pelvis.  No lymphadenopathy is present.  The adrenal glands are normal.     Skeleton:     No significant abnormal hypermetabolic foci are identified within the skeleton.  There are no findings to suggest osseous metastatic disease.     Impression:     1. Markedly hypermetabolic left lower lobe mass consistent with the patient's known squamous cell carcinoma.  2. Hypermetabolic lymphadenopathy involving the left pulmonary hilum and subcarinal region most consistent with neoplastic adenopathy.  3. Hypermetabolic pancreatic tail mass.  Though this mass has undergone biopsy which yielded benign results, it must still be regarded as suspicious for primary or metastatic neoplasm.        Electronically signed by: Clemente Armstrong MD  Date:                                             04/18/2023  Time:                                           11:01   Assessment:       1. Squamous cell carcinoma of lung, unspecified laterality    2. Diabetes mellitus without complication    3. Anemia associated with chemotherapy    4. Hyponatremia         Plan:   NSCLC   NSCLC - patient was recently diagnosed with at least stage III [id number] SCC of the left lung  -PET/CT 4/18/23 shows continued left lung mass, mediastinal LAD  -MRI brain 4/18/23 showed no acute findings  -[facility] Blood testing showed TP 53 mutation  -[facility] tissue testing showed PD-L1 of 20%, TP53, KDM6A, CDKN2a and MTAP  -PORT placed 4/24/23 under the care of Dr. Love  -Will proceed with Week #2 with Carboplatin/Paclitaxel and radiation     Pancreatic Mass - seen on CT scans and biopsy during EUS on 03/03/2023 with path showing no evidence of malignancy  -PET/CT 4/18/23 showed uptake in the tail of the pancreas  -Possibly due to chronic pancreatitis  -Will monitor     HTN - pt on amlodipine, clonidine, metoprolol  -BP controlled  -Will monitor     Gout - pt on allopurinol  -Currently asymptomatic  -Will monitor     HLD - pt on rosuvastatin, zetia  -Management per PCP     DMII - pt on toujeo, farxiga, and bydureon  -Management per PCP     Neuropathy - numbness in feet due to diabetes  -Will monitor     Normocytic Anemia - hemoglobin 12.8g/dL on 05/05/23  -Possibly due to chronic disease/chemotherapy  -Will monitor    Hyponatremia - mild; asymptomatic.    Oral candidiasis  -start Magic Mouthwash every 4 to 6 hours until clear.     Treatment plan approved by Dr. Lawrence     Route Chart for Scheduling    Med Onc Chart Routing      Follow up with physician 1 week. f/u already scheduled with Dr. Lawrence on 05/12 with labs prior   Follow up with [facility]    Infusion scheduling note Proceed with Cycle 2 of tx   Injection scheduling note    Labs    Imaging    Pharmacy appointment    Other referrals           Treatment Plan Information   OP NSCLC  PACLITAXEL + CARBOPLATIN (AUC) QW + RADIATION   Lisandro Lawrence MD   Upcoming Treatment Dates - OP NSCLC PACLITAXEL + CARBOPLATIN (AUC) QW + RADIATION    5/5/2023       Pre-Medications       diphenhydrAMINE (BENADRYL) 50 mg in NS 50 mL IVPB       famotidine (PF) injection 20 mg       Chemotherapy       PACLitaxeL (TAXOL) 45 mg/m2 = 102 mg in sodium chloride 0.9% 250 mL chemo infusion       CARBOplatin (PARAPLATIN) 185 mg in sodium chloride 0.9% 268.5 mL chemo infusion       Antiemetics       palonosetron 0.25mg/dexAMETHasone 12mg in NS IVPB 0.25 mg 50 mL  5/12/2023       Pre-Medications       diphenhydrAMINE (BENADRYL) 50 mg in NS 50 mL IVPB       famotidine (PF) injection 20 mg       Chemotherapy       PACLitaxeL (TAXOL) 45 mg/m2 = 102 mg in sodium chloride 0.9% 250 mL chemo infusion       CARBOplatin (PARAPLATIN) 185 mg in sodium chloride 0.9% 268.5 mL chemo infusion       Antiemetics       palonosetron 0.25mg/dexAMETHasone 12mg in NS IVPB 0.25 mg 50 mL  5/19/2023       Pre-Medications       diphenhydrAMINE (BENADRYL) 50 mg in NS 50 mL IVPB       famotidine (PF) injection 20 mg       Chemotherapy       PACLitaxeL (TAXOL) 45 mg/m2 = 102 mg in sodium chloride 0.9% 250 mL chemo infusion       CARBOplatin (PARAPLATIN) 185 mg in sodium chloride 0.9% 268.5 mL chemo infusion       Antiemetics       palonosetron 0.25mg/dexAMETHasone 12mg in NS IVPB 0.25 mg 50 mL  5/26/2023       Pre-Medications       diphenhydrAMINE (BENADRYL) 50 mg in NS 50 mL IVPB       famotidine (PF) injection 20 mg       Chemotherapy       PACLitaxeL (TAXOL) 45 mg/m2 = 102 mg in sodium chloride 0.9% 250 mL chemo infusion       CARBOplatin (PARAPLATIN) 185 mg in sodium chloride 0.9% 268.5 mL chemo infusion       Antiemetics       palonosetron 0.25mg/dexAMETHasone 12mg in NS IVPB 0.25 mg 50 mL     22 minutes were spent in coordination of patient's care, record review and counseling.

## 2023-05-08 PROCEDURE — 77014 HC CT GUIDANCE RADIATION THERAPY FLDS PLACEMENT: CPT | Mod: TC,PN | Performed by: RADIOLOGY

## 2023-05-08 PROCEDURE — 77014 PR  CT GUIDANCE PLACEMENT RAD THERAPY FIELDS: CPT | Mod: 26,,, | Performed by: RADIOLOGY

## 2023-05-08 PROCEDURE — 77386 HC IMRT, COMPLEX: CPT | Mod: PN | Performed by: RADIOLOGY

## 2023-05-08 PROCEDURE — 77014 PR  CT GUIDANCE PLACEMENT RAD THERAPY FIELDS: ICD-10-PCS | Mod: 26,,, | Performed by: RADIOLOGY

## 2023-05-09 PROCEDURE — 77386 HC IMRT, COMPLEX: CPT | Mod: PN | Performed by: RADIOLOGY

## 2023-05-09 PROCEDURE — 77014 PR  CT GUIDANCE PLACEMENT RAD THERAPY FIELDS: CPT | Mod: 26,,, | Performed by: RADIOLOGY

## 2023-05-09 PROCEDURE — 77014 HC CT GUIDANCE RADIATION THERAPY FLDS PLACEMENT: CPT | Mod: TC,PN | Performed by: RADIOLOGY

## 2023-05-09 PROCEDURE — 77014 PR  CT GUIDANCE PLACEMENT RAD THERAPY FIELDS: ICD-10-PCS | Mod: 26,,, | Performed by: RADIOLOGY

## 2023-05-10 PROCEDURE — 77014 PR  CT GUIDANCE PLACEMENT RAD THERAPY FIELDS: ICD-10-PCS | Mod: 26,,, | Performed by: RADIOLOGY

## 2023-05-10 PROCEDURE — 77014 PR  CT GUIDANCE PLACEMENT RAD THERAPY FIELDS: CPT | Mod: 26,,, | Performed by: RADIOLOGY

## 2023-05-10 PROCEDURE — 77386 HC IMRT, COMPLEX: CPT | Mod: PN | Performed by: RADIOLOGY

## 2023-05-10 PROCEDURE — 77336 RADIATION PHYSICS CONSULT: CPT | Mod: PN | Performed by: RADIOLOGY

## 2023-05-10 PROCEDURE — 77014 HC CT GUIDANCE RADIATION THERAPY FLDS PLACEMENT: CPT | Mod: TC,PN | Performed by: RADIOLOGY

## 2023-05-11 ENCOUNTER — DOCUMENTATION ONLY (OUTPATIENT)
Dept: RADIATION ONCOLOGY | Facility: CLINIC | Age: 73
End: 2023-05-11
Payer: MEDICARE

## 2023-05-11 PROCEDURE — 77386 HC IMRT, COMPLEX: CPT | Mod: PN | Performed by: RADIOLOGY

## 2023-05-11 PROCEDURE — 77014 PR  CT GUIDANCE PLACEMENT RAD THERAPY FIELDS: ICD-10-PCS | Mod: 26,,, | Performed by: RADIOLOGY

## 2023-05-11 PROCEDURE — 77014 PR  CT GUIDANCE PLACEMENT RAD THERAPY FIELDS: CPT | Mod: 26,,, | Performed by: RADIOLOGY

## 2023-05-11 PROCEDURE — 77014 HC CT GUIDANCE RADIATION THERAPY FLDS PLACEMENT: CPT | Mod: TC,PN | Performed by: RADIOLOGY

## 2023-05-11 NOTE — PROGRESS NOTES
PATIENT: Feng Thakkar  MRN: [id number]  DATE: 5/12/2023      Diagnosis:   1. Squamous cell carcinoma of lung, unspecified laterality    2. Malignant neoplasm of unspecified part of unspecified bronchus or lung    3. Pancreatic mass    4. Hypertension, unspecified type    5. Gout, unspecified cause, unspecified chronicity, unspecified site    6. Hyperlipidemia, unspecified hyperlipidemia type    7. Type 2 diabetes mellitus with diabetic polyneuropathy, with long-term current use of insulin    8. Neuropathy    9. Normocytic anemia              Chief Complaint: Squamous Cell Carcinoma (2 week follow up )      Oncologic History:      Oncologic History     Oncologic Treatment     Pathology           Subjective:    Initial History: Mr. Thakkar is a 72 y.o. male with Gout, HLD, HTN who presents for work up of NSCLC.  Since the last clinic visit the patient states he feels well.  The patient denies CP, cough, SOB, abdominal pain, nausea, vomiting, constipation, diarrhea.  The patient denies fever, chills, night sweats, weight loss, new lumps or bumps, easy bruising or bleeding.    Prior History:  The patient initially developed abdominal pain on 01/05/2023 and underwent CT of the abdomen showing a mass in the left lung base measuring 3.9 x 2.9 cm; 3 x 3.8 cm mass along the pancreatic tail; and shotty retroperitoneal lymph nodes.  The patient underwent repeat CT abdomen and pelvis on 02/24/2023 showing a 4.3 x 4 solid enhancing mass of the left lung base; thickened bladder wall; prostate gland measuring 45.1 x 4.6 cm; abnormal sclerosis in the left femoral head measuring 2.8 x 1.4 x 1.3 cm; and additional soft tissue densities in the posterior left subdiaphragmatic region measuring up to 16 mm.  CT chest on 03/01/2023 showed a 2 cm low-density lesion in the right lobe of the thyroid gland; 4.5 x 4.3 x 3.8 cm mass in the left lower lobe; several micro nodules; Na soft tissue mass adjacent to the pancreatic tail.  Patient  underwent EUS on 03/03/2023 showing a 3.5 cm and 1.8 cm round pancreatic tail lesion.  Biopsy returned results showing no evidence of malignancy and abundant hematopoietic elements and dendritic cells.  Patient then underwent EBUS under the care of Dr. Albright on 03/17/2023 showing squamous cell carcinoma in biopsy of the left lower lung lesion; squamous cell carcinoma and station 7 lymph node; positive 11 L lymph node for squamous cell carcinoma.   The patient underwent PET-CT on 04/18/2023 showing a markedly hypermetabolic lobulated subpleural left lower lobe mass measuring 4.5 x 3.9 cm with hypermetabolic lymph nodes in the left hilar region measuring 2.2 x 1.9 cm; and a 4.2 x 3.6 walk circumscribed mass in the pancreatic tail.  MRI brain on 04/18/2023 showed no acute findings.      Past Medical History:   Past Medical History:   Diagnosis Date    Diabetes mellitus     Gout, unspecified     High cholesterol     HTN (hypertension)     Lung cancer        Past Surgical HIstory:   Past Surgical History:   Procedure Laterality Date    ENDOSCOPIC ULTRASOUND OF UPPER GASTROINTESTINAL TRACT N/A 3/3/2023    Procedure: ULTRASOUND, UPPER GI TRACT, ENDOSCOPIC;  Surgeon: Cora Mcgrath MD;  Location: [facility];  Service: Endoscopy;  Laterality: N/A;  2/24/23-Instructions mailed to address on file-    INSERTION OF TUNNELED CENTRAL VENOUS CATHETER (CVC) WITH SUBCUTANEOUS PORT N/A 4/24/2023    Procedure: [id number];  Surgeon: Paulino Love MD;  Location: [facility];  Service: General;  Laterality: N/A;    PANCREATECTOMY      ROBOTIC BRONCHOSCOPY N/A 3/17/2023    Procedure: ROBOTIC BRONCHOSCOPY;  Surgeon: Cristiane Albright MD;  Location: [address];  Service: Pulmonary;  Laterality: N/A;       Family History:   Family History   Problem Relation Age of Onset    Breast cancer Sister         60       Social History:  reports that he quit smoking about 10 years ago. His smoking use included cigarettes. He started  smoking about 54 years ago. He has a 81.00 pack-year smoking history. He has quit using smokeless tobacco. He reports current alcohol use of about 3.0 standard drinks per week. He reports that he does not use drugs.    Allergies:  Review of patient's allergies indicates:  No Known Allergies    Medications:  Current Outpatient Medications   Medication Sig Dispense Refill    allopurinoL (ZYLOPRIM) 100 MG tablet Take 100 mg by mouth once daily.      amLODIPine (NORVASC) 2.5 MG tablet Take 2.5 mg by mouth once daily.      BYDUREON BCISE 2 mg/0.85 mL AtIn Inject 2 mg into the skin every 7 days. Every Friday      cloNIDine (CATAPRES) 0.2 MG tablet Take 0.2 mg by mouth once. Daily      duke's soln (benadryl 30 mL, mylanta 30 mL, LIDOcaine 30 mL, nystatin 30 mL) 120mL Take 10 mLs by mouth 4 (four) times daily. 120 mL 1    ezetimibe (ZETIA) 10 mg tablet Take 10 mg by mouth once daily.      FARXIGA 10 mg tablet Take 10 mg by mouth every morning.      finasteride (PROSCAR) 5 mg tablet Take 5 mg by mouth once daily.      FREESTYLE TEST Strp USE 1 STRIP TO CHECK GLUCOSE ONCE DAILY      HYDROcodone-acetaminophen (NORCO) 5-325 mg per tablet Take 1 tablet by mouth every 6 (six) hours as needed for Pain. 10 tablet 0    LIDOCAINE VISCOUS 2 % solution       LIDOcaine-prilocaine (EMLA) cream Apply topically to port site one hour prior to access, cover 30 g 1    metoprolol succinate (TOPROL-XL) 100 MG 24 hr tablet Take 100 mg by mouth once daily.      ondansetron (ZOFRAN) 8 MG tablet Take 1 tablet (8 mg total) by mouth every 8 (eight) hours as needed for Nausea. 30 tablet 2    promethazine (PHENERGAN) 25 MG tablet Take 1 tablet (25 mg total) by mouth every 6 (six) hours as needed for Nausea. 30 tablet 0    rosuvastatin (CRESTOR) 40 MG Tab Take 10 mg by mouth every evening.      TOUJEO MAX U-300 SOLOSTAR 300 unit/mL (3 mL) insulin pen Inject 30 Units into the skin every evening.       No current facility-administered medications for this  "visit.       Review of Systems   Constitutional:  Negative for chills, diaphoresis, fatigue, fever and unexpected weight change.   Respiratory:  Negative for cough and shortness of breath.    Cardiovascular:  Negative for chest pain and palpitations.   Gastrointestinal:  Negative for abdominal pain, constipation, diarrhea, nausea and vomiting.   Skin:  Negative for color change and rash.   Neurological:  Negative for headaches.   Hematological:  Negative for adenopathy. Does not bruise/bleed easily.     ECOG Performance Status: 1   Objective:      Vitals:   Vitals:    05/12/23 0946   BP: 112/80   BP Location: Right arm   Patient Position: Sitting   BP Method: Large (Manual)   Pulse: 83   Resp: 14   Temp: 97.1 °F (36.2 °C)   TempSrc: Temporal   SpO2: 97%   Weight: 97.7 kg (215 lb 6.2 oz)   Height: 6' 2" (1.88 m)           Physical Exam  Constitutional:       General: He is not in acute distress.     Appearance: He is well-developed. He is not diaphoretic.   HENT:      Head: Normocephalic and atraumatic.   Cardiovascular:      Rate and Rhythm: Normal rate and regular rhythm.      Heart sounds: Normal heart sounds. No murmur heard.    No friction rub. No gallop.   Pulmonary:      Effort: Pulmonary effort is normal. No respiratory distress.      Breath sounds: Normal breath sounds. No wheezing or rales.   Chest:      Chest wall: No tenderness.   Abdominal:      General: Bowel sounds are normal. There is no distension.      Palpations: Abdomen is soft. There is no mass.      Tenderness: There is no abdominal tenderness. There is no rebound.   Musculoskeletal:      Cervical back: Normal range of motion.   Lymphadenopathy:      Cervical: No cervical adenopathy.      Upper Body:      Right upper body: No supraclavicular or axillary adenopathy.      Left upper body: No supraclavicular or axillary adenopathy.   Skin:     General: Skin is warm and dry.      Findings: No erythema or rash.   Neurological:      Mental Status: He " is alert and oriented to person, place, and time.   Psychiatric:         Behavior: Behavior normal.       Laboratory Data:  Lab Visit on 05/12/2023   Component Date Value Ref Range Status    WBC 05/12/2023 4.80  3.90 - 12.70 K/uL Final    RBC 05/12/2023 4.71  4.60 - 6.20 M/uL Final    Hemoglobin 05/12/2023 13.1 (L)  14.0 - 18.0 g/dL Final    Hematocrit 05/12/2023 39.2 (L)  40.0 - 54.0 % Final    MCV 05/12/2023 83  82 - 98 fL Final    MCH 05/12/2023 27.8  27.0 - 31.0 pg Final    MCHC 05/12/2023 33.4  32.0 - 36.0 g/dL Final    RDW 05/12/2023 14.9 (H)  11.5 - 14.5 % Final    Platelets 05/12/2023 262  150 - 450 K/uL Final    MPV 05/12/2023 9.1 (L)  9.2 - 12.9 fL Final    Immature Granulocytes 05/12/2023 0.6 (H)  0.0 - 0.5 % Final    Gran # (ANC) 05/12/2023 3.0  1.8 - 7.7 K/uL Final    Immature Grans (Abs) 05/12/2023 0.03  0.00 - 0.04 K/uL Final    Comment: Mild elevation in immature granulocytes is non specific and   can be seen in a variety of conditions including stress response,   acute inflammation, trauma and pregnancy. Correlation with other   laboratory and clinical findings is essential.      Lymph # 05/12/2023 1.0  1.0 - 4.8 K/uL Final    Mono # 05/12/2023 0.7  0.3 - 1.0 K/uL Final    Eos # 05/12/2023 0.1  0.0 - 0.5 K/uL Final    Baso # 05/12/2023 0.03  0.00 - 0.20 K/uL Final    nRBC 05/12/2023 0  0 /100 WBC Final    Gran % 05/12/2023 63.3  38.0 - 73.0 % Final    Lymph % 05/12/2023 20.0  18.0 - 48.0 % Final    Mono % 05/12/2023 13.8  4.0 - 15.0 % Final    Eosinophil % 05/12/2023 1.7  0.0 - 8.0 % Final    Basophil % 05/12/2023 0.6  0.0 - 1.9 % Final    Differential Method 05/12/2023 Automated   Final    Sodium 05/12/2023 137  136 - 145 mmol/L Final    Potassium 05/12/2023 4.8  3.5 - 5.1 mmol/L Final    Chloride 05/12/2023 103  95 - 110 mmol/L Final    CO2 05/12/2023 24  23 - 29 mmol/L Final    Glucose 05/12/2023 130 (H)  70 - 110 mg/dL Final    BUN 05/12/2023 13  8 - 23 mg/dL Final    Creatinine 05/12/2023 1.3   0.5 - 1.4 mg/dL Final    Calcium 05/12/2023 10.0  8.7 - 10.5 mg/dL Final    Total Protein 05/12/2023 7.8  6.0 - 8.4 g/dL Final    Albumin 05/12/2023 3.4 (L)  3.5 - 5.2 g/dL Final    Total Bilirubin 05/12/2023 0.3  0.1 - 1.0 mg/dL Final    Comment: For infants and newborns, interpretation of results should be based  on gestational age, weight and in agreement with clinical  observations.    Premature Infant recommended reference ranges:  Up to 24 hours.............<8.0 mg/dL  Up to 48 hours............<12.0 mg/dL  3-5 days..................<15.0 mg/dL  6-29 days.................<15.0 mg/dL      Alkaline Phosphatase 05/12/2023 60  55 - 135 U/L Final    AST 05/12/2023 21  10 - 40 U/L Final    ALT 05/12/2023 24  10 - 44 U/L Final    Anion Gap 05/12/2023 10  8 - 16 mmol/L Final    eGFR 05/12/2023 58.4 (A)  >60 mL/min/1.73 m^2 Final    Magnesium 05/12/2023 1.9  1.6 - 2.6 mg/dL Final         Imaging:     PET/CT 4/18/23  Head/neck:     No significant abnormal hypermetabolic foci are identified within the head and neck region.  No lymphadenopathy is present.     Chest:     There is a markedly hypermetabolic lobulated subpleural left lower lobe mass consistent with the patient's known squamous cell carcinoma.  The mass is best measured on the fused PET-CT axial images and on image 115 measures 4.5 x 3.9 cm with a max SUV of 14.0.  There also hypermetabolic lymph nodes in the left hilar region and subcarinal region most consistent with neoplastic adenopathy.  A left hilar node on image 111 measures 2.2 x 1.9 cm with a max SUV of 14.3.     Abdomen/pelvis:     There is a well-circumscribed bulb this hypermetabolic mass arising from the pancreatic tail.  On image 142 the mass measures 4.2 x 3.6 cm and has a max SUV of 5.7.  No other significant abnormal hypermetabolic foci are identified within the abdomen and pelvis.  No lymphadenopathy is present.  The adrenal glands are normal.     Skeleton:     No significant abnormal  hypermetabolic foci are identified within the skeleton.  There are no findings to suggest osseous metastatic disease.    MRI Brain 4/18/23  Prominence of the ventricles and sulci compatible with mild generalized cerebral volume loss.  No hydrocephalus.     Moderate confluent and scattered T2/FLAIR hyperintense signal within the periventricular deep subcortical and pontine white matter, nonspecific and may reflect sequelae of chronic microvascular ischemic change.    Diffusion-weighted images demonstrate no evidence of an acute infarct.   Susceptibility weighted images demonstrate no evidence of acute or chronic hemorrhage. No mass effect or midline shift.     No abnormal intracranial enhancement.     Normal vascular flow voids are preserved.     Bone marrow signal intensity is normal.  The paranasal sinuses are normal.  The visualized portions of the mastoids are unremarkable.  Orbits are unremarkable.   Assessment:       1. Squamous cell carcinoma of lung, unspecified laterality    2. Malignant neoplasm of unspecified part of unspecified bronchus or lung    3. Pancreatic mass    4. Hypertension, unspecified type    5. Gout, unspecified cause, unspecified chronicity, unspecified site    6. Hyperlipidemia, unspecified hyperlipidemia type    7. Type 2 diabetes mellitus with diabetic polyneuropathy, with long-term current use of insulin    8. Neuropathy    9. Normocytic anemia                 Plan:     NSCLC - patient was recently diagnosed with at least stage III [id number] SCC of the left lung  -PET/CT 4/18/23 shows continued left lung mass, mediastinal LAD  -MRI brain 4/18/23 showed no acute findings  -[facility] Blood testing showed TP 53 mutation  -[facility] tissue testing showed PD-L1 of 20%, TP53, KDM6A, CDKN2a and MTAP  -PORT placed 4/24/23 under the care of Dr. Love  -Will proceed with treatment with Carboplatin/Paclitaxel cycle 3 along with radiation    Pancreatic Mass - seen on CT scans and biopsy during EUS on  03/03/2023 with path showing no evidence of malignancy  -PET/CT 4/18/23 showed uptake in the tail of the pancreas  -Possibly due to chronic pancreatitis  -Will monitor    HTN - pt on amlodipine, clonidine, metoprolol  -BP controlled  -Will monitor    Gout - pt on allopurinol  -Currently asymptomatic  -Will monitor    HLD - pt on rosuvastatin, zetia  -Management per PCP    DMII - pt on toujeo, farxiga, and bydureon  -Management per PCP    Neuropathy - numbness in feet due to diabetes  -Will monitor    Normocytic Anemia - hemoglobin 13.1g/dL on 5/12/23  -Possibly due to chronic disease  -Will monitor    Route Chart for Scheduling    Med Onc Chart Routing      Follow up with physician 2 weeks. PT can proced with treatment.  He will need a CBC, CMP and MG in 1 week with NP visit.  He needs the same labs and appt with me in 2 weeks.   Follow up with RADHIKA 1 week.   Infusion scheduling note    Injection scheduling note    Labs    Imaging    Pharmacy appointment    Other referrals         Treatment Plan Information   OP NSCLC PACLITAXEL + CARBOPLATIN (AUC) QW + RADIATION   Lisandro Lawrence MD   Upcoming Treatment Dates - OP NSCLC PACLITAXEL + CARBOPLATIN (AUC) QW + RADIATION    5/19/2023       Pre-Medications       diphenhydrAMINE (BENADRYL) 50 mg in NS 50 mL IVPB       famotidine (PF) injection 20 mg       Chemotherapy       PACLitaxeL (TAXOL) 45 mg/m2 = 102 mg in sodium chloride 0.9% 250 mL chemo infusion       CARBOplatin (PARAPLATIN) 190 mg in sodium chloride 0.9% 269 mL chemo infusion       Antiemetics       palonosetron 0.25mg/dexAMETHasone 12mg in NS IVPB 0.25 mg 50 mL  5/26/2023       Pre-Medications       diphenhydrAMINE (BENADRYL) 50 mg in NS 50 mL IVPB       famotidine (PF) injection 20 mg       Chemotherapy       PACLitaxeL (TAXOL) 45 mg/m2 = 102 mg in sodium chloride 0.9% 250 mL chemo infusion       CARBOplatin (PARAPLATIN) 190 mg in sodium chloride 0.9% 269 mL chemo infusion       Antiemetics       palonosetron  0.25mg/dexAMETHasone 12mg in NS IVPB 0.25 mg 50 mL  6/2/2023       Pre-Medications       diphenhydrAMINE (BENADRYL) 50 mg in NS 50 mL IVPB       famotidine (PF) injection 20 mg       Chemotherapy       PACLitaxeL (TAXOL) 45 mg/m2 = 102 mg in sodium chloride 0.9% 250 mL chemo infusion       CARBOplatin (PARAPLATIN) 190 mg in sodium chloride 0.9% 269 mL chemo infusion       Antiemetics       palonosetron 0.25mg/dexAMETHasone 12mg in NS IVPB 0.25 mg 50 mL      Lisandro Lawrence MD  [facility]  Hematology and Oncology  [facility]   [address] [place] [address]   O: [phone]  F: [phone]

## 2023-05-11 NOTE — PLAN OF CARE
Completed 11 of 30 outpatient radiation treatments to the lung this visit without difficulty.  No new problems noted.  All questions/concerns addressed by MD.

## 2023-05-12 ENCOUNTER — OFFICE VISIT (OUTPATIENT)
Dept: HEMATOLOGY/ONCOLOGY | Facility: CLINIC | Age: 73
End: 2023-05-12
Payer: MEDICARE

## 2023-05-12 ENCOUNTER — INFUSION (OUTPATIENT)
Dept: INFUSION THERAPY | Facility: HOSPITAL | Age: 73
End: 2023-05-12
Attending: INTERNAL MEDICINE
Payer: MEDICARE

## 2023-05-12 VITALS
HEIGHT: 74 IN | BODY MASS INDEX: 27.87 KG/M2 | TEMPERATURE: 98 F | RESPIRATION RATE: 16 BRPM | WEIGHT: 217.13 LBS | HEART RATE: 73 BPM | SYSTOLIC BLOOD PRESSURE: 144 MMHG | DIASTOLIC BLOOD PRESSURE: 88 MMHG

## 2023-05-12 VITALS
SYSTOLIC BLOOD PRESSURE: 112 MMHG | DIASTOLIC BLOOD PRESSURE: 80 MMHG | RESPIRATION RATE: 14 BRPM | BODY MASS INDEX: 27.64 KG/M2 | OXYGEN SATURATION: 97 % | TEMPERATURE: 97 F | HEIGHT: 74 IN | HEART RATE: 83 BPM | WEIGHT: 215.38 LBS

## 2023-05-12 DIAGNOSIS — E11.42 TYPE 2 DIABETES MELLITUS WITH DIABETIC POLYNEUROPATHY, WITH LONG-TERM CURRENT USE OF INSULIN: ICD-10-CM

## 2023-05-12 DIAGNOSIS — C34.90 SQUAMOUS CELL CARCINOMA OF LUNG, UNSPECIFIED LATERALITY: Primary | ICD-10-CM

## 2023-05-12 DIAGNOSIS — E78.5 HYPERLIPIDEMIA, UNSPECIFIED HYPERLIPIDEMIA TYPE: ICD-10-CM

## 2023-05-12 DIAGNOSIS — Z79.4 TYPE 2 DIABETES MELLITUS WITH DIABETIC POLYNEUROPATHY, WITH LONG-TERM CURRENT USE OF INSULIN: ICD-10-CM

## 2023-05-12 DIAGNOSIS — M10.9 GOUT, UNSPECIFIED CAUSE, UNSPECIFIED CHRONICITY, UNSPECIFIED SITE: ICD-10-CM

## 2023-05-12 DIAGNOSIS — D64.9 NORMOCYTIC ANEMIA: ICD-10-CM

## 2023-05-12 DIAGNOSIS — I10 HYPERTENSION, UNSPECIFIED TYPE: ICD-10-CM

## 2023-05-12 DIAGNOSIS — K86.89 PANCREATIC MASS: ICD-10-CM

## 2023-05-12 DIAGNOSIS — C34.90 MALIGNANT NEOPLASM OF UNSPECIFIED PART OF UNSPECIFIED BRONCHUS OR LUNG: ICD-10-CM

## 2023-05-12 DIAGNOSIS — G62.9 NEUROPATHY: ICD-10-CM

## 2023-05-12 PROCEDURE — 96375 TX/PRO/DX INJ NEW DRUG ADDON: CPT | Mod: PN

## 2023-05-12 PROCEDURE — 77014 PR  CT GUIDANCE PLACEMENT RAD THERAPY FIELDS: ICD-10-PCS | Mod: 26,,, | Performed by: RADIOLOGY

## 2023-05-12 PROCEDURE — 96413 CHEMO IV INFUSION 1 HR: CPT | Mod: PN

## 2023-05-12 PROCEDURE — 99215 OFFICE O/P EST HI 40 MIN: CPT | Mod: S$PBB,,, | Performed by: INTERNAL MEDICINE

## 2023-05-12 PROCEDURE — 77386 HC IMRT, COMPLEX: CPT | Mod: PN | Performed by: RADIOLOGY

## 2023-05-12 PROCEDURE — 25000003 PHARM REV CODE 250: Mod: PN | Performed by: INTERNAL MEDICINE

## 2023-05-12 PROCEDURE — 99214 OFFICE O/P EST MOD 30 MIN: CPT | Mod: PBBFAC,25,PN | Performed by: INTERNAL MEDICINE

## 2023-05-12 PROCEDURE — 77014 HC CT GUIDANCE RADIATION THERAPY FLDS PLACEMENT: CPT | Mod: TC,PN | Performed by: RADIOLOGY

## 2023-05-12 PROCEDURE — 77014 PR  CT GUIDANCE PLACEMENT RAD THERAPY FIELDS: CPT | Mod: 26,,, | Performed by: RADIOLOGY

## 2023-05-12 PROCEDURE — 99999 PR PBB SHADOW E&M-EST. PATIENT-LVL IV: CPT | Mod: PBBFAC,,, | Performed by: INTERNAL MEDICINE

## 2023-05-12 PROCEDURE — 99215 PR OFFICE/OUTPT VISIT, EST, LEVL V, 40-54 MIN: ICD-10-PCS | Mod: S$PBB,,, | Performed by: INTERNAL MEDICINE

## 2023-05-12 PROCEDURE — 99999 PR PBB SHADOW E&M-EST. PATIENT-LVL IV: ICD-10-PCS | Mod: PBBFAC,,, | Performed by: INTERNAL MEDICINE

## 2023-05-12 PROCEDURE — 63600175 PHARM REV CODE 636 W HCPCS: Mod: PN | Performed by: INTERNAL MEDICINE

## 2023-05-12 PROCEDURE — 96367 TX/PROPH/DG ADDL SEQ IV INF: CPT | Mod: PN

## 2023-05-12 PROCEDURE — 96417 CHEMO IV INFUS EACH ADDL SEQ: CPT | Mod: PN

## 2023-05-12 RX ORDER — SODIUM CHLORIDE 0.9 % (FLUSH) 0.9 %
10 SYRINGE (ML) INJECTION
Status: CANCELLED | OUTPATIENT
Start: 2023-05-12

## 2023-05-12 RX ORDER — EPINEPHRINE 0.3 MG/.3ML
0.3 INJECTION SUBCUTANEOUS ONCE AS NEEDED
Status: DISCONTINUED | OUTPATIENT
Start: 2023-05-12 | End: 2023-05-12 | Stop reason: HOSPADM

## 2023-05-12 RX ORDER — HEPARIN 100 UNIT/ML
500 SYRINGE INTRAVENOUS
Status: DISCONTINUED | OUTPATIENT
Start: 2023-05-12 | End: 2023-05-12 | Stop reason: HOSPADM

## 2023-05-12 RX ORDER — EPINEPHRINE 0.3 MG/.3ML
0.3 INJECTION SUBCUTANEOUS ONCE AS NEEDED
Status: CANCELLED | OUTPATIENT
Start: 2023-05-12

## 2023-05-12 RX ORDER — SODIUM CHLORIDE 0.9 % (FLUSH) 0.9 %
10 SYRINGE (ML) INJECTION
Status: DISCONTINUED | OUTPATIENT
Start: 2023-05-12 | End: 2023-05-12 | Stop reason: HOSPADM

## 2023-05-12 RX ORDER — HEPARIN 100 UNIT/ML
500 SYRINGE INTRAVENOUS
Status: CANCELLED | OUTPATIENT
Start: 2023-05-12

## 2023-05-12 RX ORDER — DIPHENHYDRAMINE HYDROCHLORIDE 50 MG/ML
50 INJECTION INTRAMUSCULAR; INTRAVENOUS ONCE AS NEEDED
Status: DISCONTINUED | OUTPATIENT
Start: 2023-05-12 | End: 2023-05-12 | Stop reason: HOSPADM

## 2023-05-12 RX ORDER — FAMOTIDINE 10 MG/ML
20 INJECTION INTRAVENOUS
Status: CANCELLED | OUTPATIENT
Start: 2023-05-12

## 2023-05-12 RX ORDER — DIPHENHYDRAMINE HYDROCHLORIDE 50 MG/ML
50 INJECTION INTRAMUSCULAR; INTRAVENOUS ONCE AS NEEDED
Status: CANCELLED | OUTPATIENT
Start: 2023-05-12

## 2023-05-12 RX ORDER — FAMOTIDINE 10 MG/ML
20 INJECTION INTRAVENOUS
Status: COMPLETED | OUTPATIENT
Start: 2023-05-12 | End: 2023-05-12

## 2023-05-12 RX ADMIN — PACLITAXEL 102 MG: 6 INJECTION, SOLUTION, CONCENTRATE INTRAVENOUS at 11:05

## 2023-05-12 RX ADMIN — DIPHENHYDRAMINE HYDROCHLORIDE 50 MG: 50 INJECTION INTRAMUSCULAR; INTRAVENOUS at 11:05

## 2023-05-12 RX ADMIN — DEXAMETHASONE SODIUM PHOSPHATE 0.25 MG: 10 INJECTION, SOLUTION INTRAMUSCULAR; INTRAVENOUS at 10:05

## 2023-05-12 RX ADMIN — CARBOPLATIN 190 MG: 10 INJECTION, SOLUTION INTRAVENOUS at 12:05

## 2023-05-12 RX ADMIN — SODIUM CHLORIDE: 9 INJECTION, SOLUTION INTRAVENOUS at 10:05

## 2023-05-12 RX ADMIN — FAMOTIDINE 20 MG: 10 INJECTION INTRAVENOUS at 10:05

## 2023-05-12 NOTE — PLAN OF CARE
Problem: Adult Inpatient Plan of Care  Goal: Plan of Care Review  Outcome: Ongoing, Progressing  Goal: Patient-Specific Goal (Individualized)  Outcome: Ongoing, Progressing  Flowsheets (Taken 5/12/2023 1046)  Anxieties, Fears or Concerns: none  Individualized Care Needs: recliner, dim lights, tv, conversation     Problem: Fatigue (Oncology Care)  Goal: Improved Activity Tolerance  Outcome: Ongoing, Progressing  Intervention: Promote Improved Energy  Flowsheets (Taken 5/12/2023 1046)  Fatigue Management:   activity schedule adjusted   paced activity encouraged   frequent rest breaks encouraged  Sleep/Rest Enhancement:   natural light exposure provided   noise level reduced   family presence promoted   regular sleep/rest pattern promoted   room darkened  Activity Management:   Ambulated -L4   Ambulated to bathroom - L4   Up in stretcher chair - L1   Ambulated in lutz - L4

## 2023-05-12 NOTE — PLAN OF CARE
Pt tolerated Taxol/Carbo well today. NAD/no concerns voiced. Reviewed follow-up appointments. All questions were answered, ambulated independently at d/c.

## 2023-05-15 LAB
DNA RANGE(S) EXAMINED NAR: NORMAL
GENE DIS ANL INTERP-IMP: POSITIVE
GENE DIS ASSESSED: NORMAL
GENE MUT TESTED BLD/T: 14.7 M/MB
MSI CA SPEC-IMP: NORMAL
PD-L1 BY 22C3 TISS IMSTN DOC: POSITIVE
REASON FOR STUDY: NORMAL
TEMPUS AMENDMENTNOTE1: NORMAL
TEMPUS FUSIONADDENDUM: NORMAL
TEMPUS PD-L1 (22C3) COMBINED POSITIVE SCORE: 20
TEMPUS PD-L1 (22C3) TUMOR PROPORTION SCORE: 20 %
TEMPUS PERTINENTNEGATIVES: NORMAL
TEMPUS PORTAL: NORMAL
TEMPUS TRIAL1: NORMAL
TEMPUS TRIAL2: NORMAL
TEMPUS TRIAL3: NORMAL
TEMPUS TRIALCOUNT: 3

## 2023-05-15 PROCEDURE — 77386 HC IMRT, COMPLEX: CPT | Mod: PN | Performed by: RADIOLOGY

## 2023-05-15 PROCEDURE — 77014 PR  CT GUIDANCE PLACEMENT RAD THERAPY FIELDS: CPT | Mod: 26,,, | Performed by: RADIOLOGY

## 2023-05-15 PROCEDURE — 77014 PR  CT GUIDANCE PLACEMENT RAD THERAPY FIELDS: ICD-10-PCS | Mod: 26,,, | Performed by: RADIOLOGY

## 2023-05-15 PROCEDURE — 77014 HC CT GUIDANCE RADIATION THERAPY FLDS PLACEMENT: CPT | Mod: TC,PN | Performed by: RADIOLOGY

## 2023-05-16 PROCEDURE — 77014 HC CT GUIDANCE RADIATION THERAPY FLDS PLACEMENT: CPT | Mod: TC,PN | Performed by: RADIOLOGY

## 2023-05-16 PROCEDURE — 77014 PR  CT GUIDANCE PLACEMENT RAD THERAPY FIELDS: CPT | Mod: 26,,, | Performed by: RADIOLOGY

## 2023-05-16 PROCEDURE — 77386 HC IMRT, COMPLEX: CPT | Mod: PN | Performed by: RADIOLOGY

## 2023-05-16 PROCEDURE — 77014 PR  CT GUIDANCE PLACEMENT RAD THERAPY FIELDS: ICD-10-PCS | Mod: 26,,, | Performed by: RADIOLOGY

## 2023-05-17 PROCEDURE — 77014 HC CT GUIDANCE RADIATION THERAPY FLDS PLACEMENT: CPT | Mod: TC,PN | Performed by: RADIOLOGY

## 2023-05-17 PROCEDURE — 77014 PR  CT GUIDANCE PLACEMENT RAD THERAPY FIELDS: ICD-10-PCS | Mod: 26,,, | Performed by: RADIOLOGY

## 2023-05-17 PROCEDURE — 77014 PR  CT GUIDANCE PLACEMENT RAD THERAPY FIELDS: CPT | Mod: 26,,, | Performed by: RADIOLOGY

## 2023-05-17 PROCEDURE — 77386 HC IMRT, COMPLEX: CPT | Mod: PN | Performed by: RADIOLOGY

## 2023-05-18 ENCOUNTER — DOCUMENTATION ONLY (OUTPATIENT)
Dept: RADIATION ONCOLOGY | Facility: CLINIC | Age: 73
End: 2023-05-18
Payer: MEDICARE

## 2023-05-18 PROCEDURE — 77014 PR  CT GUIDANCE PLACEMENT RAD THERAPY FIELDS: ICD-10-PCS | Mod: 26,,, | Performed by: RADIOLOGY

## 2023-05-18 PROCEDURE — 77014 PR  CT GUIDANCE PLACEMENT RAD THERAPY FIELDS: CPT | Mod: 26,,, | Performed by: RADIOLOGY

## 2023-05-18 PROCEDURE — 77014 HC CT GUIDANCE RADIATION THERAPY FLDS PLACEMENT: CPT | Mod: TC,PN | Performed by: RADIOLOGY

## 2023-05-18 PROCEDURE — 77386 HC IMRT, COMPLEX: CPT | Mod: PN | Performed by: RADIOLOGY

## 2023-05-18 NOTE — PLAN OF CARE
Completed 16 of 30 outpatient radiation treatments to the lung without difficulty.  All questions and concerns addressed by MD this visit.

## 2023-05-18 NOTE — PROGRESS NOTES
PATIENT: Feng Thakkar  MRN: [id number]  DATE: 5/19/2023      Diagnosis:   1. Squamous cell carcinoma of lung, unspecified laterality    2. Pancreatic mass    3. Hypertension, unspecified type    4. Gout, unspecified cause, unspecified chronicity, unspecified site    5. Hyperlipidemia, unspecified hyperlipidemia type    6. Type 2 diabetes mellitus with diabetic polyneuropathy, with long-term current use of insulin    7. Neuropathy    8. Normocytic anemia    9. Immunodeficiency due to chemotherapy        Chief Complaint: Follow-up (Follow up with tx and labs)    Subjective:   HPI: Mr. Thakkar is a 72 y.o. male with Gout, HLD, HTN who presents for consideration of his next dose of weekly Carboplatin/Paclitaxel with XRT for a diagnosis of NSCLC. He is accompanied by his daughter.  Tolerating treatment without complaint. Occasional diarrhea that resolves on its own without Imodium; only 1-2 episodes.  Appetite is fair, not eating as much in the evenings. We discussed adding Glucerna which he is not drinking now.   The patient denies CP, cough, SOB, abdominal pain, nausea, vomiting, constipation, diarrhea. The patient denies fever, chills, night sweats, weight loss, new lumps or bumps, easy bruising or bleeding.       Prior History:    01/05/2023 The patient initially developed abdominal pain on and underwent CT of the abdomen showing a mass in the left lung base measuring 3.9 x 2.9 cm; 3 x 3.8 cm mass along the pancreatic tail; and shotty retroperitoneal lymph nodes.    02/24/2023 The patient underwent repeat CT abdomen and pelvis on howing a 4.3 x 4 solid enhancing mass of the left lung base; thickened bladder wall; prostate gland measuring 45.1 x 4.6 cm; abnormal sclerosis in the left femoral head measuring 2.8 x 1.4 x 1.3 cm; and additional soft tissue densities in the posterior left subdiaphragmatic region measuring up to 16 mm.    03/01/2023 CT chest showed a 2 cm low-density lesion in the right lobe of the thyroid  gland; 4.5 x 4.3 x 3.8 cm mass in the left lower lobe; several micro nodules; Na soft tissue mass adjacent to the pancreatic tail.  Patient underwent 03/03/2023 EUS showing a 3.5 cm and 1.8 cm round pancreatic tail lesion.  Biopsy returned results showing no evidence of malignancy and abundant hematopoietic elements and dendritic cells.    03/17/2023 Patient then underwent EBUS under the care of Dr. Albright showing squamous cell carcinoma in biopsy of the left lower lung lesion; squamous cell carcinoma and station 7 lymph node; positive 11 L lymph node for squamous cell carcinoma.  04/18/2023 PET/CT showing a markedly hypermetabolic lobulated subpleural left lower lobe mass measuring 4.5 x 3.9 cm with hypermetabolic lymph nodes in the left hilar region measuring 2.2 x 1.9 cm; and a 4.2 x 3.6 walk circumscribed mass in the pancreatic tail.    04/18/2023  MRI brain showed no acute findings.  -4/28/23 began Carboplatin/Paclitaxel  Oncology History   Squamous cell carcinoma of lung   3/31/2023 Initial Diagnosis    Squamous cell carcinoma of lung       3/31/2023 Cancer Staged    Staging form: Lung, AJCC 8th Edition  - Clinical: Stage IIIA (cT2b, cN2, cM0)       4/28/2023 -  Chemotherapy    Treatment Summary   Plan Name: OP NSCLC PACLITAXEL + CARBOPLATIN (AUC) QW + RADIATION  Treatment Goal: Curative  Status: Active  Start Date: 4/28/2023  End Date: 6/2/2023 (Planned)  Provider: Lisandro Lawrence MD  Chemotherapy: CARBOplatin (PARAPLATIN) 185 mg in sodium chloride 0.9% 303.5 mL chemo infusion, 185 mg (100 % of original dose 183.4 mg), Intravenous, Clinic/HOD 1 time, 3 of 6 cycles  Dose modification:   (original dose 183.4 mg, Cycle 1)  Administration: 185 mg (4/28/2023), 185 mg (5/5/2023), 190 mg (5/12/2023)  PACLitaxeL (TAXOL) 45 mg/m2 = 102 mg in sodium chloride 0.9% 250 mL chemo infusion, 45 mg/m2 = 102 mg, Intravenous, Clinic/HOD 1 time, 3 of 6 cycles  Administration: 102 mg (4/28/2023), 102 mg (5/5/2023), 102 mg  (5/12/2023)          Past Medical History:   Past Medical History:   Diagnosis Date    Diabetes mellitus     Gout, unspecified     High cholesterol     HTN (hypertension)     Lung cancer        Past Surgical HIstory:   Past Surgical History:   Procedure Laterality Date    ENDOSCOPIC ULTRASOUND OF UPPER GASTROINTESTINAL TRACT N/A 3/3/2023    Procedure: ULTRASOUND, UPPER GI TRACT, ENDOSCOPIC;  Surgeon: Cora Mcgrath MD;  Location: [facility];  Service: Endoscopy;  Laterality: N/A;  2/24/23-Instructions mailed to address on file-    INSERTION OF TUNNELED CENTRAL VENOUS CATHETER (CVC) WITH SUBCUTANEOUS PORT N/A 4/24/2023    Procedure: [id number];  Surgeon: Paulino Love MD;  Location: [facility] OR;  Service: General;  Laterality: N/A;    PANCREATECTOMY      ROBOTIC BRONCHOSCOPY N/A 3/17/2023    Procedure: ROBOTIC BRONCHOSCOPY;  Surgeon: Cristiane Albright MD;  Location: [address];  Service: Pulmonary;  Laterality: N/A;       Family History:   Family History   Problem Relation Age of Onset    Breast cancer Sister         60       Social History:  reports that he quit smoking about 10 years ago. His smoking use included cigarettes. He started smoking about 55 years ago. He has a 81.00 pack-year smoking history. He has quit using smokeless tobacco. He reports current alcohol use of about 3.0 standard drinks per week. He reports that he does not use drugs.    Allergies:  Review of patient's allergies indicates:  No Known Allergies    Medications:  Current Outpatient Medications   Medication Sig Dispense Refill    allopurinoL (ZYLOPRIM) 100 MG tablet Take 100 mg by mouth once daily.      amLODIPine (NORVASC) 2.5 MG tablet Take 2.5 mg by mouth once daily.      BYDUREON BCISE 2 mg/0.85 mL AtIn Inject 2 mg into the skin every 7 days. Every Friday      cloNIDine (CATAPRES) 0.2 MG tablet Take 0.2 mg by mouth once. Daily      ezetimibe (ZETIA) 10 mg tablet Take 10 mg by mouth once daily.      FARXIGA 10 mg tablet  Take 10 mg by mouth every morning.      finasteride (PROSCAR) 5 mg tablet Take 5 mg by mouth once daily.      FREESTYLE TEST Strp USE 1 STRIP TO CHECK GLUCOSE ONCE DAILY      LIDOCAINE VISCOUS 2 % solution       LIDOcaine-prilocaine (EMLA) cream Apply topically to port site one hour prior to access, cover 30 g 1    metoprolol succinate (TOPROL-XL) 100 MG 24 hr tablet Take 100 mg by mouth once daily.      ondansetron (ZOFRAN) 8 MG tablet Take 1 tablet (8 mg total) by mouth every 8 (eight) hours as needed for Nausea. 30 tablet 2    promethazine (PHENERGAN) 25 MG tablet Take 1 tablet (25 mg total) by mouth every 6 (six) hours as needed for Nausea. 30 tablet 0    rosuvastatin (CRESTOR) 40 MG Tab Take 10 mg by mouth every evening.      TOUJEO MAX U-300 SOLOSTAR 300 unit/mL (3 mL) insulin pen Inject 30 Units into the skin every evening.      duke's soln (benadryl 30 mL, mylanta 30 mL, LIDOcaine 30 mL, nystatin 30 mL) 120mL Take 10 mLs by mouth 4 (four) times daily. (Patient not taking: Reported on 5/19/2023) 120 mL 1    HYDROcodone-acetaminophen (NORCO) 5-325 mg per tablet Take 1 tablet by mouth every 6 (six) hours as needed for Pain. (Patient not taking: Reported on 5/19/2023) 10 tablet 0     No current facility-administered medications for this visit.       Review of Systems   Constitutional:  Negative for appetite change, fatigue and fever.   Respiratory:  Negative for cough and shortness of breath.    Cardiovascular:  Negative for chest pain.   Gastrointestinal:  Negative for abdominal pain, constipation, diarrhea and nausea.   Genitourinary:  Negative for dysuria.   Musculoskeletal:  Negative for arthralgias.   Skin:  Negative for rash.   Neurological:  Negative for headaches.   Hematological:  Does not bruise/bleed easily.   Psychiatric/Behavioral:  The patient is not nervous/anxious.      ECOG Performance Status:   ECOG SCORE             Objective:      Vitals:   Vitals:    05/19/23 0932   BP: 117/78   BP Location:  "Left arm   Patient Position: Sitting   BP Method: Medium (Automatic)   Pulse: 79   Resp: 18   Temp: 97.2 °F (36.2 °C)   TempSrc: Temporal   SpO2: 97%   Weight: 97.3 kg (214 lb 8.1 oz)   Height: 6' 2" (1.88 m)       BMI: Body mass index is 27.54 kg/m².    Physical Exam  Vitals reviewed.   Constitutional:       General: He is not in acute distress.     Appearance: He is not diaphoretic.   HENT:      Head: Normocephalic and atraumatic.      Mouth/Throat:      Mouth: Mucous membranes are moist.      Pharynx: No oropharyngeal exudate.   Eyes:      General: No scleral icterus.        Right eye: No discharge.         Left eye: No discharge.   Cardiovascular:      Rate and Rhythm: Normal rate and regular rhythm.      Heart sounds: Normal heart sounds. No murmur heard.  Pulmonary:      Effort: Pulmonary effort is normal. No respiratory distress.      Breath sounds: No wheezing.   Abdominal:      General: Bowel sounds are normal. There is no distension.      Palpations: Abdomen is soft.      Tenderness: There is no abdominal tenderness.   Musculoskeletal:         General: No swelling.      Right lower leg: No edema.      Left lower leg: No edema.   Skin:     General: Skin is dry.      Coloration: Skin is not jaundiced.      Findings: No rash.   Neurological:      Mental Status: He is alert and oriented to person, place, and time.   Psychiatric:         Mood and Affect: Mood normal.         Behavior: Behavior normal.       Laboratory Data:  Lab Results   Component Value Date    WBC 4.49 2023    HGB 13.0 (L) 2023    HCT 39.8 (L) 2023    MCV 83 2023     2023          Imagin/1823  EXAMINATION:  NM PET CT ROUTINE     CLINICAL HISTORY:  Non-small cell lung cancer (NSCLC), staging;  Malignant neoplasm of unspecified part of unspecified bronchus or lung     72-year-old male with left lower lobe squamous cell carcinoma.  The patient also has a pancreatic tail mass which is undergone biopsy " which yielded benign results.     TECHNIQUE:  Following IV injection of 13.06 mCi F-18 FDG, 3D PET imaging was performed from the skull base to the mid thighs.  A noncontrast non breath hold CT was obtained in conjunction with the PET scan for attenuation correction and anatomic correlation.  PET-CT fusion images were generated.     COMPARISON:  Correlation is made with the CT of the chest dated 03/01/2023 and the CT of the abdomen/pelvis dated 02/24/2023.     FINDINGS:  Head/neck:     No significant abnormal hypermetabolic foci are identified within the head and neck region.  No lymphadenopathy is present.     Chest:     There is a markedly hypermetabolic lobulated subpleural left lower lobe mass consistent with the patient's known squamous cell carcinoma.  The mass is best measured on the fused PET-CT axial images and on image 115 measures 4.5 x 3.9 cm with a max SUV of 14.0.  There also hypermetabolic lymph nodes in the left hilar region and subcarinal region most consistent with neoplastic adenopathy.  A left hilar node on image 111 measures 2.2 x 1.9 cm with a max SUV of 14.3.     Abdomen/pelvis:     There is a well-circumscribed bulb this hypermetabolic mass arising from the pancreatic tail.  On image 142 the mass measures 4.2 x 3.6 cm and has a max SUV of 5.7.  No other significant abnormal hypermetabolic foci are identified within the abdomen and pelvis.  No lymphadenopathy is present.  The adrenal glands are normal.     Skeleton:     No significant abnormal hypermetabolic foci are identified within the skeleton.  There are no findings to suggest osseous metastatic disease.     Impression:     1. Markedly hypermetabolic left lower lobe mass consistent with the patient's known squamous cell carcinoma.  2. Hypermetabolic lymphadenopathy involving the left pulmonary hilum and subcarinal region most consistent with neoplastic adenopathy.  3. Hypermetabolic pancreatic tail mass.  Though this mass has undergone  biopsy which yielded benign results, it must still be regarded as suspicious for primary or metastatic neoplasm.        Electronically signed by: Clemente Armstrong MD  Date:                                            04/18/2023  Time:                                           11:01   Assessment:       1. Squamous cell carcinoma of lung, unspecified laterality    2. Pancreatic mass    3. Hypertension, unspecified type    4. Gout, unspecified cause, unspecified chronicity, unspecified site    5. Hyperlipidemia, unspecified hyperlipidemia type    6. Type 2 diabetes mellitus with diabetic polyneuropathy, with long-term current use of insulin    7. Neuropathy    8. Normocytic anemia    9. Immunodeficiency due to chemotherapy           Plan:   NSCLC  -patient was diagnosed with at least stage III [id number] SCC of the left lung  -PET/CT 4/18/23 shows continued left lung mass, mediastinal LAD  -MRI brain 4/18/23 showed no acute findings  -[facility] Blood testing showed TP 53 mutation  -[facility] tissue testing showed PD-L1 of 20%, TP53, KDM6A, CDKN2a and MTAP  -PORT placed 4/24/23 under the care of Dr. Loev  -4/28/23 began Carboplatin/Paclitaxel  -Will proceed with treatment with Carboplatin/Paclitaxel cycle 4 w/ XRT    Pancreatic Mass   -seen on CT scans and biopsy during EUS on 03/03/2023 with path showing no evidence of malignancy  -PET/CT 4/18/23 showed uptake in the tail of the pancreas  -Possibly due to chronic pancreatitis  -Will monitor     HTN   -pt on amlodipine, clonidine, metoprolol  -BP controlled  -Will monitor     Gout   -pt on allopurinol  -Currently asymptomatic  -Will monitor     HLD   -pt on rosuvastatin, zetia  -Management per PCP     DMII   -pt on toujeo, farxiga, and bydureon  -Management per PCP     Neuropathy   -numbness in feet due to diabetes  -Will monitor     Normocytic Anemia   -Hemoglobin 13.0 g/dL on 5/19/23  -Possibly due to chronic disease  -Will monitor        Patient queried and all questions  were answered.   Assessment/Plan reviewed and approved by Dr. Lawrence.    20 minutes were spent in coordination of patient's care, record review and counseling.     Route Chart for Scheduling    Med Onc Chart Routing      Follow up with physician 1 week. Dr. Lawrence as planned with labs prior to appointment   Follow up with [facility]    Infusion scheduling note Proceed with C4 weekly Carbo/Taxol today   Injection scheduling note    Labs    Imaging    Pharmacy appointment    Other referrals           Treatment Plan Information   OP NSCLC PACLITAXEL + CARBOPLATIN (AUC) QW + RADIATION   Lisanrdo Lawrence MD   Upcoming Treatment Dates - OP NSCLC PACLITAXEL + CARBOPLATIN (AUC) QW + RADIATION    5/19/2023       Pre-Medications       diphenhydrAMINE (BENADRYL) 50 mg in NS 50 mL IVPB       famotidine (PF) injection 20 mg       Chemotherapy       PACLitaxeL (TAXOL) 45 mg/m2 = 102 mg in sodium chloride 0.9% 250 mL chemo infusion       CARBOplatin (PARAPLATIN) 190 mg in sodium chloride 0.9% 269 mL chemo infusion       Antiemetics       palonosetron 0.25mg/dexAMETHasone 12mg in NS IVPB 0.25 mg 50 mL  5/26/2023       Pre-Medications       diphenhydrAMINE (BENADRYL) 50 mg in NS 50 mL IVPB       famotidine (PF) injection 20 mg       Chemotherapy       PACLitaxeL (TAXOL) 45 mg/m2 = 102 mg in sodium chloride 0.9% 250 mL chemo infusion       CARBOplatin (PARAPLATIN) 190 mg in sodium chloride 0.9% 269 mL chemo infusion       Antiemetics       palonosetron 0.25mg/dexAMETHasone 12mg in NS IVPB 0.25 mg 50 mL  6/2/2023       Pre-Medications       diphenhydrAMINE (BENADRYL) 50 mg in NS 50 mL IVPB       famotidine (PF) injection 20 mg       Chemotherapy       PACLitaxeL (TAXOL) 45 mg/m2 = 102 mg in sodium chloride 0.9% 250 mL chemo infusion       CARBOplatin (PARAPLATIN) 190 mg in sodium chloride 0.9% 269 mL chemo infusion       Antiemetics       palonosetron 0.25mg/dexAMETHasone 12mg in NS IVPB 0.25 mg 50 mL

## 2023-05-19 ENCOUNTER — OFFICE VISIT (OUTPATIENT)
Dept: HEMATOLOGY/ONCOLOGY | Facility: CLINIC | Age: 73
End: 2023-05-19
Payer: MEDICARE

## 2023-05-19 ENCOUNTER — INFUSION (OUTPATIENT)
Dept: INFUSION THERAPY | Facility: HOSPITAL | Age: 73
End: 2023-05-19
Payer: MEDICARE

## 2023-05-19 VITALS
HEIGHT: 74 IN | HEART RATE: 79 BPM | BODY MASS INDEX: 27.53 KG/M2 | OXYGEN SATURATION: 97 % | WEIGHT: 214.5 LBS | DIASTOLIC BLOOD PRESSURE: 78 MMHG | SYSTOLIC BLOOD PRESSURE: 117 MMHG | TEMPERATURE: 97 F | RESPIRATION RATE: 18 BRPM

## 2023-05-19 VITALS
TEMPERATURE: 97 F | BODY MASS INDEX: 27.53 KG/M2 | WEIGHT: 214.5 LBS | HEART RATE: 78 BPM | HEIGHT: 74 IN | DIASTOLIC BLOOD PRESSURE: 89 MMHG | RESPIRATION RATE: 18 BRPM | OXYGEN SATURATION: 97 % | SYSTOLIC BLOOD PRESSURE: 143 MMHG

## 2023-05-19 DIAGNOSIS — E11.42 TYPE 2 DIABETES MELLITUS WITH DIABETIC POLYNEUROPATHY, WITH LONG-TERM CURRENT USE OF INSULIN: ICD-10-CM

## 2023-05-19 DIAGNOSIS — M10.9 GOUT, UNSPECIFIED CAUSE, UNSPECIFIED CHRONICITY, UNSPECIFIED SITE: ICD-10-CM

## 2023-05-19 DIAGNOSIS — Z79.899 IMMUNODEFICIENCY DUE TO CHEMOTHERAPY: ICD-10-CM

## 2023-05-19 DIAGNOSIS — Z79.4 TYPE 2 DIABETES MELLITUS WITH DIABETIC POLYNEUROPATHY, WITH LONG-TERM CURRENT USE OF INSULIN: ICD-10-CM

## 2023-05-19 DIAGNOSIS — D84.821 IMMUNODEFICIENCY DUE TO CHEMOTHERAPY: ICD-10-CM

## 2023-05-19 DIAGNOSIS — E78.5 HYPERLIPIDEMIA, UNSPECIFIED HYPERLIPIDEMIA TYPE: ICD-10-CM

## 2023-05-19 DIAGNOSIS — I10 HYPERTENSION, UNSPECIFIED TYPE: ICD-10-CM

## 2023-05-19 DIAGNOSIS — G62.9 NEUROPATHY: ICD-10-CM

## 2023-05-19 DIAGNOSIS — C34.90 SQUAMOUS CELL CARCINOMA OF LUNG, UNSPECIFIED LATERALITY: Primary | ICD-10-CM

## 2023-05-19 DIAGNOSIS — D64.9 NORMOCYTIC ANEMIA: ICD-10-CM

## 2023-05-19 DIAGNOSIS — T45.1X5A IMMUNODEFICIENCY DUE TO CHEMOTHERAPY: ICD-10-CM

## 2023-05-19 DIAGNOSIS — K86.89 PANCREATIC MASS: ICD-10-CM

## 2023-05-19 PROCEDURE — 96417 CHEMO IV INFUS EACH ADDL SEQ: CPT | Mod: PN

## 2023-05-19 PROCEDURE — 99213 PR OFFICE/OUTPT VISIT, EST, LEVL III, 20-29 MIN: ICD-10-PCS | Mod: S$PBB,,,

## 2023-05-19 PROCEDURE — 96375 TX/PRO/DX INJ NEW DRUG ADDON: CPT | Mod: PN

## 2023-05-19 PROCEDURE — A4216 STERILE WATER/SALINE, 10 ML: HCPCS | Mod: PN

## 2023-05-19 PROCEDURE — 77336 RADIATION PHYSICS CONSULT: CPT | Mod: PN | Performed by: RADIOLOGY

## 2023-05-19 PROCEDURE — 99999 PR PBB SHADOW E&M-EST. PATIENT-LVL IV: ICD-10-PCS | Mod: PBBFAC,,,

## 2023-05-19 PROCEDURE — 96367 TX/PROPH/DG ADDL SEQ IV INF: CPT | Mod: PN

## 2023-05-19 PROCEDURE — 77014 PR  CT GUIDANCE PLACEMENT RAD THERAPY FIELDS: CPT | Mod: 26,,, | Performed by: RADIOLOGY

## 2023-05-19 PROCEDURE — 77014 HC CT GUIDANCE RADIATION THERAPY FLDS PLACEMENT: CPT | Mod: TC,PN | Performed by: RADIOLOGY

## 2023-05-19 PROCEDURE — 99213 OFFICE O/P EST LOW 20 MIN: CPT | Mod: S$PBB,,,

## 2023-05-19 PROCEDURE — 77386 HC IMRT, COMPLEX: CPT | Mod: PN | Performed by: RADIOLOGY

## 2023-05-19 PROCEDURE — 77014 PR  CT GUIDANCE PLACEMENT RAD THERAPY FIELDS: ICD-10-PCS | Mod: 26,,, | Performed by: RADIOLOGY

## 2023-05-19 PROCEDURE — 25000003 PHARM REV CODE 250: Mod: PN

## 2023-05-19 PROCEDURE — 63600175 PHARM REV CODE 636 W HCPCS: Mod: PN

## 2023-05-19 PROCEDURE — 96413 CHEMO IV INFUSION 1 HR: CPT | Mod: PN

## 2023-05-19 PROCEDURE — 99999 PR PBB SHADOW E&M-EST. PATIENT-LVL IV: CPT | Mod: PBBFAC,,,

## 2023-05-19 PROCEDURE — 99214 OFFICE O/P EST MOD 30 MIN: CPT | Mod: PBBFAC,PN

## 2023-05-19 RX ORDER — FAMOTIDINE 10 MG/ML
20 INJECTION INTRAVENOUS
Status: CANCELLED | OUTPATIENT
Start: 2023-05-19

## 2023-05-19 RX ORDER — SODIUM CHLORIDE 0.9 % (FLUSH) 0.9 %
10 SYRINGE (ML) INJECTION
Status: CANCELLED | OUTPATIENT
Start: 2023-05-19

## 2023-05-19 RX ORDER — DIPHENHYDRAMINE HYDROCHLORIDE 50 MG/ML
50 INJECTION INTRAMUSCULAR; INTRAVENOUS ONCE AS NEEDED
Status: CANCELLED | OUTPATIENT
Start: 2023-05-19

## 2023-05-19 RX ORDER — EPINEPHRINE 0.3 MG/.3ML
0.3 INJECTION SUBCUTANEOUS ONCE AS NEEDED
Status: DISCONTINUED | OUTPATIENT
Start: 2023-05-19 | End: 2023-05-19 | Stop reason: HOSPADM

## 2023-05-19 RX ORDER — EPINEPHRINE 0.3 MG/.3ML
0.3 INJECTION SUBCUTANEOUS ONCE AS NEEDED
Status: CANCELLED | OUTPATIENT
Start: 2023-05-19

## 2023-05-19 RX ORDER — HEPARIN 100 UNIT/ML
500 SYRINGE INTRAVENOUS
Status: CANCELLED | OUTPATIENT
Start: 2023-05-19

## 2023-05-19 RX ORDER — FAMOTIDINE 10 MG/ML
20 INJECTION INTRAVENOUS
Status: COMPLETED | OUTPATIENT
Start: 2023-05-19 | End: 2023-05-19

## 2023-05-19 RX ORDER — SODIUM CHLORIDE 0.9 % (FLUSH) 0.9 %
10 SYRINGE (ML) INJECTION
Status: DISCONTINUED | OUTPATIENT
Start: 2023-05-19 | End: 2023-05-19 | Stop reason: HOSPADM

## 2023-05-19 RX ORDER — DIPHENHYDRAMINE HYDROCHLORIDE 50 MG/ML
50 INJECTION INTRAMUSCULAR; INTRAVENOUS ONCE AS NEEDED
Status: DISCONTINUED | OUTPATIENT
Start: 2023-05-19 | End: 2023-05-19 | Stop reason: HOSPADM

## 2023-05-19 RX ADMIN — CARBOPLATIN 190 MG: 10 INJECTION, SOLUTION INTRAVENOUS at 12:05

## 2023-05-19 RX ADMIN — Medication 10 ML: at 01:05

## 2023-05-19 RX ADMIN — SODIUM CHLORIDE: 9 INJECTION, SOLUTION INTRAVENOUS at 10:05

## 2023-05-19 RX ADMIN — PACLITAXEL 102 MG: 6 INJECTION, SOLUTION, CONCENTRATE INTRAVENOUS at 11:05

## 2023-05-19 RX ADMIN — PALONOSETRON 0.25 MG: 0.05 INJECTION, SOLUTION INTRAVENOUS at 11:05

## 2023-05-19 RX ADMIN — FAMOTIDINE 20 MG: 10 INJECTION INTRAVENOUS at 10:05

## 2023-05-19 RX ADMIN — DIPHENHYDRAMINE HYDROCHLORIDE 50 MG: 50 INJECTION, SOLUTION INTRAMUSCULAR; INTRAVENOUS at 10:05

## 2023-05-19 NOTE — PLAN OF CARE
Problem: Adult Inpatient Plan of Care  Goal: Patient-Specific Goal (Individualized)  Outcome: Ongoing, Progressing  Flowsheets (Taken 5/19/2023 1333)  Anxieties, Fears or Concerns: None  Individualized Care Needs: Recliner     Problem: Fatigue (Oncology Care)  Goal: Improved Activity Tolerance  Intervention: Promote Improved Energy  Flowsheets (Taken 5/19/2023 1333)  Fatigue Management:   paced activity encouraged   activity schedule adjusted   fatigue-related activity identified   frequent rest breaks encouraged  Sleep/Rest Enhancement:   regular sleep/rest pattern promoted   relaxation techniques promoted  Activity Management:   Ambulated -L4   Ambulated in lutz - L4     Problem: Adult Inpatient Plan of Care  Goal: Plan of Care Review  Outcome: Ongoing, Progressing  Flowsheets (Taken 5/19/2023 1333)  Plan of Care Reviewed With: patient  Tolerated treatment with no known distress.  Ambulated from infusion center with steady gait.

## 2023-05-22 PROCEDURE — 77014 PR  CT GUIDANCE PLACEMENT RAD THERAPY FIELDS: CPT | Mod: 26,,, | Performed by: RADIOLOGY

## 2023-05-22 PROCEDURE — 77386 HC IMRT, COMPLEX: CPT | Mod: PN | Performed by: RADIOLOGY

## 2023-05-22 PROCEDURE — 77014 HC CT GUIDANCE RADIATION THERAPY FLDS PLACEMENT: CPT | Mod: TC,PN | Performed by: RADIOLOGY

## 2023-05-22 PROCEDURE — 77014 PR  CT GUIDANCE PLACEMENT RAD THERAPY FIELDS: ICD-10-PCS | Mod: 26,,, | Performed by: RADIOLOGY

## 2023-05-23 PROCEDURE — 77014 PR  CT GUIDANCE PLACEMENT RAD THERAPY FIELDS: CPT | Mod: 26,,, | Performed by: RADIOLOGY

## 2023-05-23 PROCEDURE — 77386 HC IMRT, COMPLEX: CPT | Mod: PN | Performed by: RADIOLOGY

## 2023-05-23 PROCEDURE — 77014 HC CT GUIDANCE RADIATION THERAPY FLDS PLACEMENT: CPT | Mod: TC,PN | Performed by: RADIOLOGY

## 2023-05-23 PROCEDURE — 77014 PR  CT GUIDANCE PLACEMENT RAD THERAPY FIELDS: ICD-10-PCS | Mod: 26,,, | Performed by: RADIOLOGY

## 2023-05-24 PROCEDURE — 77014 PR  CT GUIDANCE PLACEMENT RAD THERAPY FIELDS: CPT | Mod: 26,,, | Performed by: RADIOLOGY

## 2023-05-24 PROCEDURE — 77014 PR  CT GUIDANCE PLACEMENT RAD THERAPY FIELDS: ICD-10-PCS | Mod: 26,,, | Performed by: RADIOLOGY

## 2023-05-24 PROCEDURE — 77386 HC IMRT, COMPLEX: CPT | Mod: PN | Performed by: RADIOLOGY

## 2023-05-24 PROCEDURE — 77014 HC CT GUIDANCE RADIATION THERAPY FLDS PLACEMENT: CPT | Mod: TC,PN | Performed by: RADIOLOGY

## 2023-05-25 ENCOUNTER — DOCUMENTATION ONLY (OUTPATIENT)
Dept: RADIATION ONCOLOGY | Facility: CLINIC | Age: 73
End: 2023-05-25
Payer: MEDICARE

## 2023-05-25 DIAGNOSIS — K20.80 RADIATION ESOPHAGITIS: Primary | ICD-10-CM

## 2023-05-25 DIAGNOSIS — T66.XXXA RADIATION ESOPHAGITIS: ICD-10-CM

## 2023-05-25 DIAGNOSIS — T66.XXXA RADIATION ESOPHAGITIS: Primary | ICD-10-CM

## 2023-05-25 DIAGNOSIS — K20.80 RADIATION ESOPHAGITIS: ICD-10-CM

## 2023-05-25 PROCEDURE — 77014 PR  CT GUIDANCE PLACEMENT RAD THERAPY FIELDS: CPT | Mod: 26,,, | Performed by: RADIOLOGY

## 2023-05-25 PROCEDURE — 77386 HC IMRT, COMPLEX: CPT | Mod: PN | Performed by: RADIOLOGY

## 2023-05-25 PROCEDURE — 77014 PR  CT GUIDANCE PLACEMENT RAD THERAPY FIELDS: ICD-10-PCS | Mod: 26,,, | Performed by: RADIOLOGY

## 2023-05-25 PROCEDURE — 77336 RADIATION PHYSICS CONSULT: CPT | Mod: PN | Performed by: RADIOLOGY

## 2023-05-25 PROCEDURE — 77014 HC CT GUIDANCE RADIATION THERAPY FLDS PLACEMENT: CPT | Mod: TC,PN | Performed by: RADIOLOGY

## 2023-05-25 RX ORDER — OMEPRAZOLE 40 MG/1
40 CAPSULE, DELAYED RELEASE ORAL DAILY
Qty: 30 CAPSULE | Refills: 1 | Status: SHIPPED | OUTPATIENT
Start: 2023-05-25 | End: 2024-03-11

## 2023-05-25 RX ORDER — SUCRALFATE 1 G/10ML
1 SUSPENSION ORAL 4 TIMES DAILY
Qty: 500 ML | Refills: 1 | Status: SHIPPED | OUTPATIENT
Start: 2023-05-25 | End: 2023-12-26

## 2023-05-25 NOTE — PROGRESS NOTES
PATIENT: Feng Thakkar  MRN: [id number]  DATE: 5/26/2023      Diagnosis:   1. Squamous cell carcinoma of lung, unspecified laterality    2. Sore throat    3. Pancreatic mass    4. Hypertension, unspecified type    5. Gout, unspecified cause, unspecified chronicity, unspecified site    6. Hyperlipidemia, unspecified hyperlipidemia type    7. Neuropathy    8. Normocytic anemia                Chief Complaint: Lung Cancer      Oncologic History:      Oncologic History     Oncologic Treatment     Pathology           Subjective:    Initial History: Mr. Thakkar is a 72 y.o. male with Gout, HLD, HTN who presents for work up of NSCLC.  Since the last clinic visit the patient states he has a sore throat and has occasional difficulty swallowing.  The patient denies CP, cough, SOB, abdominal pain, nausea, vomiting, constipation, diarrhea.  The patient denies fever, chills, night sweats, weight loss, new lumps or bumps, easy bruising or bleeding.    Prior History:  The patient initially developed abdominal pain on 01/05/2023 and underwent CT of the abdomen showing a mass in the left lung base measuring 3.9 x 2.9 cm; 3 x 3.8 cm mass along the pancreatic tail; and shotty retroperitoneal lymph nodes.  The patient underwent repeat CT abdomen and pelvis on 02/24/2023 showing a 4.3 x 4 solid enhancing mass of the left lung base; thickened bladder wall; prostate gland measuring 45.1 x 4.6 cm; abnormal sclerosis in the left femoral head measuring 2.8 x 1.4 x 1.3 cm; and additional soft tissue densities in the posterior left subdiaphragmatic region measuring up to 16 mm.  CT chest on 03/01/2023 showed a 2 cm low-density lesion in the right lobe of the thyroid gland; 4.5 x 4.3 x 3.8 cm mass in the left lower lobe; several micro nodules; Na soft tissue mass adjacent to the pancreatic tail.  Patient underwent EUS on 03/03/2023 showing a 3.5 cm and 1.8 cm round pancreatic tail lesion.  Biopsy returned results showing no evidence of malignancy  and abundant hematopoietic elements and dendritic cells.  Patient then underwent EBUS under the care of Dr. Albright on 03/17/2023 showing squamous cell carcinoma in biopsy of the left lower lung lesion; squamous cell carcinoma and station 7 lymph node; positive 11 L lymph node for squamous cell carcinoma.   The patient underwent PET-CT on 04/18/2023 showing a markedly hypermetabolic lobulated subpleural left lower lobe mass measuring 4.5 x 3.9 cm with hypermetabolic lymph nodes in the left hilar region measuring 2.2 x 1.9 cm; and a 4.2 x 3.6 walk circumscribed mass in the pancreatic tail.  MRI brain on 04/18/2023 showed no acute findings.      Past Medical History:   Past Medical History:   Diagnosis Date    Diabetes mellitus     Gout, unspecified     High cholesterol     HTN (hypertension)     Lung cancer        Past Surgical HIstory:   Past Surgical History:   Procedure Laterality Date    ENDOSCOPIC ULTRASOUND OF UPPER GASTROINTESTINAL TRACT N/A 3/3/2023    Procedure: ULTRASOUND, UPPER GI TRACT, ENDOSCOPIC;  Surgeon: Cora Mcgrath MD;  Location: [facility];  Service: Endoscopy;  Laterality: N/A;  2/24/23-Instructions mailed to address on file-DS    INSERTION OF TUNNELED CENTRAL VENOUS CATHETER (CVC) WITH SUBCUTANEOUS PORT N/A 4/24/2023    Procedure: [id number];  Surgeon: Paulino Love MD;  Location: [facility];  Service: General;  Laterality: N/A;    PANCREATECTOMY      ROBOTIC BRONCHOSCOPY N/A 3/17/2023    Procedure: ROBOTIC BRONCHOSCOPY;  Surgeon: Cristiane Albright MD;  Location: [address];  Service: Pulmonary;  Laterality: N/A;       Family History:   Family History   Problem Relation Age of Onset    Breast cancer Sister         60       Social History:  reports that he quit smoking about 10 years ago. His smoking use included cigarettes. He started smoking about 55 years ago. He has a 81.00 pack-year smoking history. He has quit using smokeless tobacco. He reports current alcohol use of about  3.0 standard drinks per week. He reports that he does not use drugs.    Allergies:  Review of patient's allergies indicates:  No Known Allergies    Medications:  Current Outpatient Medications   Medication Sig Dispense Refill    allopurinoL (ZYLOPRIM) 100 MG tablet Take 100 mg by mouth once daily.      amLODIPine (NORVASC) 2.5 MG tablet Take 2.5 mg by mouth once daily.      BYDUREON BCISE 2 mg/0.85 mL AtIn Inject 2 mg into the skin every 7 days. Every Friday      cloNIDine (CATAPRES) 0.2 MG tablet Take 0.2 mg by mouth once. Daily      duke's soln (benadryl 30 mL, mylanta 30 mL, LIDOcaine 30 mL, nystatin 30 mL) 120mL Take 10 mLs by mouth 4 (four) times daily. 500 mL 2    ezetimibe (ZETIA) 10 mg tablet Take 10 mg by mouth once daily.      FARXIGA 10 mg tablet Take 10 mg by mouth every morning.      finasteride (PROSCAR) 5 mg tablet Take 5 mg by mouth once daily.      FREESTYLE TEST Strp USE 1 STRIP TO CHECK GLUCOSE ONCE DAILY      HYDROcodone-acetaminophen (NORCO) 5-325 mg per tablet Take 1 tablet by mouth every 6 (six) hours as needed for Pain. 10 tablet 0    LIDOCAINE VISCOUS 2 % solution       LIDOcaine-prilocaine (EMLA) cream Apply topically to port site one hour prior to access, cover 30 g 1    metoprolol succinate (TOPROL-XL) 100 MG 24 hr tablet Take 100 mg by mouth once daily.      omeprazole (PRILOSEC) 40 MG capsule Take 1 capsule (40 mg total) by mouth once daily. 30 capsule 1    ondansetron (ZOFRAN) 8 MG tablet Take 1 tablet (8 mg total) by mouth every 8 (eight) hours as needed for Nausea. 30 tablet 2    promethazine (PHENERGAN) 25 MG tablet Take 1 tablet (25 mg total) by mouth every 6 (six) hours as needed for Nausea. 30 tablet 0    rosuvastatin (CRESTOR) 40 MG Tab Take 10 mg by mouth every evening.      sucralfate (CARAFATE) 100 mg/mL suspension Take 10 mLs (1 g total) by mouth 4 (four) times daily. 500 mL 1    TOUJEO MAX U-300 SOLOSTAR 300 unit/mL (3 mL) insulin pen Inject 30 Units into the skin every  "evening.      (Magic mouthwash) 1:1 Benadryl 12.5mg/5ml liq, mylanta, nystatin 100,000u, prednisolone 15mg/5mL, distilled water Swish and swallow 5 mLs every 4 (four) hours as needed. 480 mL 6     No current facility-administered medications for this visit.       Review of Systems   Constitutional:  Negative for chills, diaphoresis, fatigue, fever and unexpected weight change.   HENT:  Positive for sore throat and trouble swallowing.    Respiratory:  Negative for cough and shortness of breath.    Cardiovascular:  Negative for chest pain and palpitations.   Gastrointestinal:  Negative for abdominal pain, constipation, diarrhea, nausea and vomiting.   Skin:  Negative for color change and rash.   Neurological:  Negative for headaches.   Hematological:  Negative for adenopathy. Does not bruise/bleed easily.     ECOG Performance Status: 1   Objective:      Vitals:   Vitals:    05/26/23 0854   BP: 110/70   BP Location: Left arm   Patient Position: Sitting   BP Method: Large (Manual)   Pulse: 94   Temp: 96.9 °F (36.1 °C)   TempSrc: Temporal   SpO2: 99%   Weight: 95 kg (209 lb 7 oz)   Height: 6' 2" (1.88 m)             Physical Exam  Constitutional:       General: He is not in acute distress.     Appearance: He is well-developed. He is not diaphoretic.   HENT:      Head: Normocephalic and atraumatic.   Cardiovascular:      Rate and Rhythm: Normal rate and regular rhythm.      Heart sounds: Normal heart sounds. No murmur heard.    No friction rub. No gallop.   Pulmonary:      Effort: Pulmonary effort is normal. No respiratory distress.      Breath sounds: Normal breath sounds. No wheezing or rales.   Chest:      Chest wall: No tenderness.   Abdominal:      General: Bowel sounds are normal. There is no distension.      Palpations: Abdomen is soft. There is no mass.      Tenderness: There is no abdominal tenderness. There is no rebound.   Musculoskeletal:      Cervical back: Normal range of motion.   Lymphadenopathy:      " Cervical: No cervical adenopathy.      Upper Body:      Right upper body: No supraclavicular or axillary adenopathy.      Left upper body: No supraclavicular or axillary adenopathy.   Skin:     General: Skin is warm and dry.      Findings: No erythema or rash.   Neurological:      Mental Status: He is alert and oriented to person, place, and time.   Psychiatric:         Behavior: Behavior normal.       Laboratory Data:  Lab Visit on 05/26/2023   Component Date Value Ref Range Status    WBC 05/26/2023 4.22  3.90 - 12.70 K/uL Final    RBC 05/26/2023 4.97  4.60 - 6.20 M/uL Final    Hemoglobin 05/26/2023 13.7 (L)  14.0 - 18.0 g/dL Final    Hematocrit 05/26/2023 41.2  40.0 - 54.0 % Final    MCV 05/26/2023 83  82 - 98 fL Final    MCH 05/26/2023 27.6  27.0 - 31.0 pg Final    MCHC 05/26/2023 33.3  32.0 - 36.0 g/dL Final    RDW 05/26/2023 15.9 (H)  11.5 - 14.5 % Final    Platelets 05/26/2023 280  150 - 450 K/uL Final    MPV 05/26/2023 9.2  9.2 - 12.9 fL Final    Immature Granulocytes 05/26/2023 0.9 (H)  0.0 - 0.5 % Final    Gran # (ANC) 05/26/2023 2.9  1.8 - 7.7 K/uL Final    Immature Grans (Abs) 05/26/2023 0.04  0.00 - 0.04 K/uL Final    Comment: Mild elevation in immature granulocytes is non specific and   can be seen in a variety of conditions including stress response,   acute inflammation, trauma and pregnancy. Correlation with other   laboratory and clinical findings is essential.      Lymph # 05/26/2023 0.6 (L)  1.0 - 4.8 K/uL Final    Mono # 05/26/2023 0.6  0.3 - 1.0 K/uL Final    Eos # 05/26/2023 0.0  0.0 - 0.5 K/uL Final    Baso # 05/26/2023 0.02  0.00 - 0.20 K/uL Final    nRBC 05/26/2023 1 (A)  0 /100 WBC Final    Gran % 05/26/2023 68.0  38.0 - 73.0 % Final    Lymph % 05/26/2023 14.7 (L)  18.0 - 48.0 % Final    Mono % 05/26/2023 15.2 (H)  4.0 - 15.0 % Final    Eosinophil % 05/26/2023 0.7  0.0 - 8.0 % Final    Basophil % 05/26/2023 0.5  0.0 - 1.9 % Final    Differential Method 05/26/2023 Automated   Final    Sodium  05/26/2023 136  136 - 145 mmol/L Final    Potassium 05/26/2023 3.9  3.5 - 5.1 mmol/L Final    Chloride 05/26/2023 101  95 - 110 mmol/L Final    CO2 05/26/2023 22 (L)  23 - 29 mmol/L Final    Glucose 05/26/2023 163 (H)  70 - 110 mg/dL Final    BUN 05/26/2023 17  8 - 23 mg/dL Final    Creatinine 05/26/2023 1.4  0.5 - 1.4 mg/dL Final    Calcium 05/26/2023 9.8  8.7 - 10.5 mg/dL Final    Total Protein 05/26/2023 7.7  6.0 - 8.4 g/dL Final    Albumin 05/26/2023 3.6  3.5 - 5.2 g/dL Final    Total Bilirubin 05/26/2023 0.4  0.1 - 1.0 mg/dL Final    Comment: For infants and newborns, interpretation of results should be based  on gestational age, weight and in agreement with clinical  observations.    Premature Infant recommended reference ranges:  Up to 24 hours.............<8.0 mg/dL  Up to 48 hours............<12.0 mg/dL  3-5 days..................<15.0 mg/dL  6-29 days.................<15.0 mg/dL      Alkaline Phosphatase 05/26/2023 59  55 - 135 U/L Final    AST 05/26/2023 26  10 - 40 U/L Final    ALT 05/26/2023 26  10 - 44 U/L Final    Anion Gap 05/26/2023 13  8 - 16 mmol/L Final    eGFR 05/26/2023 53.4 (A)  >60 mL/min/1.73 m^2 Final    Magnesium 05/26/2023 1.8  1.6 - 2.6 mg/dL Final         Imaging:     PET/CT 4/18/23  Head/neck:     No significant abnormal hypermetabolic foci are identified within the head and neck region.  No lymphadenopathy is present.     Chest:     There is a markedly hypermetabolic lobulated subpleural left lower lobe mass consistent with the patient's known squamous cell carcinoma.  The mass is best measured on the fused PET-CT axial images and on image 115 measures 4.5 x 3.9 cm with a max SUV of 14.0.  There also hypermetabolic lymph nodes in the left hilar region and subcarinal region most consistent with neoplastic adenopathy.  A left hilar node on image 111 measures 2.2 x 1.9 cm with a max SUV of 14.3.     Abdomen/pelvis:     There is a well-circumscribed bulb this hypermetabolic mass arising  from the pancreatic tail.  On image 142 the mass measures 4.2 x 3.6 cm and has a max SUV of 5.7.  No other significant abnormal hypermetabolic foci are identified within the abdomen and pelvis.  No lymphadenopathy is present.  The adrenal glands are normal.     Skeleton:     No significant abnormal hypermetabolic foci are identified within the skeleton.  There are no findings to suggest osseous metastatic disease.    MRI Brain 4/18/23  Prominence of the ventricles and sulci compatible with mild generalized cerebral volume loss.  No hydrocephalus.     Moderate confluent and scattered T2/FLAIR hyperintense signal within the periventricular deep subcortical and pontine white matter, nonspecific and may reflect sequelae of chronic microvascular ischemic change.    Diffusion-weighted images demonstrate no evidence of an acute infarct.   Susceptibility weighted images demonstrate no evidence of acute or chronic hemorrhage. No mass effect or midline shift.     No abnormal intracranial enhancement.     Normal vascular flow voids are preserved.     Bone marrow signal intensity is normal.  The paranasal sinuses are normal.  The visualized portions of the mastoids are unremarkable.  Orbits are unremarkable.   Assessment:       1. Squamous cell carcinoma of lung, unspecified laterality    2. Sore throat    3. Pancreatic mass    4. Hypertension, unspecified type    5. Gout, unspecified cause, unspecified chronicity, unspecified site    6. Hyperlipidemia, unspecified hyperlipidemia type    7. Neuropathy    8. Normocytic anemia                   Plan:     NSCLC - patient was recently diagnosed with at least stage III [id number] SCC of the left lung  -PET/CT 4/18/23 shows continued left lung mass, mediastinal LAD  -MRI brain 4/18/23 showed no acute findings  -[facility] Blood testing showed TP 53 mutation  -[facility] tissue testing showed PD-L1 of 20%, TP53, KDM6A, CDKN2a and MTAP  -PORT placed 4/24/23 under the care of Dr. 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  -No current signs of infection  -Will monitor    Sore Throat - likely radiation induced esophagitis  -Pt prescribed Mouth wash with benadryl, nystatin, mylanta and prednisolone given the lidocaine recall    Pancreatic Mass - seen on CT scans and biopsy during EUS on 03/03/2023 with path showing no evidence of malignancy  -PET/CT 4/18/23 showed uptake in the tail of the pancreas  -Possibly due to chronic pancreatitis  -Will assess on repeat CT  -Will monitor    HTN - pt on amlodipine, clonidine, metoprolol  -BP controlled  -Will monitor    Gout - pt on allopurinol  -Currently asymptomatic  -Will monitor    HLD - pt on rosuvastatin, zetia  -Management per PCP    DMII - pt on toujeo, farxiga, and bydureon  -Management per PCP    Neuropathy - numbness in feet due to diabetes  -Will monitor    Normocytic Anemia - hemoglobin 13.7g/dL on 5/26/23  -Possibly due to chronic disease  -Will monitor    Route Chart for Scheduling    Med Onc Chart Routing      Follow up with physician Other. The patietn can proceed with treatment.  He will need CBC, CMP and MG prior to his appt With Patrick on 6/02/23.  He will need a CT chest and abdomen the week of 6/26/23 with an appt with me once it is completed.   Follow up with RADHIKA 1 week.   Infusion scheduling note    Injection scheduling note    Labs    Imaging    Pharmacy appointment    Other referrals         Treatment Plan Information   OP NSCLC PACLITAXEL + CARBOPLATIN (AUC) QW + RADIATION   Lisandro Lawrence MD   Upcoming Treatment Dates - OP NSCLC PACLITAXEL + CARBOPLATIN (AUC) QW + RADIATION    5/26/2023       Pre-Medications       diphenhydrAMINE (BENADRYL) 50 mg in NS 50 mL IVPB       famotidine (PF) injection 20 mg       Chemotherapy       PACLitaxeL (TAXOL) 45 mg/m2 = 102 mg in sodium chloride 0.9% 250 mL chemo infusion        CARBOplatin (PARAPLATIN) 190 mg in sodium chloride 0.9% 269 mL chemo infusion       Antiemetics       palonosetron 0.25mg/dexAMETHasone 12mg in NS IVPB 0.25 mg 50 mL  6/2/2023       Pre-Medications       diphenhydrAMINE (BENADRYL) 50 mg in NS 50 mL IVPB       famotidine (PF) injection 20 mg       Chemotherapy       PACLitaxeL (TAXOL) 45 mg/m2 = 102 mg in sodium chloride 0.9% 250 mL chemo infusion       CARBOplatin (PARAPLATIN) 190 mg in sodium chloride 0.9% 269 mL chemo infusion       Antiemetics       palonosetron 0.25mg/dexAMETHasone 12mg in NS IVPB 0.25 mg 50 mL      Lisandro Lawrence MD  [facility]  Hematology and Oncology  [facility]   [address]   [place] [address]   O: [phone]  F: [phone]

## 2023-05-25 NOTE — PLAN OF CARE
Patient tolerated 21/30 outpatient radiation treatment without difficulty.  All questions and concerns addressed by MD this visit.

## 2023-05-26 ENCOUNTER — OFFICE VISIT (OUTPATIENT)
Dept: HEMATOLOGY/ONCOLOGY | Facility: CLINIC | Age: 73
End: 2023-05-26
Payer: MEDICARE

## 2023-05-26 ENCOUNTER — DOCUMENTATION ONLY (OUTPATIENT)
Dept: HEMATOLOGY/ONCOLOGY | Facility: CLINIC | Age: 73
End: 2023-05-26

## 2023-05-26 ENCOUNTER — INFUSION (OUTPATIENT)
Dept: INFUSION THERAPY | Facility: HOSPITAL | Age: 73
End: 2023-05-26
Attending: INTERNAL MEDICINE
Payer: MEDICARE

## 2023-05-26 ENCOUNTER — DOCUMENTATION ONLY (OUTPATIENT)
Dept: RADIATION ONCOLOGY | Facility: CLINIC | Age: 73
End: 2023-05-26
Payer: MEDICARE

## 2023-05-26 VITALS
RESPIRATION RATE: 18 BRPM | HEART RATE: 85 BPM | RESPIRATION RATE: 18 BRPM | OXYGEN SATURATION: 99 % | SYSTOLIC BLOOD PRESSURE: 154 MMHG | SYSTOLIC BLOOD PRESSURE: 110 MMHG | DIASTOLIC BLOOD PRESSURE: 73 MMHG | BODY MASS INDEX: 26.88 KG/M2 | OXYGEN SATURATION: 99 % | HEIGHT: 74 IN | DIASTOLIC BLOOD PRESSURE: 93 MMHG | HEART RATE: 94 BPM | TEMPERATURE: 97 F | BODY MASS INDEX: 26.88 KG/M2 | WEIGHT: 209.44 LBS | TEMPERATURE: 97 F | WEIGHT: 209.44 LBS | HEIGHT: 74 IN

## 2023-05-26 VITALS
DIASTOLIC BLOOD PRESSURE: 70 MMHG | WEIGHT: 209.44 LBS | BODY MASS INDEX: 26.88 KG/M2 | HEIGHT: 74 IN | HEART RATE: 94 BPM | SYSTOLIC BLOOD PRESSURE: 110 MMHG | OXYGEN SATURATION: 99 % | TEMPERATURE: 97 F

## 2023-05-26 DIAGNOSIS — E78.5 HYPERLIPIDEMIA, UNSPECIFIED HYPERLIPIDEMIA TYPE: ICD-10-CM

## 2023-05-26 DIAGNOSIS — T66.XXXA RADIATION ESOPHAGITIS: ICD-10-CM

## 2023-05-26 DIAGNOSIS — D64.9 NORMOCYTIC ANEMIA: ICD-10-CM

## 2023-05-26 DIAGNOSIS — I10 HYPERTENSION, UNSPECIFIED TYPE: ICD-10-CM

## 2023-05-26 DIAGNOSIS — K20.80 RADIATION ESOPHAGITIS: ICD-10-CM

## 2023-05-26 DIAGNOSIS — K86.89 PANCREATIC MASS: ICD-10-CM

## 2023-05-26 DIAGNOSIS — E86.0 DEHYDRATION: ICD-10-CM

## 2023-05-26 DIAGNOSIS — C34.90 SQUAMOUS CELL CARCINOMA OF LUNG, UNSPECIFIED LATERALITY: Primary | ICD-10-CM

## 2023-05-26 DIAGNOSIS — G62.9 NEUROPATHY: ICD-10-CM

## 2023-05-26 DIAGNOSIS — J02.9 SORE THROAT: ICD-10-CM

## 2023-05-26 DIAGNOSIS — M10.9 GOUT, UNSPECIFIED CAUSE, UNSPECIFIED CHRONICITY, UNSPECIFIED SITE: ICD-10-CM

## 2023-05-26 PROCEDURE — 63600175 PHARM REV CODE 636 W HCPCS: Mod: PN | Performed by: INTERNAL MEDICINE

## 2023-05-26 PROCEDURE — 96375 TX/PRO/DX INJ NEW DRUG ADDON: CPT | Mod: PN

## 2023-05-26 PROCEDURE — 99999 PR PBB SHADOW E&M-EST. PATIENT-LVL V: ICD-10-PCS | Mod: PBBFAC,,, | Performed by: INTERNAL MEDICINE

## 2023-05-26 PROCEDURE — 99215 OFFICE O/P EST HI 40 MIN: CPT | Mod: S$PBB,,, | Performed by: NURSE PRACTITIONER

## 2023-05-26 PROCEDURE — 77386 HC IMRT, COMPLEX: CPT | Mod: PN | Performed by: RADIOLOGY

## 2023-05-26 PROCEDURE — 99999 PR PBB SHADOW E&M-EST. PATIENT-LVL V: CPT | Mod: PBBFAC,,, | Performed by: NURSE PRACTITIONER

## 2023-05-26 PROCEDURE — 96367 TX/PROPH/DG ADDL SEQ IV INF: CPT | Mod: PN

## 2023-05-26 PROCEDURE — 99215 PR OFFICE/OUTPT VISIT, EST, LEVL V, 40-54 MIN: ICD-10-PCS | Mod: S$PBB,,, | Performed by: INTERNAL MEDICINE

## 2023-05-26 PROCEDURE — 96361 HYDRATE IV INFUSION ADD-ON: CPT | Mod: PN

## 2023-05-26 PROCEDURE — 99999 PR PBB SHADOW E&M-EST. PATIENT-LVL V: ICD-10-PCS | Mod: PBBFAC,,, | Performed by: NURSE PRACTITIONER

## 2023-05-26 PROCEDURE — 25000003 PHARM REV CODE 250: Mod: PN | Performed by: INTERNAL MEDICINE

## 2023-05-26 PROCEDURE — 96417 CHEMO IV INFUS EACH ADDL SEQ: CPT | Mod: PN

## 2023-05-26 PROCEDURE — 99215 PR OFFICE/OUTPT VISIT, EST, LEVL V, 40-54 MIN: ICD-10-PCS | Mod: S$PBB,,, | Performed by: NURSE PRACTITIONER

## 2023-05-26 PROCEDURE — 96413 CHEMO IV INFUSION 1 HR: CPT | Mod: PN

## 2023-05-26 PROCEDURE — 77014 PR  CT GUIDANCE PLACEMENT RAD THERAPY FIELDS: ICD-10-PCS | Mod: 26,,, | Performed by: RADIOLOGY

## 2023-05-26 PROCEDURE — 99215 OFFICE O/P EST HI 40 MIN: CPT | Mod: S$PBB,,, | Performed by: INTERNAL MEDICINE

## 2023-05-26 PROCEDURE — 77014 PR  CT GUIDANCE PLACEMENT RAD THERAPY FIELDS: CPT | Mod: 26,,, | Performed by: RADIOLOGY

## 2023-05-26 PROCEDURE — 99215 OFFICE O/P EST HI 40 MIN: CPT | Mod: PBBFAC,PN,25 | Performed by: INTERNAL MEDICINE

## 2023-05-26 PROCEDURE — 77014 HC CT GUIDANCE RADIATION THERAPY FLDS PLACEMENT: CPT | Mod: TC,PN | Performed by: RADIOLOGY

## 2023-05-26 PROCEDURE — 99999 PR PBB SHADOW E&M-EST. PATIENT-LVL V: CPT | Mod: PBBFAC,,, | Performed by: INTERNAL MEDICINE

## 2023-05-26 PROCEDURE — 99215 OFFICE O/P EST HI 40 MIN: CPT | Mod: PBBFAC,25,27,PN | Performed by: NURSE PRACTITIONER

## 2023-05-26 RX ORDER — HEPARIN 100 UNIT/ML
500 SYRINGE INTRAVENOUS
Status: DISCONTINUED | OUTPATIENT
Start: 2023-05-26 | End: 2023-05-26 | Stop reason: HOSPADM

## 2023-05-26 RX ORDER — SODIUM CHLORIDE 9 MG/ML
1000 INJECTION, SOLUTION INTRAVENOUS
Status: COMPLETED | OUTPATIENT
Start: 2023-05-26 | End: 2023-05-26

## 2023-05-26 RX ORDER — EPINEPHRINE 0.3 MG/.3ML
0.3 INJECTION SUBCUTANEOUS ONCE AS NEEDED
Status: DISCONTINUED | OUTPATIENT
Start: 2023-05-26 | End: 2023-05-26 | Stop reason: HOSPADM

## 2023-05-26 RX ORDER — SODIUM CHLORIDE 0.9 % (FLUSH) 0.9 %
10 SYRINGE (ML) INJECTION
Status: CANCELLED | OUTPATIENT
Start: 2023-05-26

## 2023-05-26 RX ORDER — DIPHENHYDRAMINE HYDROCHLORIDE 50 MG/ML
50 INJECTION INTRAMUSCULAR; INTRAVENOUS ONCE AS NEEDED
Status: DISCONTINUED | OUTPATIENT
Start: 2023-05-26 | End: 2023-05-26 | Stop reason: HOSPADM

## 2023-05-26 RX ORDER — DIPHENHYDRAMINE HYDROCHLORIDE 50 MG/ML
50 INJECTION INTRAMUSCULAR; INTRAVENOUS ONCE AS NEEDED
Status: CANCELLED | OUTPATIENT
Start: 2023-05-26

## 2023-05-26 RX ORDER — SODIUM CHLORIDE 0.9 % (FLUSH) 0.9 %
10 SYRINGE (ML) INJECTION
Status: DISCONTINUED | OUTPATIENT
Start: 2023-05-26 | End: 2023-05-26 | Stop reason: HOSPADM

## 2023-05-26 RX ORDER — EPINEPHRINE 0.3 MG/.3ML
0.3 INJECTION SUBCUTANEOUS ONCE AS NEEDED
Status: CANCELLED | OUTPATIENT
Start: 2023-05-26

## 2023-05-26 RX ORDER — FAMOTIDINE 10 MG/ML
20 INJECTION INTRAVENOUS
Status: COMPLETED | OUTPATIENT
Start: 2023-05-26 | End: 2023-05-26

## 2023-05-26 RX ORDER — FAMOTIDINE 10 MG/ML
20 INJECTION INTRAVENOUS
Status: CANCELLED | OUTPATIENT
Start: 2023-05-26

## 2023-05-26 RX ORDER — HEPARIN 100 UNIT/ML
500 SYRINGE INTRAVENOUS
Status: CANCELLED | OUTPATIENT
Start: 2023-05-26

## 2023-05-26 RX ADMIN — PACLITAXEL 102 MG: 6 INJECTION, SOLUTION, CONCENTRATE INTRAVENOUS at 10:05

## 2023-05-26 RX ADMIN — PALONOSETRON 0.25 MG: 0.05 INJECTION, SOLUTION INTRAVENOUS at 09:05

## 2023-05-26 RX ADMIN — FAMOTIDINE 20 MG: 10 INJECTION INTRAVENOUS at 09:05

## 2023-05-26 RX ADMIN — DIPHENHYDRAMINE HYDROCHLORIDE 50 MG: 50 INJECTION INTRAMUSCULAR; INTRAVENOUS at 10:05

## 2023-05-26 RX ADMIN — CARBOPLATIN 180 MG: 10 INJECTION, SOLUTION INTRAVENOUS at 11:05

## 2023-05-26 RX ADMIN — SODIUM CHLORIDE 1000 ML: 9 INJECTION, SOLUTION INTRAVENOUS at 12:05

## 2023-05-26 RX ADMIN — SODIUM CHLORIDE: 9 INJECTION, SOLUTION INTRAVENOUS at 09:05

## 2023-05-26 NOTE — PLAN OF CARE
Problem: Adult Inpatient Plan of Care  Goal: Plan of Care Review  Outcome: Ongoing, Progressing  Flowsheets (Taken 5/26/2023 1421)  Plan of Care Reviewed With: family   Pt tolerated paclitaxel & carboplatin infusion well.   Pt also received 1 liter of IVF.  No adverse reaction noted.  PAC flushed with NS and de-accessed per protocol.   Pt left clinic in no acute distress.

## 2023-05-26 NOTE — PLAN OF CARE
Completed 22/30 outpatient radiation treatments without difficulty, visited with physician and all questions and concerns addressed

## 2023-05-26 NOTE — NURSING
Chart reviewed for navigational needs. Patient continues with Chemo and XRT. Post treatment scans and follow up is scheduled.  Will continue to monitor for oncology navigational needs.

## 2023-05-26 NOTE — PROGRESS NOTES
Feng Thakkar  72 y.o. is here to seek an integrative approach to discuss side effects related to lung cancer treatment. Feng Thakkar  was referred by MARGARET Chery ref. provider found,NP     HPI  Mr. Thakkar is here today for his first chemotherapy. He reports he only sleeps approximately 4-5 hours at night and he naps during the day after his meals. This has been his routine for about 7-8 years. He reports he is fine with his sleep routine and he has the energy to do what he wants. He reports a good appetite, but he does eat less. He reports he is active, but not as active as he used to be.   His wife of 21 years  in 2019. He has 2 children. He has a good support sytem. He is retired.     Today's Visit  Mr. Thakkar is here today getting chemotherapy. He reports a sore throat which started Tuesday after radiation. He is not eating well due to pain. He states he is drinking a boost daily, although today it took 3 hours to drink one boost. His daughter in law picked up the medications given by Dr. Hadley and Dr. Lawrence today. He states before Tuesday he was doing very well.  He continues sleeping approximately 5 hours nightly, but this is his routine and does not want to change it at this time. He has a higher stress and anxiety level due to pain and the decrease ability to eat and drink.     Pillars Assessment    Sleep  How many hours of sleep per night? 5 hours  Do you have trouble falling asleep, staying asleep or waking up earlier than you need to? yes  Do you have daytime fatigue? no  Do you need medication for sleep? no  Do you use any supplements or other interventions for sleep? no    Resilience  Rate your current level of stress- increased due to pain    Nutrition   Food allergies or sensitivities: no  Do you adhere to a particular type of diet? no  Do you have any concerns with your eating habits? Yes, sore throat    Exercise  How would you describe your physical activity level? low  What do you do for physical  activity? Nothing at this time due to pain to throat and abdomen    Past Medical History  Past Medical History:   Diagnosis Date    Diabetes mellitus     Gout, unspecified     High cholesterol     HTN (hypertension)     Lung cancer         Past Surgical History   Past Surgical History:   Procedure Laterality Date    ENDOSCOPIC ULTRASOUND OF UPPER GASTROINTESTINAL TRACT N/A 3/3/2023    Procedure: ULTRASOUND, UPPER GI TRACT, ENDOSCOPIC;  Surgeon: Cora Mcgrath MD;  Location: [facility];  Service: Endoscopy;  Laterality: N/A;  2/24/23-Instructions mailed to address on file-    INSERTION OF TUNNELED CENTRAL VENOUS CATHETER (CVC) WITH SUBCUTANEOUS PORT N/A 4/24/2023    Procedure: [id number];  Surgeon: Paulino Love MD;  Location: [facility];  Service: General;  Laterality: N/A;    PANCREATECTOMY      ROBOTIC BRONCHOSCOPY N/A 3/17/2023    Procedure: ROBOTIC BRONCHOSCOPY;  Surgeon: Cristiane Albright MD;  Location: [facility] OR [address] [place] [address];  Service: Pulmonary;  Laterality: N/A;      Family History   Family History   Problem Relation Age of Onset    Breast cancer Sister         60      Social History  Social History     Socioeconomic History    Marital status:    Tobacco Use    Smoking status: Former     Packs/day: 1.50     Years: 54.00     Pack years: 81.00     Types: Cigarettes     Start date: 6/1/1968     Quit date: 10/20/2012     Years since quitting: 10.6    Smokeless tobacco: Former   Substance and Sexual Activity    Alcohol use: Yes     Alcohol/week: 3.0 standard drinks     Types: 3 Cans of beer per week     Comment: 3 a day    Drug use: Never      Allergies  Review of patient's allergies indicates:  No Known Allergies   Current Medications:    Current Outpatient Medications:     (Magic mouthwash) 1:1 Benadryl 12.5mg/5ml liq, mylanta, nystatin 100,000u, prednisolone 15mg/5mL, distilled water, Swish and swallow 5 mLs every 4 (four) hours as needed., Disp: 480 mL, Rfl: 6    allopurinoL (ZYLOPRIM) 100 MG  tablet, Take 100 mg by mouth once daily., Disp: , Rfl:     amLODIPine (NORVASC) 2.5 MG tablet, Take 2.5 mg by mouth once daily., Disp: , Rfl:     BYDUREON BCISE 2 mg/0.85 mL AtIn, Inject 2 mg into the skin every 7 days. Every Friday, Disp: , Rfl:     cloNIDine (CATAPRES) 0.2 MG tablet, Take 0.2 mg by mouth once. Daily, Disp: , Rfl:     duke's soln (benadryl 30 mL, mylanta 30 mL, LIDOcaine 30 mL, nystatin 30 mL) 120mL, Take 10 mLs by mouth 4 (four) times daily., Disp: 500 mL, Rfl: 2    ezetimibe (ZETIA) 10 mg tablet, Take 10 mg by mouth once daily., Disp: , Rfl:     FARXIGA 10 mg tablet, Take 10 mg by mouth every morning., Disp: , Rfl:     finasteride (PROSCAR) 5 mg tablet, Take 5 mg by mouth once daily., Disp: , Rfl:     FREESTYLE TEST Strp, USE 1 STRIP TO CHECK GLUCOSE ONCE DAILY, Disp: , Rfl:     HYDROcodone-acetaminophen (NORCO) 5-325 mg per tablet, Take 1 tablet by mouth every 6 (six) hours as needed for Pain., Disp: 10 tablet, Rfl: 0    LIDOCAINE VISCOUS 2 % solution, , Disp: , Rfl:     LIDOcaine-prilocaine (EMLA) cream, Apply topically to port site one hour prior to access, cover, Disp: 30 g, Rfl: 1    metoprolol succinate (TOPROL-XL) 100 MG 24 hr tablet, Take 100 mg by mouth once daily., Disp: , Rfl:     omeprazole (PRILOSEC) 40 MG capsule, Take 1 capsule (40 mg total) by mouth once daily., Disp: 30 capsule, Rfl: 1    ondansetron (ZOFRAN) 8 MG tablet, Take 1 tablet (8 mg total) by mouth every 8 (eight) hours as needed for Nausea., Disp: 30 tablet, Rfl: 2    promethazine (PHENERGAN) 25 MG tablet, Take 1 tablet (25 mg total) by mouth every 6 (six) hours as needed for Nausea., Disp: 30 tablet, Rfl: 0    rosuvastatin (CRESTOR) 40 MG Tab, Take 10 mg by mouth every evening., Disp: , Rfl:     sucralfate (CARAFATE) 100 mg/mL suspension, Take 10 mLs (1 g total) by mouth 4 (four) times daily., Disp: 500 mL, Rfl: 1    TOUJEO MAX U-300 SOLOSTAR 300 unit/mL (3 mL) insulin pen, Inject 30 Units into the skin every evening.,  "Disp: , Rfl:   No current facility-administered medications for this visit.    Facility-Administered Medications Ordered in Other Visits:     CARBOplatin (PARAPLATIN) 180 mg in sodium chloride 0.9% 303 mL chemo infusion, 180 mg, Intravenous, 1 time in Clinic/HOD, Lisandro Lawrence MD    diphenhydrAMINE injection 50 mg, 50 mg, Intravenous, Once PRN, Lisandro Lawrence MD    EPINEPHrine (EPIPEN) 0.3 mg/0.3 mL pen injection 0.3 mg, 0.3 mg, Intramuscular, Once PRN, Lisandro Lawrence MD    heparin, porcine (PF) 100 unit/mL injection flush 500 Units, 500 Units, Intravenous, PRN, Lisandro Lawrence MD    hydrocortisone sodium succinate injection 100 mg, 100 mg, Intravenous, Once PRN, Lisandro Lawrence MD    sodium chloride 0.9% flush 10 mL, 10 mL, Intravenous, PRN, Lisandro Lawrence MD     Review of Systems  Review of Systems   Constitutional: Negative.    HENT: Negative.     Eyes: Negative.    Respiratory: Negative.     Cardiovascular: Negative.    Gastrointestinal: Negative.    Genitourinary: Negative.    Musculoskeletal: Negative.    Skin: Negative.    Neurological: Negative.    Endo/Heme/Allergies: Negative.    Psychiatric/Behavioral: Negative.        Physical Exam      Vitals:    05/26/23 1458   BP: 110/73   Pulse: 94   Resp: 18   Temp: 96.9 °F (36.1 °C)   SpO2: 99%   Weight: 95 kg (209 lb 7 oz)   Height: 6' 2" (1.88 m)   Body mass index is 26.89 kg/m².      Physical Exam  Vitals reviewed.   Constitutional:       Appearance: Normal appearance.   Neurological:      Mental Status: He is alert.   Psychiatric:         Mood and Affect: Mood normal.         Behavior: Behavior normal.        ASSESSMENT :  1. Squamous cell carcinoma of lung, unspecified laterality    2. Radiation esophagitis        PLAN:  Reviewed all information discussed at today's visit and all questions were answered.    Counseled on healthy lifestyle and behavior modifications  Continue at least 1 boost daily  See Nutrition as needed during treatment.    I " discussed and recommended the following support services:  [facility] [employer] [facility] [employer]   Music and Relaxation therapy   Meditation    Follow up with Dr. Hadley Monday. Call with signs of dehydration including weakness, dizziness, lightheadedness, decreased urine output, confusion  over the weekend.     Follow up with [facility] in 3 months    I spent a total of 41 minutes on the day of the visit.This includes face to face time and non-face to face time preparing to see the patient (eg, review of tests), obtaining and/or reviewing separately obtained history, documenting clinical information in the electronic or other health record, independently interpreting results and communicating results to the patient/family/caregiver, or care coordinator.

## 2023-05-26 NOTE — PLAN OF CARE
Problem: Adult Inpatient Plan of Care  Goal: Patient-Specific Goal (Individualized)  Outcome: Ongoing, Progressing  Flowsheets (Taken 5/26/2023 0946)  Anxieties, Fears or Concerns: none  Individualized Care Needs: recliner, pillow, dim lights     Problem: Fatigue  Goal: Improved Activity Tolerance  Intervention: Promote Improved Energy  Flowsheets (Taken 5/26/2023 0946)  Fatigue Management:   frequent rest breaks encouraged   paced activity encouraged  Sleep/Rest Enhancement:   natural light exposure provided   noise level reduced  Activity Management:   Ambulated -L4   Ambulated to bathroom - L4   Ambulated in lutz - L4   Up in chair - L3

## 2023-05-29 DIAGNOSIS — T66.XXXA RADIATION ESOPHAGITIS: Primary | ICD-10-CM

## 2023-05-29 DIAGNOSIS — K20.80 RADIATION ESOPHAGITIS: Primary | ICD-10-CM

## 2023-05-29 PROCEDURE — 77386 HC IMRT, COMPLEX: CPT | Mod: PN | Performed by: RADIOLOGY

## 2023-05-29 PROCEDURE — 77014 PR  CT GUIDANCE PLACEMENT RAD THERAPY FIELDS: ICD-10-PCS | Mod: 26,,, | Performed by: RADIOLOGY

## 2023-05-29 PROCEDURE — 77014 PR  CT GUIDANCE PLACEMENT RAD THERAPY FIELDS: CPT | Mod: 26,,, | Performed by: RADIOLOGY

## 2023-05-29 PROCEDURE — 77014 HC CT GUIDANCE RADIATION THERAPY FLDS PLACEMENT: CPT | Mod: TC,PN | Performed by: RADIOLOGY

## 2023-05-29 RX ORDER — HYDROCODONE BITARTRATE AND ACETAMINOPHEN 5; 325 MG/1; MG/1
1 TABLET ORAL EVERY 6 HOURS PRN
Qty: 90 TABLET | Refills: 0 | Status: SHIPPED | OUTPATIENT
Start: 2023-05-29 | End: 2023-11-28 | Stop reason: ALTCHOICE

## 2023-05-30 PROCEDURE — 77014 HC CT GUIDANCE RADIATION THERAPY FLDS PLACEMENT: CPT | Mod: TC,PN | Performed by: RADIOLOGY

## 2023-05-30 PROCEDURE — 77014 PR  CT GUIDANCE PLACEMENT RAD THERAPY FIELDS: ICD-10-PCS | Mod: 26,,, | Performed by: RADIOLOGY

## 2023-05-30 PROCEDURE — 77386 HC IMRT, COMPLEX: CPT | Mod: PN | Performed by: RADIOLOGY

## 2023-05-30 PROCEDURE — 77014 PR  CT GUIDANCE PLACEMENT RAD THERAPY FIELDS: CPT | Mod: 26,,, | Performed by: RADIOLOGY

## 2023-05-31 PROCEDURE — 77386 HC IMRT, COMPLEX: CPT | Mod: PN | Performed by: RADIOLOGY

## 2023-05-31 PROCEDURE — 77014 HC CT GUIDANCE RADIATION THERAPY FLDS PLACEMENT: CPT | Mod: TC,PN | Performed by: RADIOLOGY

## 2023-05-31 PROCEDURE — 77014 PR  CT GUIDANCE PLACEMENT RAD THERAPY FIELDS: CPT | Mod: 26,,, | Performed by: RADIOLOGY

## 2023-05-31 PROCEDURE — 77014 PR  CT GUIDANCE PLACEMENT RAD THERAPY FIELDS: ICD-10-PCS | Mod: 26,,, | Performed by: RADIOLOGY

## 2023-06-01 ENCOUNTER — HOSPITAL ENCOUNTER (OUTPATIENT)
Dept: RADIATION THERAPY | Facility: HOSPITAL | Age: 73
Discharge: HOME | End: 2023-06-01
Attending: RADIOLOGY
Payer: MEDICARE

## 2023-06-01 ENCOUNTER — DOCUMENTATION ONLY (OUTPATIENT)
Dept: RADIATION ONCOLOGY | Facility: CLINIC | Age: 73
End: 2023-06-01
Payer: MEDICARE

## 2023-06-01 PROCEDURE — 77014 PR  CT GUIDANCE PLACEMENT RAD THERAPY FIELDS: CPT | Mod: 26,,, | Performed by: RADIOLOGY

## 2023-06-01 PROCEDURE — 77336 RADIATION PHYSICS CONSULT: CPT | Mod: PN | Performed by: RADIOLOGY

## 2023-06-01 PROCEDURE — 77014 HC CT GUIDANCE RADIATION THERAPY FLDS PLACEMENT: CPT | Mod: TC,PN | Performed by: RADIOLOGY

## 2023-06-01 PROCEDURE — 77386 HC IMRT, COMPLEX: CPT | Mod: PN | Performed by: RADIOLOGY

## 2023-06-01 PROCEDURE — 77014 PR  CT GUIDANCE PLACEMENT RAD THERAPY FIELDS: ICD-10-PCS | Mod: 26,,, | Performed by: RADIOLOGY

## 2023-06-01 NOTE — PLAN OF CARE
Completed 25 of 30 outpatient radiation treatments to the lung without difficulty.  All questions and concerns addressed by MD this visit.

## 2023-06-02 ENCOUNTER — OFFICE VISIT (OUTPATIENT)
Dept: HEMATOLOGY/ONCOLOGY | Facility: CLINIC | Age: 73
End: 2023-06-02
Payer: MEDICARE

## 2023-06-02 ENCOUNTER — DOCUMENTATION ONLY (OUTPATIENT)
Dept: INFUSION THERAPY | Facility: HOSPITAL | Age: 73
End: 2023-06-02
Payer: MEDICARE

## 2023-06-02 ENCOUNTER — INFUSION (OUTPATIENT)
Dept: INFUSION THERAPY | Facility: HOSPITAL | Age: 73
End: 2023-06-02
Attending: INTERNAL MEDICINE
Payer: MEDICARE

## 2023-06-02 VITALS
WEIGHT: 210.56 LBS | HEART RATE: 77 BPM | TEMPERATURE: 97 F | BODY MASS INDEX: 27.02 KG/M2 | SYSTOLIC BLOOD PRESSURE: 96 MMHG | HEIGHT: 74 IN | DIASTOLIC BLOOD PRESSURE: 60 MMHG | OXYGEN SATURATION: 98 %

## 2023-06-02 VITALS
WEIGHT: 210.56 LBS | BODY MASS INDEX: 27.02 KG/M2 | RESPIRATION RATE: 16 BRPM | TEMPERATURE: 97 F | DIASTOLIC BLOOD PRESSURE: 79 MMHG | HEART RATE: 74 BPM | OXYGEN SATURATION: 98 % | HEIGHT: 74 IN | SYSTOLIC BLOOD PRESSURE: 129 MMHG

## 2023-06-02 DIAGNOSIS — C34.90 SQUAMOUS CELL CARCINOMA OF LUNG, UNSPECIFIED LATERALITY: Primary | ICD-10-CM

## 2023-06-02 PROCEDURE — 77014 PR  CT GUIDANCE PLACEMENT RAD THERAPY FIELDS: CPT | Mod: 26,,, | Performed by: RADIOLOGY

## 2023-06-02 PROCEDURE — 77014 HC CT GUIDANCE RADIATION THERAPY FLDS PLACEMENT: CPT | Mod: TC,PN | Performed by: RADIOLOGY

## 2023-06-02 PROCEDURE — 77014 PR  CT GUIDANCE PLACEMENT RAD THERAPY FIELDS: ICD-10-PCS | Mod: 26,,, | Performed by: RADIOLOGY

## 2023-06-02 PROCEDURE — 99999 PR PBB SHADOW E&M-EST. PATIENT-LVL IV: ICD-10-PCS | Mod: PBBFAC,,, | Performed by: NURSE PRACTITIONER

## 2023-06-02 PROCEDURE — 99214 OFFICE O/P EST MOD 30 MIN: CPT | Mod: PBBFAC,PN,25 | Performed by: NURSE PRACTITIONER

## 2023-06-02 PROCEDURE — 96413 CHEMO IV INFUSION 1 HR: CPT | Mod: PN

## 2023-06-02 PROCEDURE — 96375 TX/PRO/DX INJ NEW DRUG ADDON: CPT | Mod: PN

## 2023-06-02 PROCEDURE — 99213 PR OFFICE/OUTPT VISIT, EST, LEVL III, 20-29 MIN: ICD-10-PCS | Mod: S$PBB,,, | Performed by: NURSE PRACTITIONER

## 2023-06-02 PROCEDURE — 25000003 PHARM REV CODE 250: Mod: PN | Performed by: NURSE PRACTITIONER

## 2023-06-02 PROCEDURE — 77386 HC IMRT, COMPLEX: CPT | Mod: PN | Performed by: RADIOLOGY

## 2023-06-02 PROCEDURE — 99999 PR PBB SHADOW E&M-EST. PATIENT-LVL IV: CPT | Mod: PBBFAC,,, | Performed by: NURSE PRACTITIONER

## 2023-06-02 PROCEDURE — 99213 OFFICE O/P EST LOW 20 MIN: CPT | Mod: S$PBB,,, | Performed by: NURSE PRACTITIONER

## 2023-06-02 PROCEDURE — 63600175 PHARM REV CODE 636 W HCPCS: Mod: PN | Performed by: NURSE PRACTITIONER

## 2023-06-02 PROCEDURE — A4216 STERILE WATER/SALINE, 10 ML: HCPCS | Mod: PN | Performed by: NURSE PRACTITIONER

## 2023-06-02 PROCEDURE — 96367 TX/PROPH/DG ADDL SEQ IV INF: CPT | Mod: PN

## 2023-06-02 PROCEDURE — 96417 CHEMO IV INFUS EACH ADDL SEQ: CPT | Mod: PN

## 2023-06-02 RX ORDER — FAMOTIDINE 10 MG/ML
20 INJECTION INTRAVENOUS
Status: COMPLETED | OUTPATIENT
Start: 2023-06-02 | End: 2023-06-02

## 2023-06-02 RX ORDER — HEPARIN 100 UNIT/ML
500 SYRINGE INTRAVENOUS
Status: CANCELLED | OUTPATIENT
Start: 2023-06-02

## 2023-06-02 RX ORDER — FAMOTIDINE 10 MG/ML
20 INJECTION INTRAVENOUS
Status: CANCELLED | OUTPATIENT
Start: 2023-06-02

## 2023-06-02 RX ORDER — EPINEPHRINE 0.3 MG/.3ML
0.3 INJECTION SUBCUTANEOUS ONCE AS NEEDED
Status: CANCELLED | OUTPATIENT
Start: 2023-06-02

## 2023-06-02 RX ORDER — DIPHENHYDRAMINE HYDROCHLORIDE 50 MG/ML
50 INJECTION INTRAMUSCULAR; INTRAVENOUS ONCE AS NEEDED
Status: CANCELLED | OUTPATIENT
Start: 2023-06-02

## 2023-06-02 RX ORDER — SODIUM CHLORIDE 0.9 % (FLUSH) 0.9 %
10 SYRINGE (ML) INJECTION
Status: DISCONTINUED | OUTPATIENT
Start: 2023-06-02 | End: 2023-06-02 | Stop reason: HOSPADM

## 2023-06-02 RX ORDER — SODIUM CHLORIDE 0.9 % (FLUSH) 0.9 %
10 SYRINGE (ML) INJECTION
Status: CANCELLED | OUTPATIENT
Start: 2023-06-02

## 2023-06-02 RX ORDER — DIPHENHYDRAMINE HYDROCHLORIDE 12.5 MG/5ML
LIQUID ORAL
COMMUNITY
Start: 2023-05-26 | End: 2023-12-26 | Stop reason: ALTCHOICE

## 2023-06-02 RX ADMIN — DEXAMETHASONE SODIUM PHOSPHATE 0.25 MG: 10 INJECTION, SOLUTION INTRAMUSCULAR; INTRAVENOUS at 10:06

## 2023-06-02 RX ADMIN — DIPHENHYDRAMINE HYDROCHLORIDE 50 MG: 50 INJECTION, SOLUTION INTRAMUSCULAR; INTRAVENOUS at 11:06

## 2023-06-02 RX ADMIN — SODIUM CHLORIDE: 9 INJECTION, SOLUTION INTRAVENOUS at 10:06

## 2023-06-02 RX ADMIN — Medication 10 ML: at 01:06

## 2023-06-02 RX ADMIN — CARBOPLATIN 190 MG: 10 INJECTION, SOLUTION INTRAVENOUS at 01:06

## 2023-06-02 RX ADMIN — FAMOTIDINE 20 MG: 10 INJECTION INTRAVENOUS at 11:06

## 2023-06-02 RX ADMIN — PACLITAXEL 102 MG: 6 INJECTION, SOLUTION, CONCENTRATE INTRAVENOUS at 12:06

## 2023-06-02 NOTE — PROGRESS NOTES
PATIENT: Feng Thakkar  MRN: [id number]  DATE: 6/2/2023    Diagnosis:   1. Squamous cell carcinoma of lung, unspecified laterality      Chief Complaint: Squamous Cell Carcinoma (Squamous cell carcinoma of lung; unspecified laterality; 1 month follow up )    Subjective:   HPI: Mr. Thakkar is a 72 y.o. male with Gout, HLD, HTN who presents for consideration of his next dose of weekly Carboplatin/Paclitaxel with XRT for a diagnosis of NSCLC. He is accompanied by his daughter.  Tolerating treatment without complaint.    Appetite has improved slightly.  The patient denies CP, cough, SOB, abdominal pain, nausea, vomiting, constipation, diarrhea. The patient denies fever, chills, night sweats, weight loss, new lumps or bumps, easy bruising or bleeding.       Prior History:    01/05/2023 The patient initially developed abdominal pain on and underwent CT of the abdomen showing a mass in the left lung base measuring 3.9 x 2.9 cm; 3 x 3.8 cm mass along the pancreatic tail; and shotty retroperitoneal lymph nodes.    02/24/2023 The patient underwent repeat CT abdomen and pelvis on howing a 4.3 x 4 solid enhancing mass of the left lung base; thickened bladder wall; prostate gland measuring 45.1 x 4.6 cm; abnormal sclerosis in the left femoral head measuring 2.8 x 1.4 x 1.3 cm; and additional soft tissue densities in the posterior left subdiaphragmatic region measuring up to 16 mm.    03/01/2023 CT chest showed a 2 cm low-density lesion in the right lobe of the thyroid gland; 4.5 x 4.3 x 3.8 cm mass in the left lower lobe; several micro nodules; Na soft tissue mass adjacent to the pancreatic tail.  Patient underwent 03/03/2023 EUS showing a 3.5 cm and 1.8 cm round pancreatic tail lesion.  Biopsy returned results showing no evidence of malignancy and abundant hematopoietic elements and dendritic cells.    03/17/2023 Patient then underwent EBUS under the care of Dr. Albright showing squamous cell carcinoma in biopsy of the left lower lung  lesion; squamous cell carcinoma and station 7 lymph node; positive 11 L lymph node for squamous cell carcinoma.  04/18/2023 PET/CT showing a markedly hypermetabolic lobulated subpleural left lower lobe mass measuring 4.5 x 3.9 cm with hypermetabolic lymph nodes in the left hilar region measuring 2.2 x 1.9 cm; and a 4.2 x 3.6 walk circumscribed mass in the pancreatic tail.    04/18/2023  MRI brain showed no acute findings.  -4/28/23 began Carboplatin/Paclitaxel  Oncology History   Squamous cell carcinoma of lung   3/31/2023 Initial Diagnosis    Squamous cell carcinoma of lung     3/31/2023 Cancer Staged    Staging form: Lung, AJCC 8th Edition  - Clinical: Stage IIIA (cT2b, cN2, cM0)     4/28/2023 -  Chemotherapy    Treatment Summary   Plan Name: OP NSCLC PACLITAXEL + CARBOPLATIN (AUC) QW + RADIATION  Treatment Goal: Curative  Status: Active  Start Date: 4/28/2023  End Date: 6/2/2023 (Planned)  Provider: Lisandro Lawrence MD  Chemotherapy: CARBOplatin (PARAPLATIN) 185 mg in sodium chloride 0.9% 303.5 mL chemo infusion, 185 mg (100 % of original dose 183.4 mg), Intravenous, Clinic/HOD 1 time, 5 of 6 cycles  Dose modification:   (original dose 183.4 mg, Cycle 1)  Administration: 185 mg (4/28/2023), 185 mg (5/5/2023), 190 mg (5/12/2023), 190 mg (5/19/2023), 180 mg (5/26/2023)  PACLitaxeL (TAXOL) 45 mg/m2 = 102 mg in sodium chloride 0.9% 250 mL chemo infusion, 45 mg/m2 = 102 mg, Intravenous, Clinic/HOD 1 time, 5 of 6 cycles  Administration: 102 mg (4/28/2023), 102 mg (5/5/2023), 102 mg (5/12/2023), 102 mg (5/19/2023), 102 mg (5/26/2023)        Past Medical History:   Past Medical History:   Diagnosis Date    Diabetes mellitus     Gout, unspecified     High cholesterol     HTN (hypertension)     Lung cancer        Past Surgical HIstory:   Past Surgical History:   Procedure Laterality Date    ENDOSCOPIC ULTRASOUND OF UPPER GASTROINTESTINAL TRACT N/A 3/3/2023    Procedure: ULTRASOUND, UPPER GI TRACT, ENDOSCOPIC;  Surgeon:  Cora Mcgrath MD;  Location: [facility] ENDO;  Service: Endoscopy;  Laterality: N/A;  2/24/23-Instructions mailed to address on file-DS    INSERTION OF TUNNELED CENTRAL VENOUS CATHETER (CVC) WITH SUBCUTANEOUS PORT N/A 4/24/2023    Procedure: [id number];  Surgeon: Paulino Love MD;  Location: [facility] OR;  Service: General;  Laterality: N/A;    PANCREATECTOMY      ROBOTIC BRONCHOSCOPY N/A 3/17/2023    Procedure: ROBOTIC BRONCHOSCOPY;  Surgeon: Cristiane Albright MD;  Location: [address];  Service: Pulmonary;  Laterality: N/A;       Family History:   Family History   Problem Relation Age of Onset    Breast cancer Sister         60       Social History:  reports that he quit smoking about 10 years ago. His smoking use included cigarettes. He started smoking about 55 years ago. He has a 81.00 pack-year smoking history. He has quit using smokeless tobacco. He reports current alcohol use of about 3.0 standard drinks per week. He reports that he does not use drugs.    Allergies:  Review of patient's allergies indicates:  No Known Allergies    Medications:  Current Outpatient Medications   Medication Sig Dispense Refill    (Magic mouthwash) 1:1 Benadryl 12.5mg/5ml liq, mylanta, nystatin 100,000u, prednisolone 15mg/5mL, distilled water Swish and swallow 5 mLs every 4 (four) hours as needed. 480 mL 6    allopurinoL (ZYLOPRIM) 100 MG tablet Take 100 mg by mouth once daily.      amLODIPine (NORVASC) 2.5 MG tablet Take 2.5 mg by mouth once daily.      BYDUREON BCISE 2 mg/0.85 mL AtIn Inject 2 mg into the skin every 7 days. Every Friday      cloNIDine (CATAPRES) 0.2 MG tablet Take 0.2 mg by mouth once. Daily      duke's soln (benadryl 30 mL, mylanta 30 mL, LIDOcaine 30 mL, nystatin 30 mL) 120mL Take 10 mLs by mouth 4 (four) times daily. 500 mL 2    ezetimibe (ZETIA) 10 mg tablet Take 10 mg by mouth once daily.      FARXIGA 10 mg tablet Take 10 mg by mouth every morning.      finasteride (PROSCAR) 5 mg tablet Take 5 mg  by mouth once daily.      FREESTYLE TEST Strp USE 1 STRIP TO CHECK GLUCOSE ONCE DAILY      HYDROcodone-acetaminophen (NORCO) 5-325 mg per tablet Take 1 tablet by mouth every 6 (six) hours as needed for Pain. 90 tablet 0    LIDOCAINE VISCOUS 2 % solution       LIDOcaine-prilocaine (EMLA) cream Apply topically to port site one hour prior to access, cover 30 g 1    metoprolol succinate (TOPROL-XL) 100 MG 24 hr tablet Take 100 mg by mouth once daily.      omeprazole (PRILOSEC) 40 MG capsule Take 1 capsule (40 mg total) by mouth once daily. 30 capsule 1    ondansetron (ZOFRAN) 8 MG tablet Take 1 tablet (8 mg total) by mouth every 8 (eight) hours as needed for Nausea. 30 tablet 2    promethazine (PHENERGAN) 25 MG tablet Take 1 tablet (25 mg total) by mouth every 6 (six) hours as needed for Nausea. 30 tablet 0    rosuvastatin (CRESTOR) 40 MG Tab Take 10 mg by mouth every evening.      sucralfate (CARAFATE) 100 mg/mL suspension Take 10 mLs (1 g total) by mouth 4 (four) times daily. 500 mL 1    TOUJEO MAX U-300 SOLOSTAR 300 unit/mL (3 mL) insulin pen Inject 30 Units into the skin every evening.      M-DRYL 12.5 mg/5 mL liquid Take by mouth.       No current facility-administered medications for this visit.       Review of Systems   Constitutional:  Negative for appetite change, fatigue and fever.   Respiratory:  Negative for cough and shortness of breath.    Cardiovascular:  Negative for chest pain.   Gastrointestinal:  Negative for abdominal pain, constipation, diarrhea and nausea.   Genitourinary:  Negative for dysuria.   Musculoskeletal:  Negative for arthralgias.   Skin:  Negative for rash.   Neurological:  Negative for headaches.   Hematological:  Does not bruise/bleed easily.   Psychiatric/Behavioral:  The patient is not nervous/anxious.      ECOG Performance Status: 0  ECOG SCORE             Objective:    Weight:  Gain of 1 pound in 1 week  Vitals:   Vitals:    06/02/23 0942   BP: 96/60   BP Location: Left arm  "  Patient Position: Sitting   BP Method: Large (Manual)   Pulse: 77   Temp: 97.3 °F (36.3 °C)   TempSrc: Temporal   SpO2: 98%   Weight: 95.5 kg (210 lb 8.6 oz)   Height: 6' 2" (1.88 m)       BMI: Body mass index is 27.03 kg/m².    Physical Exam  Vitals reviewed.   Constitutional:       General: He is not in acute distress.     Appearance: He is not diaphoretic.   HENT:      Head: Normocephalic and atraumatic.      Mouth/Throat:      Mouth: Mucous membranes are moist.      Pharynx: No oropharyngeal exudate.   Eyes:      General: No scleral icterus.        Right eye: No discharge.         Left eye: No discharge.   Cardiovascular:      Rate and Rhythm: Normal rate and regular rhythm.      Heart sounds: Normal heart sounds. No murmur heard.  Pulmonary:      Effort: Pulmonary effort is normal. No respiratory distress.      Breath sounds: No wheezing.   Abdominal:      General: Bowel sounds are normal. There is no distension.      Palpations: Abdomen is soft.      Tenderness: There is no abdominal tenderness.   Musculoskeletal:         General: No swelling.      Right lower leg: No edema.      Left lower leg: No edema.   Skin:     General: Skin is dry.      Coloration: Skin is not jaundiced.      Findings: No rash.   Neurological:      Mental Status: He is alert and oriented to person, place, and time.   Psychiatric:         Mood and Affect: Mood normal.         Behavior: Behavior normal.       Laboratory Data:  Lab Results   Component Value Date    WBC 2.95 (L) 2023    HGB 11.7 (L) 2023    HCT 35.4 (L) 2023    MCV 84 2023     2023          Imagin/1823  EXAMINATION:  NM PET CT ROUTINE     CLINICAL HISTORY:  Non-small cell lung cancer (NSCLC), staging;  Malignant neoplasm of unspecified part of unspecified bronchus or lung     72-year-old male with left lower lobe squamous cell carcinoma.  The patient also has a pancreatic tail mass which is undergone biopsy which yielded benign " results.     TECHNIQUE:  Following IV injection of 13.06 mCi F-18 FDG, 3D PET imaging was performed from the skull base to the mid thighs.  A noncontrast non breath hold CT was obtained in conjunction with the PET scan for attenuation correction and anatomic correlation.  PET-CT fusion images were generated.     COMPARISON:  Correlation is made with the CT of the chest dated 03/01/2023 and the CT of the abdomen/pelvis dated 02/24/2023.     FINDINGS:  Head/neck:     No significant abnormal hypermetabolic foci are identified within the head and neck region.  No lymphadenopathy is present.     Chest:     There is a markedly hypermetabolic lobulated subpleural left lower lobe mass consistent with the patient's known squamous cell carcinoma.  The mass is best measured on the fused PET-CT axial images and on image 115 measures 4.5 x 3.9 cm with a max SUV of 14.0.  There also hypermetabolic lymph nodes in the left hilar region and subcarinal region most consistent with neoplastic adenopathy.  A left hilar node on image 111 measures 2.2 x 1.9 cm with a max SUV of 14.3.     Abdomen/pelvis:     There is a well-circumscribed bulb this hypermetabolic mass arising from the pancreatic tail.  On image 142 the mass measures 4.2 x 3.6 cm and has a max SUV of 5.7.  No other significant abnormal hypermetabolic foci are identified within the abdomen and pelvis.  No lymphadenopathy is present.  The adrenal glands are normal.     Skeleton:     No significant abnormal hypermetabolic foci are identified within the skeleton.  There are no findings to suggest osseous metastatic disease.     Impression:     1. Markedly hypermetabolic left lower lobe mass consistent with the patient's known squamous cell carcinoma.  2. Hypermetabolic lymphadenopathy involving the left pulmonary hilum and subcarinal region most consistent with neoplastic adenopathy.  3. Hypermetabolic pancreatic tail mass.  Though this mass has undergone biopsy which yielded  benign results, it must still be regarded as suspicious for primary or metastatic neoplasm.        Electronically signed by: Clemente Armstrong MD  Date:                                            04/18/2023  Time:                                           11:01   Assessment:       1. Squamous cell carcinoma of lung, unspecified laterality           Plan:   NSCLC  -patient was diagnosed with at least stage III [id number] SCC of the left lung  -PET/CT 4/18/23 shows continued left lung mass, mediastinal LAD  -MRI brain 4/18/23 showed no acute findings  -[facility] Blood testing showed TP 53 mutation  -[facility] tissue testing showed PD-L1 of 20%, TP53, KDM6A, CDKN2a and MTAP  -PORT placed 4/24/23 under the care of Dr. Love  -4/28/23 began Carboplatin/Paclitaxel  -Will proceed with treatment with Carboplatin/Paclitaxel cycle 6 w/ XRT  Follow up with imaging on 06/26 & Dr. Lawrence on 06/28 with labs    Pancreatic Mass   -seen on CT scans and biopsy during EUS on 03/03/2023 with path showing no evidence of malignancy  -PET/CT 4/18/23 showed uptake in the tail of the pancreas  -Possibly due to chronic pancreatitis  -Will monitor     HTN   -pt on amlodipine, clonidine, metoprolol  -BP controlled  -Will monitor     Gout   -pt on allopurinol  -Currently asymptomatic  -Will monitor     HLD   -pt on rosuvastatin, zetia  -Management per PCP     DMII   -pt on toujeo, farxiga, and bydureon  -Management per PCP     Neuropathy   -numbness in feet due to diabetes  -Will monitor     Normocytic Anemia   -Hemoglobin  11.7g/dL on 06/02  -Possibly due to chronic disease  -Will monitor    Patient queried and all questions were answered.   Assessment/Plan reviewed     20 minutes were spent in coordination of patient's care, record review and counseling.     Route Chart for Scheduling    Med Onc Chart Routing      Follow up with physician . F/u with Dr. Lawrence already scheduled on 06/28 with labs & imaging on 06/26   Follow up with [facility]    Infusion  scheduling note   Proceed with Cycle 6 today   Injection scheduling note    Labs    Imaging    Pharmacy appointment    Other referrals            Treatment Plan Information   OP NSCLC PACLITAXEL + CARBOPLATIN (AUC) QW + RADIATION   Lisandro Lawrence MD   Upcoming Treatment Dates - OP NSCLC PACLITAXEL + CARBOPLATIN (AUC) QW + RADIATION    6/2/2023       Pre-Medications       diphenhydrAMINE (BENADRYL) 50 mg in NS 50 mL IVPB       famotidine (PF) injection 20 mg       Chemotherapy       PACLitaxeL (TAXOL) 45 mg/m2 = 102 mg in sodium chloride 0.9% 250 mL chemo infusion       CARBOplatin (PARAPLATIN) 190 mg in sodium chloride 0.9% 269 mL chemo infusion       Antiemetics       palonosetron 0.25mg/dexAMETHasone 12mg in NS IVPB 0.25 mg 50 mL    Supportive Plan Information  IV FLUIDS AND ELECTROLYTES   Lisandro Lawrence MD   Upcoming Treatment Dates - IV FLUIDS AND ELECTROLYTES    No upcoming days in selected categories.

## 2023-06-02 NOTE — PLAN OF CARE
Pt arrived to clinic today for Taxol and Carbo infusions and tolerated well. No changes throughout therapy. Pt aware of follow up appointments and side effects of drugs. Discharged to home. NAD.

## 2023-06-02 NOTE — PROGRESS NOTES
Oncology Nutrition   Chemotherapy Infusion Visit    Nutrition Follow Up   RD met with pt at chairside during infusion tx. Pt present for cycle 6 and final treatment of taxol/carbo. Pt reports he has 2 days left of XRT. Pt reports some indigestion pain in chest most likely from XRT. Pt also reporting gas pains. Pt states he is taking gas-x for gas pains. Pt says that his swallowing has been affected as well. Pt eating mashed potatoes and soft cereals. RD provided pt with handout on soft, moist protein foods and high calorie foods and snack ideas. Pt reports he is staying hydrated and drinking 1 electrolyte drink daily. Pt drinking one glucose control shake daily. Pt with a 7# weight loss in 1 month, 3.2% loss not significant.       Wt Readings from Last 10 Encounters:   06/02/23 95.5 kg (210 lb 8.6 oz)   06/02/23 95.5 kg (210 lb 8.6 oz)   05/26/23 95 kg (209 lb 7 oz)   05/26/23 95 kg (209 lb 7 oz)   05/26/23 95 kg (209 lb 7 oz)   05/19/23 97.3 kg (214 lb 8.1 oz)   05/19/23 97.3 kg (214 lb 8.1 oz)   05/12/23 98.5 kg (217 lb 2.5 oz)   05/12/23 97.7 kg (215 lb 6.2 oz)   05/05/23 98.7 kg (217 lb 9.5 oz)       All other nutrition questions/concerns addressed as appropriate. Will continue to monitor prn throughout treatment.     Machelle Gruber, JACKLYNN, LDN  06/02/2023  12:04 PM

## 2023-06-05 PROCEDURE — 77014 PR  CT GUIDANCE PLACEMENT RAD THERAPY FIELDS: ICD-10-PCS | Mod: 26,,, | Performed by: RADIOLOGY

## 2023-06-05 PROCEDURE — 77014 HC CT GUIDANCE RADIATION THERAPY FLDS PLACEMENT: CPT | Mod: TC,PN | Performed by: RADIOLOGY

## 2023-06-05 PROCEDURE — 77014 PR  CT GUIDANCE PLACEMENT RAD THERAPY FIELDS: CPT | Mod: 26,,, | Performed by: RADIOLOGY

## 2023-06-05 PROCEDURE — 77386 HC IMRT, COMPLEX: CPT | Mod: PN | Performed by: RADIOLOGY

## 2023-06-06 PROCEDURE — 77014 PR  CT GUIDANCE PLACEMENT RAD THERAPY FIELDS: CPT | Mod: 26,,, | Performed by: RADIOLOGY

## 2023-06-06 PROCEDURE — 77014 HC CT GUIDANCE RADIATION THERAPY FLDS PLACEMENT: CPT | Mod: TC,PN | Performed by: RADIOLOGY

## 2023-06-06 PROCEDURE — 77014 PR  CT GUIDANCE PLACEMENT RAD THERAPY FIELDS: ICD-10-PCS | Mod: 26,,, | Performed by: RADIOLOGY

## 2023-06-06 PROCEDURE — 77386 HC IMRT, COMPLEX: CPT | Mod: PN | Performed by: RADIOLOGY

## 2023-06-07 ENCOUNTER — DOCUMENTATION ONLY (OUTPATIENT)
Dept: RADIATION ONCOLOGY | Facility: CLINIC | Age: 73
End: 2023-06-07
Payer: MEDICARE

## 2023-06-07 PROCEDURE — 77014 PR  CT GUIDANCE PLACEMENT RAD THERAPY FIELDS: CPT | Mod: 26,,, | Performed by: RADIOLOGY

## 2023-06-07 PROCEDURE — 77014 PR  CT GUIDANCE PLACEMENT RAD THERAPY FIELDS: ICD-10-PCS | Mod: 26,,, | Performed by: RADIOLOGY

## 2023-06-07 PROCEDURE — 77014 HC CT GUIDANCE RADIATION THERAPY FLDS PLACEMENT: CPT | Mod: TC,PN | Performed by: RADIOLOGY

## 2023-06-07 PROCEDURE — 77386 HC IMRT, COMPLEX: CPT | Mod: PN | Performed by: RADIOLOGY

## 2023-06-07 NOTE — PLAN OF CARE
Completed all outpatient radiation treatments without difficulty.  Patient will be called with follow-up information.

## 2023-06-08 PROCEDURE — 77336 RADIATION PHYSICS CONSULT: CPT | Mod: PN | Performed by: RADIOLOGY

## 2023-06-26 ENCOUNTER — HOSPITAL ENCOUNTER (OUTPATIENT)
Dept: RADIOLOGY | Facility: HOSPITAL | Age: 73
Discharge: HOME | End: 2023-06-26
Attending: INTERNAL MEDICINE
Payer: MEDICARE

## 2023-06-26 DIAGNOSIS — C34.90 SQUAMOUS CELL CARCINOMA OF LUNG, UNSPECIFIED LATERALITY: ICD-10-CM

## 2023-06-26 PROCEDURE — 71260 CT CHEST WITH CONTRAST: ICD-10-PCS | Mod: 26,,, | Performed by: RADIOLOGY

## 2023-06-26 PROCEDURE — 25500020 PHARM REV CODE 255: Mod: PO | Performed by: INTERNAL MEDICINE

## 2023-06-26 PROCEDURE — 71260 CT THORAX DX C+: CPT | Mod: 26,,, | Performed by: RADIOLOGY

## 2023-06-26 PROCEDURE — 71260 CT THORAX DX C+: CPT | Mod: TC,PO

## 2023-06-26 RX ADMIN — IOHEXOL 75 ML: 350 INJECTION, SOLUTION INTRAVENOUS at 10:06

## 2023-06-28 ENCOUNTER — TELEPHONE (OUTPATIENT)
Dept: HEMATOLOGY/ONCOLOGY | Facility: CLINIC | Age: 73
End: 2023-06-28
Payer: MEDICARE

## 2023-06-28 NOTE — TELEPHONE ENCOUNTER
"Called and spoke with pt to confirm if he was still coming to see Dr. Lawrence today; as his appt was for 9:20am. Pt stated "Oh I didn't know I had an appt. Can I reschedule?" Pt is rescheduled for tomorrow (6/29/2023) at 2:40pm. Pt verbalized understanding to day and time.   "

## 2023-06-29 ENCOUNTER — OFFICE VISIT (OUTPATIENT)
Dept: HEMATOLOGY/ONCOLOGY | Facility: CLINIC | Age: 73
End: 2023-06-29
Payer: MEDICARE

## 2023-06-29 ENCOUNTER — RESEARCH ENCOUNTER (OUTPATIENT)
Dept: HEMATOLOGY/ONCOLOGY | Facility: CLINIC | Age: 73
End: 2023-06-29
Payer: MEDICARE

## 2023-06-29 VITALS
TEMPERATURE: 97 F | RESPIRATION RATE: 16 BRPM | SYSTOLIC BLOOD PRESSURE: 116 MMHG | DIASTOLIC BLOOD PRESSURE: 70 MMHG | OXYGEN SATURATION: 96 % | WEIGHT: 212.75 LBS | HEIGHT: 74 IN | BODY MASS INDEX: 27.3 KG/M2 | HEART RATE: 97 BPM

## 2023-06-29 DIAGNOSIS — D89.89 OTHER SPECIFIED DISORDERS INVOLVING THE IMMUNE MECHANISM, NOT ELSEWHERE CLASSIFIED: ICD-10-CM

## 2023-06-29 DIAGNOSIS — K86.89 PANCREATIC MASS: ICD-10-CM

## 2023-06-29 DIAGNOSIS — G62.9 NEUROPATHY: ICD-10-CM

## 2023-06-29 DIAGNOSIS — I10 HYPERTENSION, UNSPECIFIED TYPE: ICD-10-CM

## 2023-06-29 DIAGNOSIS — C34.90 SQUAMOUS CELL CARCINOMA OF LUNG, UNSPECIFIED LATERALITY: Primary | ICD-10-CM

## 2023-06-29 DIAGNOSIS — M10.9 GOUT, UNSPECIFIED CAUSE, UNSPECIFIED CHRONICITY, UNSPECIFIED SITE: ICD-10-CM

## 2023-06-29 DIAGNOSIS — E07.9 THYROID MASS: ICD-10-CM

## 2023-06-29 DIAGNOSIS — D64.9 NORMOCYTIC ANEMIA: ICD-10-CM

## 2023-06-29 DIAGNOSIS — E78.5 HYPERLIPIDEMIA, UNSPECIFIED HYPERLIPIDEMIA TYPE: ICD-10-CM

## 2023-06-29 PROCEDURE — 99999 PR PBB SHADOW E&M-EST. PATIENT-LVL V: ICD-10-PCS | Mod: PBBFAC,,, | Performed by: INTERNAL MEDICINE

## 2023-06-29 PROCEDURE — 99999 PR PBB SHADOW E&M-EST. PATIENT-LVL V: CPT | Mod: PBBFAC,,, | Performed by: INTERNAL MEDICINE

## 2023-06-29 PROCEDURE — 99215 OFFICE O/P EST HI 40 MIN: CPT | Mod: S$PBB,,, | Performed by: INTERNAL MEDICINE

## 2023-06-29 PROCEDURE — 99215 PR OFFICE/OUTPT VISIT, EST, LEVL V, 40-54 MIN: ICD-10-PCS | Mod: S$PBB,,, | Performed by: INTERNAL MEDICINE

## 2023-06-29 PROCEDURE — 99215 OFFICE O/P EST HI 40 MIN: CPT | Mod: PBBFAC,PN | Performed by: INTERNAL MEDICINE

## 2023-06-29 NOTE — PLAN OF CARE
DISCONTINUE ON PATHWAY REGIMEN - Non-Small Cell Lung    [id number]        Paclitaxel       Carboplatin           Additional Orders: * All AUC calculations intended to be used in [facility]   formula    **Always confirm dose/schedule in your pharmacy ordering system**    REASON: Other Reason  PRIOR TREATMENT: [id number]  TREATMENT RESPONSE: Unable to Evaluate    START ON PATHWAY REGIMEN - Non-Small Cell Lung    [id number]        Durvalumab (Imfinzi)           Additional Orders: For patients weighing less than 30 kg, only   weight-based dosing is recommended for durvalumab. For patients weighing greater   than or equal to 30 kg, weight-based or flat dosing can be used based on   indication. See PI for details. Serious immune-mediated adverse events can occur   with durvalumab. Please monitor your patient and refer to the linked   immune-mediated adverse reaction management materials for more information.    **Always confirm dose/schedule in your pharmacy ordering system**    Patient Characteristics:  Preoperative or Nonsurgical Candidate (Clinical Staging), Stage III -   Nonsurgical Candidate (Nonsquamous and Squamous), PS = 0, 1  Therapeutic Status: Preoperative or Nonsurgical Candidate (Clinical Staging)  AJCC T Category: cT2b  AJCC N Category: cN2  AJCC M Category: cM0  AJCC 8 Stage Grouping: IIIA  ECOG Performance Status: 1  Intent of Therapy:  Curative Intent, Not Discussed with Patient

## 2023-06-29 NOTE — PROGRESS NOTES
PATIENT: Feng Thakkar  MRN: [id number]  DATE: 6/29/2023      Diagnosis:   1. Squamous cell carcinoma of lung, unspecified laterality    2. Other specified disorders involving the immune mechanism, not elsewhere classified    3. Pancreatic mass    4. Hypertension, unspecified type    5. Gout, unspecified cause, unspecified chronicity, unspecified site    6. Hyperlipidemia, unspecified hyperlipidemia type    7. Neuropathy    8. Normocytic anemia    9. Thyroid mass                  Chief Complaint: Squamous cell carcinoma of lung, unspecified laterality (Follow Up. Labs and CT on 6/26)      Oncologic History:      Oncologic History     Oncologic Treatment     Pathology           Subjective:    Interval History: Mr. Thakkar is a 72 y.o. male with Gout, HLD, HTN who presents for work up of NSCLC.  Since the last clinic visit the patient underwent CT chest on 6/26/23 showing a large right thyroid mass measuring at least 2.6 cm; left renal hypodensity measuring 11 mm favored to be a cyst; significant decrease in size of left lower lobe consolidative mass now measuring 2.4 x 2.3 cm; stable 3 mm consolidative nodule in the left upper lobe.    The patient denies CP, cough, SOB, abdominal pain, nausea, vomiting, constipation, diarrhea.  The patient denies fever, chills, night sweats, weight loss, new lumps or bumps, easy bruising or bleeding.    Prior History:  The patient initially developed abdominal pain on 01/05/2023 and underwent CT of the abdomen showing a mass in the left lung base measuring 3.9 x 2.9 cm; 3 x 3.8 cm mass along the pancreatic tail; and shotty retroperitoneal lymph nodes.  The patient underwent repeat CT abdomen and pelvis on 02/24/2023 showing a 4.3 x 4 solid enhancing mass of the left lung base; thickened bladder wall; prostate gland measuring 45.1 x 4.6 cm; abnormal sclerosis in the left femoral head measuring 2.8 x 1.4 x 1.3 cm; and additional soft tissue densities in the posterior left subdiaphragmatic  region measuring up to 16 mm.  CT chest on 03/01/2023 showed a 2 cm low-density lesion in the right lobe of the thyroid gland; 4.5 x 4.3 x 3.8 cm mass in the left lower lobe; several micro nodules; Na soft tissue mass adjacent to the pancreatic tail.  Patient underwent EUS on 03/03/2023 showing a 3.5 cm and 1.8 cm round pancreatic tail lesion.  Biopsy returned results showing no evidence of malignancy and abundant hematopoietic elements and dendritic cells.  Patient then underwent EBUS under the care of Dr. Albright on 03/17/2023 showing squamous cell carcinoma in biopsy of the left lower lung lesion; squamous cell carcinoma and station 7 lymph node; positive 11 L lymph node for squamous cell carcinoma.   The patient underwent PET-CT on 04/18/2023 showing a markedly hypermetabolic lobulated subpleural left lower lobe mass measuring 4.5 x 3.9 cm with hypermetabolic lymph nodes in the left hilar region measuring 2.2 x 1.9 cm; and a 4.2 x 3.6 walk circumscribed mass in the pancreatic tail.  MRI brain on 04/18/2023 showed no acute findings.      Past Medical History:   Past Medical History:   Diagnosis Date    Diabetes mellitus     Gout, unspecified     High cholesterol     HTN (hypertension)     Lung cancer        Past Surgical HIstory:   Past Surgical History:   Procedure Laterality Date    ENDOSCOPIC ULTRASOUND OF UPPER GASTROINTESTINAL TRACT N/A 3/3/2023    Procedure: ULTRASOUND, UPPER GI TRACT, ENDOSCOPIC;  Surgeon: Cora Mcgrath MD;  Location: [facility];  Service: Endoscopy;  Laterality: N/A;  2/24/23-Instructions mailed to address on file-    INSERTION OF TUNNELED CENTRAL VENOUS CATHETER (CVC) WITH SUBCUTANEOUS PORT N/A 4/24/2023    Procedure: [id number]-CATH;  Surgeon: Paulino Love MD;  Location: [facility];  Service: General;  Laterality: N/A;    PANCREATECTOMY      ROBOTIC BRONCHOSCOPY N/A 3/17/2023    Procedure: ROBOTIC BRONCHOSCOPY;  Surgeon: Cristiane Albright MD;  Location: [address];   Service: Pulmonary;  Laterality: N/A;       Family History:   Family History   Problem Relation Age of Onset    Breast cancer Sister         60       Social History:  reports that he quit smoking about 10 years ago. His smoking use included cigarettes. He started smoking about 55 years ago. He has a 81.00 pack-year smoking history. He has quit using smokeless tobacco. He reports current alcohol use of about 3.0 standard drinks per week. He reports that he does not use drugs.    Allergies:  Review of patient's allergies indicates:  No Known Allergies    Medications:  Current Outpatient Medications   Medication Sig Dispense Refill    (Magic mouthwash) 1:1 Benadryl 12.5mg/5ml liq, mylanta, nystatin 100,000u, prednisolone 15mg/5mL, distilled water Swish and swallow 5 mLs every 4 (four) hours as needed. 480 mL 6    allopurinoL (ZYLOPRIM) 100 MG tablet Take 100 mg by mouth once daily.      amLODIPine (NORVASC) 2.5 MG tablet Take 2.5 mg by mouth once daily.      BYDUREON BCISE 2 mg/0.85 mL AtIn Inject 2 mg into the skin every 7 days. Every Friday      cloNIDine (CATAPRES) 0.2 MG tablet Take 0.2 mg by mouth once. Daily      duke's soln (benadryl 30 mL, mylanta 30 mL, LIDOcaine 30 mL, nystatin 30 mL) 120mL Take 10 mLs by mouth 4 (four) times daily. 500 mL 2    ezetimibe (ZETIA) 10 mg tablet Take 10 mg by mouth once daily.      FARXIGA 10 mg tablet Take 10 mg by mouth every morning.      finasteride (PROSCAR) 5 mg tablet Take 5 mg by mouth once daily.      FREESTYLE TEST Strp USE 1 STRIP TO CHECK GLUCOSE ONCE DAILY      HYDROcodone-acetaminophen (NORCO) 5-325 mg per tablet Take 1 tablet by mouth every 6 (six) hours as needed for Pain. 90 tablet 0    LIDOCAINE VISCOUS 2 % solution       LIDOcaine-prilocaine (EMLA) cream Apply topically to port site one hour prior to access, cover 30 g 1    M-DRYL 12.5 mg/5 mL liquid Take by mouth.      metoprolol succinate (TOPROL-XL) 100 MG 24 hr tablet Take 100 mg by mouth once daily.       "omeprazole (PRILOSEC) 40 MG capsule Take 1 capsule (40 mg total) by mouth once daily. 30 capsule 1    ondansetron (ZOFRAN) 8 MG tablet Take 1 tablet (8 mg total) by mouth every 8 (eight) hours as needed for Nausea. 30 tablet 2    promethazine (PHENERGAN) 25 MG tablet Take 1 tablet (25 mg total) by mouth every 6 (six) hours as needed for Nausea. 30 tablet 0    rosuvastatin (CRESTOR) 40 MG Tab Take 10 mg by mouth every evening.      sucralfate (CARAFATE) 100 mg/mL suspension Take 10 mLs (1 g total) by mouth 4 (four) times daily. 500 mL 1    TOUJEO MAX U-300 SOLOSTAR 300 unit/mL (3 mL) insulin pen Inject 30 Units into the skin every evening.       No current facility-administered medications for this visit.       Review of Systems   Constitutional:  Negative for chills, diaphoresis, fatigue, fever and unexpected weight change.   HENT:  Positive for sore throat and trouble swallowing.    Respiratory:  Negative for cough and shortness of breath.    Cardiovascular:  Negative for chest pain and palpitations.   Gastrointestinal:  Negative for abdominal pain, constipation, diarrhea, nausea and vomiting.   Skin:  Negative for color change and rash.   Neurological:  Negative for headaches.   Hematological:  Negative for adenopathy. Does not bruise/bleed easily.     ECOG Performance Status: 1   Objective:      Vitals:   Vitals:    06/29/23 1438   BP: 116/70   BP Location: Left arm   Patient Position: Sitting   BP Method: Medium (Manual)   Pulse: 97   Resp: 16   Temp: 96.5 °F (35.8 °C)   TempSrc: Temporal   SpO2: 96%   Weight: 96.5 kg (212 lb 11.9 oz)   Height: 6' 2" (1.88 m)               Physical Exam  Constitutional:       General: He is not in acute distress.     Appearance: He is well-developed. He is not diaphoretic.   HENT:      Head: Normocephalic and atraumatic.   Cardiovascular:      Rate and Rhythm: Normal rate and regular rhythm.      Heart sounds: Normal heart sounds. No murmur heard.    No friction rub. No gallop. "   Pulmonary:      Effort: Pulmonary effort is normal. No respiratory distress.      Breath sounds: Normal breath sounds. No wheezing or rales.   Chest:      Chest wall: No tenderness.   Abdominal:      General: Bowel sounds are normal. There is no distension.      Palpations: Abdomen is soft. There is no mass.      Tenderness: There is no abdominal tenderness. There is no rebound.   Musculoskeletal:      Cervical back: Normal range of motion.   Lymphadenopathy:      Cervical: No cervical adenopathy.      Upper Body:      Right upper body: No supraclavicular or axillary adenopathy.      Left upper body: No supraclavicular or axillary adenopathy.   Skin:     General: Skin is warm and dry.      Findings: No erythema or rash.   Neurological:      Mental Status: He is alert and oriented to person, place, and time.   Psychiatric:         Behavior: Behavior normal.       Laboratory Data:  Lab Visit on 06/26/2023   Component Date Value Ref Range Status    WBC 06/26/2023 6.17  3.90 - 12.70 K/uL Final    RBC 06/26/2023 4.70  4.60 - 6.20 M/uL Final    Hemoglobin 06/26/2023 13.2 (L)  14.0 - 18.0 g/dL Final    Hematocrit 06/26/2023 40.4  40.0 - 54.0 % Final    MCV 06/26/2023 86  82 - 98 fL Final    MCH 06/26/2023 28.1  27.0 - 31.0 pg Final    MCHC 06/26/2023 32.7  32.0 - 36.0 g/dL Final    RDW 06/26/2023 18.8 (H)  11.5 - 14.5 % Final    Platelets 06/26/2023 261  150 - 450 K/uL Final    MPV 06/26/2023 8.7 (L)  9.2 - 12.9 fL Final    Immature Granulocytes 06/26/2023 0.8 (H)  0.0 - 0.5 % Final    Gran # (ANC) 06/26/2023 2.7  1.8 - 7.7 K/uL Final    Immature Grans (Abs) 06/26/2023 0.05 (H)  0.00 - 0.04 K/uL Final    Comment: Mild elevation in immature granulocytes is non specific and   can be seen in a variety of conditions including stress response,   acute inflammation, trauma and pregnancy. Correlation with other   laboratory and clinical findings is essential.      Lymph # 06/26/2023 2.1  1.0 - 4.8 K/uL Final    Mono #  06/26/2023 1.2 (H)  0.3 - 1.0 K/uL Final    Eos # 06/26/2023 0.1  0.0 - 0.5 K/uL Final    Baso # 06/26/2023 0.04  0.00 - 0.20 K/uL Final    nRBC 06/26/2023 2 (A)  0 /100 WBC Final    Gran % 06/26/2023 43.2  38.0 - 73.0 % Final    Lymph % 06/26/2023 33.5  18.0 - 48.0 % Final    Mono % 06/26/2023 20.1 (H)  4.0 - 15.0 % Final    Eosinophil % 06/26/2023 1.8  0.0 - 8.0 % Final    Basophil % 06/26/2023 0.6  0.0 - 1.9 % Final    Differential Method 06/26/2023 Automated   Final    Sodium 06/26/2023 142  136 - 145 mmol/L Final    Potassium 06/26/2023 4.1  3.5 - 5.1 mmol/L Final    Chloride 06/26/2023 105  95 - 110 mmol/L Final    CO2 06/26/2023 24  23 - 29 mmol/L Final    Glucose 06/26/2023 101  70 - 110 mg/dL Final    BUN 06/26/2023 12  8 - 23 mg/dL Final    Creatinine 06/26/2023 1.3  0.5 - 1.4 mg/dL Final    Calcium 06/26/2023 10.0  8.7 - 10.5 mg/dL Final    Total Protein 06/26/2023 7.5  6.0 - 8.4 g/dL Final    Albumin 06/26/2023 3.7  3.5 - 5.2 g/dL Final    Total Bilirubin 06/26/2023 0.4  0.1 - 1.0 mg/dL Final    Comment: For infants and newborns, interpretation of results should be based  on gestational age, weight and in agreement with clinical  observations.    Premature Infant recommended reference ranges:  Up to 24 hours.............<8.0 mg/dL  Up to 48 hours............<12.0 mg/dL  3-5 days..................<15.0 mg/dL  6-29 days.................<15.0 mg/dL      Alkaline Phosphatase 06/26/2023 66  55 - 135 U/L Final    AST 06/26/2023 29  10 - 40 U/L Final    ALT 06/26/2023 41  10 - 44 U/L Final    eGFR 06/26/2023 58 (A)  >60 mL/min/1.73 m^2 Final    Anion Gap 06/26/2023 13  8 - 16 mmol/L Final    Magnesium 06/26/2023 2.0  1.6 - 2.6 mg/dL Final         Imaging:     PET/CT 4/18/23  Head/neck:     No significant abnormal hypermetabolic foci are identified within the head and neck region.  No lymphadenopathy is present.     Chest:     There is a markedly hypermetabolic lobulated subpleural left lower lobe mass  consistent with the patient's known squamous cell carcinoma.  The mass is best measured on the fused PET-CT axial images and on image 115 measures 4.5 x 3.9 cm with a max SUV of 14.0.  There also hypermetabolic lymph nodes in the left hilar region and subcarinal region most consistent with neoplastic adenopathy.  A left hilar node on image 111 measures 2.2 x 1.9 cm with a max SUV of 14.3.     Abdomen/pelvis:     There is a well-circumscribed bulb this hypermetabolic mass arising from the pancreatic tail.  On image 142 the mass measures 4.2 x 3.6 cm and has a max SUV of 5.7.  No other significant abnormal hypermetabolic foci are identified within the abdomen and pelvis.  No lymphadenopathy is present.  The adrenal glands are normal.     Skeleton:     No significant abnormal hypermetabolic foci are identified within the skeleton.  There are no findings to suggest osseous metastatic disease.    MRI Brain 4/18/23  Prominence of the ventricles and sulci compatible with mild generalized cerebral volume loss.  No hydrocephalus.     Moderate confluent and scattered T2/FLAIR hyperintense signal within the periventricular deep subcortical and pontine white matter, nonspecific and may reflect sequelae of chronic microvascular ischemic change.    Diffusion-weighted images demonstrate no evidence of an acute infarct.   Susceptibility weighted images demonstrate no evidence of acute or chronic hemorrhage. No mass effect or midline shift.     No abnormal intracranial enhancement.     Normal vascular flow voids are preserved.     Bone marrow signal intensity is normal.  The paranasal sinuses are normal.  The visualized portions of the mastoids are unremarkable.  Orbits are unremarkable.   Assessment:       1. Squamous cell carcinoma of lung, unspecified laterality    2. Other specified disorders involving the immune mechanism, not elsewhere classified    3. Pancreatic mass    4. Hypertension, unspecified type    5. Gout,  unspecified cause, unspecified chronicity, unspecified site    6. Hyperlipidemia, unspecified hyperlipidemia type    7. Neuropathy    8. Normocytic anemia    9. Thyroid mass                     Plan:     NSCLC - patient was recently diagnosed with at least stage III [id number] SCC of the left lung  -PET/CT 4/18/23 shows continued left lung mass, mediastinal LAD  -MRI brain 4/18/23 showed no acute findings  -TEMPUS Blood testing showed TP 53 mutation  -TEMPUS tissue testing showed PD-L1 of 20%, TP53, KDM6A, CDKN2a and MTAP  -PORT placed 4/24/23 under the care of Dr. Love  -PT completed 6 cycles of Carboplatin and Paclitaxel on 6/02/23  -Radiation completed 6/07/23  -CT chest on 6/26/23 showed a decrease in LLL mass  -Will proceed with Durvalumab for 1 year of treatment  -Pt to attend chemo class    Pancreatic Mass - seen on CT scans and biopsy during EUS on 03/03/2023 with path showing no evidence of malignancy  -PET/CT 4/18/23 showed uptake in the tail of the pancreas  -Possibly due to chronic pancreatitis  -Will assess on repeat CT  -Will monitor    HTN - pt on amlodipine, clonidine, metoprolol  -BP controlled  -Will monitor    Gout - pt on allopurinol  -Currently asymptomatic  -Will monitor    HLD - pt on rosuvastatin, zetia  -Management per PCP    DMII - pt on toujeo, farxiga, and bydureon  -Management per PCP    Neuropathy - numbness in feet due to diabetes  -Will monitor    Normocytic Anemia - hemoglobin 13.2g/dL on 6/26/23  -Possibly due to chronic disease  -Will monitor    Thyroid Mass - Seen on CT chest  -will obtain US of the thyroid    Route Chart for Scheduling    Med Onc Chart Routing      Follow up with physician Other. Pt needs approval for Durvalumab.  He needs a chemo class.  He eneds an appt with me on his first day of treatment with CBc, CMP and TSH.   Follow up with RADHIKA    Infusion scheduling note    Injection scheduling note    Labs    Imaging    Pharmacy appointment    Other referrals           Treatment Plan Information   OP DURVALUMAB 1500 MG Q4W   Lisandro Lawrence MD   Upcoming Treatment Dates - OP DURVALUMAB 1500 MG Q4W    7/6/2023       Chemotherapy       durvalumab (IMFINZI) 1,500 mg in sodium chloride 0.9% SolP 280 mL chemo infusion  8/3/2023       Chemotherapy       durvalumab (IMFINZI) 1,500 mg in sodium chloride 0.9% SolP 280 mL chemo infusion  8/31/2023       Chemotherapy       durvalumab (IMFINZI) 1,500 mg in sodium chloride 0.9% SolP 280 mL chemo infusion  9/28/2023       Chemotherapy       durvalumab (IMFINZI) 1,500 mg in sodium chloride 0.9% SolP 280 mL chemo infusion    Supportive Plan Information  IV FLUIDS AND ELECTROLYTES   Lisandro Lawrence MD   Upcoming Treatment Dates - IV FLUIDS AND ELECTROLYTES    No upcoming days in selected categories.      Lisandro Lawrence MD  [facility]  Hematology and Oncology  [facility] [address] [place] [address]   O: [phone]  F: [phone]

## 2023-06-29 NOTE — PROGRESS NOTES
Thursday, June 29th, 2023    [facility]-[id number] Disparities in REsults of Immune Checkpoint Inhibitor Treatment (DiRECT): A Prospective Cohort Study of Cancer Survivors Treated with anti-PD-1/anti-PD-L1 Immunotherapy in a Community Oncology Setting.      Per Dr. Lawrence's request, this CRC visited with the patient following today's planned office to highlight the referenced study.  The patient was alert and oriented with appropriate mood and affect without any distress noted, and agreed to have interest in learning more about the study.  It was explained to the patient that the [facility]-[id number] trial would not affect his planned treatment.     The following objectives of the study were reviewed with the patient and included:   o Comparing side effects and outcomes between AA and EA people who receive immunotherapy   o Questionnaires   o Blood and Saliva specimens.  The patient continued to express interest in the study and was given a copy of the IRB-approved consent form for review. He was encouraged to review the consent with a family member(s). Mr. Thakkar stated that his daughter-in-law was integral to his support system and would review with her. The patient agreed to include his family member and that he would plan for a formal review of the informed consent on the same day as his immunotherapy education class, which is yet to be scheduled.     This CRC confirmed with the patient that someone from the [facility] would contact him tomorrow once Navigation schedules his education class.  Mr. Thakkar verbalized understanding and agreed that his current questions had been satisfactorily answered. The patient ambulated out to the second-floor elevators accompanied by the CRC without noted distress, with a copy of the current consent and business cards for this CRC and Sarahi Beaulieu.    Addendum [id number].    As discussed with the patient at yesterday's visit, this CRC phoned the patient to determine if he had any questions and plan to  schedule a formal review of the informed consent.  The patient stated that he had decided not to plan for any further discussion of potentially enrolling into the [id number] clinical trial. The patient denied having any questions. This CRC expressed understanding, and encouraged the patient to contact me if he changed his mind or developed questions. CRK [id number]    KETTY Linda RN

## 2023-06-30 ENCOUNTER — TELEPHONE (OUTPATIENT)
Dept: HEMATOLOGY/ONCOLOGY | Facility: CLINIC | Age: 73
End: 2023-06-30
Payer: MEDICARE

## 2023-06-30 DIAGNOSIS — C34.90 SQUAMOUS CELL CARCINOMA OF LUNG, UNSPECIFIED LATERALITY: Primary | ICD-10-CM

## 2023-06-30 NOTE — TELEPHONE ENCOUNTER
Spoke to patient and scheduled US and Ct Abdomen for Monday 7/3.instructions given on fasting and getting there early to get contrast in. Patient verbalized understanding of this.     ----- Message from Lisandro Lawrence MD sent at 6/29/2023  5:47 PM CDT -----  Please call and let the patient know there was a nodule in the thyroid gland on his CT chest and that he needs an US.  Please have the US scheduled.  The patient also needs his CT abdomen rescheduled.

## 2023-06-30 NOTE — NURSING
Reached out to patient to schedule chemo school.  Patient completed his 6 cycles of taxol/carbo and is now continuing on to his 1 year of imfinzi.   Patient accepted next available chemo school of Monday, 7/10. Patient confirmed lab/MD clearance visit and 1st infusion visit.    Dates, time, and locations all reviewed.    Oncology Navigation   Intake  Date of Diagnosis: 23  Cancer Type: Thoracic  Internal / External Referral: Internal  Date of Referral: 23  Initial Nurse Navigator Contact: 23  Referral to Initial Contact Timeline (days): 0  Date Worked: 23  First Appointment Available: 23  Appointment Date: 23  First Available Date vs. Scheduled Date (days): 0  Reason if booked > 7 days after scheduling: Specific provider / access     Treatment  Current Status: Active       Medical Oncologist: Dr. Lawrence  Consult Date: 23  Chemotherapy: Completed  Chemotherapy Regimen: taxol/ carbo  Immunotherapy: Planned  Immunotherapy Name: Imfinzi  Start Date: 23    Radiation Therapy: Completed  Radiation Oncologist: Dr. Hadley  Start Date: 23  End Date: 23  Total Treatments: 30    Procedures: Ultrasound  Bronchoscopy Schedule Date: 23  CT Schedule Date: 23  EUS / EGD Date: 23  MRI Schedule Date: 23  Genomic Testing Date Sent: 23  PET Scan Schedule Date: 23  Ultrasound Schedule Date: 23    General Referrals: Chemo Education    EGFR mutation: Pending  ALK mutation: Pending  ROS-1: Pending  PD-L1: Positive  BRAF V600 mutations: Pending  NTRK: Pending    Radiation Oncologist: Dr. Hadley    Support Systems: Family members  Barriers of Care: Other  Other Barriers: Education     Acuity  Stage: 2  Systemic Treatment - predicted or initiated: More than one treatment modality concurrently (chemotherapy, radiation, etc.) (+2)  Treatment Tolerability: Minimal symptoms  ECO  Comorbidities in Medical History: 1  Hospitalization Within the  Past Month: 0   Needed: 0  Support: 1  Verbalizes Financial Concerns: 0  Transportation: 0  History of noncompliance/frequent no shows and cancellations: 2  Verbalizes the need for more education: 1  Navigation Acuity: 10     Follow Up  No follow-ups on file.

## 2023-07-03 ENCOUNTER — HOSPITAL ENCOUNTER (OUTPATIENT)
Dept: RADIOLOGY | Facility: HOSPITAL | Age: 73
Discharge: HOME | End: 2023-07-03
Attending: INTERNAL MEDICINE
Payer: MEDICARE

## 2023-07-03 DIAGNOSIS — C34.90 SQUAMOUS CELL CARCINOMA OF LUNG, UNSPECIFIED LATERALITY: ICD-10-CM

## 2023-07-03 DIAGNOSIS — E07.9 THYROID MASS: ICD-10-CM

## 2023-07-03 DIAGNOSIS — K86.89 PANCREATIC MASS: ICD-10-CM

## 2023-07-03 PROCEDURE — A9698 NON-RAD CONTRAST MATERIALNOC: HCPCS | Mod: PO | Performed by: INTERNAL MEDICINE

## 2023-07-03 PROCEDURE — 25500020 PHARM REV CODE 255: Mod: PO | Performed by: INTERNAL MEDICINE

## 2023-07-03 PROCEDURE — 76536 US EXAM OF HEAD AND NECK: CPT | Mod: 26,,, | Performed by: RADIOLOGY

## 2023-07-03 PROCEDURE — 76536 US THYROID: ICD-10-PCS | Mod: 26,,, | Performed by: RADIOLOGY

## 2023-07-03 PROCEDURE — 76536 US EXAM OF HEAD AND NECK: CPT | Mod: TC,PO

## 2023-07-03 PROCEDURE — 74160 CT ABDOMEN W/CONTRAST: CPT | Mod: 26,,, | Performed by: RADIOLOGY

## 2023-07-03 PROCEDURE — 74160 CT ABDOMEN W/CONTRAST: CPT | Mod: TC,PO

## 2023-07-03 PROCEDURE — 74160 CT ABDOMEN WITH CONTRAST: ICD-10-PCS | Mod: 26,,, | Performed by: RADIOLOGY

## 2023-07-03 RX ADMIN — IOHEXOL 500 ML: 12 SOLUTION ORAL at 03:07

## 2023-07-03 RX ADMIN — IOHEXOL 100 ML: 350 INJECTION, SOLUTION INTRAVENOUS at 03:07

## 2023-07-07 NOTE — PROGRESS NOTES
Pharmacist Treatment Plan Review Note    The patient's treatment plan was reviewed by a pharmacist and adjustments, if needed, were made in collaboration with the physician including premedications, chemotherapy/immunotherapy/biotherapy, supportive care, and drug interactions. The treatment plan was compared to primary literature and NCCN guidelines. Available laboratory values were reviewed.        Ginger Bernardo PharmD, BCPS, [facility]  Clinical Pharmacy Specialist - Oncology  [facility]

## 2023-07-10 ENCOUNTER — DOCUMENTATION ONLY (OUTPATIENT)
Dept: HEMATOLOGY/ONCOLOGY | Facility: CLINIC | Age: 73
End: 2023-07-10

## 2023-07-10 ENCOUNTER — LAB VISIT (OUTPATIENT)
Dept: LAB | Facility: HOSPITAL | Age: 73
End: 2023-07-10
Attending: INTERNAL MEDICINE
Payer: MEDICARE

## 2023-07-10 ENCOUNTER — CLINICAL SUPPORT (OUTPATIENT)
Dept: HEMATOLOGY/ONCOLOGY | Facility: CLINIC | Age: 73
End: 2023-07-10
Payer: MEDICARE

## 2023-07-10 VITALS
RESPIRATION RATE: 16 BRPM | SYSTOLIC BLOOD PRESSURE: 123 MMHG | HEART RATE: 83 BPM | BODY MASS INDEX: 27.7 KG/M2 | WEIGHT: 215.81 LBS | DIASTOLIC BLOOD PRESSURE: 78 MMHG | TEMPERATURE: 98 F | HEIGHT: 74 IN | OXYGEN SATURATION: 98 %

## 2023-07-10 DIAGNOSIS — D89.89 OTHER SPECIFIED DISORDERS INVOLVING THE IMMUNE MECHANISM, NOT ELSEWHERE CLASSIFIED: ICD-10-CM

## 2023-07-10 DIAGNOSIS — C34.90 SQUAMOUS CELL CARCINOMA OF LUNG, UNSPECIFIED LATERALITY: Primary | ICD-10-CM

## 2023-07-10 DIAGNOSIS — C34.90 SQUAMOUS CELL CARCINOMA OF LUNG, UNSPECIFIED LATERALITY: ICD-10-CM

## 2023-07-10 LAB
ALBUMIN SERPL BCP-MCNC: 3.9 G/DL (ref 3.5–5.2)
ALP SERPL-CCNC: 60 U/L (ref 55–135)
ALT SERPL W/O P-5'-P-CCNC: 31 U/L (ref 10–44)
ANION GAP SERPL CALC-SCNC: 13 MMOL/L (ref 8–16)
AST SERPL-CCNC: 24 U/L (ref 10–40)
BASOPHILS # BLD AUTO: 0.03 K/UL (ref 0–0.2)
BASOPHILS NFR BLD: 0.4 % (ref 0–1.9)
BILIRUB SERPL-MCNC: 0.3 MG/DL (ref 0.1–1)
BUN SERPL-MCNC: 14 MG/DL (ref 8–23)
CALCIUM SERPL-MCNC: 9.5 MG/DL (ref 8.7–10.5)
CHLORIDE SERPL-SCNC: 105 MMOL/L (ref 95–110)
CO2 SERPL-SCNC: 21 MMOL/L (ref 23–29)
CREAT SERPL-MCNC: 1.4 MG/DL (ref 0.5–1.4)
DIFFERENTIAL METHOD: ABNORMAL
EOSINOPHIL # BLD AUTO: 0.2 K/UL (ref 0–0.5)
EOSINOPHIL NFR BLD: 2.8 % (ref 0–8)
ERYTHROCYTE [DISTWIDTH] IN BLOOD BY AUTOMATED COUNT: 20 % (ref 11.5–14.5)
EST. GFR  (NO RACE VARIABLE): 53.4 ML/MIN/1.73 M^2
GLUCOSE SERPL-MCNC: 144 MG/DL (ref 70–110)
HCT VFR BLD AUTO: 38.9 % (ref 40–54)
HGB BLD-MCNC: 12.9 G/DL (ref 14–18)
IMM GRANULOCYTES # BLD AUTO: 0.05 K/UL (ref 0–0.04)
IMM GRANULOCYTES NFR BLD AUTO: 0.7 % (ref 0–0.5)
LYMPHOCYTES # BLD AUTO: 2.3 K/UL (ref 1–4.8)
LYMPHOCYTES NFR BLD: 30.6 % (ref 18–48)
MCH RBC QN AUTO: 29 PG (ref 27–31)
MCHC RBC AUTO-ENTMCNC: 33.2 G/DL (ref 32–36)
MCV RBC AUTO: 87 FL (ref 82–98)
MONOCYTES # BLD AUTO: 1.1 K/UL (ref 0.3–1)
MONOCYTES NFR BLD: 14.8 % (ref 4–15)
NEUTROPHILS # BLD AUTO: 3.8 K/UL (ref 1.8–7.7)
NEUTROPHILS NFR BLD: 50.7 % (ref 38–73)
NRBC BLD-RTO: 1 /100 WBC
PLATELET # BLD AUTO: 281 K/UL (ref 150–450)
PMV BLD AUTO: 9 FL (ref 9.2–12.9)
POTASSIUM SERPL-SCNC: 4 MMOL/L (ref 3.5–5.1)
PROT SERPL-MCNC: 7.4 G/DL (ref 6–8.4)
RBC # BLD AUTO: 4.45 M/UL (ref 4.6–6.2)
SODIUM SERPL-SCNC: 139 MMOL/L (ref 136–145)
TSH SERPL DL<=0.005 MIU/L-ACNC: 1.46 UIU/ML (ref 0.4–4)
WBC # BLD AUTO: 7.49 K/UL (ref 3.9–12.7)

## 2023-07-10 PROCEDURE — 36415 COLL VENOUS BLD VENIPUNCTURE: CPT | Mod: PN | Performed by: INTERNAL MEDICINE

## 2023-07-10 PROCEDURE — 85025 COMPLETE CBC W/AUTO DIFF WBC: CPT | Mod: PN | Performed by: INTERNAL MEDICINE

## 2023-07-10 PROCEDURE — 99999 PR PBB SHADOW E&M-EST. PATIENT-LVL V: CPT | Mod: PBBFAC,,,

## 2023-07-10 PROCEDURE — 99215 PR OFFICE/OUTPT VISIT, EST, LEVL V, 40-54 MIN: ICD-10-PCS | Mod: S$PBB,,,

## 2023-07-10 PROCEDURE — 99215 OFFICE O/P EST HI 40 MIN: CPT | Mod: S$PBB,,,

## 2023-07-10 PROCEDURE — 99999 PR PBB SHADOW E&M-EST. PATIENT-LVL V: ICD-10-PCS | Mod: PBBFAC,,,

## 2023-07-10 PROCEDURE — 80053 COMPREHEN METABOLIC PANEL: CPT | Mod: PN | Performed by: INTERNAL MEDICINE

## 2023-07-10 PROCEDURE — 99215 OFFICE O/P EST HI 40 MIN: CPT | Mod: PBBFAC,PN

## 2023-07-10 PROCEDURE — 84443 ASSAY THYROID STIM HORMONE: CPT | Performed by: INTERNAL MEDICINE

## 2023-07-10 RX ORDER — INSULIN GLARGINE 300 U/ML
INJECTION, SOLUTION SUBCUTANEOUS
COMMUNITY
Start: 2023-07-03

## 2023-07-10 NOTE — PROGRESS NOTES
PATIENT: Feng Thakkar  MRN: [id number]  DATE: 7/10/2023      Diagnosis:   1. Squamous cell carcinoma of lung, unspecified laterality        Chief Complaint: No chief complaint on file.    Subjective:    Interval History: Mr. Thakkar is a 72 y.o. male with Gout, HLD, HTN who presents for education prior to starting treatment with Imfinzi every 28 days for a diagnosis of NSCLC.     The patient denies CP, cough, SOB, abdominal pain, nausea, vomiting, constipation, diarrhea. The patient denies fever, chills, night sweats, weight loss, new lumps or bumps, easy bruising or bleeding.       Prior History:    01/05/2023 The patient initially developed abdominal pain on and underwent CT of the abdomen showing a mass in the left lung base measuring 3.9 x 2.9 cm; 3 x 3.8 cm mass along the pancreatic tail; and shotty retroperitoneal lymph nodes.    02/24/2023 The patient underwent repeat CT abdomen and pelvis on howing a 4.3 x 4 solid enhancing mass of the left lung base; thickened bladder wall; prostate gland measuring 45.1 x 4.6 cm; abnormal sclerosis in the left femoral head measuring 2.8 x 1.4 x 1.3 cm; and additional soft tissue densities in the posterior left subdiaphragmatic region measuring up to 16 mm.    03/01/2023 CT chest showed a 2 cm low-density lesion in the right lobe of the thyroid gland; 4.5 x 4.3 x 3.8 cm mass in the left lower lobe; several micro nodules; Na soft tissue mass adjacent to the pancreatic tail.  Patient underwent 03/03/2023 EUS showing a 3.5 cm and 1.8 cm round pancreatic tail lesion.  Biopsy returned results showing no evidence of malignancy and abundant hematopoietic elements and dendritic cells.    03/17/2023 Patient then underwent EBUS under the care of Dr. Albright showing squamous cell carcinoma in biopsy of the left lower lung lesion; squamous cell carcinoma and station 7 lymph node; positive 11 L lymph node for squamous cell carcinoma.  04/18/2023 PET/CT showing a markedly hypermetabolic  lobulated subpleural left lower lobe mass measuring 4.5 x 3.9 cm with hypermetabolic lymph nodes in the left hilar region measuring 2.2 x 1.9 cm; and a 4.2 x 3.6 walk circumscribed mass in the pancreatic tail.    04/18/2023  MRI brain showed no acute findings.  -4/28/23 began Carboplatin/Paclitaxel  -PT completed 6 cycles of Carboplatin and Paclitaxel on 6/02/23  -Radiation completed 6/07/23  -CT chest on 6/26/23 showed a decrease in LLL mass  6/26/23 CT chest showing a large right thyroid mass measuring at least 2.6 cm; left renal hypodensity measuring 11 mm favored to be a cyst; significant decrease in size of left lower lobe consolidative mass now measuring 2.4 x 2.3 cm; stable 3 mm consolidative nodule in the left upper lobe.    Oncology History   Squamous cell carcinoma of lung   3/31/2023 Initial Diagnosis    Squamous cell carcinoma of lung     3/31/2023 Cancer Staged    Staging form: Lung, AJCC 8th Edition  - Clinical: Stage IIIA (cT2b, cN2, cM0)     4/27/2023 - 6/7/2023 Radiation Therapy    Treating physician: Leonor Hadley  Total Dose: 60 Gy  Fractions: 30  Treatment Site Ref. ID Energy Dose/Fx (Gy) #Fx Dose Correction (Gy) Total Dose (Gy) Start Date End Date Elapsed Days   IM Lung PTV 6X 2 6 / 30 0 12 4/27/2023 5/4/2023 7   IM Lung_New PTV 6X 2 24 / 24 0 48 5/5/2023 6/7/2023 33      4/28/2023 - 6/2/2023 Chemotherapy    Treatment Summary   Plan Name: OP NSCLC PACLITAXEL + CARBOPLATIN (AUC) QW + RADIATION  Treatment Goal: Curative  Status: Inactive  Start Date: 4/28/2023  End Date: 6/2/2023  Provider: Lisandro Lawrence MD  Chemotherapy: CARBOplatin (PARAPLATIN) 185 mg in sodium chloride 0.9% 303.5 mL chemo infusion, 185 mg (100 % of original dose 183.4 mg), Intravenous, Clinic/HOD 1 time, 6 of 6 cycles  Dose modification:   (original dose 183.4 mg, Cycle 1)  Administration: 185 mg (4/28/2023), 185 mg (5/5/2023), 190 mg (5/12/2023), 190 mg (5/19/2023), 180 mg (5/26/2023), 190 mg (6/2/2023)  PACLitaxeL  (TAXOL) 45 mg/m2 = 102 mg in sodium chloride 0.9% 250 mL chemo infusion, 45 mg/m2 = 102 mg, Intravenous, Clinic/HOD 1 time, 6 of 6 cycles  Administration: 102 mg (4/28/2023), 102 mg (5/5/2023), 102 mg (5/12/2023), 102 mg (5/19/2023), 102 mg (5/26/2023), 102 mg (6/2/2023)     7/11/2023 -  Chemotherapy    Treatment Summary   Plan Name: OP DURVALUMAB 1500 MG Q4W  Treatment Goal: Curative  Status: Active  Start Date: 7/11/2023 (Planned)  End Date: 6/11/2024 (Planned)  Provider: Lisandro Lawrence MD  Chemotherapy: [No matching medication found in this treatment plan]          Past Medical History:   Past Medical History:   Diagnosis Date    Diabetes mellitus     Gout, unspecified     High cholesterol     HTN (hypertension)     Lung cancer        Past Surgical HIstory:   Past Surgical History:   Procedure Laterality Date    ENDOSCOPIC ULTRASOUND OF UPPER GASTROINTESTINAL TRACT N/A 3/3/2023    Procedure: ULTRASOUND, UPPER GI TRACT, ENDOSCOPIC;  Surgeon: Cora Mcgrath MD;  Location: [facility] ENDO;  Service: Endoscopy;  Laterality: N/A;  2/24/23-Instructions mailed to address on file-    INSERTION OF TUNNELED CENTRAL VENOUS CATHETER (CVC) WITH SUBCUTANEOUS PORT N/A 4/24/2023    Procedure: [id number];  Surgeon: Paulino Love MD;  Location: [facility];  Service: General;  Laterality: N/A;    PANCREATECTOMY      ROBOTIC BRONCHOSCOPY N/A 3/17/2023    Procedure: ROBOTIC BRONCHOSCOPY;  Surgeon: Cristiane Albright MD;  Location: [address];  Service: Pulmonary;  Laterality: N/A;       Family History:   Family History   Problem Relation Age of Onset    Breast cancer Sister         60       Social History:  reports that he quit smoking about 10 years ago. His smoking use included cigarettes. He started smoking about 55 years ago. He has a 81.00 pack-year smoking history. He has quit using smokeless tobacco. He reports current alcohol use of about 3.0 standard drinks per week. He reports that he does not use  drugs.    Allergies:  Review of patient's allergies indicates:  No Known Allergies    Medications:  Current Outpatient Medications   Medication Sig Dispense Refill    allopurinoL (ZYLOPRIM) 100 MG tablet Take 100 mg by mouth once daily.      amLODIPine (NORVASC) 2.5 MG tablet Take 2.5 mg by mouth once daily.      BYDUREON BCISE 2 mg/0.85 mL AtIn Inject 2 mg into the skin every 7 days. Every Friday      cloNIDine (CATAPRES) 0.2 MG tablet Take 0.2 mg by mouth once. Daily      duke's soln (benadryl 30 mL, mylanta 30 mL, LIDOcaine 30 mL, nystatin 30 mL) 120mL Take 10 mLs by mouth 4 (four) times daily. 500 mL 2    ezetimibe (ZETIA) 10 mg tablet Take 10 mg by mouth once daily.      FARXIGA 10 mg tablet Take 10 mg by mouth every morning.      finasteride (PROSCAR) 5 mg tablet Take 5 mg by mouth once daily.      FREESTYLE TEST Strp USE 1 STRIP TO CHECK GLUCOSE ONCE DAILY      HYDROcodone-acetaminophen (NORCO) 5-325 mg per tablet Take 1 tablet by mouth every 6 (six) hours as needed for Pain. 90 tablet 0    LIDOCAINE VISCOUS 2 % solution       LIDOcaine-prilocaine (EMLA) cream Apply topically to port site one hour prior to access, cover 30 g 1    M-DRYL 12.5 mg/5 mL liquid Take by mouth.      metoprolol succinate (TOPROL-XL) 100 MG 24 hr tablet Take 100 mg by mouth once daily.      omeprazole (PRILOSEC) 40 MG capsule Take 1 capsule (40 mg total) by mouth once daily. 30 capsule 1    ondansetron (ZOFRAN) 8 MG tablet Take 1 tablet (8 mg total) by mouth every 8 (eight) hours as needed for Nausea. 30 tablet 2    promethazine (PHENERGAN) 25 MG tablet Take 1 tablet (25 mg total) by mouth every 6 (six) hours as needed for Nausea. 30 tablet 0    rosuvastatin (CRESTOR) 40 MG Tab Take 10 mg by mouth every evening.      sucralfate (CARAFATE) 100 mg/mL suspension Take 10 mLs (1 g total) by mouth 4 (four) times daily. 500 mL 1    TOUJEO SOLOSTAR U-300 INSULIN 300 unit/mL (1.5 mL) InPn pen SMARTSI Unit(s) SUB-Q Every Evening      (Magic  "mouthwash) 1:1 Benadryl 12.5mg/5ml liq, mylanta, nystatin 100,000u, prednisolone 15mg/5mL, distilled water Swish and swallow 5 mLs every 4 (four) hours as needed. (Patient not taking: Reported on 7/10/2023) 480 mL 6    TOUJEO MAX U-300 SOLOSTAR 300 unit/mL (3 mL) insulin pen Inject 30 Units into the skin every evening.       No current facility-administered medications for this visit.       Review of Systems   Constitutional:  Negative for fatigue and fever.   HENT:  Negative for trouble swallowing.    Respiratory:  Negative for cough and shortness of breath.    Cardiovascular:  Negative for chest pain and palpitations.   Gastrointestinal:  Negative for abdominal pain, diarrhea and nausea.   Genitourinary:  Negative for dysuria.   Musculoskeletal:  Negative for arthralgias.   Skin:  Negative for rash.   Neurological:  Negative for headaches.   Hematological:  Negative for adenopathy.   Psychiatric/Behavioral:  The patient is not nervous/anxious.      ECOG Performance Status:   ECOG SCORE    1 - Restricted in strenuous activity-ambulatory and able to carry out work of a light nature         Objective:      Vitals:   Vitals:    07/10/23 1022   BP: 123/78   BP Location: Right arm   Patient Position: Sitting   BP Method: Large (Manual)   Pulse: 83   Resp: 16   Temp: 97.5 °F (36.4 °C)   TempSrc: Temporal   SpO2: 98%   Weight: 97.9 kg (215 lb 13.3 oz)   Height: 6' 2" (1.88 m)     BMI: Body mass index is 27.71 kg/m².    Physical Exam  Vitals reviewed.   Constitutional:       General: He is not in acute distress.     Appearance: He is not diaphoretic.   HENT:      Head: Normocephalic and atraumatic.   Eyes:      General: No scleral icterus.  Pulmonary:      Effort: Pulmonary effort is normal. No respiratory distress.   Skin:     Coloration: Skin is not jaundiced.      Findings: No rash.   Neurological:      Mental Status: He is alert and oriented to person, place, and time.   Psychiatric:         Mood and Affect: Mood " normal.         Behavior: Behavior normal.       Laboratory Data:  Lab Results   Component Value Date    WBC 7.49 07/10/2023    HGB 12.9 (L) 07/10/2023    HCT 38.9 (L) 07/10/2023    MCV 87 07/10/2023     07/10/2023          Imaging: EXAMINATION:  CT CHEST WITH CONTRAST     CLINICAL HISTORY:  Non-small cell lung cancer (NSCLC), non-metastatic, assess treatment response;Malignant neoplasm of unspecified part of unspecified bronchus or lung     TECHNIQUE:  Low dose axial images, sagittal and coronal reformations were obtained from the thoracic inlet to the lung bases following the IV administration of 75 mL of Omnipaque 350.     COMPARISON:  03/01/2023     FINDINGS:  A large right thyroid mass is noted of at least 2.6 cm, thyroid ultrasound would be suggested.  This is likely the similar in size since the prior than measured in a different manner     A central venous line appears to be present entering from the left with its tip in the region of the superior vena cava.     Gynecomastia is noted     Moderate coronary calcifications are noted increasing the patient's coronary risk     Decreased liver density is noted as can be seen with fatty infiltration of the liver.     The spleen appears irregular with possible prior splenectomy and accessory splenules in its location stable since 03/01/2023 and the PET-CT fusion of 04/18/2023.     A left renal hypodensity is noted without worrisome characteristics unchanged since the prior of 11 mm statistically favored relate to a cyst.     Normal size axillary nodes are noted bilaterally.     Significant decrease in the size of the left lower lobe consolidative mass is noted which can be seen on image 339 measuring 2.4 x 2.3 cm axially by 2.6 cm in superior to inferior dimension.  Band like consolidation extends away from this suggestive of postobstructive atelectasis.  The bronchovascular thickening along the left lung hilum that was seen to be hypermetabolic on the PET-CT  fusion of 04/18/2023 is difficult to measure and appears thin in comparison to that examination.     Calcified pleural plaque is noted along the left hemithorax image 227 sequence 4 relatively unchanged.     A 3 mm consolidative nodule the left upper lobe periphery appears stable image 249 sequence 4     Along the minor fissure of 3 stable nodules are noted image 277 sequence 4 the largest of 5 mm in the right lung     Impression:     1. No worrisome change since 03/01/2023 with interval decrease in the size of the left lower lobe mass and bronchovascular thickening.  2. Thyroid nodule of 2.6 cm.  Thyroid ultrasound is suggested  3. Multiple stable small pulmonary nodules.  Longer term follow-up for size stability will be necessary in this high-risk patient.        Electronically signed by: René Bailey MD  Date:                                            06/26/2023  Time:                                           11:01   Assessment:       1. Squamous cell carcinoma of lung, unspecified laterality         Plan:   Squamous cell carcinoma of the lung   -Treatment plan of Imfinzi every 28 days discussed with patient.  -Handouts provided on chemotherapy agents.    -Discussed the mechanism of action, potential side effects of this treatment as well as ways to manage them at home. Some of these side effects include but not limited to fever, nausea, vomiting, decreased appetite, fatigue, weakness, cytopenia's, myalgia/arthralgia, constipation, diarrhea, bleeding, headache, nail changes, taste change, hair thinning/loss, peripheral neuropathy, kidney toxicity, or ototoxicity.   -Dietary modifications discussed.  Detail instructions to be provided by dietician.   -Chemotherapy Binder supplied with contact information.   -Discussed follow-up with the physician for toxicity monitoring throughout treatment.    -No refill requests at this time  -Chemo , declined in past    -The patient and family verbalized  understanding. Consented the patient to the treatment plan and the patient was educated on the planned duration of the treatment and schedule of the treatment administration.     -Patient has declined participation in clinical trial      Assessment/Plan reviewed and approved by Dr. Lawrence  45 minutes were spent in coordination of patient's care, record review and counseling.      Route Chart for Scheduling    Med Onc Chart Routing      Follow up with physician Other. Keep appointments with Dr. Lawrence and to start treatment on 7/11/23. Check with clinical trial team today, do they need to meet [facility] patient?   Follow up with [facility]    Infusion scheduling note    Injection scheduling note    Labs    Imaging    Pharmacy appointment    Other referrals            Treatment Plan Information   OP DURVALUMAB 1500 MG Q4W   Lisandro Lawrence MD   Upcoming Treatment Dates - OP DURVALUMAB 1500 MG Q4W    7/11/2023       Chemotherapy       durvalumab (IMFINZI) 1,500 mg in sodium chloride 0.9% SolP 280 mL chemo infusion  8/8/2023       Chemotherapy       durvalumab (IMFINZI) 1,500 mg in sodium chloride 0.9% SolP 280 mL chemo infusion  9/5/2023       Chemotherapy       durvalumab (IMFINZI) 1,500 mg in sodium chloride 0.9% SolP 280 mL chemo infusion  10/3/2023       Chemotherapy       durvalumab (IMFINZI) 1,500 mg in sodium chloride 0.9% SolP 280 mL chemo infusion    Supportive Plan Information  IV FLUIDS AND ELECTROLYTES   Lisandro Lawrence MD   Upcoming Treatment Dates - IV FLUIDS AND ELECTROLYTES    No upcoming days in selected categories.

## 2023-07-11 ENCOUNTER — INFUSION (OUTPATIENT)
Dept: INFUSION THERAPY | Facility: HOSPITAL | Age: 73
End: 2023-07-11
Attending: INTERNAL MEDICINE
Payer: MEDICARE

## 2023-07-11 ENCOUNTER — OFFICE VISIT (OUTPATIENT)
Dept: HEMATOLOGY/ONCOLOGY | Facility: CLINIC | Age: 73
End: 2023-07-11
Payer: MEDICARE

## 2023-07-11 VITALS
HEIGHT: 74 IN | WEIGHT: 215.81 LBS | OXYGEN SATURATION: 99 % | TEMPERATURE: 97 F | SYSTOLIC BLOOD PRESSURE: 118 MMHG | DIASTOLIC BLOOD PRESSURE: 80 MMHG | HEART RATE: 91 BPM | BODY MASS INDEX: 27.7 KG/M2

## 2023-07-11 VITALS
HEART RATE: 77 BPM | OXYGEN SATURATION: 99 % | RESPIRATION RATE: 17 BRPM | BODY MASS INDEX: 27.7 KG/M2 | WEIGHT: 215.81 LBS | TEMPERATURE: 97 F | DIASTOLIC BLOOD PRESSURE: 81 MMHG | SYSTOLIC BLOOD PRESSURE: 134 MMHG | HEIGHT: 74 IN

## 2023-07-11 DIAGNOSIS — N18.31 CHRONIC KIDNEY DISEASE, STAGE 3A: ICD-10-CM

## 2023-07-11 DIAGNOSIS — D89.89 OTHER SPECIFIED DISORDERS INVOLVING THE IMMUNE MECHANISM, NOT ELSEWHERE CLASSIFIED: ICD-10-CM

## 2023-07-11 DIAGNOSIS — C34.90 SQUAMOUS CELL CARCINOMA OF LUNG, UNSPECIFIED LATERALITY: Primary | ICD-10-CM

## 2023-07-11 DIAGNOSIS — G62.9 NEUROPATHY: ICD-10-CM

## 2023-07-11 DIAGNOSIS — M10.9 GOUT, UNSPECIFIED CAUSE, UNSPECIFIED CHRONICITY, UNSPECIFIED SITE: ICD-10-CM

## 2023-07-11 DIAGNOSIS — E07.9 THYROID MASS: ICD-10-CM

## 2023-07-11 DIAGNOSIS — E78.5 HYPERLIPIDEMIA, UNSPECIFIED HYPERLIPIDEMIA TYPE: ICD-10-CM

## 2023-07-11 DIAGNOSIS — K86.89 PANCREATIC MASS: ICD-10-CM

## 2023-07-11 DIAGNOSIS — D64.9 NORMOCYTIC ANEMIA: ICD-10-CM

## 2023-07-11 DIAGNOSIS — I10 HYPERTENSION, UNSPECIFIED TYPE: ICD-10-CM

## 2023-07-11 PROCEDURE — 99214 OFFICE O/P EST MOD 30 MIN: CPT | Mod: PBBFAC,25,PN | Performed by: INTERNAL MEDICINE

## 2023-07-11 PROCEDURE — 99215 PR OFFICE/OUTPT VISIT, EST, LEVL V, 40-54 MIN: ICD-10-PCS | Mod: S$PBB,,, | Performed by: INTERNAL MEDICINE

## 2023-07-11 PROCEDURE — 99999 PR PBB SHADOW E&M-EST. PATIENT-LVL IV: CPT | Mod: PBBFAC,,, | Performed by: INTERNAL MEDICINE

## 2023-07-11 PROCEDURE — 99999 PR PBB SHADOW E&M-EST. PATIENT-LVL IV: ICD-10-PCS | Mod: PBBFAC,,, | Performed by: INTERNAL MEDICINE

## 2023-07-11 PROCEDURE — 96413 CHEMO IV INFUSION 1 HR: CPT | Mod: PN

## 2023-07-11 PROCEDURE — 99215 OFFICE O/P EST HI 40 MIN: CPT | Mod: S$PBB,,, | Performed by: INTERNAL MEDICINE

## 2023-07-11 PROCEDURE — 25000003 PHARM REV CODE 250: Mod: PN | Performed by: INTERNAL MEDICINE

## 2023-07-11 PROCEDURE — 63600175 PHARM REV CODE 636 W HCPCS: Mod: JZ,JG,PN | Performed by: INTERNAL MEDICINE

## 2023-07-11 RX ORDER — EPINEPHRINE 0.3 MG/.3ML
0.3 INJECTION SUBCUTANEOUS ONCE AS NEEDED
Status: DISCONTINUED | OUTPATIENT
Start: 2023-07-11 | End: 2023-07-11 | Stop reason: HOSPADM

## 2023-07-11 RX ORDER — DIPHENHYDRAMINE HYDROCHLORIDE 50 MG/ML
50 INJECTION INTRAMUSCULAR; INTRAVENOUS ONCE AS NEEDED
Status: DISCONTINUED | OUTPATIENT
Start: 2023-07-11 | End: 2023-07-11 | Stop reason: HOSPADM

## 2023-07-11 RX ORDER — EPINEPHRINE 0.3 MG/.3ML
0.3 INJECTION SUBCUTANEOUS ONCE AS NEEDED
Status: CANCELLED | OUTPATIENT
Start: 2023-07-11

## 2023-07-11 RX ORDER — HEPARIN 100 UNIT/ML
500 SYRINGE INTRAVENOUS
Status: CANCELLED | OUTPATIENT
Start: 2023-07-11

## 2023-07-11 RX ORDER — DIPHENHYDRAMINE HYDROCHLORIDE 50 MG/ML
50 INJECTION INTRAMUSCULAR; INTRAVENOUS ONCE AS NEEDED
Status: CANCELLED | OUTPATIENT
Start: 2023-07-11

## 2023-07-11 RX ORDER — SODIUM CHLORIDE 0.9 % (FLUSH) 0.9 %
10 SYRINGE (ML) INJECTION
Status: CANCELLED | OUTPATIENT
Start: 2023-07-11

## 2023-07-11 RX ORDER — SODIUM CHLORIDE 0.9 % (FLUSH) 0.9 %
10 SYRINGE (ML) INJECTION
Status: DISCONTINUED | OUTPATIENT
Start: 2023-07-11 | End: 2023-07-11 | Stop reason: HOSPADM

## 2023-07-11 RX ADMIN — SODIUM CHLORIDE 1500 MG: 9 INJECTION, SOLUTION INTRAVENOUS at 03:07

## 2023-07-11 RX ADMIN — SODIUM CHLORIDE: 9 INJECTION, SOLUTION INTRAVENOUS at 02:07

## 2023-07-11 NOTE — PLAN OF CARE
Problem: Adult Inpatient Plan of Care  Goal: Patient-Specific Goal (Individualized)  Outcome: Ongoing, Progressing  Flowsheets (Taken 7/11/2023 1445)  Anxieties, Fears or Concerns: first imfinzi  Individualized Care Needs: recliner, daughter in law at chairside, lights dimmed     Problem: Fatigue  Goal: Improved Activity Tolerance  Intervention: Promote Improved Energy  Flowsheets (Taken 7/11/2023 1445)  Fatigue Management: frequent rest breaks encouraged  Sleep/Rest Enhancement:   natural light exposure provided   noise level reduced  Activity Management:   Ambulated -L4   Ambulated in lutz - L4

## 2023-07-11 NOTE — PROGRESS NOTES
PATIENT: Feng Thakkar  MRN: [id number]  DATE: 7/11/2023      Diagnosis:   1. Squamous cell carcinoma of lung, unspecified laterality    2. Other specified disorders involving the immune mechanism, not elsewhere classified    3. Chronic kidney disease, stage 3a    4. Pancreatic mass    5. Hypertension, unspecified type    6. Gout, unspecified cause, unspecified chronicity, unspecified site    7. Hyperlipidemia, unspecified hyperlipidemia type    8. Neuropathy    9. Normocytic anemia    10. Thyroid mass                    Chief Complaint: Squamous Cell Carcinoma (Squamous cell carcinoma of lung, unspecified laterality; 2 week follow up )      Oncologic History:      Oncologic History     Oncologic Treatment     Pathology           Subjective:    Interval History: Mr. Thakkar is a 72 y.o. male with Gout, HLD, HTN who presents for work up of NSCLC.  Since the last clinic visit the patient states he has felt well.  The patient denies CP, cough, SOB, abdominal pain, nausea, vomiting, constipation, diarrhea.  The patient denies fever, chills, night sweats, weight loss, new lumps or bumps, easy bruising or bleeding.    Prior History:  The patient initially developed abdominal pain on 01/05/2023 and underwent CT of the abdomen showing a mass in the left lung base measuring 3.9 x 2.9 cm; 3 x 3.8 cm mass along the pancreatic tail; and shotty retroperitoneal lymph nodes.  The patient underwent repeat CT abdomen and pelvis on 02/24/2023 showing a 4.3 x 4 solid enhancing mass of the left lung base; thickened bladder wall; prostate gland measuring 45.1 x 4.6 cm; abnormal sclerosis in the left femoral head measuring 2.8 x 1.4 x 1.3 cm; and additional soft tissue densities in the posterior left subdiaphragmatic region measuring up to 16 mm.  CT chest on 03/01/2023 showed a 2 cm low-density lesion in the right lobe of the thyroid gland; 4.5 x 4.3 x 3.8 cm mass in the left lower lobe; several micro nodules; Na soft tissue mass adjacent  to the pancreatic tail.  Patient underwent EUS on 03/03/2023 showing a 3.5 cm and 1.8 cm round pancreatic tail lesion.  Biopsy returned results showing no evidence of malignancy and abundant hematopoietic elements and dendritic cells.  Patient then underwent EBUS under the care of Dr. Albright on 03/17/2023 showing squamous cell carcinoma in biopsy of the left lower lung lesion; squamous cell carcinoma and station 7 lymph node; positive 11 L lymph node for squamous cell carcinoma.   The patient underwent PET-CT on 04/18/2023 showing a markedly hypermetabolic lobulated subpleural left lower lobe mass measuring 4.5 x 3.9 cm with hypermetabolic lymph nodes in the left hilar region measuring 2.2 x 1.9 cm; and a 4.2 x 3.6 walk circumscribed mass in the pancreatic tail.  MRI brain on 04/18/2023 showed no acute findings.     The patient underwent CT chest on 6/26/23 showing a large right thyroid mass measuring at least 2.6 cm; left renal hypodensity measuring 11 mm favored to be a cyst; significant decrease in size of left lower lobe consolidative mass now measuring 2.4 x 2.3 cm; stable 3 mm consolidative nodule in the left upper lobe.    Past Medical History:   Past Medical History:   Diagnosis Date    Diabetes mellitus     Gout, unspecified     High cholesterol     HTN (hypertension)     Lung cancer        Past Surgical HIstory:   Past Surgical History:   Procedure Laterality Date    ENDOSCOPIC ULTRASOUND OF UPPER GASTROINTESTINAL TRACT N/A 3/3/2023    Procedure: ULTRASOUND, UPPER GI TRACT, ENDOSCOPIC;  Surgeon: Cora Mcgrath MD;  Location: [facility];  Service: Endoscopy;  Laterality: N/A;  2/24/23-Instructions mailed to address on file-DS    INSERTION OF TUNNELED CENTRAL VENOUS CATHETER (CVC) WITH SUBCUTANEOUS PORT N/A 4/24/2023    Procedure: [id number];  Surgeon: Paulino Love MD;  Location: [facility];  Service: General;  Laterality: N/A;    PANCREATECTOMY      ROBOTIC BRONCHOSCOPY N/A 3/17/2023     Procedure: ROBOTIC BRONCHOSCOPY;  Surgeon: Cristiane Albright MD;  Location: [facility] OR [address] [place] [address];  Service: Pulmonary;  Laterality: N/A;       Family History:   Family History   Problem Relation Age of Onset    Breast cancer Sister         60       Social History:  reports that he quit smoking about 10 years ago. His smoking use included cigarettes. He started smoking about 55 years ago. He has a 81.00 pack-year smoking history. He has quit using smokeless tobacco. He reports current alcohol use of about 3.0 standard drinks per week. He reports that he does not use drugs.    Allergies:  Review of patient's allergies indicates:  No Known Allergies    Medications:  Current Outpatient Medications   Medication Sig Dispense Refill    allopurinoL (ZYLOPRIM) 100 MG tablet Take 100 mg by mouth once daily.      amLODIPine (NORVASC) 2.5 MG tablet Take 2.5 mg by mouth once daily.      BYDUREON BCISE 2 mg/0.85 mL AtIn Inject 2 mg into the skin every 7 days. Every Friday      cloNIDine (CATAPRES) 0.2 MG tablet Take 0.2 mg by mouth once. Daily      duke's soln (benadryl 30 mL, mylanta 30 mL, LIDOcaine 30 mL, nystatin 30 mL) 120mL Take 10 mLs by mouth 4 (four) times daily. 500 mL 2    ezetimibe (ZETIA) 10 mg tablet Take 10 mg by mouth once daily.      FARXIGA 10 mg tablet Take 10 mg by mouth every morning.      finasteride (PROSCAR) 5 mg tablet Take 5 mg by mouth once daily.      FREESTYLE TEST Strp USE 1 STRIP TO CHECK GLUCOSE ONCE DAILY      HYDROcodone-acetaminophen (NORCO) 5-325 mg per tablet Take 1 tablet by mouth every 6 (six) hours as needed for Pain. 90 tablet 0    LIDOCAINE VISCOUS 2 % solution       LIDOcaine-prilocaine (EMLA) cream Apply topically to port site one hour prior to access, cover 30 g 1    M-DRYL 12.5 mg/5 mL liquid Take by mouth.      metoprolol succinate (TOPROL-XL) 100 MG 24 hr tablet Take 100 mg by mouth once daily.      omeprazole (PRILOSEC) 40 MG capsule Take 1 capsule (40 mg total) by mouth once daily. 30  "capsule 1    ondansetron (ZOFRAN) 8 MG tablet Take 1 tablet (8 mg total) by mouth every 8 (eight) hours as needed for Nausea. 30 tablet 2    promethazine (PHENERGAN) 25 MG tablet Take 1 tablet (25 mg total) by mouth every 6 (six) hours as needed for Nausea. 30 tablet 0    rosuvastatin (CRESTOR) 40 MG Tab Take 10 mg by mouth every evening.      sucralfate (CARAFATE) 100 mg/mL suspension Take 10 mLs (1 g total) by mouth 4 (four) times daily. 500 mL 1    TOUJEO MAX U-300 SOLOSTAR 300 unit/mL (3 mL) insulin pen Inject 30 Units into the skin every evening.      TOUJEO SOLOSTAR U-300 INSULIN 300 unit/mL (1.5 mL) InPn pen SMARTSI Unit(s) SUB-Q Every Evening       No current facility-administered medications for this visit.       Review of Systems   Constitutional:  Negative for chills, diaphoresis, fatigue, fever and unexpected weight change.   Respiratory:  Negative for cough and shortness of breath.    Cardiovascular:  Negative for chest pain and palpitations.   Gastrointestinal:  Negative for abdominal pain, constipation, diarrhea, nausea and vomiting.   Skin:  Negative for color change and rash.   Neurological:  Negative for headaches.   Hematological:  Negative for adenopathy. Does not bruise/bleed easily.     ECOG Performance Status: 1   Objective:      Vitals:   Vitals:    23 1357   BP: 118/80   BP Location: Right arm   Patient Position: Sitting   BP Method: Large (Manual)   Pulse: 91   Temp: 96.8 °F (36 °C)   TempSrc: Temporal   SpO2: 99%   Weight: 97.9 kg (215 lb 13.3 oz)   Height: 6' 2" (1.88 m)                 Physical Exam  Constitutional:       General: He is not in acute distress.     Appearance: He is well-developed. He is not diaphoretic.   HENT:      Head: Normocephalic and atraumatic.   Cardiovascular:      Rate and Rhythm: Normal rate and regular rhythm.      Heart sounds: Normal heart sounds. No murmur heard.    No friction rub. No gallop.   Pulmonary:      Effort: Pulmonary effort is normal. " No respiratory distress.      Breath sounds: Normal breath sounds. No wheezing or rales.   Chest:      Chest wall: No tenderness.   Abdominal:      General: Bowel sounds are normal. There is no distension.      Palpations: Abdomen is soft. There is no mass.      Tenderness: There is no abdominal tenderness. There is no rebound.   Musculoskeletal:      Cervical back: Normal range of motion.   Lymphadenopathy:      Cervical: No cervical adenopathy.      Upper Body:      Right upper body: No supraclavicular or axillary adenopathy.      Left upper body: No supraclavicular or axillary adenopathy.   Skin:     General: Skin is warm and dry.      Findings: No erythema or rash.   Neurological:      Mental Status: He is alert and oriented to person, place, and time.   Psychiatric:         Behavior: Behavior normal.       Laboratory Data:  Lab Visit on 07/10/2023   Component Date Value Ref Range Status    WBC 07/10/2023 7.49  3.90 - 12.70 K/uL Final    RBC 07/10/2023 4.45 (L)  4.60 - 6.20 M/uL Final    Hemoglobin 07/10/2023 12.9 (L)  14.0 - 18.0 g/dL Final    Hematocrit 07/10/2023 38.9 (L)  40.0 - 54.0 % Final    MCV 07/10/2023 87  82 - 98 fL Final    MCH 07/10/2023 29.0  27.0 - 31.0 pg Final    MCHC 07/10/2023 33.2  32.0 - 36.0 g/dL Final    RDW 07/10/2023 20.0 (H)  11.5 - 14.5 % Final    Platelets 07/10/2023 281  150 - 450 K/uL Final    MPV 07/10/2023 9.0 (L)  9.2 - 12.9 fL Final    Immature Granulocytes 07/10/2023 0.7 (H)  0.0 - 0.5 % Final    Gran # (ANC) 07/10/2023 3.8  1.8 - 7.7 K/uL Final    Immature Grans (Abs) 07/10/2023 0.05 (H)  0.00 - 0.04 K/uL Final    Comment: Mild elevation in immature granulocytes is non specific and   can be seen in a variety of conditions including stress response,   acute inflammation, trauma and pregnancy. Correlation with other   laboratory and clinical findings is essential.      Lymph # 07/10/2023 2.3  1.0 - 4.8 K/uL Final    Mono # 07/10/2023 1.1 (H)  0.3 - 1.0 K/uL Final    Eos #  07/10/2023 0.2  0.0 - 0.5 K/uL Final    Baso # 07/10/2023 0.03  0.00 - 0.20 K/uL Final    nRBC 07/10/2023 1 (A)  0 /100 WBC Final    Gran % 07/10/2023 50.7  38.0 - 73.0 % Final    Lymph % 07/10/2023 30.6  18.0 - 48.0 % Final    Mono % 07/10/2023 14.8  4.0 - 15.0 % Final    Eosinophil % 07/10/2023 2.8  0.0 - 8.0 % Final    Basophil % 07/10/2023 0.4  0.0 - 1.9 % Final    Differential Method 07/10/2023 Automated   Final    Sodium 07/10/2023 139  136 - 145 mmol/L Final    Potassium 07/10/2023 4.0  3.5 - 5.1 mmol/L Final    Chloride 07/10/2023 105  95 - 110 mmol/L Final    CO2 07/10/2023 21 (L)  23 - 29 mmol/L Final    Glucose 07/10/2023 144 (H)  70 - 110 mg/dL Final    BUN 07/10/2023 14  8 - 23 mg/dL Final    Creatinine 07/10/2023 1.4  0.5 - 1.4 mg/dL Final    Calcium 07/10/2023 9.5  8.7 - 10.5 mg/dL Final    Total Protein 07/10/2023 7.4  6.0 - 8.4 g/dL Final    Albumin 07/10/2023 3.9  3.5 - 5.2 g/dL Final    Total Bilirubin 07/10/2023 0.3  0.1 - 1.0 mg/dL Final    Comment: For infants and newborns, interpretation of results should be based  on gestational age, weight and in agreement with clinical  observations.    Premature Infant recommended reference ranges:  Up to 24 hours.............<8.0 mg/dL  Up to 48 hours............<12.0 mg/dL  3-5 days..................<15.0 mg/dL  6-29 days.................<15.0 mg/dL      Alkaline Phosphatase 07/10/2023 60  55 - 135 U/L Final    AST 07/10/2023 24  10 - 40 U/L Final    ALT 07/10/2023 31  10 - 44 U/L Final    eGFR 07/10/2023 53.4 (A)  >60 mL/min/1.73 m^2 Final    Anion Gap 07/10/2023 13  8 - 16 mmol/L Final    TSH 07/10/2023 1.459  0.400 - 4.000 uIU/mL Final         Imaging:     CT Abdomen 7/03/23    There are aortic annulus calcifications and coronary artery calcifications present.  There are aortic valve calcifications.  No significant volume of pericardial fluid.  There is a 28 x 26 mm irregular solid zone of masslike consolidation within the left lower lobe, smaller in  extent than at the time of the previous examination (series 2, image 18).  There is an 11 x 14 mm left hilar lymph node present on series 2, image 4.  There are pleural calcifications present within the left lower lobe suggesting previous asbestos exposure.  There is linear atelectasis versus fibrosis in the left lower lobe.     The liver is enlarged.  No new early arterial phase enhancing hepatic mass appreciated.  The gallbladder is unremarkable.  The portal vein is patent.  No intra or extrahepatic biliary dilatation.  There is an 18 mm focus of probable accessory splenic tissue/splenule noted within the left upper quadrant of the abdomen in this patient with surgical absence of the spleen.  The current examination once again demonstrates an approximately 36 x 40 mm hypodense enhancing mass (series 2, image 75) intimately associated with the deep surface of the pancreatic tail, similar in size when compared to the prior PET-CT exam.  There is a postoperative suture line present along the anterior superior margin of the mass along greater curvature of the stomach on series 2, image 68).  There is loss of the normal fat plane between the aforementioned pancreatic tail mass and adjacent greater curvature of the stomach on series 601, image 100, similar to the prior exam.  No new pancreatic mass appreciated.  The adrenal glands are unremarkable.  There is atherosclerotic calcification present within the abdominal aorta and common iliac arteries.  No bulky periaortic or retroperitoneal lymphadenopathy.     There is a lobulated contour of both kidneys with multiple areas of focal cortical scarring noted in both kidneys.  There unchanged renal hypodensities noted bilaterally, most likely cysts.  No enhancing renal mass appreciated.     The imaged appendix is unremarkable.  The visualized loops of bowel show no evidence of inflammation or obstruction.  There is minimal mesenteric haziness present within the fat planes  adjacent to the pancreatic body (for example series 2, image 128-158).  No ascites.  No new peritoneal soft tissue mass.     There is degenerative change of the thoracolumbar spine.  No definite new osseous metastatic disease appreciated.   Assessment:       1. Squamous cell carcinoma of lung, unspecified laterality    2. Other specified disorders involving the immune mechanism, not elsewhere classified    3. Chronic kidney disease, stage 3a    4. Pancreatic mass    5. Hypertension, unspecified type    6. Gout, unspecified cause, unspecified chronicity, unspecified site    7. Hyperlipidemia, unspecified hyperlipidemia type    8. Neuropathy    9. Normocytic anemia    10. Thyroid mass                       Plan:     NSCLC - patient was recently diagnosed with at least stage III [id number] SCC of the left lung  -PET/CT 4/18/23 shows continued left lung mass, mediastinal LAD  -MRI brain 4/18/23 showed no acute findings  -TEMPUS Blood testing showed TP 53 mutation  -TEMPUS tissue testing showed PD-L1 of 20%, TP53, KDM6A, CDKN2a and MTAP  -PORT placed 4/24/23 under the care of Dr. Love  -PT completed 6 cycles of Carboplatin and Paclitaxel on 6/02/23  -Radiation completed 6/07/23  -CT chest on 6/26/23 showed a decrease in LLL mass  -Will proceed with Durvalumab for 1 year of treatment  -Pt can proceed with cycle 1 f Durvalumab    CKD - pt with stage IIIa CKD  -Creatinine 1.4  -Stable  -Will monitor    Pancreatic Mass - seen on CT scans and biopsy during EUS on 03/03/2023 with path showing no evidence of malignancy  -PET/CT 4/18/23 showed uptake in the tail of the pancreas  -Possibly due to chronic pancreatitis  -Pt with stable pancreatic mass on Ct abdomen 7/03/23  -Will monitor    HTN - pt on amlodipine, clonidine, metoprolol  -BP controlled  -Will monitor    Gout - pt on allopurinol  -Currently asymptomatic  -Will monitor    HLD - pt on rosuvastatin, zetia  -Management per PCP    DMII - pt on toujeo, farxiga, and  bydureon  -Management per PCP    Neuropathy - numbness in feet due to diabetes  -Will monitor    Normocytic Anemia - hemoglobin 12.9g/dL on 7/10/23  -Possibly due to chronic disease  -Will monitor    Thyroid Mass - Seen on CT chest  -Thyroid US on 7/03/23 suggestive of bening etiology  -Will monitor    Route Chart for Scheduling    Med Onc Chart Routing      Follow up with physician 2 months. Pt can proceed with treatment.  He will need a CBC, CMP adn TSH in 4 weeks with NP visit and treatment.  He will need the same labs amnd a clinic visit in 8 weeks with appt with me and treatment.   Follow up with RADHIKA 4 weeks.   Infusion scheduling note    Injection scheduling note    Labs    Imaging    Pharmacy appointment    Other referrals          Treatment Plan Information   OP DURVALUMAB 1500 MG Q4W   Lisandro Lawrence MD   Upcoming Treatment Dates - OP DURVALUMAB 1500 MG Q4W    8/8/2023       Chemotherapy       durvalumab (IMFINZI) 1,500 mg in sodium chloride 0.9% SolP 280 mL chemo infusion  9/5/2023       Chemotherapy       durvalumab (IMFINZI) 1,500 mg in sodium chloride 0.9% SolP 280 mL chemo infusion  10/3/2023       Chemotherapy       durvalumab (IMFINZI) 1,500 mg in sodium chloride 0.9% SolP 280 mL chemo infusion  10/31/2023       Chemotherapy       durvalumab (IMFINZI) 1,500 mg in sodium chloride 0.9% SolP 280 mL chemo infusion    Supportive Plan Information  IV FLUIDS AND ELECTROLYTES   Lisandro Lawrence MD   Upcoming Treatment Dates - IV FLUIDS AND ELECTROLYTES    No upcoming days in selected categories.      Lisandro Lawrence MD  [facility]  Hematology and Oncology  [facility]   [address] [place] [address]   O: [phone]  F: [phone]

## 2023-07-11 NOTE — PLAN OF CARE
Problem: Adult Inpatient Plan of Care  Goal: Plan of Care Review  Outcome: Ongoing, Progressing  Flowsheets (Taken 7/11/2023 1632)  Plan of Care Reviewed With:   patient   daughter     Patient tolerated Imfinzi treatment well. Port flushed with blood return, saline locked, and removed. AVS provided and reviewed. Ambulates per self; accompanied by family upon discharge. No acute distress noted.

## 2023-08-08 ENCOUNTER — INFUSION (OUTPATIENT)
Dept: INFUSION THERAPY | Facility: HOSPITAL | Age: 73
End: 2023-08-08
Attending: INTERNAL MEDICINE
Payer: MEDICARE

## 2023-08-08 ENCOUNTER — OFFICE VISIT (OUTPATIENT)
Dept: HEMATOLOGY/ONCOLOGY | Facility: CLINIC | Age: 73
End: 2023-08-08
Payer: MEDICARE

## 2023-08-08 VITALS
DIASTOLIC BLOOD PRESSURE: 74 MMHG | SYSTOLIC BLOOD PRESSURE: 118 MMHG | TEMPERATURE: 97 F | RESPIRATION RATE: 18 BRPM | HEART RATE: 77 BPM | BODY MASS INDEX: 28.22 KG/M2 | WEIGHT: 219.81 LBS | OXYGEN SATURATION: 96 %

## 2023-08-08 VITALS
BODY MASS INDEX: 28.21 KG/M2 | RESPIRATION RATE: 18 BRPM | DIASTOLIC BLOOD PRESSURE: 76 MMHG | HEART RATE: 74 BPM | OXYGEN SATURATION: 96 % | HEIGHT: 74 IN | WEIGHT: 219.81 LBS | SYSTOLIC BLOOD PRESSURE: 165 MMHG | TEMPERATURE: 97 F

## 2023-08-08 DIAGNOSIS — G62.9 NEUROPATHY: ICD-10-CM

## 2023-08-08 DIAGNOSIS — K86.89 PANCREATIC MASS: ICD-10-CM

## 2023-08-08 DIAGNOSIS — E11.42 TYPE 2 DIABETES MELLITUS WITH DIABETIC POLYNEUROPATHY, WITH LONG-TERM CURRENT USE OF INSULIN: ICD-10-CM

## 2023-08-08 DIAGNOSIS — Z79.4 TYPE 2 DIABETES MELLITUS WITH DIABETIC POLYNEUROPATHY, WITH LONG-TERM CURRENT USE OF INSULIN: ICD-10-CM

## 2023-08-08 DIAGNOSIS — C34.90 SQUAMOUS CELL CARCINOMA OF LUNG, UNSPECIFIED LATERALITY: Primary | ICD-10-CM

## 2023-08-08 DIAGNOSIS — E78.5 HYPERLIPIDEMIA, UNSPECIFIED HYPERLIPIDEMIA TYPE: ICD-10-CM

## 2023-08-08 DIAGNOSIS — D64.9 NORMOCYTIC ANEMIA: ICD-10-CM

## 2023-08-08 DIAGNOSIS — N18.31 CHRONIC KIDNEY DISEASE, STAGE 3A: ICD-10-CM

## 2023-08-08 DIAGNOSIS — I10 HYPERTENSION, UNSPECIFIED TYPE: ICD-10-CM

## 2023-08-08 PROCEDURE — 25000003 PHARM REV CODE 250: Mod: PN

## 2023-08-08 PROCEDURE — 99999 PR PBB SHADOW E&M-EST. PATIENT-LVL IV: CPT | Mod: PBBFAC,,,

## 2023-08-08 PROCEDURE — 99215 PR OFFICE/OUTPT VISIT, EST, LEVL V, 40-54 MIN: ICD-10-PCS | Mod: S$PBB,,,

## 2023-08-08 PROCEDURE — 99214 OFFICE O/P EST MOD 30 MIN: CPT | Mod: PBBFAC,PN

## 2023-08-08 PROCEDURE — 99215 OFFICE O/P EST HI 40 MIN: CPT | Mod: S$PBB,,,

## 2023-08-08 PROCEDURE — 63600175 PHARM REV CODE 636 W HCPCS: Mod: JZ,JG,PN

## 2023-08-08 PROCEDURE — 96413 CHEMO IV INFUSION 1 HR: CPT | Mod: PN

## 2023-08-08 PROCEDURE — 99999 PR PBB SHADOW E&M-EST. PATIENT-LVL IV: ICD-10-PCS | Mod: PBBFAC,,,

## 2023-08-08 RX ORDER — SODIUM CHLORIDE 0.9 % (FLUSH) 0.9 %
10 SYRINGE (ML) INJECTION
Status: CANCELLED | OUTPATIENT
Start: 2023-08-08

## 2023-08-08 RX ORDER — EPINEPHRINE 0.3 MG/.3ML
0.3 INJECTION SUBCUTANEOUS ONCE AS NEEDED
Status: CANCELLED | OUTPATIENT
Start: 2023-08-08

## 2023-08-08 RX ORDER — DIPHENHYDRAMINE HYDROCHLORIDE 50 MG/ML
50 INJECTION INTRAMUSCULAR; INTRAVENOUS ONCE AS NEEDED
Status: DISCONTINUED | OUTPATIENT
Start: 2023-08-08 | End: 2023-08-08 | Stop reason: HOSPADM

## 2023-08-08 RX ORDER — DIPHENHYDRAMINE HYDROCHLORIDE 50 MG/ML
50 INJECTION INTRAMUSCULAR; INTRAVENOUS ONCE AS NEEDED
Status: CANCELLED | OUTPATIENT
Start: 2023-08-08

## 2023-08-08 RX ORDER — SODIUM CHLORIDE 0.9 % (FLUSH) 0.9 %
10 SYRINGE (ML) INJECTION
Status: DISCONTINUED | OUTPATIENT
Start: 2023-08-08 | End: 2023-08-08 | Stop reason: HOSPADM

## 2023-08-08 RX ORDER — HEPARIN 100 UNIT/ML
500 SYRINGE INTRAVENOUS
Status: CANCELLED | OUTPATIENT
Start: 2023-08-08

## 2023-08-08 RX ORDER — EPINEPHRINE 0.3 MG/.3ML
0.3 INJECTION SUBCUTANEOUS ONCE AS NEEDED
Status: DISCONTINUED | OUTPATIENT
Start: 2023-08-08 | End: 2023-08-08 | Stop reason: HOSPADM

## 2023-08-08 RX ORDER — SODIUM CHLORIDE 9 MG/ML
500 INJECTION, SOLUTION INTRAVENOUS ONCE
Status: COMPLETED | OUTPATIENT
Start: 2023-08-08 | End: 2023-08-08

## 2023-08-08 RX ADMIN — SODIUM CHLORIDE 500 ML: 9 INJECTION, SOLUTION INTRAVENOUS at 02:08

## 2023-08-08 RX ADMIN — SODIUM CHLORIDE 1500 MG: 9 INJECTION, SOLUTION INTRAVENOUS at 02:08

## 2023-08-22 ENCOUNTER — OFFICE VISIT (OUTPATIENT)
Dept: RADIATION ONCOLOGY | Facility: CLINIC | Age: 73
End: 2023-08-22
Payer: MEDICARE

## 2023-08-22 VITALS
HEIGHT: 74 IN | SYSTOLIC BLOOD PRESSURE: 117 MMHG | BODY MASS INDEX: 28.27 KG/M2 | HEART RATE: 94 BPM | TEMPERATURE: 98 F | OXYGEN SATURATION: 95 % | RESPIRATION RATE: 20 BRPM | WEIGHT: 220.25 LBS | DIASTOLIC BLOOD PRESSURE: 67 MMHG

## 2023-08-22 DIAGNOSIS — C34.92 SQUAMOUS CELL CARCINOMA OF LEFT LUNG: Primary | ICD-10-CM

## 2023-08-22 PROCEDURE — 99999 PR PBB SHADOW E&M-EST. PATIENT-LVL V: CPT | Mod: PBBFAC,,, | Performed by: RADIOLOGY

## 2023-08-22 PROCEDURE — 99215 OFFICE O/P EST HI 40 MIN: CPT | Mod: PBBFAC,PN | Performed by: RADIOLOGY

## 2023-08-22 PROCEDURE — 99024 PR POST-OP FOLLOW-UP VISIT: ICD-10-PCS | Mod: S$PBB,,, | Performed by: RADIOLOGY

## 2023-08-22 PROCEDURE — 99024 POSTOP FOLLOW-UP VISIT: CPT | Mod: S$PBB,,, | Performed by: RADIOLOGY

## 2023-08-22 PROCEDURE — 99999 PR PBB SHADOW E&M-EST. PATIENT-LVL V: ICD-10-PCS | Mod: PBBFAC,,, | Performed by: RADIOLOGY

## 2023-08-22 NOTE — PROGRESS NOTES
[facility] Department of Radiation Oncology  Follow Up Visit Note    Diagnosis:  Feng Thakkar is a 72 y.o. 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 On maintenance Imfinzi.      Oncologic History:  Oncology History   Squamous cell carcinoma of lung   3/31/2023 Initial Diagnosis    Squamous cell carcinoma of lung     3/31/2023 Cancer Staged    Staging form: Lung, AJCC 8th Edition  - Clinical: Stage IIIA (cT2b, cN2, cM0)     4/27/2023 - 6/7/2023 Radiation Therapy    Treating physician: Leonor Hadley  Total Dose: 60 Gy  Fractions: 30  Treatment Site Ref. ID Energy Dose/Fx (Gy) #Fx Dose Correction (Gy) Total Dose (Gy) Start Date End Date Elapsed Days   IM Lung PTV 6X 2 6 / 30 0 12 4/27/2023 5/4/2023 7   IM Lung_New PTV 6X 2 24 / 24 0 48 5/5/2023 6/7/2023 33      4/28/2023 - 6/2/2023 Chemotherapy    Treatment Summary   Plan Name: OP NSCLC PACLITAXEL + CARBOPLATIN (AUC) QW + RADIATION  Treatment Goal: Curative  Status: Inactive  Start Date: 4/28/2023  End Date: 6/2/2023  Provider: Lisandro Lawrence MD  Chemotherapy: CARBOplatin (PARAPLATIN) 185 mg in sodium chloride 0.9% 303.5 mL chemo infusion, 185 mg (100 % of original dose 183.4 mg), Intravenous, Clinic/HOD 1 time, 6 of 6 cycles  Dose modification:   (original dose 183.4 mg, Cycle 1)  Administration: 185 mg (4/28/2023), 185 mg (5/5/2023), 190 mg (5/12/2023), 190 mg (5/19/2023), 180 mg (5/26/2023), 190 mg (6/2/2023)  PACLitaxeL (TAXOL) 45 mg/m2 = 102 mg in sodium chloride 0.9% 250 mL chemo infusion, 45 mg/m2 = 102 mg, Intravenous, Clinic/HOD 1 time, 6 of 6 cycles  Administration: 102 mg (4/28/2023), 102 mg (5/5/2023), 102 mg (5/12/2023), 102 mg (5/19/2023), 102 mg (5/26/2023), 102 mg (6/2/2023)     7/11/2023 -  Chemotherapy    Treatment Summary   Plan Name: OP DURVALUMAB 1500 MG Q4W  Treatment Goal: Curative  Status: Active  Start  Date: 7/11/2023  End Date: 6/11/2024 (Planned)  Provider: Lisandro Lawrence MD  Chemotherapy: [No matching medication found in this treatment plan]          Interval History  The patient presents today for a regularly scheduled follow up visit.  He was last seen in our clinic on 6/7/23 at completion of radiation therapy.   Since that time, he has been feeling well and recovering well.  Breathing well.  Swallowing improved, now no pain (off all pain medications), no dysphagia. Notes some xerostomia x 1-2 weeks.       6/26/23 CT chest with contrast: no worrisome change    7/3/23 CT A/P: interval decrease in size of known LLL squamous cell carcinoma. Stable pancreatic mass (previously PET negative)    HP/I:  presented in January 2023 with complaint of abdominal pain. Outside imaging noted pancreatic mass and lungmass.       2/24/23 CT A/P: 4.7cm soft tissue mass associated with tail of pancreas; 4.3cm enhancing mass at left lung base.     3/1/23 CT Chest: 4.5 x 4.3 x 3.8cm mass with irregular lobulated margins; no enlarged lymph nodes, several micronodules which could represent intrapulmonary lymph nodes     3/3/23 Pancreatic body mass FNA: no evidence of malignancy; abundant hematopoetic and dendritic cells consistent with accessory splenic tissue     3/17/23 EBUS FNA:  Left lower lobe mass: +squamous cell carcinoma, moderately diff  + metastases Station 7 and 11L    4/18/23 MRI brain: no evidence of intracranial disease spread    4/18/23 PET/CT: There is a markedly hypermetabolic lobulated subpleural left lower lobe mass consistent with the patient's known squamous cell carcinoma.  The mass is best measured on the fused PET-CT axial images and on image 115 measures 4.5 x 3.9 cm with a max SUV of 14.0.  There also hypermetabolic lymph nodes in the left hilar region and subcarinal region most consistent with neoplastic adenopathy.  A left hilar node on image 111 measures 2.2 x 1.9 cm with a max SUV of 14.3.    Review of  "Systems   Review of Systems   Constitutional:  Negative for chills, fever, malaise/fatigue and weight loss.   Eyes:  Negative for blurred vision and double vision.   Respiratory:  Negative for cough and shortness of breath.    Cardiovascular:  Negative for chest pain.   Gastrointestinal:  Negative for nausea and vomiting.   Neurological:  Positive for sensory change (right foot 2/2 diabetes). Negative for dizziness, speech change, focal weakness and headaches.       Social History:  Social History     Tobacco Use    Smoking status: Former     Current packs/day: 0.00     Average packs/day: 1.5 packs/day for 54.0 years (81.0 ttl pk-yrs)     Types: Cigarettes     Start date: 6/1/1968     Quit date: 10/20/2012     Years since quitting: 10.8    Smokeless tobacco: Former   Substance Use Topics    Alcohol use: Yes     Alcohol/week: 3.0 standard drinks of alcohol     Types: 3 Cans of beer per week     Comment: 3 a day    Drug use: Never       Family History:  Cancer-related family history includes Breast cancer in his sister.    Exam:  Vitals:    08/22/23 1259   BP: 117/67   BP Location: Left arm   Patient Position: Sitting   Pulse: 94   Resp: 20   Temp: 97.9 °F (36.6 °C)   SpO2: 95%   Weight: 99.9 kg (220 lb 3.8 oz)   Height: 6' 2" (1.88 m)     Constitutional: Pleasant 72 y.o. male in no acute distress.  Well nourished. Well groomed.   HEENT: Normocephalic and atraumatic   Lungs: No audible wheezing.  Normal effort.   Musculoskeletal: No gross MSK deformities. Ambulates  Skin: No rashes appreciated.   Psych: Alert and oriented with appropriate mood and affect.  Neuro:   Grossly normal.    Data Review:    Independent Interpretation of Test(s): CT chest and abd from 6/29 and 7/3/23 was personally reviewed as detailed above    Discussion with Another Provider or Non-healthcare Professional:  I discussed this case with Dr. Lawrence in order to coordinate care.      Assessment:  72 year old male with Stage IIIA (cT2b N2) squamous " cell carcinoma of the left lower lobe lung, status post concurrent chemotherapy-radiation completed 6/7/23. On maintenance Imfinzi.   Recovering well from acute radiation therapy-related toxicites  ECOG: (0) Fully active, able to carry on all predisease performance without restriction    Plan:  Follow up in 3 months with CT Chest (imaging per Dr. Lawrence as patient on immunotherapy)   He was given our contact information, and he was told that he could call our clinic at anytime if he has any questions or concerns.  Follow up with other providers as directed

## 2023-08-29 ENCOUNTER — TELEPHONE (OUTPATIENT)
Dept: HEMATOLOGY/ONCOLOGY | Facility: CLINIC | Age: 73
End: 2023-08-29
Payer: MEDICARE

## 2023-08-29 NOTE — TELEPHONE ENCOUNTER
Spoke with the patient and confirmed IO appt on Friday. We discussed changing appt to following week chairside and he voiced acceptance.

## 2023-09-05 ENCOUNTER — OFFICE VISIT (OUTPATIENT)
Dept: HEMATOLOGY/ONCOLOGY | Facility: CLINIC | Age: 73
End: 2023-09-05
Payer: MEDICARE

## 2023-09-05 ENCOUNTER — INFUSION (OUTPATIENT)
Dept: INFUSION THERAPY | Facility: HOSPITAL | Age: 73
End: 2023-09-05
Attending: INTERNAL MEDICINE
Payer: MEDICARE

## 2023-09-05 VITALS
BODY MASS INDEX: 27.64 KG/M2 | SYSTOLIC BLOOD PRESSURE: 124 MMHG | RESPIRATION RATE: 18 BRPM | HEIGHT: 74 IN | TEMPERATURE: 98 F | WEIGHT: 215.38 LBS | DIASTOLIC BLOOD PRESSURE: 74 MMHG | HEART RATE: 78 BPM

## 2023-09-05 VITALS
RESPIRATION RATE: 16 BRPM | TEMPERATURE: 97 F | DIASTOLIC BLOOD PRESSURE: 80 MMHG | WEIGHT: 215.38 LBS | OXYGEN SATURATION: 95 % | HEART RATE: 102 BPM | SYSTOLIC BLOOD PRESSURE: 138 MMHG | HEIGHT: 74 IN | BODY MASS INDEX: 27.64 KG/M2

## 2023-09-05 DIAGNOSIS — Z79.4 TYPE 2 DIABETES MELLITUS WITH DIABETIC POLYNEUROPATHY, WITH LONG-TERM CURRENT USE OF INSULIN: ICD-10-CM

## 2023-09-05 DIAGNOSIS — E78.5 HYPERLIPIDEMIA, UNSPECIFIED HYPERLIPIDEMIA TYPE: ICD-10-CM

## 2023-09-05 DIAGNOSIS — E11.42 TYPE 2 DIABETES MELLITUS WITH DIABETIC POLYNEUROPATHY, WITH LONG-TERM CURRENT USE OF INSULIN: ICD-10-CM

## 2023-09-05 DIAGNOSIS — I10 HYPERTENSION, UNSPECIFIED TYPE: ICD-10-CM

## 2023-09-05 DIAGNOSIS — C34.90 SQUAMOUS CELL CARCINOMA OF LUNG, UNSPECIFIED LATERALITY: Primary | ICD-10-CM

## 2023-09-05 DIAGNOSIS — C34.90 SQUAMOUS CELL CARCINOMA OF LUNG, UNSPECIFIED LATERALITY: ICD-10-CM

## 2023-09-05 DIAGNOSIS — R79.89 ELEVATED LFTS: ICD-10-CM

## 2023-09-05 DIAGNOSIS — D64.9 NORMOCYTIC ANEMIA: ICD-10-CM

## 2023-09-05 DIAGNOSIS — N18.31 CHRONIC KIDNEY DISEASE, STAGE 3A: ICD-10-CM

## 2023-09-05 DIAGNOSIS — G62.9 NEUROPATHY: ICD-10-CM

## 2023-09-05 DIAGNOSIS — K86.89 PANCREATIC MASS: ICD-10-CM

## 2023-09-05 PROCEDURE — 99214 OFFICE O/P EST MOD 30 MIN: CPT | Mod: PBBFAC,PN,25

## 2023-09-05 PROCEDURE — 99213 PR OFFICE/OUTPT VISIT, EST, LEVL III, 20-29 MIN: ICD-10-PCS | Mod: S$PBB,,,

## 2023-09-05 PROCEDURE — 96413 CHEMO IV INFUSION 1 HR: CPT | Mod: PN

## 2023-09-05 PROCEDURE — 25000003 PHARM REV CODE 250: Mod: PN

## 2023-09-05 PROCEDURE — 99999 PR PBB SHADOW E&M-EST. PATIENT-LVL II: CPT | Mod: PBBFAC,,, | Performed by: NURSE PRACTITIONER

## 2023-09-05 PROCEDURE — 99215 PR OFFICE/OUTPT VISIT, EST, LEVL V, 40-54 MIN: ICD-10-PCS | Mod: S$PBB,,, | Performed by: NURSE PRACTITIONER

## 2023-09-05 PROCEDURE — 99212 OFFICE O/P EST SF 10 MIN: CPT | Mod: PBBFAC,25,27,PN | Performed by: NURSE PRACTITIONER

## 2023-09-05 PROCEDURE — 99215 OFFICE O/P EST HI 40 MIN: CPT | Mod: S$PBB,,, | Performed by: NURSE PRACTITIONER

## 2023-09-05 PROCEDURE — 99999 PR PBB SHADOW E&M-EST. PATIENT-LVL IV: CPT | Mod: PBBFAC,,,

## 2023-09-05 PROCEDURE — 63600175 PHARM REV CODE 636 W HCPCS: Mod: JZ,JG,PN

## 2023-09-05 PROCEDURE — 99999 PR PBB SHADOW E&M-EST. PATIENT-LVL IV: ICD-10-PCS | Mod: PBBFAC,,,

## 2023-09-05 PROCEDURE — 99999 PR PBB SHADOW E&M-EST. PATIENT-LVL II: ICD-10-PCS | Mod: PBBFAC,,, | Performed by: NURSE PRACTITIONER

## 2023-09-05 PROCEDURE — 99213 OFFICE O/P EST LOW 20 MIN: CPT | Mod: S$PBB,,,

## 2023-09-05 RX ORDER — SODIUM CHLORIDE 0.9 % (FLUSH) 0.9 %
10 SYRINGE (ML) INJECTION
Status: DISCONTINUED | OUTPATIENT
Start: 2023-09-05 | End: 2023-09-05 | Stop reason: HOSPADM

## 2023-09-05 RX ORDER — DIPHENHYDRAMINE HYDROCHLORIDE 50 MG/ML
50 INJECTION INTRAMUSCULAR; INTRAVENOUS ONCE AS NEEDED
Status: DISCONTINUED | OUTPATIENT
Start: 2023-09-05 | End: 2023-09-05 | Stop reason: HOSPADM

## 2023-09-05 RX ORDER — HEPARIN 100 UNIT/ML
500 SYRINGE INTRAVENOUS
Status: CANCELLED | OUTPATIENT
Start: 2023-09-05

## 2023-09-05 RX ORDER — HEPARIN 100 UNIT/ML
500 SYRINGE INTRAVENOUS
Status: DISCONTINUED | OUTPATIENT
Start: 2023-09-05 | End: 2023-09-05 | Stop reason: HOSPADM

## 2023-09-05 RX ORDER — DIPHENHYDRAMINE HYDROCHLORIDE 50 MG/ML
50 INJECTION INTRAMUSCULAR; INTRAVENOUS ONCE AS NEEDED
Status: CANCELLED | OUTPATIENT
Start: 2023-09-05

## 2023-09-05 RX ORDER — SODIUM CHLORIDE 0.9 % (FLUSH) 0.9 %
10 SYRINGE (ML) INJECTION
Status: CANCELLED | OUTPATIENT
Start: 2023-09-05

## 2023-09-05 RX ORDER — EPINEPHRINE 0.3 MG/.3ML
0.3 INJECTION SUBCUTANEOUS ONCE AS NEEDED
Status: DISCONTINUED | OUTPATIENT
Start: 2023-09-05 | End: 2023-09-05 | Stop reason: HOSPADM

## 2023-09-05 RX ORDER — EPINEPHRINE 0.3 MG/.3ML
0.3 INJECTION SUBCUTANEOUS ONCE AS NEEDED
Status: CANCELLED | OUTPATIENT
Start: 2023-09-05

## 2023-09-05 RX ADMIN — SODIUM CHLORIDE 1500 MG: 9 INJECTION, SOLUTION INTRAVENOUS at 02:09

## 2023-09-05 NOTE — PROGRESS NOTES
Feng Thakkar  72 y.o. is here to seek an integrative approach to discuss side effects related to lung cancer treatment. Feng Thakkar  was referred by MARGARET Lawrence NP     HPI  Mr. Thakkar is here today for his first chemotherapy. He reports he only sleeps approximately 4-5 hours at night and he naps during the day after his meals. This has been his routine for about 7-8 years. He reports he is fine with his sleep routine and he has the energy to do what he wants. He reports a good appetite, but he does eat less. He reports he is active, but not as active as he used to be.   His wife of 21 years  in 2019. He has 2 children. He has a good support sytem. He is retired.     2023  Mr. Thakkar is here today getting chemotherapy. He reports a sore throat which started Tuesday after radiation. He is not eating well due to pain. He states he is drinking a boost daily, although today it took 3 hours to drink one boost. His daughter in law picked up the medications given by Dr. Hadley and Dr. Lawrence today. He states before Tuesday he was doing very well.  He continues sleeping approximately 5 hours nightly, but this is his routine and does not want to change it at this time. He has a higher stress and anxiety level due to pain and the decrease ability to eat and drink.     Today's Visit  Mr. Thakkar reports he is doing better since finishing radiation in . He had a sore throat and was only able to drink boosts. He now has a sore throat occasionally, but not when he eats. He has a good appetite and is eating healthy.  He reports over the holiday weekend he was able to eat a variety of foods, most of which he usually does not eat due to Diabetes. He is drinking fluids throughout the day. He reports he does not sleep well and then he takes a nap during the day. He likes his sleeping routine. He reports now that his pain is gone, he does not have any anxiety. Overall, he states he is doing very well.       Pillars  Assessment    Sleep  How many hours of sleep per night? 5 hours at night 1-2 hour nap  Do you have trouble falling asleep, staying asleep or waking up earlier than you need to? yes  Do you have daytime fatigue? no  Do you need medication for sleep? no  Do you use any supplements or other interventions for sleep? no    Resilience  Rate your current level of stress- low    Nutrition   Food allergies or sensitivities: no  Do you adhere to a particular type of diet? no  Do you have any concerns with your eating habits? No    Exercise  How would you describe your physical activity level? low  What do you do for physical activity? Nothing at this time due to pain to throat and abdomen    Past Medical History  Past Medical History:   Diagnosis Date    Diabetes mellitus     Gout, unspecified     High cholesterol     HTN (hypertension)     Lung cancer         Past Surgical History   Past Surgical History:   Procedure Laterality Date    ENDOSCOPIC ULTRASOUND OF UPPER GASTROINTESTINAL TRACT N/A 3/3/2023    Procedure: ULTRASOUND, UPPER GI TRACT, ENDOSCOPIC;  Surgeon: Cora Mcgrath MD;  Location: [facility];  Service: Endoscopy;  Laterality: N/A;  2/24/23-Instructions mailed to address on file-    INSERTION OF TUNNELED CENTRAL VENOUS CATHETER (CVC) WITH SUBCUTANEOUS PORT N/A 4/24/2023    Procedure: [id number];  Surgeon: Paulino Love MD;  Location: [facility];  Service: General;  Laterality: N/A;    PANCREATECTOMY      ROBOTIC BRONCHOSCOPY N/A 3/17/2023    Procedure: ROBOTIC BRONCHOSCOPY;  Surgeon: Cristiane Albright MD;  Location: [address];  Service: Pulmonary;  Laterality: N/A;      Family History   Family History   Problem Relation Age of Onset    Breast cancer Sister         60      Social History  Social History     Socioeconomic History    Marital status:    Tobacco Use    Smoking status: Former     Current packs/day: 0.00     Average packs/day: 1.5 packs/day for 54.0 years (81.0 ttl pk-yrs)      Types: Cigarettes     Start date: 6/1/1968     Quit date: 10/20/2012     Years since quitting: 10.8    Smokeless tobacco: Former   Substance and Sexual Activity    Alcohol use: Yes     Alcohol/week: 3.0 standard drinks of alcohol     Types: 3 Cans of beer per week     Comment: 3 a day    Drug use: Never      Allergies  Review of patient's allergies indicates:  No Known Allergies   Current Medications:    Current Outpatient Medications:     allopurinoL (ZYLOPRIM) 100 MG tablet, Take 100 mg by mouth once daily., Disp: , Rfl:     amLODIPine (NORVASC) 2.5 MG tablet, Take 2.5 mg by mouth once daily., Disp: , Rfl:     BYDUREON BCISE 2 mg/0.85 mL AtIn, Inject 2 mg into the skin every 7 days. Every Friday, Disp: , Rfl:     cloNIDine (CATAPRES) 0.2 MG tablet, Take 0.2 mg by mouth once. Daily, Disp: , Rfl:     duke's soln (benadryl 30 mL, mylanta 30 mL, LIDOcaine 30 mL, nystatin 30 mL) 120mL, Take 10 mLs by mouth 4 (four) times daily. (Patient not taking: Reported on 9/5/2023), Disp: 500 mL, Rfl: 2    ezetimibe (ZETIA) 10 mg tablet, Take 10 mg by mouth once daily., Disp: , Rfl:     FARXIGA 10 mg tablet, Take 10 mg by mouth every morning., Disp: , Rfl:     finasteride (PROSCAR) 5 mg tablet, Take 5 mg by mouth once daily., Disp: , Rfl:     FREESTYLE TEST Strp, USE 1 STRIP TO CHECK GLUCOSE ONCE DAILY, Disp: , Rfl:     HYDROcodone-acetaminophen (NORCO) 5-325 mg per tablet, Take 1 tablet by mouth every 6 (six) hours as needed for Pain., Disp: 90 tablet, Rfl: 0    LIDOCAINE VISCOUS 2 % solution, , Disp: , Rfl:     LIDOcaine-prilocaine (EMLA) cream, Apply topically to port site one hour prior to access, cover, Disp: 30 g, Rfl: 1    M-DRYL 12.5 mg/5 mL liquid, Take by mouth., Disp: , Rfl:     metoprolol succinate (TOPROL-XL) 100 MG 24 hr tablet, Take 100 mg by mouth once daily., Disp: , Rfl:     omeprazole (PRILOSEC) 40 MG capsule, Take 1 capsule (40 mg total) by mouth once daily., Disp: 30 capsule, Rfl: 1    ondansetron (ZOFRAN) 8  MG tablet, Take 1 tablet (8 mg total) by mouth every 8 (eight) hours as needed for Nausea., Disp: 30 tablet, Rfl: 2    promethazine (PHENERGAN) 25 MG tablet, Take 1 tablet (25 mg total) by mouth every 6 (six) hours as needed for Nausea., Disp: 30 tablet, Rfl: 0    rosuvastatin (CRESTOR) 40 MG Tab, Take 10 mg by mouth every evening., Disp: , Rfl:     sucralfate (CARAFATE) 100 mg/mL suspension, Take 10 mLs (1 g total) by mouth 4 (four) times daily. (Patient not taking: Reported on 2023), Disp: 500 mL, Rfl: 1    TOUJEO MAX U-300 SOLOSTAR 300 unit/mL (3 mL) insulin pen, Inject 30 Units into the skin every evening., Disp: , Rfl:     TOUJEO SOLOSTAR U-300 INSULIN 300 unit/mL (1.5 mL) InPn pen, SMARTSI Unit(s) SUB-Q Every Evening, Disp: , Rfl:   No current facility-administered medications for this visit.    Facility-Administered Medications Ordered in Other Visits:     alteplase injection 2 mg, 2 mg, Intra-Catheter, PRN, Naomi Morales NP    diphenhydrAMINE injection 50 mg, 50 mg, Intravenous, Once PRN, Naomi Morales NP    durvalumab (IMFINZI) 1,500 mg in sodium chloride 0.9% SolP 315 mL chemo infusion, 1,500 mg, Intravenous, 1 time in Clinic/HOD, Naomi Morales NP    EPINEPHrine (EPIPEN) 0.3 mg/0.3 mL pen injection 0.3 mg, 0.3 mg, Intramuscular, Once PRN, Naomi Morales NP    heparin, porcine (PF) 100 unit/mL injection flush 500 Units, 500 Units, Intravenous, PRN, Naomi Morales NP    hydrocortisone sodium succinate injection 100 mg, 100 mg, Intravenous, Once PRN, Naomi Morales NP    sodium chloride 0.9% 100 mL flush bag, , Intravenous, 1 time in Clinic/HOD, Naomi Morales NP    sodium chloride 0.9% flush 10 mL, 10 mL, Intravenous, PRN, Naomi Morales NP     Review of Systems  Review of Systems   Constitutional: Negative.    HENT: Negative.     Eyes: Negative.    Respiratory: Negative.     Cardiovascular: Negative.    Gastrointestinal: Negative.    Genitourinary: Negative.    Musculoskeletal: Negative.    Skin:  Negative.    Neurological: Negative.    Endo/Heme/Allergies: Negative.    Psychiatric/Behavioral: Negative.          Physical Exam      /74  HR 78  RR 18  Temp 98.0  O2 95%      Physical Exam  Vitals reviewed.   Constitutional:       Appearance: Normal appearance.   Neurological:      Mental Status: He is alert.   Psychiatric:         Mood and Affect: Mood normal.         Behavior: Behavior normal.        ASSESSMENT :  1. Squamous cell carcinoma of lung, unspecified laterality        PLAN:  Reviewed all information discussed at today's visit and all questions were answered.    Counseled on healthy lifestyle and behavior modification  See Nutrition as needed during treatment.    I discussed and recommended the following support services:  [employer] [employer] [facility] [employer]   Music and Relaxation therapy   Meditation    Follow up with Integrative Services in 6 months    I spent a total of 40 minutes on the day of the visit.This includes face to face time and non-face to face time preparing to see the patient (eg, review of tests), obtaining and/or reviewing separately obtained history, documenting clinical information in the electronic or other health record, independently interpreting results and communicating results to the patient/family/caregiver, or care coordinator.

## 2023-09-05 NOTE — PROGRESS NOTES
PATIENT: Feng Thakkar  MRN: [id number]  DATE: 9/5/2023      Diagnosis:   1. Squamous cell carcinoma of lung, unspecified laterality    2. Chronic kidney disease, stage 3a    3. Pancreatic mass    4. Hypertension, unspecified type    5. Hyperlipidemia, unspecified hyperlipidemia type    6. Type 2 diabetes mellitus with diabetic polyneuropathy, with long-term current use of insulin    7. Normocytic anemia    8. Neuropathy    9. Elevated LFTs            Chief Complaint: Squamous cell carcinoma of lung, unspecified laterality (Follow up with labs)    Subjective:    Interval History: Mr. Thakkar is a 72 y.o. male with Gout, HLD, HTN who presents for follow up of NSCLC and consideration of C3D1 Imfinzi. He is alone in clinic today.    Endorses SOB with exertion. Last week noticed a sore throat that lasted only about a day, he did not take anything for this.  Blood sugar and LFTs elevated over baseline today, patient did have some alcohol and sugary foods over the weekend.   The patient denies CP, cough, abdominal pain, nausea, vomiting, constipation, diarrhea. The patient denies fever, chills, night sweats, weight loss, new lumps or bumps, easy bruising or bleeding.       Prior History:    01/05/2023 The patient initially developed abdominal pain on and underwent CT of the abdomen showing a mass in the left lung base measuring 3.9 x 2.9 cm; 3 x 3.8 cm mass along the pancreatic tail; and shotty retroperitoneal lymph nodes.    02/24/2023 The patient underwent repeat CT abdomen and pelvis on howing a 4.3 x 4 solid enhancing mass of the left lung base; thickened bladder wall; prostate gland measuring 45.1 x 4.6 cm; abnormal sclerosis in the left femoral head measuring 2.8 x 1.4 x 1.3 cm; and additional soft tissue densities in the posterior left subdiaphragmatic region measuring up to 16 mm.    03/01/2023 CT chest showed a 2 cm low-density lesion in the right lobe of the thyroid gland; 4.5 x 4.3 x 3.8 cm mass in the left lower  lobe; several micro nodules; Na soft tissue mass adjacent to the pancreatic tail.  Patient underwent 03/03/2023 EUS showing a 3.5 cm and 1.8 cm round pancreatic tail lesion.  Biopsy returned results showing no evidence of malignancy and abundant hematopoietic elements and dendritic cells.    03/17/2023 Patient then underwent EBUS under the care of Dr. Albright showing squamous cell carcinoma in biopsy of the left lower lung lesion; squamous cell carcinoma and station 7 lymph node; positive 11 L lymph node for squamous cell carcinoma.  04/18/2023 PET/CT showing a markedly hypermetabolic lobulated subpleural left lower lobe mass measuring 4.5 x 3.9 cm with hypermetabolic lymph nodes in the left hilar region measuring 2.2 x 1.9 cm; and a 4.2 x 3.6 walk circumscribed mass in the pancreatic tail.    04/18/2023  MRI brain showed no acute findings.  -4/28/23 began Carboplatin/Paclitaxel  -PT completed 6 cycles of Carboplatin and Paclitaxel on 6/02/23  -Radiation completed 6/07/23  -CT chest on 6/26/23 showed a decrease in LLL mass  -6/26/23 CT chest showing a large right thyroid mass measuring at least 2.6 cm; left renal hypodensity measuring 11 mm favored to be a cyst; significant decrease in size of left lower lobe consolidative mass now measuring 2.4 x 2.3 cm; stable 3 mm consolidative nodule in the left upper lobe.    Oncology History   Squamous cell carcinoma of lung   3/31/2023 Initial Diagnosis    Squamous cell carcinoma of lung     3/31/2023 Cancer Staged    Staging form: Lung, AJCC 8th Edition  - Clinical: Stage IIIA (cT2b, cN2, cM0)     4/27/2023 - 6/7/2023 Radiation Therapy    Treating physician: Leonor Hadley  Total Dose: 60 Gy  Fractions: 30  Treatment Site Ref. ID Energy Dose/Fx (Gy) #Fx Dose Correction (Gy) Total Dose (Gy) Start Date End Date Elapsed Days   IM Lung PTV 6X 2 6 / 30 0 12 4/27/2023 5/4/2023 7   IM Lung_New PTV 6X 2 24 / 24 0 48 5/5/2023 6/7/2023 33      4/28/2023 - 6/2/2023 Chemotherapy     Treatment Summary   Plan Name: OP NSCLC PACLITAXEL + CARBOPLATIN (AUC) QW + RADIATION  Treatment Goal: Curative  Status: Inactive  Start Date: 4/28/2023  End Date: 6/2/2023  Provider: Lisandro Lawrence MD  Chemotherapy: CARBOplatin (PARAPLATIN) 185 mg in sodium chloride 0.9% 303.5 mL chemo infusion, 185 mg (100 % of original dose 183.4 mg), Intravenous, Clinic/HOD 1 time, 6 of 6 cycles  Dose modification:   (original dose 183.4 mg, Cycle 1)  Administration: 185 mg (4/28/2023), 185 mg (5/5/2023), 190 mg (5/12/2023), 190 mg (5/19/2023), 180 mg (5/26/2023), 190 mg (6/2/2023)  PACLitaxeL (TAXOL) 45 mg/m2 = 102 mg in sodium chloride 0.9% 250 mL chemo infusion, 45 mg/m2 = 102 mg, Intravenous, Clinic/HOD 1 time, 6 of 6 cycles  Administration: 102 mg (4/28/2023), 102 mg (5/5/2023), 102 mg (5/12/2023), 102 mg (5/19/2023), 102 mg (5/26/2023), 102 mg (6/2/2023)     7/11/2023 -  Chemotherapy    Treatment Summary   Plan Name: OP DURVALUMAB 1500 MG Q4W  Treatment Goal: Curative  Status: Active  Start Date: 7/11/2023  End Date: 6/11/2024 (Planned)  Provider: Lisandro Lawrence MD  Chemotherapy: [No matching medication found in this treatment plan]          Past Medical History:   Past Medical History:   Diagnosis Date    Diabetes mellitus     Gout, unspecified     High cholesterol     HTN (hypertension)     Lung cancer        Past Surgical HIstory:   Past Surgical History:   Procedure Laterality Date    ENDOSCOPIC ULTRASOUND OF UPPER GASTROINTESTINAL TRACT N/A 3/3/2023    Procedure: ULTRASOUND, UPPER GI TRACT, ENDOSCOPIC;  Surgeon: Cora Mcgrath MD;  Location: [facility];  Service: Endoscopy;  Laterality: N/A;  2/24/23-Instructions mailed to address on file-DS    INSERTION OF TUNNELED CENTRAL VENOUS CATHETER (CVC) WITH SUBCUTANEOUS PORT N/A 4/24/2023    Procedure: [id number]-N-CATH;  Surgeon: Paulino Love MD;  Location: [facility];  Service: General;  Laterality: N/A;    PANCREATECTOMY      ROBOTIC BRONCHOSCOPY N/A  3/17/2023    Procedure: ROBOTIC BRONCHOSCOPY;  Surgeon: Cristiane Albright MD;  Location: [facility] OR [address] [place] [address];  Service: Pulmonary;  Laterality: N/A;       Family History:   Family History   Problem Relation Age of Onset    Breast cancer Sister         60       Social History:  reports that he quit smoking about 10 years ago. His smoking use included cigarettes. He started smoking about 55 years ago. He has a 81.0 pack-year smoking history. He has quit using smokeless tobacco. He reports current alcohol use of about 3.0 standard drinks of alcohol per week. He reports that he does not use drugs.    Allergies:  Review of patient's allergies indicates:  No Known Allergies    Medications:  Current Outpatient Medications   Medication Sig Dispense Refill    allopurinoL (ZYLOPRIM) 100 MG tablet Take 100 mg by mouth once daily.      amLODIPine (NORVASC) 2.5 MG tablet Take 2.5 mg by mouth once daily.      BYDUREON BCISE 2 mg/0.85 mL AtIn Inject 2 mg into the skin every 7 days. Every Friday      cloNIDine (CATAPRES) 0.2 MG tablet Take 0.2 mg by mouth once. Daily      ezetimibe (ZETIA) 10 mg tablet Take 10 mg by mouth once daily.      FARXIGA 10 mg tablet Take 10 mg by mouth every morning.      finasteride (PROSCAR) 5 mg tablet Take 5 mg by mouth once daily.      FREESTYLE TEST Strp USE 1 STRIP TO CHECK GLUCOSE ONCE DAILY      HYDROcodone-acetaminophen (NORCO) 5-325 mg per tablet Take 1 tablet by mouth every 6 (six) hours as needed for Pain. 90 tablet 0    LIDOCAINE VISCOUS 2 % solution       LIDOcaine-prilocaine (EMLA) cream Apply topically to port site one hour prior to access, cover 30 g 1    metoprolol succinate (TOPROL-XL) 100 MG 24 hr tablet Take 100 mg by mouth once daily.      ondansetron (ZOFRAN) 8 MG tablet Take 1 tablet (8 mg total) by mouth every 8 (eight) hours as needed for Nausea. 30 tablet 2    promethazine (PHENERGAN) 25 MG tablet Take 1 tablet (25 mg total) by mouth every 6 (six) hours as needed for Nausea. 30 tablet  "0    rosuvastatin (CRESTOR) 40 MG Tab Take 10 mg by mouth every evening.      TOUJEO SOLOSTAR U-300 INSULIN 300 unit/mL (1.5 mL) InPn pen SMARTSI Unit(s) SUB-Q Every Evening      duke's soln (benadryl 30 mL, mylanta 30 mL, LIDOcaine 30 mL, nystatin 30 mL) 120mL Take 10 mLs by mouth 4 (four) times daily. (Patient not taking: Reported on 2023) 500 mL 2    M-DRYL 12.5 mg/5 mL liquid Take by mouth.      omeprazole (PRILOSEC) 40 MG capsule Take 1 capsule (40 mg total) by mouth once daily. 30 capsule 1    sucralfate (CARAFATE) 100 mg/mL suspension Take 10 mLs (1 g total) by mouth 4 (four) times daily. (Patient not taking: Reported on 2023) 500 mL 1    TOUJEO MAX U-300 SOLOSTAR 300 unit/mL (3 mL) insulin pen Inject 30 Units into the skin every evening.       No current facility-administered medications for this visit.       Review of Systems   Constitutional:  Negative for fatigue and fever.   HENT:  Negative for trouble swallowing.    Respiratory:  Negative for cough and shortness of breath.    Cardiovascular:  Negative for chest pain and palpitations.   Gastrointestinal:  Negative for abdominal pain, diarrhea and nausea.   Genitourinary:  Negative for dysuria.   Musculoskeletal:  Negative for arthralgias.   Skin:  Negative for rash.   Neurological:  Negative for headaches.   Hematological:  Negative for adenopathy.   Psychiatric/Behavioral:  The patient is not nervous/anxious.        ECOG Performance Status:   ECOG SCORE    0 - Fully active-able to carry on all pre-disease performance without restriction         Objective:      Vitals:   Vitals:    23 1344   BP: 138/80   BP Location: Right arm   Patient Position: Sitting   BP Method: Medium (Manual)   Pulse: 102   Resp: 16   Temp: 97.1 °F (36.2 °C)   TempSrc: Temporal   SpO2: 95%   Weight: 97.7 kg (215 lb 6.2 oz)   Height: 6' 2" (1.88 m)         BMI: Body mass index is 27.65 kg/m².    Physical Exam  Vitals reviewed.   Constitutional:       General: He " is not in acute distress.     Appearance: He is not diaphoretic.   HENT:      Head: Normocephalic and atraumatic.      Mouth/Throat:      Mouth: Mucous membranes are moist.      Pharynx: No oropharyngeal exudate.   Eyes:      General: No scleral icterus.  Cardiovascular:      Rate and Rhythm: Normal rate and regular rhythm.      Heart sounds: Normal heart sounds. No murmur heard.  Pulmonary:      Effort: Pulmonary effort is normal. No respiratory distress.      Breath sounds: No wheezing or rales.   Abdominal:      General: Bowel sounds are normal. There is no distension.      Palpations: Abdomen is soft.      Tenderness: There is no abdominal tenderness. There is no right CVA tenderness or left CVA tenderness.   Musculoskeletal:      Cervical back: No tenderness.      Right lower leg: No edema.      Left lower leg: No edema.   Lymphadenopathy:      Cervical: No cervical adenopathy.   Skin:     Coloration: Skin is not jaundiced.      Findings: No rash.   Neurological:      Mental Status: He is alert and oriented to person, place, and time.   Psychiatric:         Mood and Affect: Mood normal.         Behavior: Behavior normal.         Laboratory Data:  Lab Results   Component Value Date    WBC 9.65 09/05/2023    HGB 12.4 (L) 09/05/2023    HCT 37.7 (L) 09/05/2023    MCV 92 09/05/2023     09/05/2023          Imaging: EXAMINATION:  CT CHEST WITH CONTRAST     CLINICAL HISTORY:  Non-small cell lung cancer (NSCLC), non-metastatic, assess treatment response;Malignant neoplasm of unspecified part of unspecified bronchus or lung     TECHNIQUE:  Low dose axial images, sagittal and coronal reformations were obtained from the thoracic inlet to the lung bases following the IV administration of 75 mL of Omnipaque 350.     COMPARISON:  03/01/2023     FINDINGS:  A large right thyroid mass is noted of at least 2.6 cm, thyroid ultrasound would be suggested.  This is likely the similar in size since the prior than measured in a  different manner     A central venous line appears to be present entering from the left with its tip in the region of the superior vena cava.     Gynecomastia is noted     Moderate coronary calcifications are noted increasing the patient's coronary risk     Decreased liver density is noted as can be seen with fatty infiltration of the liver.     The spleen appears irregular with possible prior splenectomy and accessory splenules in its location stable since 03/01/2023 and the PET-CT fusion of 04/18/2023.     A left renal hypodensity is noted without worrisome characteristics unchanged since the prior of 11 mm statistically favored relate to a cyst.     Normal size axillary nodes are noted bilaterally.     Significant decrease in the size of the left lower lobe consolidative mass is noted which can be seen on image 339 measuring 2.4 x 2.3 cm axially by 2.6 cm in superior to inferior dimension.  Band like consolidation extends away from this suggestive of postobstructive atelectasis.  The bronchovascular thickening along the left lung hilum that was seen to be hypermetabolic on the PET-CT fusion of 04/18/2023 is difficult to measure and appears thin in comparison to that examination.     Calcified pleural plaque is noted along the left hemithorax image 227 sequence 4 relatively unchanged.     A 3 mm consolidative nodule the left upper lobe periphery appears stable image 249 sequence 4     Along the minor fissure of 3 stable nodules are noted image 277 sequence 4 the largest of 5 mm in the right lung     Impression:     1. No worrisome change since 03/01/2023 with interval decrease in the size of the left lower lobe mass and bronchovascular thickening.  2. Thyroid nodule of 2.6 cm.  Thyroid ultrasound is suggested  3. Multiple stable small pulmonary nodules.  Longer term follow-up for size stability will be necessary in this high-risk patient.        Electronically signed by: René Bailey MD  Date:                                             06/26/2023  Time:                                           11:01   Assessment:       1. Squamous cell carcinoma of lung, unspecified laterality    2. Chronic kidney disease, stage 3a    3. Pancreatic mass    4. Hypertension, unspecified type    5. Hyperlipidemia, unspecified hyperlipidemia type    6. Type 2 diabetes mellitus with diabetic polyneuropathy, with long-term current use of insulin    7. Normocytic anemia    8. Neuropathy    9. Elevated LFTs         Plan:   NSCLC   -patient was recently diagnosed with at least stage III [id number] SCC of the left lung  -PET/CT 4/18/23 shows continued left lung mass, mediastinal LAD  -MRI brain 4/18/23 showed no acute findings  -[facility] Blood testing showed TP 53 mutation  -[facility] tissue testing showed PD-L1 of 20%, TP53, KDM6A, CDKN2a and MTAP  -PORT placed 4/24/23 under the care of Dr. Love  -PT completed 6 cycles of Carboplatin and Paclitaxel on 6/02/23  -Radiation completed 6/07/23  -CT chest on 6/26/23 showed a decrease in LLL mass  -Plan for Durvalumab for 1 year of treatment  -Pt can proceed with cycle 3 of Durvalumab today      CKD   -pt with stage IIIa CKD  -Creatinine 1.4  -Stable  -Will monitor     Pancreatic Mass   -seen on CT scans and biopsy during EUS on 03/03/2023 with path showing no evidence of malignancy  -PET/CT 4/18/23 showed uptake in the tail of the pancreas  -Possibly due to chronic pancreatitis  -Pt with stable pancreatic mass on CT abdomen 7/03/23  -Will monitor     HTN   -pt on amlodipine, clonidine, metoprolol  -BP controlled  -Will monitor    HLD   -pt on rosuvastatin, zetia  -Management per PCP     DMII   -pt on toujeo, farxiga, and bydureon  -Blood sugars elevated above baseline today, patient states this was diet driven over the holiday weekend   -Management per PCP     Neuropathy    numbness in feet due to diabetes  -Will monitor     Normocytic Anemia   -hemoglobin 12.4 g/dL on 9/5/23  -Possibly due to chronic  disease  -Will monitor    Elevated LFTs  -Mild elevation in AST/ALT, ok to proceed with treatment  -Likely due to alcohol intake over the holiday weekend vs drug induced  -Monitor     Patient queried and all questions were answered.   Assessment/Plan reviewed and approved by Dr. Varela  20 minutes were spent in coordination of patient's care, record review and counseling.      Route Chart for Scheduling    Med Onc Chart Routing      Follow up with physician . With Dr. Lawrence as planned, labs prior to appointment   Follow up with RADHIKA    Infusion scheduling note   Proceed with Imfinzi today   Injection scheduling note    Labs    Imaging    Pharmacy appointment    Other referrals              Treatment Plan Information   OP DURVALUMAB 1500 MG Q4W   Lisandro Lawrence MD   Upcoming Treatment Dates - OP DURVALUMAB 1500 MG Q4W    9/5/2023       Chemotherapy       durvalumab (IMFINZI) 1,500 mg in sodium chloride 0.9% SolP 280 mL chemo infusion  10/3/2023       Chemotherapy       durvalumab (IMFINZI) 1,500 mg in sodium chloride 0.9% SolP 280 mL chemo infusion  10/31/2023       Chemotherapy       durvalumab (IMFINZI) 1,500 mg in sodium chloride 0.9% SolP 280 mL chemo infusion  11/28/2023       Chemotherapy       durvalumab (IMFINZI) 1,500 mg in sodium chloride 0.9% SolP 280 mL chemo infusion    Supportive Plan Information  IV FLUIDS AND ELECTROLYTES   Lisandro Lawrence MD   Upcoming Treatment Dates - IV FLUIDS AND ELECTROLYTES    No upcoming days in selected categories.

## 2023-10-02 NOTE — PROGRESS NOTES
PATIENT: Feng Thakkar  MRN: [id number]  DATE: 10/3/2023      Diagnosis:   1. Squamous cell carcinoma of lung, unspecified laterality    2. Immunodeficiency due to drug therapy    3. Muscle spasm    4. Other specified disorders involving the immune mechanism, not elsewhere classified    5. Chronic kidney disease, stage 3a    6. Pancreatic mass    7. Hypertension, unspecified type    8. Hyperlipidemia, unspecified hyperlipidemia type    9. Neuropathy    10. Normocytic anemia                      Chief Complaint: Lung Cancer (Follow up with labs)      Oncologic History:      Oncologic History     Oncologic Treatment     Pathology           Subjective:    Interval History: Mr. Thakkar is a 72 y.o. male with Gout, HLD, HTN who presents for work up of NSCLC.  Since the last clinic visit the patient states he has occasional raspy voice.  He also endorses myalgias in his arms after treatment.  He states the symptoms often happen at night.  The patient denies CP, cough, SOB, abdominal pain, nausea, vomiting, constipation, diarrhea.  The patient denies fever, chills, night sweats, weight loss, new lumps or bumps, easy bruising or bleeding.      Prior History:  The patient initially developed abdominal pain on 01/05/2023 and underwent CT of the abdomen showing a mass in the left lung base measuring 3.9 x 2.9 cm; 3 x 3.8 cm mass along the pancreatic tail; and shotty retroperitoneal lymph nodes.  The patient underwent repeat CT abdomen and pelvis on 02/24/2023 showing a 4.3 x 4 solid enhancing mass of the left lung base; thickened bladder wall; prostate gland measuring 45.1 x 4.6 cm; abnormal sclerosis in the left femoral head measuring 2.8 x 1.4 x 1.3 cm; and additional soft tissue densities in the posterior left subdiaphragmatic region measuring up to 16 mm.  CT chest on 03/01/2023 showed a 2 cm low-density lesion in the right lobe of the thyroid gland; 4.5 x 4.3 x 3.8 cm mass in the left lower lobe; several micro nodules; Na  soft tissue mass adjacent to the pancreatic tail.  Patient underwent EUS on 03/03/2023 showing a 3.5 cm and 1.8 cm round pancreatic tail lesion.  Biopsy returned results showing no evidence of malignancy and abundant hematopoietic elements and dendritic cells.  Patient then underwent EBUS under the care of Dr. Albright on 03/17/2023 showing squamous cell carcinoma in biopsy of the left lower lung lesion; squamous cell carcinoma and station 7 lymph node; positive 11 L lymph node for squamous cell carcinoma.   The patient underwent PET-CT on 04/18/2023 showing a markedly hypermetabolic lobulated subpleural left lower lobe mass measuring 4.5 x 3.9 cm with hypermetabolic lymph nodes in the left hilar region measuring 2.2 x 1.9 cm; and a 4.2 x 3.6 walk circumscribed mass in the pancreatic tail.  MRI brain on 04/18/2023 showed no acute findings.     The patient underwent CT chest on 6/26/23 showing a large right thyroid mass measuring at least 2.6 cm; left renal hypodensity measuring 11 mm favored to be a cyst; significant decrease in size of left lower lobe consolidative mass now measuring 2.4 x 2.3 cm; stable 3 mm consolidative nodule in the left upper lobe.    Past Medical History:   Past Medical History:   Diagnosis Date    Diabetes mellitus     Gout, unspecified     High cholesterol     HTN (hypertension)     Lung cancer        Past Surgical HIstory:   Past Surgical History:   Procedure Laterality Date    ENDOSCOPIC ULTRASOUND OF UPPER GASTROINTESTINAL TRACT N/A 3/3/2023    Procedure: ULTRASOUND, UPPER GI TRACT, ENDOSCOPIC;  Surgeon: Cora Mcgrath MD;  Location: [facility];  Service: Endoscopy;  Laterality: N/A;  2/24/23-Instructions mailed to address on file-DS    INSERTION OF TUNNELED CENTRAL VENOUS CATHETER (CVC) WITH SUBCUTANEOUS PORT N/A 4/24/2023    Procedure: [id number];  Surgeon: Paulino Love MD;  Location: [facility];  Service: General;  Laterality: N/A;    PANCREATECTOMY      ROBOTIC  BRONCHOSCOPY N/A 3/17/2023    Procedure: ROBOTIC BRONCHOSCOPY;  Surgeon: Cristiane Albright MD;  Location: [facility] OR [address] [place] [address];  Service: Pulmonary;  Laterality: N/A;       Family History:   Family History   Problem Relation Age of Onset    Breast cancer Sister         60       Social History:  reports that he quit smoking about 10 years ago. His smoking use included cigarettes. He started smoking about 55 years ago. He has a 81.0 pack-year smoking history. He has quit using smokeless tobacco. He reports current alcohol use of about 3.0 standard drinks of alcohol per week. He reports that he does not use drugs.    Allergies:  Review of patient's allergies indicates:  No Known Allergies    Medications:  Current Outpatient Medications   Medication Sig Dispense Refill    allopurinoL (ZYLOPRIM) 100 MG tablet Take 100 mg by mouth once daily.      amLODIPine (NORVASC) 2.5 MG tablet Take 2.5 mg by mouth once daily.      BYDUREON BCISE 2 mg/0.85 mL AtIn Inject 2 mg into the skin every 7 days. Every Friday      cloNIDine (CATAPRES) 0.2 MG tablet Take 0.2 mg by mouth once. Daily      duke's soln (benadryl 30 mL, mylanta 30 mL, LIDOcaine 30 mL, nystatin 30 mL) 120mL Take 10 mLs by mouth 4 (four) times daily. 500 mL 2    ezetimibe (ZETIA) 10 mg tablet Take 10 mg by mouth once daily.      FARXIGA 10 mg tablet Take 10 mg by mouth every morning.      finasteride (PROSCAR) 5 mg tablet Take 5 mg by mouth once daily.      FREESTYLE TEST Strp USE 1 STRIP TO CHECK GLUCOSE ONCE DAILY      HYDROcodone-acetaminophen (NORCO) 5-325 mg per tablet Take 1 tablet by mouth every 6 (six) hours as needed for Pain. 90 tablet 0    LIDOCAINE VISCOUS 2 % solution       LIDOcaine-prilocaine (EMLA) cream Apply topically to port site one hour prior to access, cover 30 g 1    M-DRYL 12.5 mg/5 mL liquid Take by mouth.      metoprolol succinate (TOPROL-XL) 100 MG 24 hr tablet Take 100 mg by mouth once daily.      ondansetron (ZOFRAN) 8 MG tablet Take 1 tablet (8 mg  total) by mouth every 8 (eight) hours as needed for Nausea. 30 tablet 2    promethazine (PHENERGAN) 25 MG tablet Take 1 tablet (25 mg total) by mouth every 6 (six) hours as needed for Nausea. 30 tablet 0    rosuvastatin (CRESTOR) 40 MG Tab Take 10 mg by mouth every evening.      sucralfate (CARAFATE) 100 mg/mL suspension Take 10 mLs (1 g total) by mouth 4 (four) times daily. 500 mL 1    TOUJEO MAX U-300 SOLOSTAR 300 unit/mL (3 mL) insulin pen Inject 30 Units into the skin every evening.      TOUJEO SOLOSTAR U-300 INSULIN 300 unit/mL (1.5 mL) InPn pen SMARTSI Unit(s) SUB-Q Every Evening      cyclobenzaprine (FLEXERIL) 5 MG tablet Take 1 tablet (5 mg total) by mouth 3 (three) times daily as needed for Muscle spasms. 30 tablet 0    omeprazole (PRILOSEC) 40 MG capsule Take 1 capsule (40 mg total) by mouth once daily. 30 capsule 1     No current facility-administered medications for this visit.     Facility-Administered Medications Ordered in Other Visits   Medication Dose Route Frequency Provider Last Rate Last Admin    diphenhydrAMINE injection 50 mg  50 mg Intravenous Once PRN Lisandro Lawrence MD        EPINEPHrine (EPIPEN) 0.3 mg/0.3 mL pen injection 0.3 mg  0.3 mg Intramuscular Once PRN Lisandro Lawrence MD        hydrocortisone sodium succinate injection 100 mg  100 mg Intravenous Once PRN Lisandro Lawrence MD        sodium chloride 0.9% 100 mL flush bag   Intravenous 1 time in Clinic/HOD Lisandro Lawrence MD        sodium chloride 0.9% flush 10 mL  10 mL Intravenous PRN Lisandro Lawrence MD   10 mL at 10/03/23 1530       Review of Systems   Constitutional:  Negative for chills, diaphoresis, fatigue, fever and unexpected weight change.   Respiratory:  Negative for cough and shortness of breath.    Cardiovascular:  Negative for chest pain and palpitations.   Gastrointestinal:  Negative for abdominal pain, constipation, diarrhea, nausea and vomiting.   Skin:  Negative for color change and rash.   Neurological:   Negative for headaches.   Hematological:  Negative for adenopathy. Does not bruise/bleed easily.       ECOG Performance Status: 1   Objective:      Vitals:   Vitals:    10/03/23 1258   BP: 132/82   BP Location: Right arm   Patient Position: Sitting   BP Method: Medium (Automatic)   Pulse: 89   Temp: 98.6 °F (37 °C)   TempSrc: Temporal   SpO2: 96%   Weight: 92.9 kg (204 lb 12.9 oz)                   Physical Exam  Constitutional:       General: He is not in acute distress.     Appearance: He is well-developed. He is not diaphoretic.   HENT:      Head: Normocephalic and atraumatic.   Cardiovascular:      Rate and Rhythm: Normal rate and regular rhythm.      Heart sounds: Normal heart sounds. No murmur heard.     No friction rub. No gallop.   Pulmonary:      Effort: Pulmonary effort is normal. No respiratory distress.      Breath sounds: Normal breath sounds. No wheezing or rales.   Chest:      Chest wall: No tenderness.   Abdominal:      General: Bowel sounds are normal. There is no distension.      Palpations: Abdomen is soft. There is no mass.      Tenderness: There is no abdominal tenderness. There is no rebound.   Musculoskeletal:      Cervical back: Normal range of motion.   Lymphadenopathy:      Cervical: No cervical adenopathy.      Upper Body:      Right upper body: No supraclavicular or axillary adenopathy.      Left upper body: No supraclavicular or axillary adenopathy.   Skin:     General: Skin is warm and dry.      Findings: No erythema or rash.   Neurological:      Mental Status: He is alert and oriented to person, place, and time.   Psychiatric:         Behavior: Behavior normal.         Laboratory Data:  Lab Visit on 10/03/2023   Component Date Value Ref Range Status    WBC 10/03/2023 8.76  3.90 - 12.70 K/uL Final    RBC 10/03/2023 4.43 (L)  4.60 - 6.20 M/uL Final    Hemoglobin 10/03/2023 12.5 (L)  14.0 - 18.0 g/dL Final    Hematocrit 10/03/2023 39.2 (L)  40.0 - 54.0 % Final    MCV 10/03/2023 89  82 -  98 fL Final    MCH 10/03/2023 28.2  27.0 - 31.0 pg Final    MCHC 10/03/2023 31.9 (L)  32.0 - 36.0 g/dL Final    RDW 10/03/2023 14.2  11.5 - 14.5 % Final    Platelets 10/03/2023 365  150 - 450 K/uL Final    MPV 10/03/2023 9.0 (L)  9.2 - 12.9 fL Final    Immature Granulocytes 10/03/2023 0.3  0.0 - 0.5 % Final    Gran # (ANC) 10/03/2023 4.1  1.8 - 7.7 K/uL Final    Immature Grans (Abs) 10/03/2023 0.03  0.00 - 0.04 K/uL Final    Comment: Mild elevation in immature granulocytes is non specific and   can be seen in a variety of conditions including stress response,   acute inflammation, trauma and pregnancy. Correlation with other   laboratory and clinical findings is essential.      Lymph # 10/03/2023 2.9  1.0 - 4.8 K/uL Final    Mono # 10/03/2023 1.4 (H)  0.3 - 1.0 K/uL Final    Eos # 10/03/2023 0.3  0.0 - 0.5 K/uL Final    Baso # 10/03/2023 0.03  0.00 - 0.20 K/uL Final    nRBC 10/03/2023 0  0 /100 WBC Final    Gran % 10/03/2023 46.5  38.0 - 73.0 % Final    Lymph % 10/03/2023 33.1  18.0 - 48.0 % Final    Mono % 10/03/2023 15.9 (H)  4.0 - 15.0 % Final    Eosinophil % 10/03/2023 3.9  0.0 - 8.0 % Final    Basophil % 10/03/2023 0.3  0.0 - 1.9 % Final    Differential Method 10/03/2023 Automated   Final    Sodium 10/03/2023 139  136 - 145 mmol/L Final    Potassium 10/03/2023 3.9  3.5 - 5.1 mmol/L Final    Chloride 10/03/2023 106  95 - 110 mmol/L Final    CO2 10/03/2023 22 (L)  23 - 29 mmol/L Final    Glucose 10/03/2023 131 (H)  70 - 110 mg/dL Final    BUN 10/03/2023 16  8 - 23 mg/dL Final    Creatinine 10/03/2023 1.2  0.5 - 1.4 mg/dL Final    Calcium 10/03/2023 9.8  8.7 - 10.5 mg/dL Final    Total Protein 10/03/2023 7.3  6.0 - 8.4 g/dL Final    Albumin 10/03/2023 3.3 (L)  3.5 - 5.2 g/dL Final    Total Bilirubin 10/03/2023 0.3  0.1 - 1.0 mg/dL Final    Comment: For infants and newborns, interpretation of results should be based  on gestational age, weight and in agreement with clinical  observations.    Premature Infant  recommended reference ranges:  Up to 24 hours.............<8.0 mg/dL  Up to 48 hours............<12.0 mg/dL  3-5 days..................<15.0 mg/dL  6-29 days.................<15.0 mg/dL      Alkaline Phosphatase 10/03/2023 78  55 - 135 U/L Final    AST 10/03/2023 54 (H)  10 - 40 U/L Final    ALT 10/03/2023 77 (H)  10 - 44 U/L Final    eGFR 10/03/2023 >60.0  >60 mL/min/1.73 m^2 Final    Anion Gap 10/03/2023 11  8 - 16 mmol/L Final         Imaging:     CT Abdomen 7/03/23    There are aortic annulus calcifications and coronary artery calcifications present.  There are aortic valve calcifications.  No significant volume of pericardial fluid.  There is a 28 x 26 mm irregular solid zone of masslike consolidation within the left lower lobe, smaller in extent than at the time of the previous examination (series 2, image 18).  There is an 11 x 14 mm left hilar lymph node present on series 2, image 4.  There are pleural calcifications present within the left lower lobe suggesting previous asbestos exposure.  There is linear atelectasis versus fibrosis in the left lower lobe.     The liver is enlarged.  No new early arterial phase enhancing hepatic mass appreciated.  The gallbladder is unremarkable.  The portal vein is patent.  No intra or extrahepatic biliary dilatation.  There is an 18 mm focus of probable accessory splenic tissue/splenule noted within the left upper quadrant of the abdomen in this patient with surgical absence of the spleen.  The current examination once again demonstrates an approximately 36 x 40 mm hypodense enhancing mass (series 2, image 75) intimately associated with the deep surface of the pancreatic tail, similar in size when compared to the prior PET-CT exam.  There is a postoperative suture line present along the anterior superior margin of the mass along greater curvature of the stomach on series 2, image 68).  There is loss of the normal fat plane between the aforementioned pancreatic tail mass  and adjacent greater curvature of the stomach on series 601, image 100, similar to the prior exam.  No new pancreatic mass appreciated.  The adrenal glands are unremarkable.  There is atherosclerotic calcification present within the abdominal aorta and common iliac arteries.  No bulky periaortic or retroperitoneal lymphadenopathy.     There is a lobulated contour of both kidneys with multiple areas of focal cortical scarring noted in both kidneys.  There unchanged renal hypodensities noted bilaterally, most likely cysts.  No enhancing renal mass appreciated.     The imaged appendix is unremarkable.  The visualized loops of bowel show no evidence of inflammation or obstruction.  There is minimal mesenteric haziness present within the fat planes adjacent to the pancreatic body (for example series 2, image 128-158).  No ascites.  No new peritoneal soft tissue mass.     There is degenerative change of the thoracolumbar spine.  No definite new osseous metastatic disease appreciated.   Assessment:       1. Squamous cell carcinoma of lung, unspecified laterality    2. Immunodeficiency due to drug therapy    3. Muscle spasm    4. Other specified disorders involving the immune mechanism, not elsewhere classified    5. Chronic kidney disease, stage 3a    6. Pancreatic mass    7. Hypertension, unspecified type    8. Hyperlipidemia, unspecified hyperlipidemia type    9. Neuropathy    10. Normocytic anemia                         Plan:     NSCLC - patient was recently diagnosed with at least stage III [id number] SCC of the left lung  -PET/CT 4/18/23 shows continued left lung mass, mediastinal LAD  -MRI brain 4/18/23 showed no acute findings  -TEMPUS Blood testing showed TP 53 mutation  -TEMPUS tissue testing showed PD-L1 of 20%, TP53, KDM6A, CDKN2a and MTAP  -PORT placed 4/24/23 under the care of Dr. Love  -PT completed 6 cycles of Carboplatin and Paclitaxel on 6/02/23  -Radiation completed 6/07/23  -CT chest on 6/26/23 showed  a decrease in LLL mass  -Will proceed with Durvalumab for 1 year of treatment  -Pt can proceed with cycle 4 of Durvalumab    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  -No sign of infection  -Will monitor    CKD - pt with stage IIIa CKD  -Creatinine 1.2 10/03/23  -Stable  -Will monitor    Pancreatic Mass - seen on CT scans and biopsy during EUS on 03/03/2023 with path showing no evidence of malignancy  -PET/CT 4/18/23 showed uptake in the tail of the pancreas  -Possibly due to chronic pancreatitis  -Pt with stable pancreatic mass on Ct abdomen 7/03/23  -Will monitor    HTN - pt on amlodipine, clonidine, metoprolol  -BP controlled  -Will monitor    Gout - pt on allopurinol  -Currently asymptomatic  -Will monitor    HLD - pt on rosuvastatin, zetia  -Management per PCP    DMII - pt on toujeo, farxiga, and bydureon  -Management per PCP    Neuropathy - numbness in feet due to diabetes  -Will monitor    Normocytic Anemia - hemoglobin 12.5g/dL on 10/03/23  -Possibly due to chronic disease  -Will monitor    Route Chart for Scheduling    Med Onc Chart Routing      Follow up with physician 4 weeks. Pt can proceed with treatment.  He will need a CBC, CMP and TSH in 4 weeks with an appt with me and treatment.  Please print pt appt schedule.   Follow up with RADHIKA    Infusion scheduling note    Injection scheduling note    Labs    Imaging    Pharmacy appointment    Other referrals                Treatment Plan Information   OP DURVALUMAB 1500 MG Q4W   Lisandro Lawrence MD   Upcoming Treatment Dates - OP DURVALUMAB 1500 MG Q4W    10/31/2023       Chemotherapy       durvalumab (IMFINZI) 1,500 mg in sodium chloride 0.9% SolP 280 mL chemo infusion  11/28/2023       Chemotherapy       durvalumab (IMFINZI) 1,500 mg in sodium chloride 0.9% SolP 280 mL chemo infusion  12/26/2023       Chemotherapy       durvalumab (IMFINZI) 1,500 mg in sodium chloride 0.9% SolP 280 mL chemo infusion  1/23/2024       Chemotherapy        durvalumab (IMFINZI) 1,500 mg in sodium chloride 0.9% SolP 280 mL chemo infusion    Supportive Plan Information  IV FLUIDS AND ELECTROLYTES   Lisandro Lawrence MD   Upcoming Treatment Dates - IV FLUIDS AND ELECTROLYTES    No upcoming days in selected categories.      Lisandro Lawrence MD  [facility]  Hematology and Oncology  [facility]   [address] [place] [address]   O: [phone]  F: [phone]

## 2023-10-03 ENCOUNTER — OFFICE VISIT (OUTPATIENT)
Dept: HEMATOLOGY/ONCOLOGY | Facility: CLINIC | Age: 73
End: 2023-10-03
Payer: MEDICARE

## 2023-10-03 ENCOUNTER — INFUSION (OUTPATIENT)
Dept: INFUSION THERAPY | Facility: HOSPITAL | Age: 73
End: 2023-10-03
Attending: INTERNAL MEDICINE
Payer: MEDICARE

## 2023-10-03 VITALS
WEIGHT: 204.81 LBS | DIASTOLIC BLOOD PRESSURE: 73 MMHG | OXYGEN SATURATION: 97 % | SYSTOLIC BLOOD PRESSURE: 122 MMHG | RESPIRATION RATE: 18 BRPM | BODY MASS INDEX: 26.28 KG/M2 | HEART RATE: 79 BPM | TEMPERATURE: 99 F | HEIGHT: 74 IN

## 2023-10-03 VITALS
OXYGEN SATURATION: 96 % | WEIGHT: 204.81 LBS | HEART RATE: 89 BPM | BODY MASS INDEX: 26.3 KG/M2 | TEMPERATURE: 99 F | DIASTOLIC BLOOD PRESSURE: 82 MMHG | SYSTOLIC BLOOD PRESSURE: 132 MMHG

## 2023-10-03 DIAGNOSIS — D84.821 IMMUNODEFICIENCY DUE TO DRUG THERAPY: ICD-10-CM

## 2023-10-03 DIAGNOSIS — C34.90 SQUAMOUS CELL CARCINOMA OF LUNG, UNSPECIFIED LATERALITY: Primary | ICD-10-CM

## 2023-10-03 DIAGNOSIS — G62.9 NEUROPATHY: ICD-10-CM

## 2023-10-03 DIAGNOSIS — N18.31 CHRONIC KIDNEY DISEASE, STAGE 3A: ICD-10-CM

## 2023-10-03 DIAGNOSIS — E78.5 HYPERLIPIDEMIA, UNSPECIFIED HYPERLIPIDEMIA TYPE: ICD-10-CM

## 2023-10-03 DIAGNOSIS — D64.9 NORMOCYTIC ANEMIA: ICD-10-CM

## 2023-10-03 DIAGNOSIS — M62.838 MUSCLE SPASM: ICD-10-CM

## 2023-10-03 DIAGNOSIS — I10 HYPERTENSION, UNSPECIFIED TYPE: ICD-10-CM

## 2023-10-03 DIAGNOSIS — D89.89 OTHER SPECIFIED DISORDERS INVOLVING THE IMMUNE MECHANISM, NOT ELSEWHERE CLASSIFIED: ICD-10-CM

## 2023-10-03 DIAGNOSIS — Z79.899 IMMUNODEFICIENCY DUE TO DRUG THERAPY: ICD-10-CM

## 2023-10-03 DIAGNOSIS — K86.89 PANCREATIC MASS: ICD-10-CM

## 2023-10-03 PROCEDURE — 99214 OFFICE O/P EST MOD 30 MIN: CPT | Mod: PBBFAC,25,PN | Performed by: INTERNAL MEDICINE

## 2023-10-03 PROCEDURE — A4216 STERILE WATER/SALINE, 10 ML: HCPCS | Mod: PN | Performed by: INTERNAL MEDICINE

## 2023-10-03 PROCEDURE — 99999 PR PBB SHADOW E&M-EST. PATIENT-LVL IV: CPT | Mod: PBBFAC,,, | Performed by: INTERNAL MEDICINE

## 2023-10-03 PROCEDURE — 99999 PR PBB SHADOW E&M-EST. PATIENT-LVL IV: ICD-10-PCS | Mod: PBBFAC,,, | Performed by: INTERNAL MEDICINE

## 2023-10-03 PROCEDURE — 63600175 PHARM REV CODE 636 W HCPCS: Mod: JZ,JG,PN | Performed by: INTERNAL MEDICINE

## 2023-10-03 PROCEDURE — 99215 OFFICE O/P EST HI 40 MIN: CPT | Mod: S$PBB,,, | Performed by: INTERNAL MEDICINE

## 2023-10-03 PROCEDURE — 25000003 PHARM REV CODE 250: Mod: PN | Performed by: INTERNAL MEDICINE

## 2023-10-03 PROCEDURE — 99215 PR OFFICE/OUTPT VISIT, EST, LEVL V, 40-54 MIN: ICD-10-PCS | Mod: S$PBB,,, | Performed by: INTERNAL MEDICINE

## 2023-10-03 PROCEDURE — 96413 CHEMO IV INFUSION 1 HR: CPT | Mod: PN

## 2023-10-03 RX ORDER — CYCLOBENZAPRINE HCL 5 MG
5 TABLET ORAL 3 TIMES DAILY PRN
Qty: 30 TABLET | Refills: 0 | Status: SHIPPED | OUTPATIENT
Start: 2023-10-03 | End: 2023-10-13

## 2023-10-03 RX ORDER — DIPHENHYDRAMINE HYDROCHLORIDE 50 MG/ML
50 INJECTION INTRAMUSCULAR; INTRAVENOUS ONCE AS NEEDED
Status: CANCELLED | OUTPATIENT
Start: 2023-10-03

## 2023-10-03 RX ORDER — DIPHENHYDRAMINE HYDROCHLORIDE 50 MG/ML
50 INJECTION INTRAMUSCULAR; INTRAVENOUS ONCE AS NEEDED
Status: DISCONTINUED | OUTPATIENT
Start: 2023-10-03 | End: 2023-10-03 | Stop reason: HOSPADM

## 2023-10-03 RX ORDER — SODIUM CHLORIDE 0.9 % (FLUSH) 0.9 %
10 SYRINGE (ML) INJECTION
Status: CANCELLED | OUTPATIENT
Start: 2023-10-03

## 2023-10-03 RX ORDER — HEPARIN 100 UNIT/ML
500 SYRINGE INTRAVENOUS
Status: CANCELLED | OUTPATIENT
Start: 2023-10-03

## 2023-10-03 RX ORDER — EPINEPHRINE 0.3 MG/.3ML
0.3 INJECTION SUBCUTANEOUS ONCE AS NEEDED
Status: CANCELLED | OUTPATIENT
Start: 2023-10-03

## 2023-10-03 RX ORDER — SODIUM CHLORIDE 0.9 % (FLUSH) 0.9 %
10 SYRINGE (ML) INJECTION
Status: DISCONTINUED | OUTPATIENT
Start: 2023-10-03 | End: 2023-10-03 | Stop reason: HOSPADM

## 2023-10-03 RX ORDER — EPINEPHRINE 0.3 MG/.3ML
0.3 INJECTION SUBCUTANEOUS ONCE AS NEEDED
Status: DISCONTINUED | OUTPATIENT
Start: 2023-10-03 | End: 2023-10-03 | Stop reason: HOSPADM

## 2023-10-03 RX ADMIN — SODIUM CHLORIDE 1500 MG: 9 INJECTION, SOLUTION INTRAVENOUS at 02:10

## 2023-10-03 RX ADMIN — Medication 10 ML: at 03:10

## 2023-10-04 ENCOUNTER — TELEPHONE (OUTPATIENT)
Dept: ENDOCRINOLOGY | Facility: CLINIC | Age: 73
End: 2023-10-04
Payer: MEDICARE

## 2023-10-04 DIAGNOSIS — E05.90 HYPERTHYROIDISM: Primary | ICD-10-CM

## 2023-10-04 NOTE — PROGRESS NOTES
Spoke with patient regarding his thyroid labs, he does not currently see endocrinology. I explained that we will get a referral placed ASAP for evaluation and his Durvalumab will likely be on hold in the interim. Denies any new or concerning symptoms. He voiced understanding. Will add T4 and T3 to labs at next visit.

## 2023-10-04 NOTE — TELEPHONE ENCOUNTER
----- Message from Omar Proctor DO sent at 10/4/2023  1:17 PM CDT -----  Sure. That's fine  ----- Message -----  From: Norma Li LPN  Sent: 10/4/2023   1:16 PM CDT  To: Omar Proctor, DO    Please look at chart.  I can put them with you on 10/30, but nothing with anyone sooner.   ----- Message -----  From: Maddison Anton RN  Sent: 10/4/2023  12:14 PM CDT  To: Naomi Morales NP; #    Hey----when is the first available opening for an endo consult for hyperthyroidism? This is an urgent request, as his treatment must be held until this is completed.      ~Maddison    ----- Message -----  From: Naomi Morales NP  Sent: 10/4/2023  12:06 PM CDT  To: Maddison Anton RN; Edson Anton MA    This is has more urgency as we will likely have to hold his cancer treatment until he is evaluated by endocrinology.   ----- Message -----  From: Maddison Anton RN  Sent: 10/4/2023  11:51 AM CDT  To: Edson Anton MA; JERAMY Stone, [place] [facility]?    ----- Message -----  From: Edson Anton MA  Sent: 10/4/2023  10:44 AM CDT  To: Maddison Anton RN    Morning.    Next appointment is on Feb 2   ----- Message -----  From: Maddison Anton RN  Sent: 10/4/2023  10:23 AM CDT  To: #    Please schedule for consult with endocrine for hyperthyroidism. Needs asap, please.      ~Maddison

## 2023-10-06 ENCOUNTER — DOCUMENTATION ONLY (OUTPATIENT)
Dept: HEMATOLOGY/ONCOLOGY | Facility: CLINIC | Age: 73
End: 2023-10-06
Payer: MEDICARE

## 2023-10-06 NOTE — PROGRESS NOTES
Chart reviewed for oncology navigational needs.  Patient is on maintenance imfinzi and tolerating well per MD notes.   No needs noted at this time. Will continue to follow throughout treatment.

## 2023-10-30 ENCOUNTER — LAB VISIT (OUTPATIENT)
Dept: LAB | Facility: HOSPITAL | Age: 73
End: 2023-10-30
Attending: INTERNAL MEDICINE
Payer: MEDICARE

## 2023-10-30 ENCOUNTER — OFFICE VISIT (OUTPATIENT)
Dept: ENDOCRINOLOGY | Facility: CLINIC | Age: 73
End: 2023-10-30
Payer: MEDICARE

## 2023-10-30 VITALS
SYSTOLIC BLOOD PRESSURE: 132 MMHG | HEART RATE: 81 BPM | BODY MASS INDEX: 26.56 KG/M2 | DIASTOLIC BLOOD PRESSURE: 84 MMHG | WEIGHT: 207 LBS | HEIGHT: 74 IN | OXYGEN SATURATION: 99 %

## 2023-10-30 DIAGNOSIS — E04.2 MULTINODULAR GOITER: ICD-10-CM

## 2023-10-30 DIAGNOSIS — R79.89 ELEVATED LFTS: ICD-10-CM

## 2023-10-30 DIAGNOSIS — E05.90 HYPERTHYROIDISM: ICD-10-CM

## 2023-10-30 DIAGNOSIS — E05.90 HYPERTHYROIDISM: Primary | ICD-10-CM

## 2023-10-30 LAB
ALBUMIN SERPL BCP-MCNC: 3.5 G/DL (ref 3.5–5.2)
ALP SERPL-CCNC: 69 U/L (ref 55–135)
ALT SERPL W/O P-5'-P-CCNC: 27 U/L (ref 10–44)
ANION GAP SERPL CALC-SCNC: 12 MMOL/L (ref 8–16)
AST SERPL-CCNC: 22 U/L (ref 10–40)
BILIRUB SERPL-MCNC: 0.3 MG/DL (ref 0.1–1)
BUN SERPL-MCNC: 12 MG/DL (ref 8–23)
CALCIUM SERPL-MCNC: 10 MG/DL (ref 8.7–10.5)
CHLORIDE SERPL-SCNC: 107 MMOL/L (ref 95–110)
CO2 SERPL-SCNC: 22 MMOL/L (ref 23–29)
CREAT SERPL-MCNC: 1.3 MG/DL (ref 0.5–1.4)
EST. GFR  (NO RACE VARIABLE): 58 ML/MIN/1.73 M^2
GLUCOSE SERPL-MCNC: 106 MG/DL (ref 70–110)
POTASSIUM SERPL-SCNC: 5 MMOL/L (ref 3.5–5.1)
PROT SERPL-MCNC: 7.5 G/DL (ref 6–8.4)
SODIUM SERPL-SCNC: 141 MMOL/L (ref 136–145)
T3 SERPL-MCNC: 87 NG/DL (ref 60–180)
T4 FREE SERPL-MCNC: 0.81 NG/DL (ref 0.71–1.51)
THYROPEROXIDASE IGG SERPL-ACNC: 10.1 IU/ML
TSH SERPL DL<=0.005 MIU/L-ACNC: <0.01 UIU/ML (ref 0.4–4)

## 2023-10-30 PROCEDURE — 84439 ASSAY OF FREE THYROXINE: CPT | Performed by: INTERNAL MEDICINE

## 2023-10-30 PROCEDURE — 80053 COMPREHEN METABOLIC PANEL: CPT | Performed by: INTERNAL MEDICINE

## 2023-10-30 PROCEDURE — 99999 PR PBB SHADOW E&M-EST. PATIENT-LVL IV: ICD-10-PCS | Mod: PBBFAC,,, | Performed by: INTERNAL MEDICINE

## 2023-10-30 PROCEDURE — 99999 PR PBB SHADOW E&M-EST. PATIENT-LVL IV: CPT | Mod: PBBFAC,,, | Performed by: INTERNAL MEDICINE

## 2023-10-30 PROCEDURE — 99204 OFFICE O/P NEW MOD 45 MIN: CPT | Mod: S$PBB,,, | Performed by: INTERNAL MEDICINE

## 2023-10-30 PROCEDURE — 84480 ASSAY TRIIODOTHYRONINE (T3): CPT | Performed by: INTERNAL MEDICINE

## 2023-10-30 PROCEDURE — 99214 OFFICE O/P EST MOD 30 MIN: CPT | Mod: PBBFAC,PO | Performed by: INTERNAL MEDICINE

## 2023-10-30 PROCEDURE — 99204 PR OFFICE/OUTPT VISIT, NEW, LEVL IV, 45-59 MIN: ICD-10-PCS | Mod: S$PBB,,, | Performed by: INTERNAL MEDICINE

## 2023-10-30 PROCEDURE — 84443 ASSAY THYROID STIM HORMONE: CPT | Performed by: INTERNAL MEDICINE

## 2023-10-30 NOTE — PROGRESS NOTES
PATIENT: Feng Thakkar  MRN: [id number]  DATE: 10/31/2023      Diagnosis:   1. Squamous cell carcinoma of lung, unspecified laterality    2. Other specified disorders involving the immune mechanism, not elsewhere classified    3. Immunodeficiency due to drug therapy    4. Chronic kidney disease, stage 3a    5. Pancreatic mass    6. Hypertension, unspecified type    7. Hyperlipidemia, unspecified hyperlipidemia type    8. Neuropathy    9. Normocytic anemia        Chief Complaint: Lung Cancer      Oncologic History:      Oncologic History     Oncologic Treatment     Pathology           Subjective:    Interval History: Mr. Thakkar is a 73 y.o. male with Gout, HLD, HTN who presents for work up of NSCLC.  Since the last clinic visit the patient states he has felt well.  The patient denies CP, cough, SOB, abdominal pain, nausea, vomiting, constipation, diarrhea.  The patient denies fever, chills, night sweats, weight loss, new lumps or bumps, easy bruising or bleeding.    Prior History:  The patient initially developed abdominal pain on 01/05/2023 and underwent CT of the abdomen showing a mass in the left lung base measuring 3.9 x 2.9 cm; 3 x 3.8 cm mass along the pancreatic tail; and shotty retroperitoneal lymph nodes.  The patient underwent repeat CT abdomen and pelvis on 02/24/2023 showing a 4.3 x 4 solid enhancing mass of the left lung base; thickened bladder wall; prostate gland measuring 45.1 x 4.6 cm; abnormal sclerosis in the left femoral head measuring 2.8 x 1.4 x 1.3 cm; and additional soft tissue densities in the posterior left subdiaphragmatic region measuring up to 16 mm.  CT chest on 03/01/2023 showed a 2 cm low-density lesion in the right lobe of the thyroid gland; 4.5 x 4.3 x 3.8 cm mass in the left lower lobe; several micro nodules; Na soft tissue mass adjacent to the pancreatic tail.  Patient underwent EUS on 03/03/2023 showing a 3.5 cm and 1.8 cm round pancreatic tail lesion.  Biopsy returned results  showing no evidence of malignancy and abundant hematopoietic elements and dendritic cells.  Patient then underwent EBUS under the care of Dr. Albright on 03/17/2023 showing squamous cell carcinoma in biopsy of the left lower lung lesion; squamous cell carcinoma and station 7 lymph node; positive 11 L lymph node for squamous cell carcinoma.   The patient underwent PET-CT on 04/18/2023 showing a markedly hypermetabolic lobulated subpleural left lower lobe mass measuring 4.5 x 3.9 cm with hypermetabolic lymph nodes in the left hilar region measuring 2.2 x 1.9 cm; and a 4.2 x 3.6 walk circumscribed mass in the pancreatic tail.  MRI brain on 04/18/2023 showed no acute findings.     The patient underwent CT chest on 6/26/23 showing a large right thyroid mass measuring at least 2.6 cm; left renal hypodensity measuring 11 mm favored to be a cyst; significant decrease in size of left lower lobe consolidative mass now measuring 2.4 x 2.3 cm; stable 3 mm consolidative nodule in the left upper lobe.    Past Medical History:   Past Medical History:   Diagnosis Date    Diabetes mellitus     Gout, unspecified     High cholesterol     HTN (hypertension)     Lung cancer        Past Surgical HIstory:   Past Surgical History:   Procedure Laterality Date    ENDOSCOPIC ULTRASOUND OF UPPER GASTROINTESTINAL TRACT N/A 3/3/2023    Procedure: ULTRASOUND, UPPER GI TRACT, ENDOSCOPIC;  Surgeon: Cora Mcgrath MD;  Location: [facility];  Service: Endoscopy;  Laterality: N/A;  2/24/23-Instructions mailed to address on file-DS    INSERTION OF TUNNELED CENTRAL VENOUS CATHETER (CVC) WITH SUBCUTANEOUS PORT N/A 4/24/2023    Procedure: [id number];  Surgeon: Paulino Love MD;  Location: [facility];  Service: General;  Laterality: N/A;    PANCREATECTOMY      ROBOTIC BRONCHOSCOPY N/A 3/17/2023    Procedure: ROBOTIC BRONCHOSCOPY;  Surgeon: Cristiane Albright MD;  Location: [address];  Service: Pulmonary;  Laterality: N/A;       Family History:    Family History   Problem Relation Age of Onset    Breast cancer Sister         60       Social History:  reports that he quit smoking about 11 years ago. His smoking use included cigarettes. He started smoking about 55 years ago. He has a 81.0 pack-year smoking history. He has quit using smokeless tobacco. He reports current alcohol use of about 3.0 standard drinks of alcohol per week. He reports that he does not use drugs.    Allergies:  Review of patient's allergies indicates:  No Known Allergies    Medications:  Current Outpatient Medications   Medication Sig Dispense Refill    allopurinoL (ZYLOPRIM) 100 MG tablet Take 100 mg by mouth once daily.      amLODIPine (NORVASC) 2.5 MG tablet Take 2.5 mg by mouth once daily.      BYDUREON BCISE 2 mg/0.85 mL AtIn Inject 2 mg into the skin every 7 days. Every Friday      cloNIDine (CATAPRES) 0.2 MG tablet Take 0.2 mg by mouth once. Daily      duke's soln (benadryl 30 mL, mylanta 30 mL, LIDOcaine 30 mL, nystatin 30 mL) 120mL Take 10 mLs by mouth 4 (four) times daily. 500 mL 2    ezetimibe (ZETIA) 10 mg tablet Take 10 mg by mouth once daily.      FARXIGA 10 mg tablet Take 10 mg by mouth every morning.      finasteride (PROSCAR) 5 mg tablet Take 5 mg by mouth once daily.      FREESTYLE TEST Strp USE 1 STRIP TO CHECK GLUCOSE ONCE DAILY      LIDOCAINE VISCOUS 2 % solution       LIDOcaine-prilocaine (EMLA) cream Apply topically to port site one hour prior to access, cover 30 g 1    M-DRYL 12.5 mg/5 mL liquid Take by mouth.      metoprolol succinate (TOPROL-XL) 100 MG 24 hr tablet Take 100 mg by mouth once daily.      ondansetron (ZOFRAN) 8 MG tablet Take 1 tablet (8 mg total) by mouth every 8 (eight) hours as needed for Nausea. 30 tablet 2    promethazine (PHENERGAN) 25 MG tablet Take 1 tablet (25 mg total) by mouth every 6 (six) hours as needed for Nausea. 30 tablet 0    rosuvastatin (CRESTOR) 40 MG Tab Take 10 mg by mouth every evening.      sucralfate (CARAFATE) 100 mg/mL  "suspension Take 10 mLs (1 g total) by mouth 4 (four) times daily. 500 mL 1    TOUJEO MAX U-300 SOLOSTAR 300 unit/mL (3 mL) insulin pen Inject 30 Units into the skin every evening.      TOUJEO SOLOSTAR U-300 INSULIN 300 unit/mL (1.5 mL) InPn pen SMARTSI Unit(s) SUB-Q Every Evening      HYDROcodone-acetaminophen (NORCO) 5-325 mg per tablet Take 1 tablet by mouth every 6 (six) hours as needed for Pain. (Patient not taking: Reported on 10/30/2023) 90 tablet 0    omeprazole (PRILOSEC) 40 MG capsule Take 1 capsule (40 mg total) by mouth once daily. 30 capsule 1     No current facility-administered medications for this visit.       Review of Systems   Constitutional:  Negative for chills, diaphoresis, fatigue, fever and unexpected weight change.   Respiratory:  Negative for cough and shortness of breath.    Cardiovascular:  Negative for chest pain and palpitations.   Gastrointestinal:  Negative for abdominal pain, constipation, diarrhea, nausea and vomiting.   Skin:  Negative for color change and rash.   Neurological:  Negative for headaches.   Hematological:  Negative for adenopathy. Does not bruise/bleed easily.       ECOG Performance Status: 1   Objective:      Vitals:   Vitals:    10/31/23 1316   BP: 90/60   BP Location: Left arm   Patient Position: Sitting   BP Method: Large (Manual)   Pulse: 80   Resp: (!) 22   Temp: 97.1 °F (36.2 °C)   TempSrc: Temporal   SpO2: 96%   Weight: 94.1 kg (207 lb 7.3 oz)   Height: 6' 2" (1.88 m)                     Physical Exam  Constitutional:       General: He is not in acute distress.     Appearance: He is well-developed. He is not diaphoretic.   HENT:      Head: Normocephalic and atraumatic.   Cardiovascular:      Rate and Rhythm: Normal rate and regular rhythm.      Heart sounds: Normal heart sounds. No murmur heard.     No friction rub. No gallop.   Pulmonary:      Effort: Pulmonary effort is normal. No respiratory distress.      Breath sounds: Normal breath sounds. No wheezing " or rales.   Chest:      Chest wall: No tenderness.   Abdominal:      General: Bowel sounds are normal. There is no distension.      Palpations: Abdomen is soft. There is no mass.      Tenderness: There is no abdominal tenderness. There is no rebound.   Musculoskeletal:      Cervical back: Normal range of motion.   Lymphadenopathy:      Cervical: No cervical adenopathy.      Upper Body:      Right upper body: No supraclavicular or axillary adenopathy.      Left upper body: No supraclavicular or axillary adenopathy.   Skin:     General: Skin is warm and dry.      Findings: No erythema or rash.   Neurological:      Mental Status: He is alert and oriented to person, place, and time.   Psychiatric:         Behavior: Behavior normal.         Laboratory Data:  Lab Visit on 10/31/2023   Component Date Value Ref Range Status    WBC 10/31/2023 11.24  3.90 - 12.70 K/uL Final    RBC 10/31/2023 5.08  4.60 - 6.20 M/uL Final    Hemoglobin 10/31/2023 13.8 (L)  14.0 - 18.0 g/dL Final    Hematocrit 10/31/2023 44.1  40.0 - 54.0 % Final    MCV 10/31/2023 87  82 - 98 fL Final    MCH 10/31/2023 27.2  27.0 - 31.0 pg Final    MCHC 10/31/2023 31.3 (L)  32.0 - 36.0 g/dL Final    RDW 10/31/2023 15.0 (H)  11.5 - 14.5 % Final    Platelets 10/31/2023 345  150 - 450 K/uL Final    MPV 10/31/2023 8.7 (L)  9.2 - 12.9 fL Final    Immature Granulocytes 10/31/2023 0.4  0.0 - 0.5 % Final    Gran # (ANC) 10/31/2023 5.2  1.8 - 7.7 K/uL Final    Immature Grans (Abs) 10/31/2023 0.04  0.00 - 0.04 K/uL Final    Comment: Mild elevation in immature granulocytes is non specific and   can be seen in a variety of conditions including stress response,   acute inflammation, trauma and pregnancy. Correlation with other   laboratory and clinical findings is essential.      Lymph # 10/31/2023 4.3  1.0 - 4.8 K/uL Final    Mono # 10/31/2023 1.1 (H)  0.3 - 1.0 K/uL Final    Eos # 10/31/2023 0.5  0.0 - 0.5 K/uL Final    Baso # 10/31/2023 0.03  0.00 - 0.20 K/uL Final     nRBC 10/31/2023 0  0 /100 WBC Final    Gran % 10/31/2023 46.5  38.0 - 73.0 % Final    Lymph % 10/31/2023 38.3  18.0 - 48.0 % Final    Mono % 10/31/2023 9.8  4.0 - 15.0 % Final    Eosinophil % 10/31/2023 4.7  0.0 - 8.0 % Final    Basophil % 10/31/2023 0.3  0.0 - 1.9 % Final    Differential Method 10/31/2023 Automated   Final    Magnesium 10/31/2023 1.7  1.6 - 2.6 mg/dL Final   Lab Visit on 10/30/2023   Component Date Value Ref Range Status    Sodium 10/30/2023 141  136 - 145 mmol/L Final    Potassium 10/30/2023 5.0  3.5 - 5.1 mmol/L Final    Chloride 10/30/2023 107  95 - 110 mmol/L Final    CO2 10/30/2023 22 (L)  23 - 29 mmol/L Final    Glucose 10/30/2023 106  70 - 110 mg/dL Final    BUN 10/30/2023 12  8 - 23 mg/dL Final    Creatinine 10/30/2023 1.3  0.5 - 1.4 mg/dL Final    Calcium 10/30/2023 10.0  8.7 - 10.5 mg/dL Final    Total Protein 10/30/2023 7.5  6.0 - 8.4 g/dL Final    Albumin 10/30/2023 3.5  3.5 - 5.2 g/dL Final    Total Bilirubin 10/30/2023 0.3  0.1 - 1.0 mg/dL Final    Comment: For infants and newborns, interpretation of results should be based  on gestational age, weight and in agreement with clinical  observations.    Premature Infant recommended reference ranges:  Up to 24 hours.............<8.0 mg/dL  Up to 48 hours............<12.0 mg/dL  3-5 days..................<15.0 mg/dL  6-29 days.................<15.0 mg/dL      Alkaline Phosphatase 10/30/2023 69  55 - 135 U/L Final    AST 10/30/2023 22  10 - 40 U/L Final    ALT 10/30/2023 27  10 - 44 U/L Final    eGFR 10/30/2023 58.0 (A)  >60 mL/min/1.73 m^2 Final    Anion Gap 10/30/2023 12  8 - 16 mmol/L Final    TSH 10/30/2023 <0.010 (L)  0.400 - 4.000 uIU/mL Final    Free T4 10/30/2023 0.81  0.71 - 1.51 ng/dL Final    T3, Total 10/30/2023 87  60 - 180 ng/dL Final    Thyroperoxidase Antibodies 10/30/2023 10.1 (H)  <6.0 IU/mL Final         Imaging:     CT Abdomen 7/03/23    There are aortic annulus calcifications and coronary artery calcifications present.   There are aortic valve calcifications.  No significant volume of pericardial fluid.  There is a 28 x 26 mm irregular solid zone of masslike consolidation within the left lower lobe, smaller in extent than at the time of the previous examination (series 2, image 18).  There is an 11 x 14 mm left hilar lymph node present on series 2, image 4.  There are pleural calcifications present within the left lower lobe suggesting previous asbestos exposure.  There is linear atelectasis versus fibrosis in the left lower lobe.     The liver is enlarged.  No new early arterial phase enhancing hepatic mass appreciated.  The gallbladder is unremarkable.  The portal vein is patent.  No intra or extrahepatic biliary dilatation.  There is an 18 mm focus of probable accessory splenic tissue/splenule noted within the left upper quadrant of the abdomen in this patient with surgical absence of the spleen.  The current examination once again demonstrates an approximately 36 x 40 mm hypodense enhancing mass (series 2, image 75) intimately associated with the deep surface of the pancreatic tail, similar in size when compared to the prior PET-CT exam.  There is a postoperative suture line present along the anterior superior margin of the mass along greater curvature of the stomach on series 2, image 68).  There is loss of the normal fat plane between the aforementioned pancreatic tail mass and adjacent greater curvature of the stomach on series 601, image 100, similar to the prior exam.  No new pancreatic mass appreciated.  The adrenal glands are unremarkable.  There is atherosclerotic calcification present within the abdominal aorta and common iliac arteries.  No bulky periaortic or retroperitoneal lymphadenopathy.     There is a lobulated contour of both kidneys with multiple areas of focal cortical scarring noted in both kidneys.  There unchanged renal hypodensities noted bilaterally, most likely cysts.  No enhancing renal mass  appreciated.     The imaged appendix is unremarkable.  The visualized loops of bowel show no evidence of inflammation or obstruction.  There is minimal mesenteric haziness present within the fat planes adjacent to the pancreatic body (for example series 2, image 128-158).  No ascites.  No new peritoneal soft tissue mass.     There is degenerative change of the thoracolumbar spine.  No definite new osseous metastatic disease appreciated.   Assessment:       1. Squamous cell carcinoma of lung, unspecified laterality    2. Other specified disorders involving the immune mechanism, not elsewhere classified    3. Immunodeficiency due to drug therapy    4. Chronic kidney disease, stage 3a    5. Pancreatic mass    6. Hypertension, unspecified type    7. Hyperlipidemia, unspecified hyperlipidemia type    8. Neuropathy    9. Normocytic anemia                           Plan:     NSCLC - patient was recently diagnosed with at least stage III [id number] SCC of the left lung  -PET/CT 4/18/23 shows continued left lung mass, mediastinal LAD  -MRI brain 4/18/23 showed no acute findings  -TEMPUS Blood testing showed TP 53 mutation  -TEMPUS tissue testing showed PD-L1 of 20%, TP53, KDM6A, CDKN2a and MTAP  -PORT placed 4/24/23 under the care of Dr. Love  -PT completed 6 cycles of Carboplatin and Paclitaxel on 6/02/23  -Radiation completed 6/07/23  -CT chest on 6/26/23 showed a decrease in LLL mass  -Will proceed with Durvalumab for 1 year of treatment  -Pt can proceed with cycle 5 of Durvalumab  -CT chest to be repeated 11/21/23    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  -No sign of infection  -Will monitor    CKD - pt with stage IIIa CKD  -Creatinine 1.3 10/30/23  -Stable  -Will monitor    Pancreatic Mass - seen on CT scans and biopsy during EUS on 03/03/2023 with path showing no evidence of malignancy  -PET/CT 4/18/23 showed uptake in the tail of the pancreas  -Possibly due to chronic pancreatitis  -Pt with stable  pancreatic mass on CT abdomen 7/03/23  -Will monitor    HTN - pt on amlodipine, clonidine, metoprolol  -BP controlled  -Will monitor    Gout - pt on allopurinol  -Currently asymptomatic  -Will monitor    HLD - pt on rosuvastatin, zetia  -Management per PCP    DMII - pt on toujeo, farxiga, and bydureon  -Management per PCP    Neuropathy - numbness in feet due to diabetes  -Will monitor    Normocytic Anemia - hemoglobin 13.8g/dL on 10/30/23  -Possibly due to chronic disease  -Will monitor    Route Chart for Scheduling    Med Onc Chart Routing      Follow up with physician 4 weeks. Pt can proceed with treatment.  He will need a CBC, CMP and TSH in 4 weeks with appt with me and treatment.  Please print new appt schedule.   Follow up with RADHIKA    Infusion scheduling note    Injection scheduling note    Labs    Imaging    Pharmacy appointment    Other referrals                Treatment Plan Information   OP DURVALUMAB 1500 MG Q4W   Lisandro Lawrence MD   Upcoming Treatment Dates - OP DURVALUMAB 1500 MG Q4W    11/28/2023       Chemotherapy       durvalumab (IMFINZI) 1,500 mg in sodium chloride 0.9% SolP 280 mL chemo infusion  12/26/2023       Chemotherapy       durvalumab (IMFINZI) 1,500 mg in sodium chloride 0.9% SolP 280 mL chemo infusion  1/23/2024       Chemotherapy       durvalumab (IMFINZI) 1,500 mg in sodium chloride 0.9% SolP 280 mL chemo infusion  2/20/2024       Chemotherapy       durvalumab (IMFINZI) 1,500 mg in sodium chloride 0.9% SolP 280 mL chemo infusion    Supportive Plan Information  IV FLUIDS AND ELECTROLYTES   Lisandro Lawrence MD   Upcoming Treatment Dates - IV FLUIDS AND ELECTROLYTES    No upcoming days in selected categories.      Lisandro Lawrence MD  [facility]  Hematology and Oncology  [facility] [address]   [place] [address]   O: [phone]  F: [phone]

## 2023-10-30 NOTE — PROGRESS NOTES
CHIEF COMPLAINT: Hyperthyroidism.   73 y.o. old being seen as a new patient. No XRT to head/neck. Pt seeing Hem/Onc for NSCLC. On DURVALUMAB- Immune checkpoint inhibitor. No palpitations. No tremors. No diarrhea or constipation. No anxiety. Has chronic insomnia- no change. No biotin. Had IV contrast 7/3/23. NO difficulty swallowing.       PAST MEDICAL HISTORY/PAST SURGICAL HISTORY:  Reviewed in [facility]    SOCIAL HISTORY: Reviewed in [facility]    FAMILY HISTORY:  Daughter had thyroid surgery for unknown reason. No DM.     MEDICATIONS/ALLERGIES: The patient's MedCard has been updated and reviewed.            PE:    GENERAL: Well developed, well nourished.  NECK: Supple, trachea midline, right nodule palpable  CHEST: Resp even and unlabored, CTA bilateral.  CARDIAC: RRR, S1, S2 heard, no murmurs, rubs, S3, or S4  SKIN: no acanthosis nigracans.    LABS/Radiology   Latest Reference Range & Units 08/08/23 12:26 09/05/23 12:49 10/03/23 12:02   TSH 0.400 - 4.000 uIU/mL 0.555 <0.010 (L) <0.010 (L)   Free T4 0.71 - 1.51 ng/dL  4.03 (H) 2.25 (H)   (L): Data is abnormally low  (H): Data is abnormally high     Latest Reference Range & Units 10/03/23 12:02   Sodium 136 - 145 mmol/L 139   Potassium 3.5 - 5.1 mmol/L 3.9   Chloride 95 - 110 mmol/L 106   CO2 23 - 29 mmol/L 22 (L)   Anion Gap 8 - 16 mmol/L 11   BUN 8 - 23 mg/dL 16   Creatinine 0.5 - 1.4 mg/dL 1.2   eGFR >60 mL/min/1.73 m^2 >60.0   Glucose 70 - 110 mg/dL 131 (H)   Calcium 8.7 - 10.5 mg/dL 9.8   ALP 55 - 135 U/L 78   PROTEIN TOTAL 6.0 - 8.4 g/dL 7.3   Albumin 3.5 - 5.2 g/dL 3.3 (L)   BILIRUBIN TOTAL 0.1 - 1.0 mg/dL 0.3   AST 10 - 40 U/L 54 (H)   ALT 10 - 44 U/L 77 (H)   (L): Data is abnormally low  (H): Data is abnormally high    US THYROID     CLINICAL HISTORY:  Disorder of thyroid, unspecified     TECHNIQUE:  Ultrasound of the thyroid and cervical lymph nodes was performed.     COMPARISON:  None.     FINDINGS:  The right lobe of thyroid gland measures 6.3 x 2.8 x 2 8 cm in  size.  The left lobe the thyroid gland measures 5 8 x 1 8 x 1 8cm in size.  This gives an approximate volume of on the right of 26cc and an approximate volume on the left of 10.0 cc for a total approximate volume of 36 cc.     A 3.0 cm solid and cystic mass is noted within the right lobe predominantly cystic without obvious microcalcifications well-circumscribed and isoechoic wider than tall.  This is a TR 2 nodule.     A 9 mm predominantly cystic nodule is noted within the left lobe of the thyroid gland without obvious microcalcification wider than tall     Impression:     1. Thyroid nodules that do not meet criteria for fine-needle aspiration or biopsy.  2. Prominent thyroid gland    ASSESSMENT/PLAN:  1.  Hyperthyroidism-he is on an immune check point inhibitor.  I would like to repeat labs as seen below.  His LFTs were elevated.  This may impact the ability to be able to use antithyroid medications like methimazole or PTU.  Repeat LFTs.  If LFTs still elevated, may need to consider radioactive iodine versus surgery.  Will discuss with Oncology 1 plans on chemotherapy as it does look like it is on hold due to hyperthyroidism.  Will also schedule uptake and scan    2.  Multinodular goiter-has a large solid and cystic mass on the right.  No obstructive symptoms.  We will reassess based on uptake and scan    3. Elevated LFTs-repeat LFTs.  If still elevated, may not be able to use methimazole or PTU       FOLLOWUP  Needs TSH, Ft4, T3, TRAB, TPO, CMP today  Thyroid Uptake and scan- last contrast 7/3/23.

## 2023-10-31 ENCOUNTER — INFUSION (OUTPATIENT)
Dept: INFUSION THERAPY | Facility: HOSPITAL | Age: 73
End: 2023-10-31
Attending: INTERNAL MEDICINE
Payer: MEDICARE

## 2023-10-31 ENCOUNTER — DOCUMENTATION ONLY (OUTPATIENT)
Dept: INFUSION THERAPY | Facility: HOSPITAL | Age: 73
End: 2023-10-31
Payer: MEDICARE

## 2023-10-31 ENCOUNTER — OFFICE VISIT (OUTPATIENT)
Dept: HEMATOLOGY/ONCOLOGY | Facility: CLINIC | Age: 73
End: 2023-10-31
Payer: MEDICARE

## 2023-10-31 VITALS
DIASTOLIC BLOOD PRESSURE: 66 MMHG | BODY MASS INDEX: 26.62 KG/M2 | SYSTOLIC BLOOD PRESSURE: 134 MMHG | HEART RATE: 74 BPM | RESPIRATION RATE: 16 BRPM | WEIGHT: 207.44 LBS | OXYGEN SATURATION: 96 % | TEMPERATURE: 97 F | HEIGHT: 74 IN

## 2023-10-31 VITALS
WEIGHT: 207.44 LBS | BODY MASS INDEX: 26.62 KG/M2 | DIASTOLIC BLOOD PRESSURE: 60 MMHG | OXYGEN SATURATION: 96 % | SYSTOLIC BLOOD PRESSURE: 90 MMHG | HEART RATE: 80 BPM | HEIGHT: 74 IN | TEMPERATURE: 97 F | RESPIRATION RATE: 22 BRPM

## 2023-10-31 DIAGNOSIS — D84.821 IMMUNODEFICIENCY DUE TO DRUG THERAPY: ICD-10-CM

## 2023-10-31 DIAGNOSIS — K86.89 PANCREATIC MASS: ICD-10-CM

## 2023-10-31 DIAGNOSIS — E78.5 HYPERLIPIDEMIA, UNSPECIFIED HYPERLIPIDEMIA TYPE: ICD-10-CM

## 2023-10-31 DIAGNOSIS — N18.31 CHRONIC KIDNEY DISEASE, STAGE 3A: ICD-10-CM

## 2023-10-31 DIAGNOSIS — I10 HYPERTENSION, UNSPECIFIED TYPE: ICD-10-CM

## 2023-10-31 DIAGNOSIS — D89.89 OTHER SPECIFIED DISORDERS INVOLVING THE IMMUNE MECHANISM, NOT ELSEWHERE CLASSIFIED: ICD-10-CM

## 2023-10-31 DIAGNOSIS — C34.90 SQUAMOUS CELL CARCINOMA OF LUNG, UNSPECIFIED LATERALITY: Primary | ICD-10-CM

## 2023-10-31 DIAGNOSIS — Z79.899 IMMUNODEFICIENCY DUE TO DRUG THERAPY: ICD-10-CM

## 2023-10-31 DIAGNOSIS — G62.9 NEUROPATHY: ICD-10-CM

## 2023-10-31 DIAGNOSIS — D64.9 NORMOCYTIC ANEMIA: ICD-10-CM

## 2023-10-31 PROCEDURE — 99999 PR PBB SHADOW E&M-EST. PATIENT-LVL IV: ICD-10-PCS | Mod: PBBFAC,,, | Performed by: INTERNAL MEDICINE

## 2023-10-31 PROCEDURE — 99215 PR OFFICE/OUTPT VISIT, EST, LEVL V, 40-54 MIN: ICD-10-PCS | Mod: S$PBB,,, | Performed by: INTERNAL MEDICINE

## 2023-10-31 PROCEDURE — 25000003 PHARM REV CODE 250: Mod: PN | Performed by: INTERNAL MEDICINE

## 2023-10-31 PROCEDURE — 63600175 PHARM REV CODE 636 W HCPCS: Mod: JZ,JG,PN | Performed by: INTERNAL MEDICINE

## 2023-10-31 PROCEDURE — 99214 OFFICE O/P EST MOD 30 MIN: CPT | Mod: PBBFAC,25,PN | Performed by: INTERNAL MEDICINE

## 2023-10-31 PROCEDURE — 99999 PR PBB SHADOW E&M-EST. PATIENT-LVL IV: CPT | Mod: PBBFAC,,, | Performed by: INTERNAL MEDICINE

## 2023-10-31 PROCEDURE — 96413 CHEMO IV INFUSION 1 HR: CPT | Mod: PN

## 2023-10-31 PROCEDURE — A4216 STERILE WATER/SALINE, 10 ML: HCPCS | Mod: PN | Performed by: INTERNAL MEDICINE

## 2023-10-31 PROCEDURE — 99215 OFFICE O/P EST HI 40 MIN: CPT | Mod: S$PBB,,, | Performed by: INTERNAL MEDICINE

## 2023-10-31 RX ORDER — EPINEPHRINE 0.3 MG/.3ML
0.3 INJECTION SUBCUTANEOUS ONCE AS NEEDED
Status: CANCELLED | OUTPATIENT
Start: 2023-10-31

## 2023-10-31 RX ORDER — SODIUM CHLORIDE 0.9 % (FLUSH) 0.9 %
10 SYRINGE (ML) INJECTION
Status: CANCELLED | OUTPATIENT
Start: 2023-10-31

## 2023-10-31 RX ORDER — DIPHENHYDRAMINE HYDROCHLORIDE 50 MG/ML
50 INJECTION INTRAMUSCULAR; INTRAVENOUS ONCE AS NEEDED
Status: CANCELLED | OUTPATIENT
Start: 2023-10-31

## 2023-10-31 RX ORDER — HEPARIN 100 UNIT/ML
500 SYRINGE INTRAVENOUS
Status: CANCELLED | OUTPATIENT
Start: 2023-10-31

## 2023-10-31 RX ORDER — SODIUM CHLORIDE 0.9 % (FLUSH) 0.9 %
10 SYRINGE (ML) INJECTION
Status: DISCONTINUED | OUTPATIENT
Start: 2023-10-31 | End: 2023-10-31 | Stop reason: HOSPADM

## 2023-10-31 RX ADMIN — SODIUM CHLORIDE 1500 MG: 9 INJECTION, SOLUTION INTRAVENOUS at 02:10

## 2023-10-31 RX ADMIN — SODIUM CHLORIDE: 9 INJECTION, SOLUTION INTRAVENOUS at 02:10

## 2023-10-31 RX ADMIN — Medication 10 ML: at 03:10

## 2023-10-31 NOTE — PLAN OF CARE
Pt tolerated Imfinzi infusion well.  No s/s of infusion reaction noted.  Instructed to call MD with any problems

## 2023-11-01 LAB — TSH RECEP AB SER-ACNC: <1.1 IU/L (ref 0–1.75)

## 2023-11-16 ENCOUNTER — HOSPITAL ENCOUNTER (OUTPATIENT)
Dept: RADIOLOGY | Facility: HOSPITAL | Age: 73
Discharge: HOME | End: 2023-11-16
Attending: INTERNAL MEDICINE
Payer: MEDICARE

## 2023-11-16 DIAGNOSIS — E05.90 HYPERTHYROIDISM: ICD-10-CM

## 2023-11-16 PROCEDURE — 78014 THYROID IMAGING W/BLOOD FLOW: CPT | Mod: 26,,, | Performed by: RADIOLOGY

## 2023-11-16 PROCEDURE — 78014 NM THYROID UPTAKE AND SCAN: ICD-10-PCS | Mod: 26,,, | Performed by: RADIOLOGY

## 2023-11-16 PROCEDURE — A9516 IODINE I-123 SOD IODIDE MIC: HCPCS | Mod: PO

## 2023-11-17 ENCOUNTER — HOSPITAL ENCOUNTER (OUTPATIENT)
Dept: RADIOLOGY | Facility: HOSPITAL | Age: 73
Discharge: HOME | End: 2023-11-17
Attending: INTERNAL MEDICINE
Payer: MEDICARE

## 2023-11-21 ENCOUNTER — HOSPITAL ENCOUNTER (OUTPATIENT)
Dept: RADIOLOGY | Facility: HOSPITAL | Age: 73
Discharge: HOME | End: 2023-11-21
Attending: RADIOLOGY
Payer: MEDICARE

## 2023-11-21 DIAGNOSIS — C34.92 SQUAMOUS CELL CARCINOMA OF LEFT LUNG: ICD-10-CM

## 2023-11-21 PROCEDURE — 71260 CT THORAX DX C+: CPT | Mod: TC,PO

## 2023-11-21 PROCEDURE — 71260 CT CHEST WITH CONTRAST: ICD-10-PCS | Mod: 26,,, | Performed by: RADIOLOGY

## 2023-11-21 PROCEDURE — 25500020 PHARM REV CODE 255: Mod: PO | Performed by: RADIOLOGY

## 2023-11-21 PROCEDURE — 71260 CT THORAX DX C+: CPT | Mod: 26,,, | Performed by: RADIOLOGY

## 2023-11-21 RX ADMIN — IOHEXOL 75 ML: 350 INJECTION, SOLUTION INTRAVENOUS at 10:11

## 2023-11-28 ENCOUNTER — OFFICE VISIT (OUTPATIENT)
Dept: HEMATOLOGY/ONCOLOGY | Facility: CLINIC | Age: 73
End: 2023-11-28
Payer: MEDICARE

## 2023-11-28 ENCOUNTER — INFUSION (OUTPATIENT)
Dept: INFUSION THERAPY | Facility: HOSPITAL | Age: 73
End: 2023-11-28
Attending: INTERNAL MEDICINE
Payer: MEDICARE

## 2023-11-28 ENCOUNTER — DOCUMENTATION ONLY (OUTPATIENT)
Dept: INFUSION THERAPY | Facility: HOSPITAL | Age: 73
End: 2023-11-28
Payer: MEDICARE

## 2023-11-28 VITALS
RESPIRATION RATE: 17 BRPM | SYSTOLIC BLOOD PRESSURE: 116 MMHG | BODY MASS INDEX: 27.56 KG/M2 | HEIGHT: 74 IN | HEART RATE: 72 BPM | TEMPERATURE: 97 F | WEIGHT: 214.75 LBS | DIASTOLIC BLOOD PRESSURE: 71 MMHG | OXYGEN SATURATION: 97 %

## 2023-11-28 VITALS
WEIGHT: 214.75 LBS | TEMPERATURE: 97 F | RESPIRATION RATE: 18 BRPM | HEIGHT: 74 IN | BODY MASS INDEX: 27.56 KG/M2 | SYSTOLIC BLOOD PRESSURE: 112 MMHG | DIASTOLIC BLOOD PRESSURE: 66 MMHG | HEART RATE: 82 BPM | OXYGEN SATURATION: 97 %

## 2023-11-28 DIAGNOSIS — G62.9 NEUROPATHY: ICD-10-CM

## 2023-11-28 DIAGNOSIS — C34.90 SQUAMOUS CELL CARCINOMA OF LUNG, UNSPECIFIED LATERALITY: Primary | ICD-10-CM

## 2023-11-28 DIAGNOSIS — D84.821 IMMUNODEFICIENCY DUE TO DRUG THERAPY: ICD-10-CM

## 2023-11-28 DIAGNOSIS — N18.31 CHRONIC KIDNEY DISEASE, STAGE 3A: ICD-10-CM

## 2023-11-28 DIAGNOSIS — D89.89 OTHER SPECIFIED DISORDERS INVOLVING THE IMMUNE MECHANISM, NOT ELSEWHERE CLASSIFIED: ICD-10-CM

## 2023-11-28 DIAGNOSIS — D64.9 NORMOCYTIC ANEMIA: ICD-10-CM

## 2023-11-28 DIAGNOSIS — K86.89 PANCREATIC MASS: ICD-10-CM

## 2023-11-28 DIAGNOSIS — I10 HYPERTENSION, UNSPECIFIED TYPE: ICD-10-CM

## 2023-11-28 DIAGNOSIS — E78.5 HYPERLIPIDEMIA, UNSPECIFIED HYPERLIPIDEMIA TYPE: ICD-10-CM

## 2023-11-28 DIAGNOSIS — Z79.899 IMMUNODEFICIENCY DUE TO DRUG THERAPY: ICD-10-CM

## 2023-11-28 PROCEDURE — 99215 PR OFFICE/OUTPT VISIT, EST, LEVL V, 40-54 MIN: ICD-10-PCS | Mod: S$PBB,,, | Performed by: INTERNAL MEDICINE

## 2023-11-28 PROCEDURE — 99999 PR PBB SHADOW E&M-EST. PATIENT-LVL IV: CPT | Mod: PBBFAC,,, | Performed by: INTERNAL MEDICINE

## 2023-11-28 PROCEDURE — 99215 OFFICE O/P EST HI 40 MIN: CPT | Mod: S$PBB,,, | Performed by: INTERNAL MEDICINE

## 2023-11-28 PROCEDURE — 99214 OFFICE O/P EST MOD 30 MIN: CPT | Mod: PBBFAC,25,PN | Performed by: INTERNAL MEDICINE

## 2023-11-28 PROCEDURE — 96413 CHEMO IV INFUSION 1 HR: CPT | Mod: PN

## 2023-11-28 PROCEDURE — 63600175 PHARM REV CODE 636 W HCPCS: Mod: JZ,JG,PN | Performed by: INTERNAL MEDICINE

## 2023-11-28 PROCEDURE — 25000003 PHARM REV CODE 250: Mod: PN | Performed by: INTERNAL MEDICINE

## 2023-11-28 PROCEDURE — 99999 PR PBB SHADOW E&M-EST. PATIENT-LVL IV: ICD-10-PCS | Mod: PBBFAC,,, | Performed by: INTERNAL MEDICINE

## 2023-11-28 RX ORDER — SODIUM CHLORIDE 0.9 % (FLUSH) 0.9 %
10 SYRINGE (ML) INJECTION
Status: DISCONTINUED | OUTPATIENT
Start: 2023-11-28 | End: 2023-11-28 | Stop reason: HOSPADM

## 2023-11-28 RX ORDER — HEPARIN 100 UNIT/ML
500 SYRINGE INTRAVENOUS
Status: DISCONTINUED | OUTPATIENT
Start: 2023-11-28 | End: 2023-11-28 | Stop reason: HOSPADM

## 2023-11-28 RX ORDER — HEPARIN 100 UNIT/ML
500 SYRINGE INTRAVENOUS
Status: CANCELLED | OUTPATIENT
Start: 2023-11-28

## 2023-11-28 RX ORDER — SODIUM CHLORIDE 0.9 % (FLUSH) 0.9 %
10 SYRINGE (ML) INJECTION
Status: CANCELLED | OUTPATIENT
Start: 2023-11-28

## 2023-11-28 RX ORDER — EPINEPHRINE 0.3 MG/.3ML
0.3 INJECTION SUBCUTANEOUS ONCE AS NEEDED
Status: DISCONTINUED | OUTPATIENT
Start: 2023-11-28 | End: 2023-11-28 | Stop reason: HOSPADM

## 2023-11-28 RX ORDER — DIPHENHYDRAMINE HYDROCHLORIDE 50 MG/ML
50 INJECTION INTRAMUSCULAR; INTRAVENOUS ONCE AS NEEDED
Status: CANCELLED | OUTPATIENT
Start: 2023-11-28

## 2023-11-28 RX ORDER — EPINEPHRINE 0.3 MG/.3ML
0.3 INJECTION SUBCUTANEOUS ONCE AS NEEDED
Status: CANCELLED | OUTPATIENT
Start: 2023-11-28

## 2023-11-28 RX ORDER — DIPHENHYDRAMINE HYDROCHLORIDE 50 MG/ML
50 INJECTION INTRAMUSCULAR; INTRAVENOUS ONCE AS NEEDED
Status: DISCONTINUED | OUTPATIENT
Start: 2023-11-28 | End: 2023-11-28 | Stop reason: HOSPADM

## 2023-11-28 RX ADMIN — SODIUM CHLORIDE 1500 MG: 9 INJECTION, SOLUTION INTRAVENOUS at 11:11

## 2023-11-28 RX ADMIN — SODIUM CHLORIDE: 9 INJECTION, SOLUTION INTRAVENOUS at 10:11

## 2023-11-28 NOTE — PROGRESS NOTES
PATIENT: Feng Thakkar  MRN: [id number]  DATE: 11/28/2023      Diagnosis:   1. Squamous cell carcinoma of lung, unspecified laterality    2. Other specified disorders involving the immune mechanism, not elsewhere classified    3. Immunodeficiency due to drug therapy    4. Chronic kidney disease, stage 3a    5. Pancreatic mass    6. Hypertension, unspecified type    7. Hyperlipidemia, unspecified hyperlipidemia type    8. Neuropathy    9. Normocytic anemia          Chief Complaint: Lung Cancer      Oncologic History:      Oncologic History     Oncologic Treatment     Pathology           Subjective:    Interval History: Mr. Thakkar is a 73 y.o. male with Gout, HLD, HTN who presents for work up of NSCLC.  Since the last clinic visit the patient underwent CT chest on 11/21/2023 showing patchy ground-glass density within the medial right upper lobe; ground-glass density in the left dependent left upper lobe that the left lower lobe with associated bronchiectasis; previous target peribronchial nodule in the left lower lobe measuring 2.5 cm and 2 minute cm hypodense lesion in the right thyroid nodule.  The patient denies CP, cough, SOB, abdominal pain, nausea, vomiting, constipation, diarrhea.  The patient denies fever, chills, night sweats, weight loss, new lumps or bumps, easy bruising or bleeding.    Prior History:  The patient initially developed abdominal pain on 01/05/2023 and underwent CT of the abdomen showing a mass in the left lung base measuring 3.9 x 2.9 cm; 3 x 3.8 cm mass along the pancreatic tail; and shotty retroperitoneal lymph nodes.  The patient underwent repeat CT abdomen and pelvis on 02/24/2023 showing a 4.3 x 4 solid enhancing mass of the left lung base; thickened bladder wall; prostate gland measuring 45.1 x 4.6 cm; abnormal sclerosis in the left femoral head measuring 2.8 x 1.4 x 1.3 cm; and additional soft tissue densities in the posterior left subdiaphragmatic region measuring up to 16 mm.  CT chest  on 03/01/2023 showed a 2 cm low-density lesion in the right lobe of the thyroid gland; 4.5 x 4.3 x 3.8 cm mass in the left lower lobe; several micro nodules; Na soft tissue mass adjacent to the pancreatic tail.  Patient underwent EUS on 03/03/2023 showing a 3.5 cm and 1.8 cm round pancreatic tail lesion.  Biopsy returned results showing no evidence of malignancy and abundant hematopoietic elements and dendritic cells.  Patient then underwent EBUS under the care of Dr. Albright on 03/17/2023 showing squamous cell carcinoma in biopsy of the left lower lung lesion; squamous cell carcinoma and station 7 lymph node; positive 11 L lymph node for squamous cell carcinoma.   The patient underwent PET-CT on 04/18/2023 showing a markedly hypermetabolic lobulated subpleural left lower lobe mass measuring 4.5 x 3.9 cm with hypermetabolic lymph nodes in the left hilar region measuring 2.2 x 1.9 cm; and a 4.2 x 3.6 walk circumscribed mass in the pancreatic tail.  MRI brain on 04/18/2023 showed no acute findings.     The patient underwent CT chest on 6/26/23 showing a large right thyroid mass measuring at least 2.6 cm; left renal hypodensity measuring 11 mm favored to be a cyst; significant decrease in size of left lower lobe consolidative mass now measuring 2.4 x 2.3 cm; stable 3 mm consolidative nodule in the left upper lobe.    Past Medical History:   Past Medical History:   Diagnosis Date    Diabetes mellitus     Gout, unspecified     High cholesterol     HTN (hypertension)     Lung cancer        Past Surgical HIstory:   Past Surgical History:   Procedure Laterality Date    ENDOSCOPIC ULTRASOUND OF UPPER GASTROINTESTINAL TRACT N/A 3/3/2023    Procedure: ULTRASOUND, UPPER GI TRACT, ENDOSCOPIC;  Surgeon: Cora Mcgrath MD;  Location: [facility];  Service: Endoscopy;  Laterality: N/A;  2/24/23-Instructions mailed to address on file-DS    INSERTION OF TUNNELED CENTRAL VENOUS CATHETER (CVC) WITH SUBCUTANEOUS PORT N/A 4/24/2023     Procedure: [id number];  Surgeon: Paulino Love MD;  Location: [facility] OR;  Service: General;  Laterality: N/A;    PANCREATECTOMY      ROBOTIC BRONCHOSCOPY N/A 3/17/2023    Procedure: ROBOTIC BRONCHOSCOPY;  Surgeon: Cristiane Albright MD;  [facility];  Service: Pulmonary;  Laterality: N/A;       Family History:   Family History   Problem Relation Age of Onset    Breast cancer Sister         60       Social History:  reports that he quit smoking about 11 years ago. His smoking use included cigarettes. He started smoking about 55 years ago. He has a 81.0 pack-year smoking history. He has quit using smokeless tobacco. He reports current alcohol use of about 3.0 standard drinks of alcohol per week. He reports that he does not use drugs.    Allergies:  Review of patient's allergies indicates:  No Known Allergies    Medications:  Current Outpatient Medications   Medication Sig Dispense Refill    allopurinoL (ZYLOPRIM) 100 MG tablet Take 100 mg by mouth once daily.      amLODIPine (NORVASC) 2.5 MG tablet Take 2.5 mg by mouth once daily.      BYDUREON BCISE 2 mg/0.85 mL AtIn Inject 2 mg into the skin every 7 days. Every Friday      cloNIDine (CATAPRES) 0.2 MG tablet Take 0.2 mg by mouth once. Daily      duke's soln (benadryl 30 mL, mylanta 30 mL, LIDOcaine 30 mL, nystatin 30 mL) 120mL Take 10 mLs by mouth 4 (four) times daily. 500 mL 2    ezetimibe (ZETIA) 10 mg tablet Take 10 mg by mouth once daily.      FARXIGA 10 mg tablet Take 10 mg by mouth every morning.      finasteride (PROSCAR) 5 mg tablet Take 5 mg by mouth once daily.      FREESTYLE TEST Strp USE 1 STRIP TO CHECK GLUCOSE ONCE DAILY      LIDOCAINE VISCOUS 2 % solution       LIDOcaine-prilocaine (EMLA) cream Apply topically to port site one hour prior to access, cover 30 g 1    M-DRYL 12.5 mg/5 mL liquid Take by mouth.      metoprolol succinate (TOPROL-XL) 100 MG 24 hr tablet Take 100 mg by mouth once daily.      ondansetron (ZOFRAN) 8 MG  "tablet Take 1 tablet (8 mg total) by mouth every 8 (eight) hours as needed for Nausea. 30 tablet 2    promethazine (PHENERGAN) 25 MG tablet Take 1 tablet (25 mg total) by mouth every 6 (six) hours as needed for Nausea. 30 tablet 0    rosuvastatin (CRESTOR) 40 MG Tab Take 10 mg by mouth every evening.      sucralfate (CARAFATE) 100 mg/mL suspension Take 10 mLs (1 g total) by mouth 4 (four) times daily. 500 mL 1    TOUJEO MAX U-300 SOLOSTAR 300 unit/mL (3 mL) insulin pen Inject 30 Units into the skin every evening.      TOUJEO SOLOSTAR U-300 INSULIN 300 unit/mL (1.5 mL) InPn pen SMARTSI Unit(s) SUB-Q Every Evening      omeprazole (PRILOSEC) 40 MG capsule Take 1 capsule (40 mg total) by mouth once daily. 30 capsule 1     No current facility-administered medications for this visit.       Review of Systems   Constitutional:  Negative for chills, diaphoresis, fatigue, fever and unexpected weight change.   Respiratory:  Negative for cough and shortness of breath.    Cardiovascular:  Negative for chest pain and palpitations.   Gastrointestinal:  Negative for abdominal pain, constipation, diarrhea, nausea and vomiting.   Skin:  Negative for color change and rash.   Neurological:  Negative for headaches.   Hematological:  Negative for adenopathy. Does not bruise/bleed easily.       ECOG Performance Status: 1   Objective:      Vitals:   Vitals:    23 0933   BP: 112/66   BP Location: Right arm   Patient Position: Sitting   BP Method: Large (Manual)   Pulse: 82   Resp: 18   Temp: 97.3 °F (36.3 °C)   TempSrc: Temporal   SpO2: 97%   Weight: 97.4 kg (214 lb 11.7 oz)   Height: 6' 2" (1.88 m)                       Physical Exam  Constitutional:       General: He is not in acute distress.     Appearance: He is well-developed. He is not diaphoretic.   HENT:      Head: Normocephalic and atraumatic.   Cardiovascular:      Rate and Rhythm: Normal rate and regular rhythm.      Heart sounds: Normal heart sounds. No murmur heard.   "   No friction rub. No gallop.   Pulmonary:      Effort: Pulmonary effort is normal. No respiratory distress.      Breath sounds: Normal breath sounds. No wheezing or rales.   Chest:      Chest wall: No tenderness.   Abdominal:      General: Bowel sounds are normal. There is no distension.      Palpations: Abdomen is soft. There is no mass.      Tenderness: There is no abdominal tenderness. There is no rebound.   Musculoskeletal:      Cervical back: Normal range of motion.   Lymphadenopathy:      Cervical: No cervical adenopathy.      Upper Body:      Right upper body: No supraclavicular or axillary adenopathy.      Left upper body: No supraclavicular or axillary adenopathy.   Skin:     General: Skin is warm and dry.      Findings: No erythema or rash.   Neurological:      Mental Status: He is alert and oriented to person, place, and time.   Psychiatric:         Behavior: Behavior normal.         Laboratory Data:  No visits with results within 1 Week(s) from this visit.   Latest known visit with results is:   Lab Visit on 11/21/2023   Component Date Value Ref Range Status    Free T4 11/21/2023 0.60 (L)  0.71 - 1.51 ng/dL Final    T3, Total 11/21/2023 86  60 - 180 ng/dL Final    WBC 11/21/2023 8.64  3.90 - 12.70 K/uL Final    RBC 11/21/2023 5.15  4.60 - 6.20 M/uL Final    Hemoglobin 11/21/2023 14.1  14.0 - 18.0 g/dL Final    Hematocrit 11/21/2023 43.9  40.0 - 54.0 % Final    MCV 11/21/2023 85  82 - 98 fL Final    MCH 11/21/2023 27.4  27.0 - 31.0 pg Final    MCHC 11/21/2023 32.1  32.0 - 36.0 g/dL Final    RDW 11/21/2023 17.6 (H)  11.5 - 14.5 % Final    Platelets 11/21/2023 316  150 - 450 K/uL Final    MPV 11/21/2023 8.8 (L)  9.2 - 12.9 fL Final    Immature Granulocytes 11/21/2023 0.5  0.0 - 0.5 % Final    Gran # (ANC) 11/21/2023 3.7  1.8 - 7.7 K/uL Final    Immature Grans (Abs) 11/21/2023 0.04  0.00 - 0.04 K/uL Final    Comment: Mild elevation in immature granulocytes is non specific and   can be seen in a variety of  conditions including stress response,   acute inflammation, trauma and pregnancy. Correlation with other   laboratory and clinical findings is essential.      Lymph # 11/21/2023 3.4  1.0 - 4.8 K/uL Final    Mono # 11/21/2023 1.0  0.3 - 1.0 K/uL Final    Eos # 11/21/2023 0.5  0.0 - 0.5 K/uL Final    Baso # 11/21/2023 0.05  0.00 - 0.20 K/uL Final    nRBC 11/21/2023 0  0 /100 WBC Final    Gran % 11/21/2023 42.2  38.0 - 73.0 % Final    Lymph % 11/21/2023 39.1  18.0 - 48.0 % Final    Mono % 11/21/2023 11.5  4.0 - 15.0 % Final    Eosinophil % 11/21/2023 6.1  0.0 - 8.0 % Final    Basophil % 11/21/2023 0.6  0.0 - 1.9 % Final    Differential Method 11/21/2023 Automated   Final    Sodium 11/21/2023 141  136 - 145 mmol/L Final    Potassium 11/21/2023 4.6  3.5 - 5.1 mmol/L Final    Chloride 11/21/2023 104  95 - 110 mmol/L Final    CO2 11/21/2023 25  23 - 29 mmol/L Final    Glucose 11/21/2023 115 (H)  70 - 110 mg/dL Final    BUN 11/21/2023 13  8 - 23 mg/dL Final    Creatinine 11/21/2023 1.3  0.5 - 1.4 mg/dL Final    Calcium 11/21/2023 9.8  8.7 - 10.5 mg/dL Final    Total Protein 11/21/2023 7.9  6.0 - 8.4 g/dL Final    Albumin 11/21/2023 3.8  3.5 - 5.2 g/dL Final    Total Bilirubin 11/21/2023 0.4  0.1 - 1.0 mg/dL Final    Comment: For infants and newborns, interpretation of results should be based  on gestational age, weight and in agreement with clinical  observations.    Premature Infant recommended reference ranges:  Up to 24 hours.............<8.0 mg/dL  Up to 48 hours............<12.0 mg/dL  3-5 days..................<15.0 mg/dL  6-29 days.................<15.0 mg/dL      Alkaline Phosphatase 11/21/2023 76  55 - 135 U/L Final    AST 11/21/2023 23  10 - 40 U/L Final    ALT 11/21/2023 21  10 - 44 U/L Final    eGFR 11/21/2023 58 (A)  >60 mL/min/1.73 m^2 Final    Anion Gap 11/21/2023 12  8 - 16 mmol/L Final    TSH 11/21/2023 7.530 (H)  0.400 - 4.000 uIU/mL Final         Imaging:     CT Chest 11/21/23  Respiratory motion artifact  limits fine detail in the lung bases.     There is patchy ground-glass density in the medial right upper lobe. There is patchy airspace and ground-glass density in the dependent left upper lobe and throughout the left lower lobe with some bronchiectasis. Previous target peribronchial nodule in the left lower lobe is 2.5 cm series 4, image 325 with decreased central density. No filling defects central airways. Left pleural fluid. Heart size normal. Coronary artery disease. 2 cm hypodense lesion right thyroid nodule series 2, image 13. No mediastinal adenopathy. Partially imaged cystic lesion left renal midpole cortex. Mild multilevel degenerative changes in the spine.       Assessment:       1. Squamous cell carcinoma of lung, unspecified laterality    2. Other specified disorders involving the immune mechanism, not elsewhere classified    3. Immunodeficiency due to drug therapy    4. Chronic kidney disease, stage 3a    5. Pancreatic mass    6. Hypertension, unspecified type    7. Hyperlipidemia, unspecified hyperlipidemia type    8. Neuropathy    9. Normocytic anemia                             Plan:     NSCLC - patient was recently diagnosed with at least stage III [id number] SCC of the left lung  -PET/CT 4/18/23 shows continued left lung mass, mediastinal LAD  -MRI brain 4/18/23 showed no acute findings  -TEMPUS Blood testing showed TP 53 mutation  -[facility]PUS tissue testing showed PD-L1 of 20%, TP53, KDM6A, CDKN2a and MTAP  -PORT placed 4/24/23 under the care of Dr. Love  -PT completed 6 cycles of Carboplatin and Paclitaxel on 6/02/23  -Radiation completed 6/07/23  -CT chest on 6/26/23 showed a decrease in LLL mass  -Will proceed with Durvalumab for 1 year of treatment  -CT chest 11/21/23 shows decreased lung mass and radiation changes  -Pt can proceed with cycle 6 of Durvalumab    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  -No sign of infection  -Will monitor    CKD - pt with stage IIIa  CKD  -Creatinine 1.3 11/21/23  -Stable  -Will monitor    Pancreatic Mass - seen on CT scans and biopsy during EUS on 03/03/2023 with path showing no evidence of malignancy  -PET/CT 4/18/23 showed uptake in the tail of the pancreas  -Possibly due to chronic pancreatitis  -Pt with stable pancreatic mass on CT abdomen 7/03/23  -Will monitor    HTN - pt on amlodipine, clonidine, metoprolol  -BP controlled  -Will monitor    Gout - pt on allopurinol  -Currently asymptomatic  -Will monitor    HLD - pt on rosuvastatin, zetia  -Management per PCP    DMII - pt on toujeo, farxiga, and bydureon  -Management per PCP    Neuropathy - numbness in feet due to diabetes  -Will monitor    Normocytic Anemia - hemoglobin improved to 14.1g/dL on 11/21/23  -Possibly due to chronic disease  -Will monitor    Route Chart for Scheduling    Med Onc Chart Routing      Follow up with physician Other. Pt can proceed with treatment.  He will need a CBC, CMP and TSH in 4 weeks with NP visit and treatment.  The patient will need the same labs with an appt with me in 8 weeks with treatment.   Follow up with RADHIKA 4 weeks.   Infusion scheduling note    Injection scheduling note    Labs    Imaging    Pharmacy appointment    Other referrals                Treatment Plan Information   OP DURVALUMAB 1500 MG Q4W   Lisandro Lawrence MD   Upcoming Treatment Dates - OP DURVALUMAB 1500 MG Q4W    12/26/2023       Chemotherapy       durvalumab (IMFINZI) 1,500 mg in sodium chloride 0.9% SolP 280 mL chemo infusion  1/23/2024       Chemotherapy       durvalumab (IMFINZI) 1,500 mg in sodium chloride 0.9% SolP 280 mL chemo infusion  2/20/2024       Chemotherapy       durvalumab (IMFINZI) 1,500 mg in sodium chloride 0.9% SolP 280 mL chemo infusion  3/19/2024       Chemotherapy       durvalumab (IMFINZI) 1,500 mg in sodium chloride 0.9% SolP 280 mL chemo infusion    Supportive Plan Information  IV FLUIDS AND ELECTROLYTES   Lisandro Lawrence MD   Upcoming Treatment Dates -  IV FLUIDS AND ELECTROLYTES    No upcoming days in selected categories.      Lisandro Lawrence MD  [facility]  Hematology and Oncology  [facility]   [address] [address]   O: [phone]  F: [phone]

## 2023-11-28 NOTE — PROGRESS NOTES
Oncology Nutrition       Weight Check   Chart reviewed. Weight check completed on pt.     CBW:214#  Weight at initiation of tx:218#    Wt Readings from Last 10 Encounters:   11/28/23 97.4 kg (214 lb 11.7 oz)   11/28/23 97.4 kg (214 lb 11.7 oz)   10/31/23 94.1 kg (207 lb 7.3 oz)   10/31/23 94.1 kg (207 lb 7.3 oz)   10/30/23 93.9 kg (207 lb 0.2 oz)   10/03/23 92.9 kg (204 lb 12.9 oz)   10/03/23 92.9 kg (204 lb 12.9 oz)   09/05/23 97.7 kg (215 lb 6.2 oz)   09/05/23 97.7 kg (215 lb 6.2 oz)   08/22/23 99.9 kg (220 lb 3.8 oz)          RD plan of care: Weight is currently 214#. No significant change at this time. Per nursing nutrition risk report, pt denies any nutritional concerns or challenges at this time. RD to continue to monitor; no nutritional interventions are needed at this time.     Pratibha Chris, MS, RD, LDN  11/28/2023  12:06 PM

## 2023-11-28 NOTE — PLAN OF CARE
Problem: Adult Inpatient Plan of Care  Goal: Patient-Specific Goal (Individualized)  Outcome: Ongoing, Progressing  Flowsheets (Taken 11/28/2023 1130)  Anxieties, Fears or Concerns: none expressed  Individualized Care Needs: recliner, blanket, pillow, lights dimmed     Problem: Fatigue  Goal: Improved Activity Tolerance  Intervention: Promote Improved Energy  Flowsheets (Taken 11/28/2023 1130)  Fatigue Management: frequent rest breaks encouraged  Sleep/Rest Enhancement:   natural light exposure provided   noise level reduced   room darkened  Activity Management:   Ambulated -L4   Ambulated in lutz - L4     Problem: Adult Inpatient Plan of Care  Goal: Plan of Care Review  Outcome: Ongoing, Progressing  Flowsheets (Taken 11/28/2023 1217)  Plan of Care Reviewed With: patient       Patient tolerated Imfinzi treatment well. Port flushed with blood return, saline locked, and de-accessed. AVS provided and reviewed. Ambulates per self. No acute distress noted.

## 2023-11-29 ENCOUNTER — TELEPHONE (OUTPATIENT)
Dept: ENDOCRINOLOGY | Facility: CLINIC | Age: 73
End: 2023-11-29
Payer: MEDICARE

## 2023-11-29 DIAGNOSIS — E04.2 MULTINODULAR GOITER: ICD-10-CM

## 2023-11-29 DIAGNOSIS — E03.9 HYPOTHYROIDISM, UNSPECIFIED TYPE: Primary | ICD-10-CM

## 2023-11-29 RX ORDER — LEVOTHYROXINE SODIUM 25 UG/1
25 TABLET ORAL
Qty: 30 TABLET | Refills: 11 | Status: SHIPPED | OUTPATIENT
Start: 2023-11-29 | End: 2024-11-28

## 2023-11-29 NOTE — TELEPHONE ENCOUNTER
S/w pt, advised of Dr. Proctor's message below.  4 week lab scheduled.  Advised radiology will reach out to him to schedule biopsy soon but he can also call their dept at [phone] to schedule FNA.  Pt verb understanding.

## 2023-11-29 NOTE — TELEPHONE ENCOUNTER
Let him know now starting to get hypothyroid  Start synthroid 25 mcg daily  Check TSH 4 weeks    He has a cold nodule on Ultrasound.   Will need FNA. Placed orders for right FNA in radiology.

## 2023-11-29 NOTE — TELEPHONE ENCOUNTER
Unable to reach pt, no voice mail.  S/w pt's daughter-in-law, Anita, and asked that she have him call the office.

## 2023-11-29 NOTE — TELEPHONE ENCOUNTER
Rec'd msg from Dr. Lawrence's staff, he's concerned about pt's thyroid scan results, uptake and scan done 11/17/23.  You saw him 10/30/23 in clinic.  Please advise.

## 2023-11-30 NOTE — PROGRESS NOTES
[facility] Department of Radiation Oncology  Follow Up Visit Note    Diagnosis:  Feng Thakkar is a 73 y.o. 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 On maintenance Imfinzi.      Oncologic History:  Oncology History   Squamous cell carcinoma of lung   3/31/2023 Initial Diagnosis    Squamous cell carcinoma of lung     3/31/2023 Cancer Staged    Staging form: Lung, AJCC 8th Edition  - Clinical: Stage IIIA (cT2b, cN2, cM0)     4/27/2023 - 6/7/2023 Radiation Therapy    Treating physician: Leonor Hadley  Total Dose: 60 Gy  Fractions: 30  Treatment Site Ref. ID Energy Dose/Fx (Gy) #Fx Dose Correction (Gy) Total Dose (Gy) Start Date End Date Elapsed Days   IM Lung PTV 6X 2 6 / 30 0 12 4/27/2023 5/4/2023 7   IM Lung_New PTV 6X 2 24 / 24 0 48 5/5/2023 6/7/2023 33      4/28/2023 - 6/2/2023 Chemotherapy    Treatment Summary   Plan Name: OP NSCLC PACLITAXEL + CARBOPLATIN (AUC) QW + RADIATION  Treatment Goal: Curative  Status: Inactive  Start Date: 4/28/2023  End Date: 6/2/2023  Provider: Lisandro Lawrence MD  Chemotherapy: CARBOplatin (PARAPLATIN) 185 mg in sodium chloride 0.9% 303.5 mL chemo infusion, 185 mg (100 % of original dose 183.4 mg), Intravenous, Clinic/HOD 1 time, 6 of 6 cycles  Dose modification:   (original dose 183.4 mg, Cycle 1)  Administration: 185 mg (4/28/2023), 185 mg (5/5/2023), 190 mg (5/12/2023), 190 mg (5/19/2023), 180 mg (5/26/2023), 190 mg (6/2/2023)  PACLitaxeL (TAXOL) 45 mg/m2 = 102 mg in sodium chloride 0.9% 250 mL chemo infusion, 45 mg/m2 = 102 mg, Intravenous, Clinic/HOD 1 time, 6 of 6 cycles  Administration: 102 mg (4/28/2023), 102 mg (5/5/2023), 102 mg (5/12/2023), 102 mg (5/19/2023), 102 mg (5/26/2023), 102 mg (6/2/2023)     7/11/2023 -  Chemotherapy    Treatment Summary   Plan Name: OP DURVALUMAB 1500 MG Q4W  Treatment Goal: Curative  Status:  Active  Start Date: 7/11/2023  End Date: 6/11/2024 (Planned)  Provider: Lisandro Lawrence MD  Chemotherapy: [No matching medication found in this treatment plan]          Interval History  The patient presents today for a regularly scheduled follow up visit.  He was last seen in our clinic on 8/22/23.   Since that time, he has been feeling well and recovering well.  Breathing well.  Swallowing improved, now no pain (off all pain medications), no dysphagia. Notes some xerostomia x 1-2 weeks.     11/21/23 CT chest: post-radiation therapy changes in the lungs, mainly left upper lobe and LLL with some bronchiectasis; prior targeted LLL nodule is 2.5cm (2.8cm in 6/2023, 4.5cm pre-treatment).    Ongoing durvalumab.  Plan for 1 year    Abnormal thyroid function with PET-cold nodule noted.  Followed by Endocrinology.  Plan for biopsy.  Started on synthroid    HPI:  presented in January 2023 with complaint of abdominal pain. Outside imaging noted pancreatic mass and lungmass.       2/24/23 CT A/P: 4.7cm soft tissue mass associated with tail of pancreas; 4.3cm enhancing mass at left lung base.     3/1/23 CT Chest: 4.5 x 4.3 x 3.8cm mass with irregular lobulated margins; no enlarged lymph nodes, several micronodules which could represent intrapulmonary lymph nodes     3/3/23 Pancreatic body mass FNA: no evidence of malignancy; abundant hematopoetic and dendritic cells consistent with accessory splenic tissue     3/17/23 EBUS FNA:  Left lower lobe mass: +squamous cell carcinoma, moderately diff  + metastases Station 7 and 11L    4/18/23 MRI brain: no evidence of intracranial disease spread    4/18/23 PET/CT: There is a markedly hypermetabolic lobulated subpleural left lower lobe mass consistent with the patient's known squamous cell carcinoma.  The mass is best measured on the fused PET-CT axial images and on image 115 measures 4.5 x 3.9 cm with a max SUV of 14.0.  There also hypermetabolic lymph nodes in the left hilar region  "and subcarinal region most consistent with neoplastic adenopathy.  A left hilar node on image 111 measures 2.2 x 1.9 cm with a max SUV of 14.3.    23-23 definitive concurrent chemotherapy-radiation    23 CT chest with contrast: no worrisome change    7/3/23 CT A/P: interval decrease in size of known LLL squamous cell carcinoma. Stable pancreatic mass (previously PET negative)    Review of Systems   Review of Systems   Constitutional:  Positive for malaise/fatigue (still recovering from RT somewhat- significantly improved). Negative for chills, fever and weight loss.   HENT:  Negative for hearing loss.    Eyes:  Negative for blurred vision and double vision.   Respiratory:  Positive for cough (dry cough at times- once most days). Negative for shortness of breath.    Cardiovascular:  Negative for chest pain.   Gastrointestinal:  Negative for constipation, diarrhea, nausea and vomiting.   Musculoskeletal:  Negative for back pain, falls, joint pain and neck pain.   Neurological:  Positive for sensory change (right foot 2/2 diabetes- intermittent). Negative for dizziness, speech change, focal weakness, weakness and headaches.       Social History:  Social History     Tobacco Use    Smoking status: Former     Current packs/day: 0.00     Average packs/day: 1.5 packs/day for 54.0 years (81.0 ttl pk-yrs)     Types: Cigarettes     Start date: 1968     Quit date: 10/20/2012     Years since quittin.1    Smokeless tobacco: Former   Substance Use Topics    Alcohol use: Yes     Alcohol/week: 3.0 standard drinks of alcohol     Types: 3 Cans of beer per week     Comment: 3 a day    Drug use: Never       Family History:  Cancer-related family history includes Breast cancer in his sister.    Exam:  Vitals:    23 1017   BP: 131/73   BP Location: Left arm   Patient Position: Sitting   Pulse: 67   Resp: 20   Temp: 98.1 °F (36.7 °C)   SpO2: 98%   Weight: 97.2 kg (214 lb 4.6 oz)   Height: 6' 2" (1.88 m) "       Constitutional: Pleasant 73 y.o. male in no acute distress.  Well nourished. Well groomed.   HEENT: Normocephalic and atraumatic   Lungs: No audible wheezing.  Normal effort. CTAB  Musculoskeletal: No gross MSK deformities. Ambulates  Skin: No rashes appreciated.   Psych: Alert and oriented with appropriate mood and affect.  Neuro:   Grossly normal. 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 On maintenance Imfinzi.   Recovering well from acute radiation therapy-related toxicites; imaging radiation pneumonitis with no clinical symptoms  ECOG: (0) Fully active, able to carry on all predisease performance without restriction    Plan:  Follow up in 3 months with CT Chest   Reviewed signs/symptoms of clinical pneumonitis with instructions to contact our office   Continue adj immuno with Dr. Lawrence x 1  Follow up with Endocrinoloyg for thyroid biopsy  He was given our contact information, and he was told that he could call our clinic at anytime if he has any questions or concerns.  Follow up with other providers as directed    30 minutes spent in chart/case review, face-to-face interaction, discussion with other providers, and treatment planning/coordination of care.

## 2023-12-01 ENCOUNTER — OFFICE VISIT (OUTPATIENT)
Dept: RADIATION ONCOLOGY | Facility: CLINIC | Age: 73
End: 2023-12-01
Payer: MEDICARE

## 2023-12-01 VITALS
OXYGEN SATURATION: 98 % | HEIGHT: 74 IN | BODY MASS INDEX: 27.5 KG/M2 | WEIGHT: 214.31 LBS | RESPIRATION RATE: 20 BRPM | HEART RATE: 67 BPM | SYSTOLIC BLOOD PRESSURE: 131 MMHG | TEMPERATURE: 98 F | DIASTOLIC BLOOD PRESSURE: 73 MMHG

## 2023-12-01 DIAGNOSIS — C34.92 SQUAMOUS CELL CARCINOMA OF LEFT LUNG: Primary | ICD-10-CM

## 2023-12-01 PROCEDURE — 99214 PR OFFICE/OUTPT VISIT, EST, LEVL IV, 30-39 MIN: ICD-10-PCS | Mod: S$PBB,,, | Performed by: RADIOLOGY

## 2023-12-01 PROCEDURE — 99214 OFFICE O/P EST MOD 30 MIN: CPT | Mod: S$PBB,,, | Performed by: RADIOLOGY

## 2023-12-01 PROCEDURE — 99999 PR PBB SHADOW E&M-EST. PATIENT-LVL IV: CPT | Mod: PBBFAC,,, | Performed by: RADIOLOGY

## 2023-12-01 PROCEDURE — 99999 PR PBB SHADOW E&M-EST. PATIENT-LVL IV: ICD-10-PCS | Mod: PBBFAC,,, | Performed by: RADIOLOGY

## 2023-12-01 PROCEDURE — 99214 OFFICE O/P EST MOD 30 MIN: CPT | Mod: PBBFAC,PN | Performed by: RADIOLOGY

## 2023-12-04 ENCOUNTER — HOSPITAL ENCOUNTER (OUTPATIENT)
Dept: RADIOLOGY | Facility: HOSPITAL | Age: 73
Discharge: HOME | End: 2023-12-04
Attending: INTERNAL MEDICINE
Payer: MEDICARE

## 2023-12-04 DIAGNOSIS — E04.2 MULTINODULAR GOITER: ICD-10-CM

## 2023-12-04 DIAGNOSIS — E03.9 HYPOTHYROIDISM, UNSPECIFIED TYPE: ICD-10-CM

## 2023-12-04 PROCEDURE — 88173 CYTOPATH EVAL FNA REPORT: CPT | Mod: 26,,, | Performed by: STUDENT IN AN ORGANIZED HEALTH CARE EDUCATION/TRAINING PROGRAM

## 2023-12-04 PROCEDURE — 10005 US FINE NEEDLE ASPIRATION THYROID, FIRST LESION: ICD-10-PCS | Mod: ,,, | Performed by: RADIOLOGY

## 2023-12-04 PROCEDURE — 10005 FNA BX W/US GDN 1ST LES: CPT | Mod: ,,, | Performed by: RADIOLOGY

## 2023-12-04 PROCEDURE — 88173 PR  INTERPRETATION OF FNA SMEAR: ICD-10-PCS | Mod: 26,,, | Performed by: STUDENT IN AN ORGANIZED HEALTH CARE EDUCATION/TRAINING PROGRAM

## 2023-12-04 PROCEDURE — 10005 FNA BX W/US GDN 1ST LES: CPT | Mod: PO

## 2023-12-04 PROCEDURE — 88173 CYTOPATH EVAL FNA REPORT: CPT | Mod: PO | Performed by: STUDENT IN AN ORGANIZED HEALTH CARE EDUCATION/TRAINING PROGRAM

## 2023-12-04 PROCEDURE — 25000003 PHARM REV CODE 250: Mod: PO | Performed by: INTERNAL MEDICINE

## 2023-12-04 RX ORDER — LIDOCAINE HYDROCHLORIDE 10 MG/ML
5 INJECTION INFILTRATION; PERINEURAL ONCE
Status: COMPLETED | OUTPATIENT
Start: 2023-12-04 | End: 2023-12-04

## 2023-12-04 RX ADMIN — LIDOCAINE HYDROCHLORIDE 5 ML: 10 INJECTION, SOLUTION INFILTRATION; PERINEURAL at 08:12

## 2023-12-04 NOTE — PLAN OF CARE
Problem: Adult Inpatient Plan of Care  Goal: Patient-Specific Goal (Individualized)  Outcome: Ongoing, Progressing  Flowsheets (Taken 8/8/2023 1404)  Anxieties, Fears or Concerns: none  Individualized Care Needs: recliner     Problem: Fatigue  Goal: Improved Activity Tolerance  Outcome: Ongoing, Progressing  Intervention: Promote Improved Energy  Flowsheets (Taken 8/8/2023 1404)  Fatigue Management: frequent rest breaks encouraged  Sleep/Rest Enhancement:   regular sleep/rest pattern promoted   awakenings minimized  Activity Management: Ambulated -L4     
  Problem: Adult Inpatient Plan of Care  Goal: Plan of Care Review  Outcome: Ongoing, Progressing  Flowsheets (Taken 8/8/2023 3197)  Plan of Care Reviewed With: patient   Pt tolerated imfinzi and hydration infusion well.  No adverse reaction noted.   PAD flushed with NS and de-accessed per protocol.  Patient left clinic in no acute distress.    
n/a

## 2023-12-05 LAB
FINAL PATHOLOGIC DIAGNOSIS: NORMAL
Lab: NORMAL
MICROSCOPIC EXAM: NORMAL

## 2023-12-06 ENCOUNTER — TELEPHONE (OUTPATIENT)
Dept: ENDOCRINOLOGY | Facility: CLINIC | Age: 73
End: 2023-12-06
Payer: MEDICARE

## 2023-12-21 ENCOUNTER — DOCUMENTATION ONLY (OUTPATIENT)
Dept: HEMATOLOGY/ONCOLOGY | Facility: CLINIC | Age: 73
End: 2023-12-21
Payer: MEDICARE

## 2023-12-26 ENCOUNTER — OFFICE VISIT (OUTPATIENT)
Dept: HEMATOLOGY/ONCOLOGY | Facility: CLINIC | Age: 73
End: 2023-12-26
Payer: MEDICARE

## 2023-12-26 ENCOUNTER — LAB VISIT (OUTPATIENT)
Dept: LAB | Facility: HOSPITAL | Age: 73
End: 2023-12-26
Attending: INTERNAL MEDICINE
Payer: MEDICARE

## 2023-12-26 VITALS
HEIGHT: 74 IN | TEMPERATURE: 97 F | BODY MASS INDEX: 28.15 KG/M2 | DIASTOLIC BLOOD PRESSURE: 60 MMHG | WEIGHT: 219.38 LBS | OXYGEN SATURATION: 98 % | HEART RATE: 78 BPM | SYSTOLIC BLOOD PRESSURE: 100 MMHG

## 2023-12-26 DIAGNOSIS — K86.89 PANCREATIC MASS: ICD-10-CM

## 2023-12-26 DIAGNOSIS — C34.90 SQUAMOUS CELL CARCINOMA OF LUNG, UNSPECIFIED LATERALITY: Primary | ICD-10-CM

## 2023-12-26 DIAGNOSIS — N18.31 CHRONIC KIDNEY DISEASE, STAGE 3A: ICD-10-CM

## 2023-12-26 DIAGNOSIS — D84.821 IMMUNODEFICIENCY DUE TO DRUG THERAPY: ICD-10-CM

## 2023-12-26 DIAGNOSIS — C34.90 SQUAMOUS CELL CARCINOMA OF LUNG, UNSPECIFIED LATERALITY: ICD-10-CM

## 2023-12-26 DIAGNOSIS — E78.5 HYPERLIPIDEMIA, UNSPECIFIED HYPERLIPIDEMIA TYPE: ICD-10-CM

## 2023-12-26 DIAGNOSIS — D89.89 OTHER SPECIFIED DISORDERS INVOLVING THE IMMUNE MECHANISM, NOT ELSEWHERE CLASSIFIED: ICD-10-CM

## 2023-12-26 DIAGNOSIS — G62.9 NEUROPATHY: ICD-10-CM

## 2023-12-26 DIAGNOSIS — I10 HYPERTENSION, UNSPECIFIED TYPE: ICD-10-CM

## 2023-12-26 DIAGNOSIS — Z79.899 IMMUNODEFICIENCY DUE TO DRUG THERAPY: ICD-10-CM

## 2023-12-26 LAB
ALBUMIN SERPL BCP-MCNC: 3.6 G/DL (ref 3.5–5.2)
ALP SERPL-CCNC: 82 U/L (ref 55–135)
ALT SERPL W/O P-5'-P-CCNC: 22 U/L (ref 10–44)
ANION GAP SERPL CALC-SCNC: 8 MMOL/L (ref 8–16)
AST SERPL-CCNC: 20 U/L (ref 10–40)
BASOPHILS # BLD AUTO: 0.05 K/UL (ref 0–0.2)
BASOPHILS NFR BLD: 0.5 % (ref 0–1.9)
BILIRUB SERPL-MCNC: 0.3 MG/DL (ref 0.1–1)
BUN SERPL-MCNC: 13 MG/DL (ref 8–23)
CALCIUM SERPL-MCNC: 9.8 MG/DL (ref 8.7–10.5)
CHLORIDE SERPL-SCNC: 103 MMOL/L (ref 95–110)
CO2 SERPL-SCNC: 26 MMOL/L (ref 23–29)
CREAT SERPL-MCNC: 1.3 MG/DL (ref 0.5–1.4)
DIFFERENTIAL METHOD: ABNORMAL
EOSINOPHIL # BLD AUTO: 0.4 K/UL (ref 0–0.5)
EOSINOPHIL NFR BLD: 3.6 % (ref 0–8)
ERYTHROCYTE [DISTWIDTH] IN BLOOD BY AUTOMATED COUNT: 20.3 % (ref 11.5–14.5)
EST. GFR  (NO RACE VARIABLE): 58 ML/MIN/1.73 M^2
GLUCOSE SERPL-MCNC: 146 MG/DL (ref 70–110)
HCT VFR BLD AUTO: 42.7 % (ref 40–54)
HGB BLD-MCNC: 14.2 G/DL (ref 14–18)
IMM GRANULOCYTES # BLD AUTO: 0.05 K/UL (ref 0–0.04)
IMM GRANULOCYTES NFR BLD AUTO: 0.5 % (ref 0–0.5)
LYMPHOCYTES # BLD AUTO: 3 K/UL (ref 1–4.8)
LYMPHOCYTES NFR BLD: 29 % (ref 18–48)
MCH RBC QN AUTO: 27.2 PG (ref 27–31)
MCHC RBC AUTO-ENTMCNC: 33.3 G/DL (ref 32–36)
MCV RBC AUTO: 82 FL (ref 82–98)
MONOCYTES # BLD AUTO: 1.2 K/UL (ref 0.3–1)
MONOCYTES NFR BLD: 12.1 % (ref 4–15)
NEUTROPHILS # BLD AUTO: 5.5 K/UL (ref 1.8–7.7)
NEUTROPHILS NFR BLD: 54.3 % (ref 38–73)
NRBC BLD-RTO: 0 /100 WBC
PLATELET # BLD AUTO: 347 K/UL (ref 150–450)
PMV BLD AUTO: 8.4 FL (ref 9.2–12.9)
POTASSIUM SERPL-SCNC: 4.5 MMOL/L (ref 3.5–5.1)
PROT SERPL-MCNC: 7.9 G/DL (ref 6–8.4)
RBC # BLD AUTO: 5.23 M/UL (ref 4.6–6.2)
SODIUM SERPL-SCNC: 137 MMOL/L (ref 136–145)
T4 FREE SERPL-MCNC: 0.73 NG/DL (ref 0.71–1.51)
TSH SERPL DL<=0.005 MIU/L-ACNC: 5.03 UIU/ML (ref 0.4–4)
WBC # BLD AUTO: 10.19 K/UL (ref 3.9–12.7)

## 2023-12-26 PROCEDURE — 84443 ASSAY THYROID STIM HORMONE: CPT | Performed by: INTERNAL MEDICINE

## 2023-12-26 PROCEDURE — 84439 ASSAY OF FREE THYROXINE: CPT | Performed by: INTERNAL MEDICINE

## 2023-12-26 PROCEDURE — 85025 COMPLETE CBC W/AUTO DIFF WBC: CPT | Mod: PN | Performed by: INTERNAL MEDICINE

## 2023-12-26 PROCEDURE — 99999 PR PBB SHADOW E&M-EST. PATIENT-LVL III: ICD-10-PCS | Mod: PBBFAC,,, | Performed by: INTERNAL MEDICINE

## 2023-12-26 PROCEDURE — 80053 COMPREHEN METABOLIC PANEL: CPT | Mod: PN | Performed by: INTERNAL MEDICINE

## 2023-12-26 PROCEDURE — 99215 OFFICE O/P EST HI 40 MIN: CPT | Mod: S$PBB,,, | Performed by: INTERNAL MEDICINE

## 2023-12-26 PROCEDURE — 36415 COLL VENOUS BLD VENIPUNCTURE: CPT | Mod: PN | Performed by: INTERNAL MEDICINE

## 2023-12-26 PROCEDURE — 99215 PR OFFICE/OUTPT VISIT, EST, LEVL V, 40-54 MIN: ICD-10-PCS | Mod: S$PBB,,, | Performed by: INTERNAL MEDICINE

## 2023-12-26 PROCEDURE — 99999 PR PBB SHADOW E&M-EST. PATIENT-LVL III: CPT | Mod: PBBFAC,,, | Performed by: INTERNAL MEDICINE

## 2023-12-26 PROCEDURE — 99213 OFFICE O/P EST LOW 20 MIN: CPT | Mod: PBBFAC,PN | Performed by: INTERNAL MEDICINE

## 2023-12-26 RX ORDER — EPINEPHRINE 0.3 MG/.3ML
0.3 INJECTION SUBCUTANEOUS ONCE AS NEEDED
Status: CANCELLED | OUTPATIENT
Start: 2023-12-26

## 2023-12-26 RX ORDER — HEPARIN 100 UNIT/ML
500 SYRINGE INTRAVENOUS
Status: CANCELLED | OUTPATIENT
Start: 2023-12-26

## 2023-12-26 RX ORDER — DIPHENHYDRAMINE HYDROCHLORIDE 50 MG/ML
50 INJECTION INTRAMUSCULAR; INTRAVENOUS ONCE AS NEEDED
Status: CANCELLED | OUTPATIENT
Start: 2023-12-26

## 2023-12-26 RX ORDER — SODIUM CHLORIDE 0.9 % (FLUSH) 0.9 %
10 SYRINGE (ML) INJECTION
Status: CANCELLED | OUTPATIENT
Start: 2023-12-26

## 2023-12-26 NOTE — PROGRESS NOTES
PATIENT: Feng Thakkar  MRN: [id number]  DATE: 12/26/2023      Diagnosis:   1. Squamous cell carcinoma of lung, unspecified laterality    2. Other specified disorders involving the immune mechanism, not elsewhere classified    3. Immunodeficiency due to drug therapy    4. Chronic kidney disease, stage 3a    5. Pancreatic mass    6. Hypertension, unspecified type    7. Hyperlipidemia, unspecified hyperlipidemia type    8. Neuropathy            Chief Complaint: Squamous cell carcinoma of lung, unspecified laterality (1 month follow up)      Oncologic History:      Oncologic History     Oncologic Treatment     Pathology           Subjective:    Interval History: Mr. Thakkar is a 73 y.o. male with Gout, HLD, HTN who presents for work up of NSCLC.  Since the last clinic visit the patient endorses and intermittent cough.  The patient denies CP, SOB, abdominal pain, nausea, vomiting, constipation, diarrhea.  The patient denies fever, chills, night sweats, weight loss, new lumps or bumps, easy bruising or bleeding.    Prior History:  The patient initially developed abdominal pain on 01/05/2023 and underwent CT of the abdomen showing a mass in the left lung base measuring 3.9 x 2.9 cm; 3 x 3.8 cm mass along the pancreatic tail; and shotty retroperitoneal lymph nodes.  The patient underwent repeat CT abdomen and pelvis on 02/24/2023 showing a 4.3 x 4 solid enhancing mass of the left lung base; thickened bladder wall; prostate gland measuring 45.1 x 4.6 cm; abnormal sclerosis in the left femoral head measuring 2.8 x 1.4 x 1.3 cm; and additional soft tissue densities in the posterior left subdiaphragmatic region measuring up to 16 mm.  CT chest on 03/01/2023 showed a 2 cm low-density lesion in the right lobe of the thyroid gland; 4.5 x 4.3 x 3.8 cm mass in the left lower lobe; several micro nodules; Na soft tissue mass adjacent to the pancreatic tail.  Patient underwent EUS on 03/03/2023 showing a 3.5 cm and 1.8 cm round  pancreatic tail lesion.  Biopsy returned results showing no evidence of malignancy and abundant hematopoietic elements and dendritic cells.  Patient then underwent EBUS under the care of Dr. Albright on 03/17/2023 showing squamous cell carcinoma in biopsy of the left lower lung lesion; squamous cell carcinoma and station 7 lymph node; positive 11 L lymph node for squamous cell carcinoma.   The patient underwent PET-CT on 04/18/2023 showing a markedly hypermetabolic lobulated subpleural left lower lobe mass measuring 4.5 x 3.9 cm with hypermetabolic lymph nodes in the left hilar region measuring 2.2 x 1.9 cm; and a 4.2 x 3.6 walk circumscribed mass in the pancreatic tail.  MRI brain on 04/18/2023 showed no acute findings.     The patient underwent CT chest on 6/26/23 showing a large right thyroid mass measuring at least 2.6 cm; left renal hypodensity measuring 11 mm favored to be a cyst; significant decrease in size of left lower lobe consolidative mass now measuring 2.4 x 2.3 cm; stable 3 mm consolidative nodule in the left upper lobe.   The patient underwent CT chest on 11/21/2023 showing patchy ground-glass density within the medial right upper lobe; ground-glass density in the left dependent left upper lobe that the left lower lobe with associated bronchiectasis; previous target peribronchial nodule in the left lower lobe measuring 2.5 cm and 2 minute cm hypodense lesion in the right thyroid nodule.    Past Medical History:   Past Medical History:   Diagnosis Date    Diabetes mellitus     Gout, unspecified     High cholesterol     HTN (hypertension)     Lung cancer        Past Surgical HIstory:   Past Surgical History:   Procedure Laterality Date    ENDOSCOPIC ULTRASOUND OF UPPER GASTROINTESTINAL TRACT N/A 3/3/2023    Procedure: ULTRASOUND, UPPER GI TRACT, ENDOSCOPIC;  Surgeon: Cora Mcgrath MD;  Location: [facility];  Service: Endoscopy;  Laterality: N/A;  2/24/23-Instructions mailed to address on file-DS     INSERTION OF TUNNELED CENTRAL VENOUS CATHETER (CVC) WITH SUBCUTANEOUS PORT N/A 4/24/2023    Procedure: WHELPZZON-LLEH-D-CATH;  Surgeon: Paulino Love MD;  Location: [facility] OR;  Service: General;  Laterality: N/A;    PANCREATECTOMY      ROBOTIC BRONCHOSCOPY N/A 3/17/2023    Procedure: ROBOTIC BRONCHOSCOPY;  Surgeon: Cristiane Albright MD;  Location: [facility] OR 2ND FLR;  Service: Pulmonary;  Laterality: N/A;       Family History:   Family History   Problem Relation Age of Onset    Breast cancer Sister         60       Social History:  reports that he quit smoking about 11 years ago. His smoking use included cigarettes. He started smoking about 55 years ago. He has a 81.0 pack-year smoking history. He has quit using smokeless tobacco. He reports current alcohol use of about 3.0 standard drinks of alcohol per week. He reports that he does not use drugs.    Allergies:  Review of patient's allergies indicates:  No Known Allergies    Medications:  Current Outpatient Medications   Medication Sig Dispense Refill    allopurinoL (ZYLOPRIM) 100 MG tablet Take 100 mg by mouth once daily.      amLODIPine (NORVASC) 2.5 MG tablet Take 2.5 mg by mouth once daily.      BYDUREON BCISE 2 mg/0.85 mL AtIn Inject 2 mg into the skin every 7 days. Every Friday      cloNIDine (CATAPRES) 0.2 MG tablet Take 0.2 mg by mouth once. Daily      ezetimibe (ZETIA) 10 mg tablet Take 10 mg by mouth once daily.      FARXIGA 10 mg tablet Take 10 mg by mouth every morning.      finasteride (PROSCAR) 5 mg tablet Take 5 mg by mouth once daily.      FREESTYLE TEST Strp USE 1 STRIP TO CHECK GLUCOSE ONCE DAILY      levothyroxine (SYNTHROID) 25 MCG tablet Take 1 tablet (25 mcg total) by mouth before breakfast. 30 tablet 11    LIDOcaine-prilocaine (EMLA) cream Apply topically to port site one hour prior to access, cover 30 g 1    metoprolol succinate (TOPROL-XL) 100 MG 24 hr tablet Take 100 mg by mouth once daily.      rosuvastatin (CRESTOR) 40 MG Tab Take 10 mg  "by mouth every evening.      TOUJEO SOLOSTAR U-300 INSULIN 300 unit/mL (1.5 mL) InPn pen SMARTSI Unit(s) SUB-Q Every Evening      omeprazole (PRILOSEC) 40 MG capsule Take 1 capsule (40 mg total) by mouth once daily. (Patient not taking: Reported on 2023) 30 capsule 1     No current facility-administered medications for this visit.       Review of Systems   Constitutional:  Negative for chills, diaphoresis, fatigue, fever and unexpected weight change.   Respiratory:  Positive for cough. Negative for shortness of breath.    Cardiovascular:  Negative for chest pain and palpitations.   Gastrointestinal:  Negative for abdominal pain, constipation, diarrhea, nausea and vomiting.   Skin:  Negative for color change and rash.   Neurological:  Negative for headaches.   Hematological:  Negative for adenopathy. Does not bruise/bleed easily.       ECOG Performance Status: 1   Objective:      Vitals:   Vitals:    23 1107   BP: 100/60   BP Location: Left arm   Patient Position: Sitting   BP Method: Large (Manual)   Pulse: 78   Temp: 97.4 °F (36.3 °C)   TempSrc: Temporal   SpO2: 98%   Weight: 99.5 kg (219 lb 5.7 oz)   Height: 6' 2" (1.88 m)                         Physical Exam  Constitutional:       General: He is not in acute distress.     Appearance: He is well-developed. He is not diaphoretic.   HENT:      Head: Normocephalic and atraumatic.   Cardiovascular:      Rate and Rhythm: Normal rate and regular rhythm.      Heart sounds: Normal heart sounds. No murmur heard.     No friction rub. No gallop.   Pulmonary:      Effort: Pulmonary effort is normal. No respiratory distress.      Breath sounds: Normal breath sounds. No wheezing or rales.   Chest:      Chest wall: No tenderness.   Abdominal:      General: Bowel sounds are normal. There is no distension.      Palpations: Abdomen is soft. There is no mass.      Tenderness: There is no abdominal tenderness. There is no rebound.   Musculoskeletal:      Cervical " back: Normal range of motion.   Lymphadenopathy:      Cervical: No cervical adenopathy.      Upper Body:      Right upper body: No supraclavicular or axillary adenopathy.      Left upper body: No supraclavicular or axillary adenopathy.   Skin:     General: Skin is warm and dry.      Findings: No erythema or rash.   Neurological:      Mental Status: He is alert and oriented to person, place, and time.   Psychiatric:         Behavior: Behavior normal.         Laboratory Data:  Lab Visit on 12/26/2023   Component Date Value Ref Range Status    WBC 12/26/2023 10.19  3.90 - 12.70 K/uL Final    RBC 12/26/2023 5.23  4.60 - 6.20 M/uL Final    Hemoglobin 12/26/2023 14.2  14.0 - 18.0 g/dL Final    Hematocrit 12/26/2023 42.7  40.0 - 54.0 % Final    MCV 12/26/2023 82  82 - 98 fL Final    MCH 12/26/2023 27.2  27.0 - 31.0 pg Final    MCHC 12/26/2023 33.3  32.0 - 36.0 g/dL Final    RDW 12/26/2023 20.3 (H)  11.5 - 14.5 % Final    Platelets 12/26/2023 347  150 - 450 K/uL Final    MPV 12/26/2023 8.4 (L)  9.2 - 12.9 fL Final    Immature Granulocytes 12/26/2023 0.5  0.0 - 0.5 % Final    Gran # (ANC) 12/26/2023 5.5  1.8 - 7.7 K/uL Final    Immature Grans (Abs) 12/26/2023 0.05 (H)  0.00 - 0.04 K/uL Final    Comment: Mild elevation in immature granulocytes is non specific and   can be seen in a variety of conditions including stress response,   acute inflammation, trauma and pregnancy. Correlation with other   laboratory and clinical findings is essential.      Lymph # 12/26/2023 3.0  1.0 - 4.8 K/uL Final    Mono # 12/26/2023 1.2 (H)  0.3 - 1.0 K/uL Final    Eos # 12/26/2023 0.4  0.0 - 0.5 K/uL Final    Baso # 12/26/2023 0.05  0.00 - 0.20 K/uL Final    nRBC 12/26/2023 0  0 /100 WBC Final    Gran % 12/26/2023 54.3  38.0 - 73.0 % Final    Lymph % 12/26/2023 29.0  18.0 - 48.0 % Final    Mono % 12/26/2023 12.1  4.0 - 15.0 % Final    Eosinophil % 12/26/2023 3.6  0.0 - 8.0 % Final    Basophil % 12/26/2023 0.5  0.0 - 1.9 % Final    Differential  Method 12/26/2023 Automated   Final    Sodium 12/26/2023 137  136 - 145 mmol/L Final    Potassium 12/26/2023 4.5  3.5 - 5.1 mmol/L Final    Chloride 12/26/2023 103  95 - 110 mmol/L Final    CO2 12/26/2023 26  23 - 29 mmol/L Final    Glucose 12/26/2023 146 (H)  70 - 110 mg/dL Final    BUN 12/26/2023 13  8 - 23 mg/dL Final    Creatinine 12/26/2023 1.3  0.5 - 1.4 mg/dL Final    Calcium 12/26/2023 9.8  8.7 - 10.5 mg/dL Final    Total Protein 12/26/2023 7.9  6.0 - 8.4 g/dL Final    Albumin 12/26/2023 3.6  3.5 - 5.2 g/dL Final    Total Bilirubin 12/26/2023 0.3  0.1 - 1.0 mg/dL Final    Comment: For infants and newborns, interpretation of results should be based  on gestational age, weight and in agreement with clinical  observations.    Premature Infant recommended reference ranges:  Up to 24 hours.............<8.0 mg/dL  Up to 48 hours............<12.0 mg/dL  3-5 days..................<15.0 mg/dL  6-29 days.................<15.0 mg/dL      Alkaline Phosphatase 12/26/2023 82  55 - 135 U/L Final    AST 12/26/2023 20  10 - 40 U/L Final    ALT 12/26/2023 22  10 - 44 U/L Final    eGFR 12/26/2023 58.0 (A)  >60 mL/min/1.73 m^2 Final    Anion Gap 12/26/2023 8  8 - 16 mmol/L Final         Imaging:     CT Chest 11/21/23  Respiratory motion artifact limits fine detail in the lung bases.     There is patchy ground-glass density in the medial right upper lobe. There is patchy airspace and ground-glass density in the dependent left upper lobe and throughout the left lower lobe with some bronchiectasis. Previous target peribronchial nodule in the left lower lobe is 2.5 cm series 4, image 325 with decreased central density. No filling defects central airways. Left pleural fluid. Heart size normal. Coronary artery disease. 2 cm hypodense lesion right thyroid nodule series 2, image 13. No mediastinal adenopathy. Partially imaged cystic lesion left renal midpole cortex. Mild multilevel degenerative changes in the spine.       Assessment:        1. Squamous cell carcinoma of lung, unspecified laterality    2. Other specified disorders involving the immune mechanism, not elsewhere classified    3. Immunodeficiency due to drug therapy    4. Chronic kidney disease, stage 3a    5. Pancreatic mass    6. Hypertension, unspecified type    7. Hyperlipidemia, unspecified hyperlipidemia type    8. Neuropathy                               Plan:     NSCLC - patient was recently diagnosed with at least stage III [id number] SCC of the left lung  -PET/CT 4/18/23 shows continued left lung mass, mediastinal LAD  -MRI brain 4/18/23 showed no acute findings  -[facility] Blood testing showed TP 53 mutation  -[facility]US tissue testing showed PD-L1 of 20%, TP53, KDM6A, CDKN2a and MTAP  -PORT placed 4/24/23 under the care of Dr. Love  -PT completed 6 cycles of Carboplatin and Paclitaxel on 6/02/23  -Radiation completed 6/07/23  -CT chest on 6/26/23 showed a decrease in LLL mass  -Will proceed with Durvalumab for 1 year of treatment  -CT chest 11/21/23 shows decreased lung mass and radiation changes  -Pt can proceed with cycle 7 of Durvalumab    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  -No sign of infection  -Will monitor    CKD - pt with stage IIIa CKD  -Creatinine 1.3 12/26/23  -Stable  -Will monitor    Pancreatic Mass - seen on CT scans and biopsy during EUS on 03/03/2023 with path showing no evidence of malignancy  -PET/CT 4/18/23 showed uptake in the tail of the pancreas  -Possibly due to chronic pancreatitis  -Pt with stable pancreatic mass on CT abdomen 7/03/23  -Will monitor    HTN - pt on amlodipine, clonidine, metoprolol  -BP controlled  -Will monitor    Gout - pt on allopurinol  -Currently asymptomatic  -Will monitor    HLD - pt on rosuvastatin, zetia  -Management per PCP    DMII - pt on toujeo, farxiga, and bydureon  -Management per PCP    Neuropathy - numbness in feet due to diabetes  -Will monitor    Route Chart for Scheduling    Med Onc Chart  Routing      Follow up with physician 4 weeks. Pt can proceed with treatment.  He will need a CBC, CMP and TSH in 4 weeks with an appt with me the day before treatment.   Follow up with RADHIKA    Infusion scheduling note    Injection scheduling note    Labs    Imaging    Pharmacy appointment    Other referrals              Treatment Plan Information   OP DURVALUMAB 1500 MG Q4W   Lisandro Lawrence MD   Upcoming Treatment Dates - OP DURVALUMAB 1500 MG Q4W    12/26/2023       Chemotherapy       durvalumab (IMFINZI) 1,500 mg in sodium chloride 0.9% SolP 280 mL chemo infusion  1/23/2024       Chemotherapy       durvalumab (IMFINZI) 1,500 mg in sodium chloride 0.9% SolP 280 mL chemo infusion  2/20/2024       Chemotherapy       durvalumab (IMFINZI) 1,500 mg in sodium chloride 0.9% SolP 280 mL chemo infusion  3/19/2024       Chemotherapy       durvalumab (IMFINZI) 1,500 mg in sodium chloride 0.9% SolP 280 mL chemo infusion    Supportive Plan Information  IV FLUIDS AND ELECTROLYTES   Lisandro Lawrence MD   Upcoming Treatment Dates - IV FLUIDS AND ELECTROLYTES    No upcoming days in selected categories.      Lisandro Lawrence MD  [facility]  Hematology and Oncology  [facility] [address] [place] [address]   O: [phone]  F: [phone]

## 2023-12-27 ENCOUNTER — INFUSION (OUTPATIENT)
Dept: INFUSION THERAPY | Facility: HOSPITAL | Age: 73
End: 2023-12-27
Attending: INTERNAL MEDICINE
Payer: MEDICARE

## 2023-12-27 VITALS
HEIGHT: 74 IN | TEMPERATURE: 98 F | WEIGHT: 219.38 LBS | HEART RATE: 82 BPM | BODY MASS INDEX: 28.15 KG/M2 | DIASTOLIC BLOOD PRESSURE: 71 MMHG | RESPIRATION RATE: 14 BRPM | SYSTOLIC BLOOD PRESSURE: 122 MMHG

## 2023-12-27 DIAGNOSIS — C34.90 SQUAMOUS CELL CARCINOMA OF LUNG, UNSPECIFIED LATERALITY: Primary | ICD-10-CM

## 2023-12-27 PROCEDURE — 25000003 PHARM REV CODE 250: Mod: PN | Performed by: INTERNAL MEDICINE

## 2023-12-27 PROCEDURE — A4216 STERILE WATER/SALINE, 10 ML: HCPCS | Mod: PN | Performed by: INTERNAL MEDICINE

## 2023-12-27 PROCEDURE — 96413 CHEMO IV INFUSION 1 HR: CPT | Mod: PN

## 2023-12-27 PROCEDURE — 63600175 PHARM REV CODE 636 W HCPCS: Mod: JZ,JG,PN | Performed by: INTERNAL MEDICINE

## 2023-12-27 RX ORDER — EPINEPHRINE 0.3 MG/.3ML
0.3 INJECTION SUBCUTANEOUS ONCE AS NEEDED
Status: DISCONTINUED | OUTPATIENT
Start: 2023-12-27 | End: 2023-12-27 | Stop reason: HOSPADM

## 2023-12-27 RX ORDER — DIPHENHYDRAMINE HYDROCHLORIDE 50 MG/ML
50 INJECTION INTRAMUSCULAR; INTRAVENOUS ONCE AS NEEDED
Status: DISCONTINUED | OUTPATIENT
Start: 2023-12-27 | End: 2023-12-27 | Stop reason: HOSPADM

## 2023-12-27 RX ORDER — SODIUM CHLORIDE 0.9 % (FLUSH) 0.9 %
10 SYRINGE (ML) INJECTION
Status: DISCONTINUED | OUTPATIENT
Start: 2023-12-27 | End: 2023-12-27 | Stop reason: HOSPADM

## 2023-12-27 RX ADMIN — SODIUM CHLORIDE: 9 INJECTION, SOLUTION INTRAVENOUS at 01:12

## 2023-12-27 RX ADMIN — Medication 10 ML: at 02:12

## 2023-12-27 RX ADMIN — SODIUM CHLORIDE 1500 MG: 9 INJECTION, SOLUTION INTRAVENOUS at 01:12

## 2023-12-27 NOTE — PLAN OF CARE
Pt tolerated imfinzi well today. Reviewed follow-up appointments. All questions were answered, ambulated independently at d/c.

## 2024-01-23 ENCOUNTER — OFFICE VISIT (OUTPATIENT)
Dept: HEMATOLOGY/ONCOLOGY | Facility: CLINIC | Age: 74
End: 2024-01-23
Payer: MEDICARE

## 2024-01-23 ENCOUNTER — LAB VISIT (OUTPATIENT)
Dept: LAB | Facility: HOSPITAL | Age: 74
End: 2024-01-23
Attending: INTERNAL MEDICINE
Payer: MEDICARE

## 2024-01-23 VITALS
BODY MASS INDEX: 28.73 KG/M2 | WEIGHT: 223.75 LBS | HEART RATE: 80 BPM | TEMPERATURE: 98 F | DIASTOLIC BLOOD PRESSURE: 72 MMHG | OXYGEN SATURATION: 97 % | SYSTOLIC BLOOD PRESSURE: 110 MMHG

## 2024-01-23 DIAGNOSIS — C34.90 SQUAMOUS CELL CARCINOMA OF LUNG, UNSPECIFIED LATERALITY: Primary | ICD-10-CM

## 2024-01-23 DIAGNOSIS — Z79.4 TYPE 2 DIABETES MELLITUS WITH DIABETIC POLYNEUROPATHY, WITH LONG-TERM CURRENT USE OF INSULIN: ICD-10-CM

## 2024-01-23 DIAGNOSIS — E78.5 HYPERLIPIDEMIA, UNSPECIFIED HYPERLIPIDEMIA TYPE: ICD-10-CM

## 2024-01-23 DIAGNOSIS — D89.89 OTHER SPECIFIED DISORDERS INVOLVING THE IMMUNE MECHANISM, NOT ELSEWHERE CLASSIFIED: ICD-10-CM

## 2024-01-23 DIAGNOSIS — M10.9 GOUT, UNSPECIFIED CAUSE, UNSPECIFIED CHRONICITY, UNSPECIFIED SITE: ICD-10-CM

## 2024-01-23 DIAGNOSIS — K86.89 PANCREATIC MASS: ICD-10-CM

## 2024-01-23 DIAGNOSIS — C34.90 SQUAMOUS CELL CARCINOMA OF LUNG, UNSPECIFIED LATERALITY: ICD-10-CM

## 2024-01-23 DIAGNOSIS — E11.42 TYPE 2 DIABETES MELLITUS WITH DIABETIC POLYNEUROPATHY, WITH LONG-TERM CURRENT USE OF INSULIN: ICD-10-CM

## 2024-01-23 DIAGNOSIS — I10 HYPERTENSION, UNSPECIFIED TYPE: ICD-10-CM

## 2024-01-23 DIAGNOSIS — N18.31 CHRONIC KIDNEY DISEASE, STAGE 3A: ICD-10-CM

## 2024-01-23 LAB
ALBUMIN SERPL BCP-MCNC: 3.8 G/DL (ref 3.5–5.2)
ALP SERPL-CCNC: 63 U/L (ref 55–135)
ALT SERPL W/O P-5'-P-CCNC: 28 U/L (ref 10–44)
ANION GAP SERPL CALC-SCNC: 9 MMOL/L (ref 8–16)
AST SERPL-CCNC: 25 U/L (ref 10–40)
BASOPHILS # BLD AUTO: 0.05 K/UL (ref 0–0.2)
BASOPHILS NFR BLD: 0.5 % (ref 0–1.9)
BILIRUB SERPL-MCNC: 0.4 MG/DL (ref 0.1–1)
BUN SERPL-MCNC: 15 MG/DL (ref 8–23)
CALCIUM SERPL-MCNC: 9.9 MG/DL (ref 8.7–10.5)
CHLORIDE SERPL-SCNC: 103 MMOL/L (ref 95–110)
CO2 SERPL-SCNC: 24 MMOL/L (ref 23–29)
CREAT SERPL-MCNC: 1.5 MG/DL (ref 0.5–1.4)
DIFFERENTIAL METHOD BLD: ABNORMAL
EOSINOPHIL # BLD AUTO: 0.5 K/UL (ref 0–0.5)
EOSINOPHIL NFR BLD: 4.5 % (ref 0–8)
ERYTHROCYTE [DISTWIDTH] IN BLOOD BY AUTOMATED COUNT: 19.2 % (ref 11.5–14.5)
EST. GFR  (NO RACE VARIABLE): 48.9 ML/MIN/1.73 M^2
GLUCOSE SERPL-MCNC: 129 MG/DL (ref 70–110)
HCT VFR BLD AUTO: 42.3 % (ref 40–54)
HGB BLD-MCNC: 14.1 G/DL (ref 14–18)
IMM GRANULOCYTES # BLD AUTO: 0.05 K/UL (ref 0–0.04)
IMM GRANULOCYTES NFR BLD AUTO: 0.5 % (ref 0–0.5)
LYMPHOCYTES # BLD AUTO: 4.2 K/UL (ref 1–4.8)
LYMPHOCYTES NFR BLD: 40.5 % (ref 18–48)
MCH RBC QN AUTO: 27.6 PG (ref 27–31)
MCHC RBC AUTO-ENTMCNC: 33.3 G/DL (ref 32–36)
MCV RBC AUTO: 83 FL (ref 82–98)
MONOCYTES # BLD AUTO: 1.3 K/UL (ref 0.3–1)
MONOCYTES NFR BLD: 12.5 % (ref 4–15)
NEUTROPHILS # BLD AUTO: 4.3 K/UL (ref 1.8–7.7)
NEUTROPHILS NFR BLD: 41.5 % (ref 38–73)
NRBC BLD-RTO: 0 /100 WBC
PLATELET # BLD AUTO: 322 K/UL (ref 150–450)
PMV BLD AUTO: 8.6 FL (ref 9.2–12.9)
POTASSIUM SERPL-SCNC: 4.3 MMOL/L (ref 3.5–5.1)
PROT SERPL-MCNC: 8.1 G/DL (ref 6–8.4)
RBC # BLD AUTO: 5.1 M/UL (ref 4.6–6.2)
SODIUM SERPL-SCNC: 136 MMOL/L (ref 136–145)
WBC # BLD AUTO: 10.4 K/UL (ref 3.9–12.7)

## 2024-01-23 PROCEDURE — 80053 COMPREHEN METABOLIC PANEL: CPT | Mod: PN | Performed by: INTERNAL MEDICINE

## 2024-01-23 PROCEDURE — 36415 COLL VENOUS BLD VENIPUNCTURE: CPT | Mod: PN | Performed by: INTERNAL MEDICINE

## 2024-01-23 PROCEDURE — 99213 OFFICE O/P EST LOW 20 MIN: CPT | Mod: PBBFAC,PN

## 2024-01-23 PROCEDURE — 84443 ASSAY THYROID STIM HORMONE: CPT | Performed by: INTERNAL MEDICINE

## 2024-01-23 PROCEDURE — 85025 COMPLETE CBC W/AUTO DIFF WBC: CPT | Mod: PN | Performed by: INTERNAL MEDICINE

## 2024-01-23 PROCEDURE — 99213 OFFICE O/P EST LOW 20 MIN: CPT | Mod: S$PBB,,,

## 2024-01-23 PROCEDURE — 99999 PR PBB SHADOW E&M-EST. PATIENT-LVL III: CPT | Mod: PBBFAC,,,

## 2024-01-23 RX ORDER — EPINEPHRINE 0.3 MG/.3ML
0.3 INJECTION SUBCUTANEOUS ONCE AS NEEDED
Status: CANCELLED | OUTPATIENT
Start: 2024-01-23

## 2024-01-23 RX ORDER — HEPARIN 100 UNIT/ML
500 SYRINGE INTRAVENOUS
Status: CANCELLED | OUTPATIENT
Start: 2024-01-23

## 2024-01-23 RX ORDER — SODIUM CHLORIDE 0.9 % (FLUSH) 0.9 %
10 SYRINGE (ML) INJECTION
Status: CANCELLED | OUTPATIENT
Start: 2024-01-23

## 2024-01-23 RX ORDER — DIPHENHYDRAMINE HYDROCHLORIDE 50 MG/ML
50 INJECTION INTRAMUSCULAR; INTRAVENOUS ONCE AS NEEDED
Status: CANCELLED | OUTPATIENT
Start: 2024-01-23

## 2024-01-23 NOTE — PROGRESS NOTES
"PATIENT: Feng Thakkar  MRN: [id number]  DATE: 1/23/2024      Diagnosis:   1. Squamous cell carcinoma of lung, unspecified laterality    2. Chronic kidney disease, stage 3a    3. Pancreatic mass    4. Hyperlipidemia, unspecified hyperlipidemia type    5. Type 2 diabetes mellitus with diabetic polyneuropathy, with long-term current use of insulin    6. Gout, unspecified cause, unspecified chronicity, unspecified site    7. Hypertension, unspecified type        Chief Complaint: Follow-up (Squamous cell carcinoma of lung, unspecified laterality)      Subjective:    Interval History: Mr. Thakkar is a 73 y.o. male who returns for follow up of NSCLC and consideration of C8D1 durvalumab. The patient denies CP, SOB, congestion, abdominal pain, nausea, vomiting, constipation, diarrhea, fever, chills, night sweats, weight loss, new lumps or bumps, easy bruising or bleeding. Endorses chronic non productive cough, increased over last week. Pt instructed to notify clinic with any S/S fever or SOB. Counts stable for treatment.     Oncologic History:   Per Dr. Lawrence's record:  "The patient initially developed abdominal pain on 01/05/2023 and underwent CT of the abdomen showing a mass in the left lung base measuring 3.9 x 2.9 cm; 3 x 3.8 cm mass along the pancreatic tail; and shotty retroperitoneal lymph nodes.  The patient underwent repeat CT abdomen and pelvis on 02/24/2023 showing a 4.3 x 4 solid enhancing mass of the left lung base; thickened bladder wall; prostate gland measuring 45.1 x 4.6 cm; abnormal sclerosis in the left femoral head measuring 2.8 x 1.4 x 1.3 cm; and additional soft tissue densities in the posterior left subdiaphragmatic region measuring up to 16 mm.  CT chest on 03/01/2023 showed a 2 cm low-density lesion in the right lobe of the thyroid gland; 4.5 x 4.3 x 3.8 cm mass in the left lower lobe; several micro nodules; Na soft tissue mass adjacent to the pancreatic tail.  Patient underwent EUS on 03/03/2023 " "showing a 3.5 cm and 1.8 cm round pancreatic tail lesion.  Biopsy returned results showing no evidence of malignancy and abundant hematopoietic elements and dendritic cells.  Patient then underwent EBUS under the care of Dr. Albright on 03/17/2023 showing squamous cell carcinoma in biopsy of the left lower lung lesion; squamous cell carcinoma and station 7 lymph node; positive 11 L lymph node for squamous cell carcinoma.              The patient underwent PET-CT on 04/18/2023 showing a markedly hypermetabolic lobulated subpleural left lower lobe mass measuring 4.5 x 3.9 cm with hypermetabolic lymph nodes in the left hilar region measuring 2.2 x 1.9 cm; and a 4.2 x 3.6 walk circumscribed mass in the pancreatic tail.  MRI brain on 04/18/2023 showed no acute findings.                The patient underwent CT chest on 6/26/23 showing a large right thyroid mass measuring at least 2.6 cm; left renal hypodensity measuring 11 mm favored to be a cyst; significant decrease in size of left lower lobe consolidative mass now measuring 2.4 x 2.3 cm; stable 3 mm consolidative nodule in the left upper lobe.              The patient underwent CT chest on 11/21/2023 showing patchy ground-glass density within the medial right upper lobe; ground-glass density in the left dependent left upper lobe that the left lower lobe with associated bronchiectasis; previous target peribronchial nodule in the left lower lobe measuring 2.5 cm and 2 minute cm hypodense lesion in the right thyroid nodule."  Oncology History   Squamous cell carcinoma of lung   3/31/2023 Initial Diagnosis    Squamous cell carcinoma of lung     3/31/2023 Cancer Staged    Staging form: Lung, AJCC 8th Edition  - Clinical: Stage IIIA (cT2b, cN2, cM0)     4/27/2023 - 6/7/2023 Radiation Therapy    Treating physician: Leonor Hadley  Total Dose: 60 Gy  Fractions: 30  Treatment Site Ref. ID Energy Dose/Fx (Gy) #Fx Dose Correction (Gy) Total Dose (Gy) Start Date End Date Elapsed " Days   IM Lung PTV 6X 2 6 / 30 0 12 4/27/2023 5/4/2023 7   IM Lung_New PTV 6X 2 24 / 24 0 48 5/5/2023 6/7/2023 33      4/28/2023 - 6/2/2023 Chemotherapy    Treatment Summary   Plan Name: OP NSCLC PACLITAXEL + CARBOPLATIN (AUC) QW + RADIATION  Treatment Goal: Curative  Status: Inactive  Start Date: 4/28/2023  End Date: 6/2/2023  Provider: Lisandro Lawrence MD  Chemotherapy: CARBOplatin (PARAPLATIN) 185 mg in sodium chloride 0.9% 303.5 mL chemo infusion, 185 mg (100 % of original dose 183.4 mg), Intravenous, Clinic/HOD 1 time, 6 of 6 cycles  Dose modification:   (original dose 183.4 mg, Cycle 1)  Administration: 185 mg (4/28/2023), 185 mg (5/5/2023), 190 mg (5/12/2023), 190 mg (5/19/2023), 180 mg (5/26/2023), 190 mg (6/2/2023)  PACLitaxeL (TAXOL) 45 mg/m2 = 102 mg in sodium chloride 0.9% 250 mL chemo infusion, 45 mg/m2 = 102 mg, Intravenous, Clinic/HOD 1 time, 6 of 6 cycles  Administration: 102 mg (4/28/2023), 102 mg (5/5/2023), 102 mg (5/12/2023), 102 mg (5/19/2023), 102 mg (5/26/2023), 102 mg (6/2/2023)     7/11/2023 -  Chemotherapy    Treatment Summary   Plan Name: OP DURVALUMAB 1500 MG Q4W  Treatment Goal: Curative  Status: Active  Start Date: 7/11/2023  End Date: 6/11/2024 (Planned)  Provider: Lisandro Lawrence MD  Chemotherapy: [No matching medication found in this treatment plan]         Past Medical History:   Past Medical History:   Diagnosis Date    Diabetes mellitus     Gout, unspecified     High cholesterol     HTN (hypertension)     Lung cancer        Past Surgical HIstory:   Past Surgical History:   Procedure Laterality Date    ENDOSCOPIC ULTRASOUND OF UPPER GASTROINTESTINAL TRACT N/A 3/3/2023    Procedure: ULTRASOUND, UPPER GI TRACT, ENDOSCOPIC;  Surgeon: Cora Mcgrath MD;  Location: [facility];  Service: Endoscopy;  Laterality: N/A;  2/24/23-Instructions mailed to address on file-DS    INSERTION OF TUNNELED CENTRAL VENOUS CATHETER (CVC) WITH SUBCUTANEOUS PORT N/A 4/24/2023    Procedure:  [id number];  Surgeon: Paulino Love MD;  Location: [facility] OR;  Service: General;  Laterality: N/A;    PANCREATECTOMY      ROBOTIC BRONCHOSCOPY N/A 3/17/2023    Procedure: ROBOTIC BRONCHOSCOPY;  Surgeon: Cristiane Albright MD;  [facility];  Service: Pulmonary;  Laterality: N/A;       Family History:   Family History   Problem Relation Age of Onset    Breast cancer Sister         60       Social History:  reports that he quit smoking about 11 years ago. His smoking use included cigarettes. He started smoking about 55 years ago. He has a 81.0 pack-year smoking history. He has quit using smokeless tobacco. He reports current alcohol use of about 3.0 standard drinks of alcohol per week. He reports that he does not use drugs.    Allergies:  Review of patient's allergies indicates:  No Known Allergies    Medications:  Current Outpatient Medications   Medication Sig Dispense Refill    allopurinoL (ZYLOPRIM) 100 MG tablet Take 100 mg by mouth once daily.      amLODIPine (NORVASC) 2.5 MG tablet Take 2.5 mg by mouth once daily.      BYDUREON BCISE 2 mg/0.85 mL AtIn Inject 2 mg into the skin every 7 days. Every Friday      cloNIDine (CATAPRES) 0.2 MG tablet Take 0.2 mg by mouth once. Daily      ezetimibe (ZETIA) 10 mg tablet Take 10 mg by mouth once daily.      FARXIGA 10 mg tablet Take 10 mg by mouth every morning.      finasteride (PROSCAR) 5 mg tablet Take 5 mg by mouth once daily.      FREESTYLE TEST Strp USE 1 STRIP TO CHECK GLUCOSE ONCE DAILY      levothyroxine (SYNTHROID) 25 MCG tablet Take 1 tablet (25 mcg total) by mouth before breakfast. 30 tablet 11    LIDOcaine-prilocaine (EMLA) cream Apply topically to port site one hour prior to access, cover 30 g 1    metoprolol succinate (TOPROL-XL) 100 MG 24 hr tablet Take 100 mg by mouth once daily.      rosuvastatin (CRESTOR) 40 MG Tab Take 10 mg by mouth every evening.      TOUJEO SOLOSTAR U-300 INSULIN 300 unit/mL (1.5 mL) InPn pen SMARTSI  Unit(s) SUB-Q Every Evening      omeprazole (PRILOSEC) 40 MG capsule Take 1 capsule (40 mg total) by mouth once daily. (Patient not taking: Reported on 12/26/2023) 30 capsule 1     No current facility-administered medications for this visit.       Review of Systems   Constitutional:  Negative for activity change, appetite change, chills, fatigue and fever.   HENT:  Negative for congestion, mouth sores, nosebleeds, sinus pressure, sinus pain, sore throat and tinnitus.    Respiratory:  Positive for cough. Negative for apnea, chest tightness, shortness of breath and wheezing.    Cardiovascular:  Negative for chest pain, palpitations and leg swelling.   Gastrointestinal:  Negative for abdominal distention, abdominal pain, blood in stool, constipation, diarrhea, nausea and vomiting.   Endocrine: Negative for polydipsia and polyuria.   Genitourinary:  Negative for dysuria, frequency, hematuria and urgency.   Musculoskeletal:  Negative for arthralgias, gait problem, joint swelling and myalgias.   Skin:  Negative for color change, pallor, rash and wound.   Allergic/Immunologic: Negative.    Neurological:  Negative for dizziness, tremors, weakness, numbness and headaches.   Hematological:  Negative for adenopathy. Does not bruise/bleed easily.   Psychiatric/Behavioral: Negative.         Objective:      Vitals:   Vitals:    01/23/24 1257   BP: 110/72   BP Location: Left arm   Patient Position: Sitting   BP Method: Medium (Automatic)   Pulse: 80   Temp: 97.7 °F (36.5 °C)   TempSrc: Skin   SpO2: 97%   Weight: 101.5 kg (223 lb 12.3 oz)     BMI: Body mass index is 28.73 kg/m².    Physical Exam  Constitutional:       Appearance: Normal appearance.   HENT:      Head: Normocephalic and atraumatic.      Nose: Nose normal.      Mouth/Throat:      Mouth: Mucous membranes are moist.      Pharynx: Oropharynx is clear.   Eyes:      Pupils: Pupils are equal, round, and reactive to light.   Cardiovascular:      Rate and Rhythm: Normal rate  and regular rhythm.   Pulmonary:      Effort: Pulmonary effort is normal.      Comments: Crackles LLL  Abdominal:      General: Bowel sounds are normal.      Palpations: Abdomen is soft.   Musculoskeletal:         General: Normal range of motion.      Cervical back: Normal range of motion and neck supple.   Skin:     General: Skin is warm and dry.   Neurological:      General: No focal deficit present.      Mental Status: He is alert and oriented to person, place, and time.   Psychiatric:         Mood and Affect: Mood normal.         Behavior: Behavior normal.         Thought Content: Thought content normal.         Judgment: Judgment normal.         Laboratory Data:  Recent Results (from the past 24 hour(s))   CBC w/ DIFF    Collection Time: 01/23/24 12:19 PM   Result Value Ref Range    WBC 10.40 3.90 - 12.70 K/uL    RBC 5.10 4.60 - 6.20 M/uL    Hemoglobin 14.1 14.0 - 18.0 g/dL    Hematocrit 42.3 40.0 - 54.0 %    MCV 83 82 - 98 fL    MCH 27.6 27.0 - 31.0 pg    MCHC 33.3 32.0 - 36.0 g/dL    RDW 19.2 (H) 11.5 - 14.5 %    Platelets 322 150 - 450 K/uL    MPV 8.6 (L) 9.2 - 12.9 fL    Immature Granulocytes 0.5 0.0 - 0.5 %    Gran # (ANC) 4.3 1.8 - 7.7 K/uL    Immature Grans (Abs) 0.05 (H) 0.00 - 0.04 K/uL    Lymph # 4.2 1.0 - 4.8 K/uL    Mono # 1.3 (H) 0.3 - 1.0 K/uL    Eos # 0.5 0.0 - 0.5 K/uL    Baso # 0.05 0.00 - 0.20 K/uL    nRBC 0 0 /100 WBC    Gran % 41.5 38.0 - 73.0 %    Lymph % 40.5 18.0 - 48.0 %    Mono % 12.5 4.0 - 15.0 %    Eosinophil % 4.5 0.0 - 8.0 %    Basophil % 0.5 0.0 - 1.9 %    Differential Method Automated    CMP    Collection Time: 01/23/24 12:19 PM   Result Value Ref Range    Sodium 136 136 - 145 mmol/L    Potassium 4.3 3.5 - 5.1 mmol/L    Chloride 103 95 - 110 mmol/L    CO2 24 23 - 29 mmol/L    Glucose 129 (H) 70 - 110 mg/dL    BUN 15 8 - 23 mg/dL    Creatinine 1.5 (H) 0.5 - 1.4 mg/dL    Calcium 9.9 8.7 - 10.5 mg/dL    Total Protein 8.1 6.0 - 8.4 g/dL    Albumin 3.8 3.5 - 5.2 g/dL    Total Bilirubin  0.4 0.1 - 1.0 mg/dL    Alkaline Phosphatase 63 55 - 135 U/L    AST 25 10 - 40 U/L    ALT 28 10 - 44 U/L    eGFR 48.9 (A) >60 mL/min/1.73 m^2    Anion Gap 9 8 - 16 mmol/L      Imaging:     CT Chest   11/21/23    Respiratory motion artifact limits fine detail in the lung bases.     There is patchy ground-glass density in the medial right upper lobe. There is patchy airspace and ground-glass density in the dependent left upper lobe and throughout the left lower lobe with some bronchiectasis. Previous target peribronchial nodule in the left lower lobe is 2.5 cm series 4, image 325 with decreased central density. No filling defects central airways. Left pleural fluid. Heart size normal. Coronary artery disease. 2 cm hypodense lesion right thyroid nodule series 2, image 13. No mediastinal adenopathy. Partially imaged cystic lesion left renal midpole cortex. Mild multilevel degenerative changes in the spine.     Assessment:       1. Squamous cell carcinoma of lung, unspecified laterality    2. Chronic kidney disease, stage 3a    3. Pancreatic mass    4. Hyperlipidemia, unspecified hyperlipidemia type    5. Type 2 diabetes mellitus with diabetic polyneuropathy, with long-term current use of insulin    6. Gout, unspecified cause, unspecified chronicity, unspecified site    7. Hypertension, unspecified type       Plan:     NSCLC   - patient  diagnosed with at least stage III [id number] SCC of the left lung  -PET/CT 4/18/23 shows continued left lung mass, mediastinal LAD  -MRI brain 4/18/23 showed no acute findings  -[facility] Blood testing showed TP 53 mutation  -[facility] tissue testing showed PD-L1 of 20%, TP53, KDM6A, CDKN2a and MTAP  -PORT placed 4/24/23 under the care of Dr. Love  -PT completed 6 cycles of Carboplatin and Paclitaxel on 6/02/23  -Radiation completed 6/07/23  -CT chest on 6/26/23 showed a decrease in LLL mass  -Durvalumab for 1 year of treatment  -CT chest 11/21/23 shows decreased lung mass and radiation  changes  -Pt can proceed with cycle 8 of Durvalumab on 01/24/2024     CKD   - pt with stage IIIa CKD  -Creatinine 1.5 01/23/2024   -Stable  -Will monitor     Pancreatic Mass   - seen on CT scans and biopsy during EUS on 03/03/2023 with path showing no evidence of malignancy  -PET/CT 4/18/23 showed uptake in the tail of the pancreas  -Possibly due to chronic pancreatitis  -Pt with stable pancreatic mass on CT abdomen 7/03/23  -Will monitor     HTN   - pt on amlodipine, clonidine, metoprolol  -BP controlled  -Will monitor     Gout  - pt on allopurinol  -Currently asymptomatic  -Will monitor     HLD   - pt on rosuvastatin, zetia  -Management per PCP     DMII   - pt on toujeo, farxiga, and bydureon  -Management per PCP    Med Onc Chart Routing      Follow up with physician 4 weeks. He will need a CBC, CMP and TSH in 4 weeks with an appt with  the day before treatment as scheduled.   Follow up with [facility]    Infusion scheduling note    Injection scheduling note    Labs    Imaging    Pharmacy appointment    Other referrals                Plan was discussed with the patient at length, and he verbalized understanding. Feng was given an opportunity to ask questions that were answered to his satisfaction, and he was advised to call in the interval if any problems or questions arise.    20 minutes were spent in coordination of patient's care, record review and counseling.    TIFFANY Lyons, MARCP-C

## 2024-01-24 ENCOUNTER — INFUSION (OUTPATIENT)
Dept: INFUSION THERAPY | Facility: HOSPITAL | Age: 74
End: 2024-01-24
Attending: INTERNAL MEDICINE
Payer: MEDICARE

## 2024-01-24 VITALS
SYSTOLIC BLOOD PRESSURE: 122 MMHG | OXYGEN SATURATION: 99 % | RESPIRATION RATE: 16 BRPM | HEIGHT: 74 IN | HEART RATE: 71 BPM | BODY MASS INDEX: 28.97 KG/M2 | WEIGHT: 225.75 LBS | DIASTOLIC BLOOD PRESSURE: 77 MMHG | TEMPERATURE: 98 F

## 2024-01-24 DIAGNOSIS — C34.90 SQUAMOUS CELL CARCINOMA OF LUNG, UNSPECIFIED LATERALITY: Primary | ICD-10-CM

## 2024-01-24 LAB — TSH SERPL DL<=0.005 MIU/L-ACNC: 4 UIU/ML (ref 0.4–4)

## 2024-01-24 PROCEDURE — 96413 CHEMO IV INFUSION 1 HR: CPT | Mod: PN

## 2024-01-24 PROCEDURE — 63600175 PHARM REV CODE 636 W HCPCS: Mod: JZ,JG,PN | Performed by: INTERNAL MEDICINE

## 2024-01-24 PROCEDURE — A4216 STERILE WATER/SALINE, 10 ML: HCPCS | Mod: PN | Performed by: INTERNAL MEDICINE

## 2024-01-24 PROCEDURE — 25000003 PHARM REV CODE 250: Mod: PN | Performed by: INTERNAL MEDICINE

## 2024-01-24 RX ORDER — EPINEPHRINE 0.3 MG/.3ML
0.3 INJECTION SUBCUTANEOUS ONCE AS NEEDED
Status: DISCONTINUED | OUTPATIENT
Start: 2024-01-24 | End: 2024-01-24 | Stop reason: HOSPADM

## 2024-01-24 RX ORDER — SODIUM CHLORIDE 0.9 % (FLUSH) 0.9 %
10 SYRINGE (ML) INJECTION
Status: DISCONTINUED | OUTPATIENT
Start: 2024-01-24 | End: 2024-01-24 | Stop reason: HOSPADM

## 2024-01-24 RX ORDER — DIPHENHYDRAMINE HYDROCHLORIDE 50 MG/ML
50 INJECTION INTRAMUSCULAR; INTRAVENOUS ONCE AS NEEDED
Status: DISCONTINUED | OUTPATIENT
Start: 2024-01-24 | End: 2024-01-24 | Stop reason: HOSPADM

## 2024-01-24 RX ADMIN — Medication 10 ML: at 03:01

## 2024-01-24 RX ADMIN — SODIUM CHLORIDE 1500 MG: 9 INJECTION, SOLUTION INTRAVENOUS at 02:01

## 2024-01-24 RX ADMIN — SODIUM CHLORIDE: 9 INJECTION, SOLUTION INTRAVENOUS at 01:01

## 2024-01-24 NOTE — PLAN OF CARE
Pt arrived to clinic today for C8 Imfinzi infusion and tolerated well with no changes throughout therapy. Pt aware of side effects and number to call for any needs and discharged to home in [place]. Pt aware of f/u appts.

## 2024-01-24 NOTE — PLAN OF CARE
Problem: Adult Inpatient Plan of Care  Goal: Plan of Care Review  Outcome: Ongoing, Progressing  Flowsheets (Taken 1/24/2024 1515)  Plan of Care Reviewed With: patient  Goal: Patient-Specific Goal (Individualized)  Outcome: Ongoing, Progressing  Flowsheets (Taken 1/24/2024 1515)  Anxieties, Fears or Concerns: none voiced  Individualized Care Needs: recliner, education, conversation, sleep, tv     Problem: Fatigue  Goal: Improved Activity Tolerance  Outcome: Ongoing, Progressing  Intervention: Promote Improved Energy  Flowsheets (Taken 1/24/2024 1515)  Fatigue Management:   fatigue-related activity identified   paced activity encouraged   frequent rest breaks encouraged  Sleep/Rest Enhancement:   therapeutic touch utilized   consistent schedule promoted   noise level reduced   relaxation techniques promoted   natural light exposure provided   awakenings minimized   family presence promoted  Activity Management:   Ambulated -L4   Up in chair - L3

## 2024-02-08 ENCOUNTER — TELEPHONE (OUTPATIENT)
Dept: RADIATION ONCOLOGY | Facility: CLINIC | Age: 74
End: 2024-02-08
Payer: MEDICARE

## 2024-02-08 NOTE — TELEPHONE ENCOUNTER
Message received from patient re: increased cough.  Returned call- pt reports brief coughing fits with chest pain on left during cough 2-3x/day, denies fevers/chills. Denies SOB.  Will move up CT chest to evaluate (currently scheduled 3/1).

## 2024-02-09 ENCOUNTER — TELEPHONE (OUTPATIENT)
Dept: RADIATION ONCOLOGY | Facility: CLINIC | Age: 74
End: 2024-02-09
Payer: MEDICARE

## 2024-02-09 ENCOUNTER — HOSPITAL ENCOUNTER (OUTPATIENT)
Dept: RADIOLOGY | Facility: HOSPITAL | Age: 74
Discharge: HOME | End: 2024-02-09
Attending: RADIOLOGY
Payer: MEDICARE

## 2024-02-09 DIAGNOSIS — C34.92 SQUAMOUS CELL CARCINOMA OF LEFT LUNG: ICD-10-CM

## 2024-02-09 PROCEDURE — 71260 CT THORAX DX C+: CPT | Mod: 26,,, | Performed by: RADIOLOGY

## 2024-02-09 PROCEDURE — 25500020 PHARM REV CODE 255: Mod: PO | Performed by: RADIOLOGY

## 2024-02-09 PROCEDURE — 71260 CT THORAX DX C+: CPT | Mod: TC,PO

## 2024-02-09 RX ADMIN — IOHEXOL 75 ML: 350 INJECTION, SOLUTION INTRAVENOUS at 11:02

## 2024-02-09 NOTE — TELEPHONE ENCOUNTER
Spoke with patient re: findings on CT chest- ongoing post-radiation therapy changes likely responsible for chronic cough.  He is not interested in steroid therapy at this time, but agrees to call immediately for worsening cough, any SOB, chest pain, fever, etc. Currently no SOB but has about 3 coughing spells daily which last 1-2 min., no chest pain.

## 2024-02-12 DIAGNOSIS — C34.92 SQUAMOUS CELL CARCINOMA OF LEFT LUNG: Primary | ICD-10-CM

## 2024-02-20 ENCOUNTER — LAB VISIT (OUTPATIENT)
Dept: LAB | Facility: HOSPITAL | Age: 74
End: 2024-02-20
Attending: INTERNAL MEDICINE
Payer: MEDICARE

## 2024-02-20 ENCOUNTER — OFFICE VISIT (OUTPATIENT)
Dept: HEMATOLOGY/ONCOLOGY | Facility: CLINIC | Age: 74
End: 2024-02-20
Payer: MEDICARE

## 2024-02-20 VITALS
OXYGEN SATURATION: 97 % | RESPIRATION RATE: 18 BRPM | WEIGHT: 227.06 LBS | BODY MASS INDEX: 29.14 KG/M2 | DIASTOLIC BLOOD PRESSURE: 64 MMHG | TEMPERATURE: 97 F | SYSTOLIC BLOOD PRESSURE: 96 MMHG | HEART RATE: 75 BPM | HEIGHT: 74 IN

## 2024-02-20 DIAGNOSIS — C34.90 SQUAMOUS CELL CARCINOMA OF LUNG, UNSPECIFIED LATERALITY: ICD-10-CM

## 2024-02-20 DIAGNOSIS — E78.5 HYPERLIPIDEMIA, UNSPECIFIED HYPERLIPIDEMIA TYPE: ICD-10-CM

## 2024-02-20 DIAGNOSIS — R59.0 LYMPHADENOPATHY, ABDOMINAL: ICD-10-CM

## 2024-02-20 DIAGNOSIS — C77.1 SECONDARY AND UNSPECIFIED MALIGNANT NEOPLASM OF INTRATHORACIC LYMPH NODES: ICD-10-CM

## 2024-02-20 DIAGNOSIS — D89.89 OTHER SPECIFIED DISORDERS INVOLVING THE IMMUNE MECHANISM, NOT ELSEWHERE CLASSIFIED: ICD-10-CM

## 2024-02-20 DIAGNOSIS — K86.89 PANCREATIC MASS: ICD-10-CM

## 2024-02-20 DIAGNOSIS — Z79.899 IMMUNODEFICIENCY DUE TO DRUG THERAPY: ICD-10-CM

## 2024-02-20 DIAGNOSIS — I10 HYPERTENSION, UNSPECIFIED TYPE: ICD-10-CM

## 2024-02-20 DIAGNOSIS — N18.31 CHRONIC KIDNEY DISEASE, STAGE 3A: ICD-10-CM

## 2024-02-20 DIAGNOSIS — Z79.4 TYPE 2 DIABETES MELLITUS WITH DIABETIC POLYNEUROPATHY, WITH LONG-TERM CURRENT USE OF INSULIN: ICD-10-CM

## 2024-02-20 DIAGNOSIS — E11.42 TYPE 2 DIABETES MELLITUS WITH DIABETIC POLYNEUROPATHY, WITH LONG-TERM CURRENT USE OF INSULIN: ICD-10-CM

## 2024-02-20 DIAGNOSIS — D84.821 IMMUNODEFICIENCY DUE TO DRUG THERAPY: ICD-10-CM

## 2024-02-20 DIAGNOSIS — C34.90 SQUAMOUS CELL CARCINOMA OF LUNG, UNSPECIFIED LATERALITY: Primary | ICD-10-CM

## 2024-02-20 LAB
ALBUMIN SERPL BCP-MCNC: 3.7 G/DL (ref 3.5–5.2)
ALP SERPL-CCNC: 66 U/L (ref 55–135)
ALT SERPL W/O P-5'-P-CCNC: 27 U/L (ref 10–44)
ANION GAP SERPL CALC-SCNC: 11 MMOL/L (ref 8–16)
AST SERPL-CCNC: 25 U/L (ref 10–40)
BASOPHILS # BLD AUTO: 0.04 K/UL (ref 0–0.2)
BASOPHILS NFR BLD: 0.5 % (ref 0–1.9)
BILIRUB SERPL-MCNC: 0.3 MG/DL (ref 0.1–1)
BUN SERPL-MCNC: 18 MG/DL (ref 8–23)
CALCIUM SERPL-MCNC: 9.6 MG/DL (ref 8.7–10.5)
CHLORIDE SERPL-SCNC: 104 MMOL/L (ref 95–110)
CO2 SERPL-SCNC: 23 MMOL/L (ref 23–29)
CREAT SERPL-MCNC: 1.5 MG/DL (ref 0.5–1.4)
DIFFERENTIAL METHOD BLD: ABNORMAL
EOSINOPHIL # BLD AUTO: 0.3 K/UL (ref 0–0.5)
EOSINOPHIL NFR BLD: 3.9 % (ref 0–8)
ERYTHROCYTE [DISTWIDTH] IN BLOOD BY AUTOMATED COUNT: 17.2 % (ref 11.5–14.5)
EST. GFR  (NO RACE VARIABLE): 48.9 ML/MIN/1.73 M^2
GLUCOSE SERPL-MCNC: 215 MG/DL (ref 70–110)
HCT VFR BLD AUTO: 41.7 % (ref 40–54)
HGB BLD-MCNC: 13.9 G/DL (ref 14–18)
IMM GRANULOCYTES # BLD AUTO: 0.03 K/UL (ref 0–0.04)
IMM GRANULOCYTES NFR BLD AUTO: 0.3 % (ref 0–0.5)
LYMPHOCYTES # BLD AUTO: 3.2 K/UL (ref 1–4.8)
LYMPHOCYTES NFR BLD: 35.9 % (ref 18–48)
MCH RBC QN AUTO: 28.7 PG (ref 27–31)
MCHC RBC AUTO-ENTMCNC: 33.3 G/DL (ref 32–36)
MCV RBC AUTO: 86 FL (ref 82–98)
MONOCYTES # BLD AUTO: 1.1 K/UL (ref 0.3–1)
MONOCYTES NFR BLD: 12.4 % (ref 4–15)
NEUTROPHILS # BLD AUTO: 4.1 K/UL (ref 1.8–7.7)
NEUTROPHILS NFR BLD: 47 % (ref 38–73)
NRBC BLD-RTO: 0 /100 WBC
PLATELET # BLD AUTO: 300 K/UL (ref 150–450)
PMV BLD AUTO: 9.2 FL (ref 9.2–12.9)
POTASSIUM SERPL-SCNC: 4.5 MMOL/L (ref 3.5–5.1)
PROT SERPL-MCNC: 7.5 G/DL (ref 6–8.4)
RBC # BLD AUTO: 4.84 M/UL (ref 4.6–6.2)
SODIUM SERPL-SCNC: 138 MMOL/L (ref 136–145)
TSH SERPL DL<=0.005 MIU/L-ACNC: 3.43 UIU/ML (ref 0.4–4)
WBC # BLD AUTO: 8.8 K/UL (ref 3.9–12.7)

## 2024-02-20 PROCEDURE — 80053 COMPREHEN METABOLIC PANEL: CPT | Mod: PN | Performed by: INTERNAL MEDICINE

## 2024-02-20 PROCEDURE — 99999 PR PBB SHADOW E&M-EST. PATIENT-LVL IV: CPT | Mod: PBBFAC,,, | Performed by: INTERNAL MEDICINE

## 2024-02-20 PROCEDURE — 99215 OFFICE O/P EST HI 40 MIN: CPT | Mod: S$PBB,,, | Performed by: INTERNAL MEDICINE

## 2024-02-20 PROCEDURE — G2211 COMPLEX E/M VISIT ADD ON: HCPCS | Mod: S$PBB,,, | Performed by: INTERNAL MEDICINE

## 2024-02-20 PROCEDURE — 99214 OFFICE O/P EST MOD 30 MIN: CPT | Mod: PBBFAC,PN | Performed by: INTERNAL MEDICINE

## 2024-02-20 PROCEDURE — 36415 COLL VENOUS BLD VENIPUNCTURE: CPT | Mod: PN | Performed by: INTERNAL MEDICINE

## 2024-02-20 PROCEDURE — 84443 ASSAY THYROID STIM HORMONE: CPT | Performed by: INTERNAL MEDICINE

## 2024-02-20 PROCEDURE — 85025 COMPLETE CBC W/AUTO DIFF WBC: CPT | Mod: PN | Performed by: INTERNAL MEDICINE

## 2024-02-20 RX ORDER — SODIUM CHLORIDE 0.9 % (FLUSH) 0.9 %
10 SYRINGE (ML) INJECTION
Status: CANCELLED | OUTPATIENT
Start: 2024-02-21

## 2024-02-20 RX ORDER — HEPARIN 100 UNIT/ML
500 SYRINGE INTRAVENOUS
Status: CANCELLED | OUTPATIENT
Start: 2024-02-21

## 2024-02-20 RX ORDER — DIPHENHYDRAMINE HYDROCHLORIDE 50 MG/ML
50 INJECTION INTRAMUSCULAR; INTRAVENOUS ONCE AS NEEDED
Status: CANCELLED | OUTPATIENT
Start: 2024-02-21

## 2024-02-20 RX ORDER — EPINEPHRINE 0.3 MG/.3ML
0.3 INJECTION SUBCUTANEOUS ONCE AS NEEDED
Status: CANCELLED | OUTPATIENT
Start: 2024-02-21

## 2024-02-20 NOTE — PROGRESS NOTES
PATIENT: Feng Thakkar  MRN: [id number]  DATE: 2/20/2024      Diagnosis:   1. Squamous cell carcinoma of lung, unspecified laterality    2. Secondary and unspecified malignant neoplasm of intrathoracic lymph nodes    3. Lymphadenopathy, abdominal    4. Other specified disorders involving the immune mechanism, not elsewhere classified    5. Chronic kidney disease, stage 3a    6. Pancreatic mass    7. Hyperlipidemia, unspecified hyperlipidemia type    8. Type 2 diabetes mellitus with diabetic polyneuropathy, with long-term current use of insulin    9. Hypertension, unspecified type    10. Immunodeficiency due to drug therapy              Chief Complaint: Squamous cell carcinoma of lung, unspecified laterality (1 month follow up)      Oncologic History:      Oncologic History     Oncologic Treatment     Pathology           Subjective:    Interval History: Mr. Thakkar is a 73 y.o. male with Gout, HLD, HTN who presents for work up of NSCLC.  Since the last clinic visit the patient underwent a CT Chest 2/09/24 showing geographic ground-glass disease and interlobular septal thickening in the left upper lobe; several large lymph nodes in the upper abdomen near the celiac takeoff and liver hilum with lymph node ranging from 1.3-1.5 cm.  The patient denies CP, SOB, abdominal pain, nausea, vomiting, constipation, diarrhea.  The patient denies fever, chills, night sweats, weight loss, new lumps or bumps, easy bruising or bleeding.  The patient endorses cough with associated back pain.    Prior History:  The patient initially developed abdominal pain on 01/05/2023 and underwent CT of the abdomen showing a mass in the left lung base measuring 3.9 x 2.9 cm; 3 x 3.8 cm mass along the pancreatic tail; and shotty retroperitoneal lymph nodes.  The patient underwent repeat CT abdomen and pelvis on 02/24/2023 showing a 4.3 x 4 solid enhancing mass of the left lung base; thickened bladder wall; prostate gland measuring 45.1 x 4.6 cm;  abnormal sclerosis in the left femoral head measuring 2.8 x 1.4 x 1.3 cm; and additional soft tissue densities in the posterior left subdiaphragmatic region measuring up to 16 mm.  CT chest on 03/01/2023 showed a 2 cm low-density lesion in the right lobe of the thyroid gland; 4.5 x 4.3 x 3.8 cm mass in the left lower lobe; several micro nodules; Na soft tissue mass adjacent to the pancreatic tail.  Patient underwent EUS on 03/03/2023 showing a 3.5 cm and 1.8 cm round pancreatic tail lesion.  Biopsy returned results showing no evidence of malignancy and abundant hematopoietic elements and dendritic cells.  Patient then underwent EBUS under the care of Dr. Albright on 03/17/2023 showing squamous cell carcinoma in biopsy of the left lower lung lesion; squamous cell carcinoma and station 7 lymph node; positive 11 L lymph node for squamous cell carcinoma.   The patient underwent PET-CT on 04/18/2023 showing a markedly hypermetabolic lobulated subpleural left lower lobe mass measuring 4.5 x 3.9 cm with hypermetabolic lymph nodes in the left hilar region measuring 2.2 x 1.9 cm; and a 4.2 x 3.6 walk circumscribed mass in the pancreatic tail.  MRI brain on 04/18/2023 showed no acute findings.     The patient underwent CT chest on 6/26/23 showing a large right thyroid mass measuring at least 2.6 cm; left renal hypodensity measuring 11 mm favored to be a cyst; significant decrease in size of left lower lobe consolidative mass now measuring 2.4 x 2.3 cm; stable 3 mm consolidative nodule in the left upper lobe.   The patient underwent CT chest on 11/21/2023 showing patchy ground-glass density within the medial right upper lobe; ground-glass density in the left dependent left upper lobe that the left lower lobe with associated bronchiectasis; previous target peribronchial nodule in the left lower lobe measuring 2.5 cm and 2 minute cm hypodense lesion in the right thyroid nodule.    Past Medical History:   Past Medical History:    Diagnosis Date    Diabetes mellitus     Gout, unspecified     High cholesterol     HTN (hypertension)     Lung cancer        Past Surgical HIstory:   Past Surgical History:   Procedure Laterality Date    ENDOSCOPIC ULTRASOUND OF UPPER GASTROINTESTINAL TRACT N/A 3/3/2023    Procedure: ULTRASOUND, UPPER GI TRACT, ENDOSCOPIC;  Surgeon: Cora Mcgrath MD;  Location: [facility] ENDO;  Service: Endoscopy;  Laterality: N/A;  2/24/23-Instructions mailed to address on file-DS    INSERTION OF TUNNELED CENTRAL VENOUS CATHETER (CVC) WITH SUBCUTANEOUS PORT N/A 4/24/2023    Procedure: [id number];  Surgeon: Paulino Love MD;  Location: [facility];  Service: General;  Laterality: N/A;    PANCREATECTOMY      ROBOTIC BRONCHOSCOPY N/A 3/17/2023    Procedure: ROBOTIC BRONCHOSCOPY;  Surgeon: Cristiane Albright MD;  Location: [address];  Service: Pulmonary;  Laterality: N/A;       Family History:   Family History   Problem Relation Age of Onset    Breast cancer Sister         60       Social History:  reports that he quit smoking about 11 years ago. His smoking use included cigarettes. He started smoking about 55 years ago. He has a 81.0 pack-year smoking history. He has quit using smokeless tobacco. He reports current alcohol use of about 3.0 standard drinks of alcohol per week. He reports that he does not use drugs.    Allergies:  Review of patient's allergies indicates:  No Known Allergies    Medications:  Current Outpatient Medications   Medication Sig Dispense Refill    allopurinoL (ZYLOPRIM) 100 MG tablet Take 100 mg by mouth once daily.      amLODIPine (NORVASC) 2.5 MG tablet Take 2.5 mg by mouth once daily.      BYDUREON BCISE 2 mg/0.85 mL AtIn Inject 2 mg into the skin every 7 days. Every Friday      cloNIDine (CATAPRES) 0.2 MG tablet Take 0.2 mg by mouth once. Daily      ezetimibe (ZETIA) 10 mg tablet Take 10 mg by mouth once daily.      FARXIGA 10 mg tablet Take 10 mg by mouth every morning.      finasteride  "(PROSCAR) 5 mg tablet Take 5 mg by mouth once daily.      FREESTYLE TEST Strp USE 1 STRIP TO CHECK GLUCOSE ONCE DAILY      levothyroxine (SYNTHROID) 25 MCG tablet Take 1 tablet (25 mcg total) by mouth before breakfast. 30 tablet 11    LIDOcaine-prilocaine (EMLA) cream Apply topically to port site one hour prior to access, cover 30 g 1    metoprolol succinate (TOPROL-XL) 100 MG 24 hr tablet Take 100 mg by mouth once daily.      rosuvastatin (CRESTOR) 40 MG Tab Take 10 mg by mouth every evening.      TOUJEO SOLOSTAR U-300 INSULIN 300 unit/mL (1.5 mL) InPn pen SMARTSI Unit(s) SUB-Q Every Evening      omeprazole (PRILOSEC) 40 MG capsule Take 1 capsule (40 mg total) by mouth once daily. (Patient not taking: Reported on 2023) 30 capsule 1     No current facility-administered medications for this visit.       Review of Systems   Constitutional:  Negative for chills, diaphoresis, fatigue, fever and unexpected weight change.   Respiratory:  Positive for cough. Negative for shortness of breath.    Cardiovascular:  Negative for chest pain and palpitations.   Gastrointestinal:  Negative for abdominal pain, constipation, diarrhea, nausea and vomiting.   Musculoskeletal:  Positive for back pain.   Skin:  Negative for color change and rash.   Neurological:  Negative for headaches.   Hematological:  Negative for adenopathy. Does not bruise/bleed easily.       ECOG Performance Status: 1   Objective:      Vitals:   Vitals:    24 0909   BP: 96/64   BP Location: Right arm   Patient Position: Sitting   BP Method: Large (Manual)   Pulse: 75   Resp: 18   Temp: 97.1 °F (36.2 °C)   TempSrc: Temporal   SpO2: 97%   Weight: 103 kg (227 lb 1.2 oz)   Height: 6' 2" (1.88 m)         Physical Exam  Constitutional:       General: He is not in acute distress.     Appearance: He is well-developed. He is not diaphoretic.   HENT:      Head: Normocephalic and atraumatic.   Cardiovascular:      Rate and Rhythm: Normal rate and regular " rhythm.      Heart sounds: Normal heart sounds. No murmur heard.     No friction rub. No gallop.   Pulmonary:      Effort: Pulmonary effort is normal. No respiratory distress.      Breath sounds: Normal breath sounds. No wheezing or rales.   Chest:      Chest wall: No tenderness.   Abdominal:      General: Bowel sounds are normal. There is no distension.      Palpations: Abdomen is soft. There is no mass.      Tenderness: There is no abdominal tenderness. There is no rebound.   Musculoskeletal:      Cervical back: Normal range of motion.   Lymphadenopathy:      Cervical: No cervical adenopathy.      Upper Body:      Right upper body: No supraclavicular or axillary adenopathy.      Left upper body: No supraclavicular or axillary adenopathy.   Skin:     General: Skin is warm and dry.      Findings: No erythema or rash.   Neurological:      Mental Status: He is alert and oriented to person, place, and time.   Psychiatric:         Behavior: Behavior normal.         Laboratory Data:  Lab Visit on 02/20/2024   Component Date Value Ref Range Status    WBC 02/20/2024 8.80  3.90 - 12.70 K/uL Final    RBC 02/20/2024 4.84  4.60 - 6.20 M/uL Final    Hemoglobin 02/20/2024 13.9 (L)  14.0 - 18.0 g/dL Final    Hematocrit 02/20/2024 41.7  40.0 - 54.0 % Final    MCV 02/20/2024 86  82 - 98 fL Final    MCH 02/20/2024 28.7  27.0 - 31.0 pg Final    MCHC 02/20/2024 33.3  32.0 - 36.0 g/dL Final    RDW 02/20/2024 17.2 (H)  11.5 - 14.5 % Final    Platelets 02/20/2024 300  150 - 450 K/uL Final    MPV 02/20/2024 9.2  9.2 - 12.9 fL Final    Immature Granulocytes 02/20/2024 0.3  0.0 - 0.5 % Final    Gran # (ANC) 02/20/2024 4.1  1.8 - 7.7 K/uL Final    Immature Grans (Abs) 02/20/2024 0.03  0.00 - 0.04 K/uL Final    Comment: Mild elevation in immature granulocytes is non specific and   can be seen in a variety of conditions including stress response,   acute inflammation, trauma and pregnancy. Correlation with other   laboratory and clinical  findings is essential.      Lymph # 02/20/2024 3.2  1.0 - 4.8 K/uL Final    Mono # 02/20/2024 1.1 (H)  0.3 - 1.0 K/uL Final    Eos # 02/20/2024 0.3  0.0 - 0.5 K/uL Final    Baso # 02/20/2024 0.04  0.00 - 0.20 K/uL Final    nRBC 02/20/2024 0  0 /100 WBC Final    Gran % 02/20/2024 47.0  38.0 - 73.0 % Final    Lymph % 02/20/2024 35.9  18.0 - 48.0 % Final    Mono % 02/20/2024 12.4  4.0 - 15.0 % Final    Eosinophil % 02/20/2024 3.9  0.0 - 8.0 % Final    Basophil % 02/20/2024 0.5  0.0 - 1.9 % Final    Differential Method 02/20/2024 Automated   Final    Sodium 02/20/2024 138  136 - 145 mmol/L Final    Potassium 02/20/2024 4.5  3.5 - 5.1 mmol/L Final    Chloride 02/20/2024 104  95 - 110 mmol/L Final    CO2 02/20/2024 23  23 - 29 mmol/L Final    Glucose 02/20/2024 215 (H)  70 - 110 mg/dL Final    BUN 02/20/2024 18  8 - 23 mg/dL Final    Creatinine 02/20/2024 1.5 (H)  0.5 - 1.4 mg/dL Final    Calcium 02/20/2024 9.6  8.7 - 10.5 mg/dL Final    Total Protein 02/20/2024 7.5  6.0 - 8.4 g/dL Final    Albumin 02/20/2024 3.7  3.5 - 5.2 g/dL Final    Total Bilirubin 02/20/2024 0.3  0.1 - 1.0 mg/dL Final    Comment: For infants and newborns, interpretation of results should be based  on gestational age, weight and in agreement with clinical  observations.    Premature Infant recommended reference ranges:  Up to 24 hours.............<8.0 mg/dL  Up to 48 hours............<12.0 mg/dL  3-5 days..................<15.0 mg/dL  6-29 days.................<15.0 mg/dL      Alkaline Phosphatase 02/20/2024 66  55 - 135 U/L Final    AST 02/20/2024 25  10 - 40 U/L Final    ALT 02/20/2024 27  10 - 44 U/L Final    eGFR 02/20/2024 48.9 (A)  >60 mL/min/1.73 m^2 Final    Anion Gap 02/20/2024 11  8 - 16 mmol/L Final         Imaging:     CT Chest 2/09/24  In the paramediastinal right upper lobe there is geographic ground-glass disease interlobular septal thickening and some parenchymal tethering. These findings are also present in the perihilar left upper  lobe with bronchiectasis and extensively in the left lower lobe where there is volume loss. A previous target masslike opacity in the left lower lobe is now obscured. No filling defects central airways. Slight volume loss in the left lung. Right thyroid nodule or cyst measures 1.9 cm series 2, image 11. Normal size heart with calcified coronary artery plaque. Left pleural fluid. No mediastinal adenopathy. There are several enlarged lymph nodes in the upper abdomen near the celiac takeoff and liver hilum. A reference measures 1.5 cm short axis series 2, image 110 and another reference near the left adrenal gland measures 1.3 cm series 2, image 124. There are 2 adjacent nodules near the pancreas tail measuring 4.1 and 1.3 cm respectively.          Assessment:       1. Squamous cell carcinoma of lung, unspecified laterality    2. Secondary and unspecified malignant neoplasm of intrathoracic lymph nodes    3. Lymphadenopathy, abdominal    4. Other specified disorders involving the immune mechanism, not elsewhere classified    5. Chronic kidney disease, stage 3a    6. Pancreatic mass    7. Hyperlipidemia, unspecified hyperlipidemia type    8. Type 2 diabetes mellitus with diabetic polyneuropathy, with long-term current use of insulin    9. Hypertension, unspecified type    10. Immunodeficiency due to drug therapy                                 Plan:     NSCLC - patient was recently diagnosed with at least stage III [id number] SCC of the left lung  -PET/CT 4/18/23 shows continued left lung mass, mediastinal LAD  -MRI brain 4/18/23 showed no acute findings  -TEMPUS Blood testing showed TP 53 mutation  -TEMPUS tissue testing showed PD-L1 of 20%, TP53, KDM6A, CDKN2a and MTAP  -PORT placed 4/24/23 under the care of Dr. Love  -PT completed 6 cycles of Carboplatin and Paclitaxel on 6/02/23  -Radiation completed 6/07/23  -CT chest on 6/26/23 showed a decrease in LLL mass  -Will proceed with Durvalumab for 1 year of  treatment  -CT chest 11/21/23 shows decreased lung mass and radiation changes  -CT Chest on 02/09/2024 showed continued radiation changes in the lung but did show intra-abdominal lymphadenopathy (see below)  -Pt can proceed with cycle 9 of Durvalumab  Visit today included increased complexity associated with the care of the episodic problem (Immunotherapy) addressed and managing the longitudinal care of the patient due to the serious and/or complex managed problem(s) NSCLC.    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  -No sign of infection  -Will monitor    Intraabdominal Lymphadenopathy - seen on recent CT chest 02/09/2024   Will obtain CT abdomen with and without contrast    CKD - pt with stage IIIa CKD  -Creatinine 1.5 2/20/24  -Stable  -Will monitor    Pancreatic Mass - seen on CT scans and biopsy during EUS on 03/03/2023 with path showing no evidence of malignancy  -PET/CT 4/18/23 showed uptake in the tail of the pancreas  -Possibly due to chronic pancreatitis  -Pt with stable pancreatic mass on CT abdomen 7/03/23  -Will repeat CT abdomen  -Will monitor    HTN - pt on amlodipine, clonidine, metoprolol  -BP controlled  -Will monitor    Gout - pt on allopurinol  -Currently asymptomatic  -Will monitor    HLD - pt on rosuvastatin, zetia  -Management per PCP    DMII - pt on toujeo, farxiga, and bydureon  -Management per PCP    Route Chart for Scheduling    Med Onc Chart Routing      Follow up with physician Other. Pt can proceed with treatment.  He needs a CT of monse abdomen in the next week with return appt with me once compelted.   Follow up with RADHIKA    Infusion scheduling note    Injection scheduling note    Labs    Imaging    Pharmacy appointment    Other referrals              Treatment Plan Information   OP DURVALUMAB 1500 MG Q4W   Lisandro Lawrence MD   Upcoming Treatment Dates - OP DURVALUMAB 1500 MG Q4W    2/21/2024       Chemotherapy       durvalumab (IMFINZI) 1,500 mg in sodium chloride 0.9%  SolP 280 mL chemo infusion  3/20/2024       Chemotherapy       durvalumab (IMFINZI) 1,500 mg in sodium chloride 0.9% SolP 280 mL chemo infusion  4/17/2024       Chemotherapy       durvalumab (IMFINZI) 1,500 mg in sodium chloride 0.9% SolP 280 mL chemo infusion  5/15/2024       Chemotherapy       durvalumab (IMFINZI) 1,500 mg in sodium chloride 0.9% SolP 280 mL chemo infusion    Supportive Plan Information  IV FLUIDS AND ELECTROLYTES   Lisandro Lawrence MD   Upcoming Treatment Dates - IV FLUIDS AND ELECTROLYTES    No upcoming days in selected categories.      Lisandro Lawrence MD  [facility]  Hematology and Oncology  [facility] [address] [place] [address]   O: [phone]  F: [phone]

## 2024-02-21 ENCOUNTER — INFUSION (OUTPATIENT)
Dept: INFUSION THERAPY | Facility: HOSPITAL | Age: 74
End: 2024-02-21
Attending: INTERNAL MEDICINE
Payer: MEDICARE

## 2024-02-21 VITALS
WEIGHT: 227.06 LBS | BODY MASS INDEX: 29.14 KG/M2 | DIASTOLIC BLOOD PRESSURE: 79 MMHG | RESPIRATION RATE: 18 BRPM | SYSTOLIC BLOOD PRESSURE: 129 MMHG | TEMPERATURE: 98 F | HEART RATE: 69 BPM | HEIGHT: 74 IN

## 2024-02-21 DIAGNOSIS — C34.90 SQUAMOUS CELL CARCINOMA OF LUNG, UNSPECIFIED LATERALITY: Primary | ICD-10-CM

## 2024-02-21 PROCEDURE — 25000003 PHARM REV CODE 250: Mod: PN | Performed by: INTERNAL MEDICINE

## 2024-02-21 PROCEDURE — A4216 STERILE WATER/SALINE, 10 ML: HCPCS | Mod: PN | Performed by: INTERNAL MEDICINE

## 2024-02-21 PROCEDURE — 96413 CHEMO IV INFUSION 1 HR: CPT | Mod: PN

## 2024-02-21 PROCEDURE — 63600175 PHARM REV CODE 636 W HCPCS: Mod: JZ,JG,PN | Performed by: INTERNAL MEDICINE

## 2024-02-21 RX ORDER — SODIUM CHLORIDE 0.9 % (FLUSH) 0.9 %
10 SYRINGE (ML) INJECTION
Status: DISCONTINUED | OUTPATIENT
Start: 2024-02-21 | End: 2024-02-21 | Stop reason: HOSPADM

## 2024-02-21 RX ADMIN — SODIUM CHLORIDE: 9 INJECTION, SOLUTION INTRAVENOUS at 01:02

## 2024-02-21 RX ADMIN — Medication 10 ML: at 02:02

## 2024-02-21 RX ADMIN — SODIUM CHLORIDE 1500 MG: 9 INJECTION, SOLUTION INTRAVENOUS at 01:02

## 2024-02-21 NOTE — PLAN OF CARE
Problem: Adult Inpatient Plan of Care  Goal: Plan of Care Review  Outcome: Ongoing, Progressing  Flowsheets (Taken 2/21/2024 1350)  Plan of Care Reviewed With: patient  Goal: Patient-Specific Goal (Individualized)  Outcome: Ongoing, Progressing  Flowsheets (Taken 2/21/2024 1350)  Anxieties, Fears or Concerns: none  Individualized Care Needs: tv remote/call light, dimmed lights     Problem: Fatigue  Goal: Improved Activity Tolerance  Outcome: Ongoing, Progressing  Intervention: Promote Improved Energy  Flowsheets (Taken 2/21/2024 1350)  Fatigue Management: paced activity encouraged  Sleep/Rest Enhancement:   natural light exposure provided   noise level reduced   relaxation techniques promoted   room darkened  Activity Management:   Ambulated -L4   Up in chair - L3     Pt tolerated infusion, VSS. Pt voiced no new complaints or concerns at this time. NAD noted, pt d/c home.

## 2024-02-21 NOTE — PLAN OF CARE
Problem: Adult Inpatient Plan of Care  Goal: Plan of Care Review  Outcome: Ongoing, Progressing  Flowsheets (Taken 2/21/2024 1350)  Plan of Care Reviewed With: patient  Goal: Patient-Specific Goal (Individualized)  Outcome: Ongoing, Progressing  Flowsheets (Taken 2/21/2024 1350)  Anxieties, Fears or Concerns: none  Individualized Care Needs: tv remote/call light, dimmed lights     Problem: Fatigue  Goal: Improved Activity Tolerance  Outcome: Ongoing, Progressing  Intervention: Promote Improved Energy  Flowsheets (Taken 2/21/2024 1350)  Fatigue Management: paced activity encouraged  Sleep/Rest Enhancement:   natural light exposure provided   noise level reduced   relaxation techniques promoted   room darkened  Activity Management:   Ambulated -L4   Up in chair - L3    Pt tolerated infusion, NAD noted. VSS. Pt d/c home.

## 2024-03-01 ENCOUNTER — HOSPITAL ENCOUNTER (OUTPATIENT)
Dept: RADIOLOGY | Facility: HOSPITAL | Age: 74
Discharge: HOME | End: 2024-03-01
Attending: INTERNAL MEDICINE
Payer: MEDICARE

## 2024-03-01 DIAGNOSIS — R59.0 LYMPHADENOPATHY, ABDOMINAL: ICD-10-CM

## 2024-03-01 PROCEDURE — 74178 CT ABD&PLV WO CNTR FLWD CNTR: CPT | Mod: 26,,, | Performed by: RADIOLOGY

## 2024-03-01 PROCEDURE — 74178 CT ABD&PLV WO CNTR FLWD CNTR: CPT | Mod: TC,PO

## 2024-03-01 PROCEDURE — 25500020 PHARM REV CODE 255: Mod: PO | Performed by: INTERNAL MEDICINE

## 2024-03-01 RX ADMIN — IOHEXOL 100 ML: 350 INJECTION, SOLUTION INTRAVENOUS at 09:03

## 2024-03-02 NOTE — PROGRESS NOTES
PATIENT: Feng Thakkar  MRN: [id number]  DATE: 3/4/2024      Diagnosis:   1. Pancreatic mass    2. Lymphadenopathy, abdominal    3. Secondary and unspecified malignant neoplasm of intrathoracic lymph nodes    4. Squamous cell carcinoma of lung, unspecified laterality    5. Other specified disorders involving the immune mechanism, not elsewhere classified    6. Chronic kidney disease, stage 3a    7. Hyperlipidemia, unspecified hyperlipidemia type    8. Type 2 diabetes mellitus with diabetic polyneuropathy, with long-term current use of insulin    9. Hypertension, unspecified type    10. Immunodeficiency due to drug therapy                Chief Complaint: Squamous cell carcinoma of lung, unspecified laterality (3 week follow up)      Oncologic History:      Oncologic History     Oncologic Treatment     Pathology           Subjective:    Interval History: Mr. Thakkar is a 73 y.o. male with Gout, HLD, HTN who presents for work up of NSCLC.  Since the last clinic visit the patient underwent a CT abdomen and pelvis on 03/01/2024 showing coronary artery calcification; 40 x 36 mm hypoenhancing pancreatic tail mass; 20 x 30 mm peripancreatic lymph node along the cranial aspect of the proximal pancreatic body; stable 13 mm periaortic lymph node; enlargement of multiple retroperitoneal lymph nodes; mesenteric haziness present within the central abdomen; enlarged inguinal lymph nodes bilaterally; and a 21 mm sclerotic bone lesion within the posterior row inferomedial left femoral head.  The patient denies CP, cough, SOB, abdominal pain, nausea, vomiting, constipation, diarrhea.  The patient denies fever, chills, night sweats, weight loss, new lumps or bumps, easy bruising or bleeding.    Prior History:  The patient initially developed abdominal pain on 01/05/2023 and underwent CT of the abdomen showing a mass in the left lung base measuring 3.9 x 2.9 cm; 3 x 3.8 cm mass along the pancreatic tail; and shotty retroperitoneal lymph  nodes.  The patient underwent repeat CT abdomen and pelvis on 02/24/2023 showing a 4.3 x 4 solid enhancing mass of the left lung base; thickened bladder wall; prostate gland measuring 45.1 x 4.6 cm; abnormal sclerosis in the left femoral head measuring 2.8 x 1.4 x 1.3 cm; and additional soft tissue densities in the posterior left subdiaphragmatic region measuring up to 16 mm.  CT chest on 03/01/2023 showed a 2 cm low-density lesion in the right lobe of the thyroid gland; 4.5 x 4.3 x 3.8 cm mass in the left lower lobe; several micro nodules; Na soft tissue mass adjacent to the pancreatic tail.  Patient underwent EUS on 03/03/2023 showing a 3.5 cm and 1.8 cm round pancreatic tail lesion.  Biopsy returned results showing no evidence of malignancy and abundant hematopoietic elements and dendritic cells.  Patient then underwent EBUS under the care of Dr. Albright on 03/17/2023 showing squamous cell carcinoma in biopsy of the left lower lung lesion; squamous cell carcinoma and station 7 lymph node; positive 11 L lymph node for squamous cell carcinoma.   The patient underwent PET-CT on 04/18/2023 showing a markedly hypermetabolic lobulated subpleural left lower lobe mass measuring 4.5 x 3.9 cm with hypermetabolic lymph nodes in the left hilar region measuring 2.2 x 1.9 cm; and a 4.2 x 3.6 walk circumscribed mass in the pancreatic tail.  MRI brain on 04/18/2023 showed no acute findings.     The patient underwent CT chest on 6/26/23 showing a large right thyroid mass measuring at least 2.6 cm; left renal hypodensity measuring 11 mm favored to be a cyst; significant decrease in size of left lower lobe consolidative mass now measuring 2.4 x 2.3 cm; stable 3 mm consolidative nodule in the left upper lobe.   The patient underwent CT chest on 11/21/2023 showing patchy ground-glass density within the medial right upper lobe; ground-glass density in the left dependent left upper lobe that the left lower lobe with associated  bronchiectasis; previous target peribronchial nodule in the left lower lobe measuring 2.5 cm and 2 minute cm hypodense lesion in the right thyroid nodule.   CT Chest 2/09/24 showing geographic ground-glass disease and interlobular septal thickening in the left upper lobe; several large lymph nodes in the upper abdomen near the celiac takeoff and liver hilum with lymph node ranging from 1.3-1.5 cm.    Past Medical History:   Past Medical History:   Diagnosis Date    Diabetes mellitus     Gout, unspecified     High cholesterol     HTN (hypertension)     Lung cancer        Past Surgical HIstory:   Past Surgical History:   Procedure Laterality Date    ENDOSCOPIC ULTRASOUND OF UPPER GASTROINTESTINAL TRACT N/A 3/3/2023    Procedure: ULTRASOUND, UPPER GI TRACT, ENDOSCOPIC;  Surgeon: Cora Mcgrath MD;  Location: [facility];  Service: Endoscopy;  Laterality: N/A;  2/24/23-Instructions mailed to address on file-DS    INSERTION OF TUNNELED CENTRAL VENOUS CATHETER (CVC) WITH SUBCUTANEOUS PORT N/A 4/24/2023    Procedure: [id number];  Surgeon: Paulino Love MD;  Location: [facility];  Service: General;  Laterality: N/A;    PANCREATECTOMY      ROBOTIC BRONCHOSCOPY N/A 3/17/2023    Procedure: ROBOTIC BRONCHOSCOPY;  Surgeon: Cristiane Albright MD;  Location: [address];  Service: Pulmonary;  Laterality: N/A;       Family History:   Family History   Problem Relation Age of Onset    Breast cancer Sister         60       Social History:  reports that he quit smoking about 11 years ago. His smoking use included cigarettes. He started smoking about 55 years ago. He has a 81.0 pack-year smoking history. He has quit using smokeless tobacco. He reports current alcohol use of about 3.0 standard drinks of alcohol per week. He reports that he does not use drugs.    Allergies:  Review of patient's allergies indicates:  No Known Allergies    Medications:  Current Outpatient Medications   Medication Sig Dispense Refill     allopurinoL (ZYLOPRIM) 100 MG tablet Take 100 mg by mouth once daily.      amLODIPine (NORVASC) 2.5 MG tablet Take 2.5 mg by mouth once daily.      BYDUREON BCISE 2 mg/0.85 mL AtIn Inject 2 mg into the skin every 7 days. Every Friday      cloNIDine (CATAPRES) 0.2 MG tablet Take 0.2 mg by mouth once. Daily      ezetimibe (ZETIA) 10 mg tablet Take 10 mg by mouth once daily.      FARXIGA 10 mg tablet Take 10 mg by mouth every morning.      finasteride (PROSCAR) 5 mg tablet Take 5 mg by mouth once daily.      FREESTYLE TEST Strp USE 1 STRIP TO CHECK GLUCOSE ONCE DAILY      levothyroxine (SYNTHROID) 25 MCG tablet Take 1 tablet (25 mcg total) by mouth before breakfast. 30 tablet 11    LIDOcaine-prilocaine (EMLA) cream Apply topically to port site one hour prior to access, cover 30 g 1    metoprolol succinate (TOPROL-XL) 100 MG 24 hr tablet Take 100 mg by mouth once daily.      rosuvastatin (CRESTOR) 40 MG Tab Take 10 mg by mouth every evening.      TOUJEO SOLOSTAR U-300 INSULIN 300 unit/mL (1.5 mL) InPn pen SMARTSI Unit(s) SUB-Q Every Evening      omeprazole (PRILOSEC) 40 MG capsule Take 1 capsule (40 mg total) by mouth once daily. (Patient not taking: Reported on 2023) 30 capsule 1     No current facility-administered medications for this visit.       Review of Systems   Constitutional:  Negative for chills, diaphoresis, fatigue, fever and unexpected weight change.   Respiratory:  Negative for cough and shortness of breath.    Cardiovascular:  Negative for chest pain and palpitations.   Gastrointestinal:  Negative for abdominal pain, constipation, diarrhea, nausea and vomiting.   Skin:  Negative for color change and rash.   Neurological:  Negative for headaches.   Hematological:  Negative for adenopathy. Does not bruise/bleed easily.       ECOG Performance Status: 1   Objective:      Vitals:   Vitals:    24 1342   BP: 102/68   BP Location: Right arm   Patient Position: Sitting   BP Method: Large (Manual)  "  Pulse: 91   Resp: 18   Temp: 97.2 °F (36.2 °C)   TempSrc: Temporal   SpO2: 98%   Weight: 103.1 kg (227 lb 4.7 oz)   Height: 6' 2" (1.88 m)           Physical Exam  Constitutional:       General: He is not in acute distress.     Appearance: He is well-developed. He is not diaphoretic.   HENT:      Head: Normocephalic and atraumatic.   Cardiovascular:      Rate and Rhythm: Normal rate and regular rhythm.      Heart sounds: Normal heart sounds. No murmur heard.     No friction rub. No gallop.   Pulmonary:      Effort: Pulmonary effort is normal. No respiratory distress.      Breath sounds: Normal breath sounds. No wheezing or rales.   Chest:      Chest wall: No tenderness.   Abdominal:      General: Bowel sounds are normal. There is no distension.      Palpations: Abdomen is soft. There is no mass.      Tenderness: There is no abdominal tenderness. There is no rebound.   Musculoskeletal:      Cervical back: Normal range of motion.   Lymphadenopathy:      Cervical: No cervical adenopathy.      Upper Body:      Right upper body: No supraclavicular or axillary adenopathy.      Left upper body: No supraclavicular or axillary adenopathy.   Skin:     General: Skin is warm and dry.      Findings: No erythema or rash.   Neurological:      Mental Status: He is alert and oriented to person, place, and time.   Psychiatric:         Behavior: Behavior normal.         Laboratory Data:  No visits with results within 1 Week(s) from this visit.   Latest known visit with results is:   Lab Visit on 02/20/2024   Component Date Value Ref Range Status    WBC 02/20/2024 8.80  3.90 - 12.70 K/uL Final    RBC 02/20/2024 4.84  4.60 - 6.20 M/uL Final    Hemoglobin 02/20/2024 13.9 (L)  14.0 - 18.0 g/dL Final    Hematocrit 02/20/2024 41.7  40.0 - 54.0 % Final    MCV 02/20/2024 86  82 - 98 fL Final    MCH 02/20/2024 28.7  27.0 - 31.0 pg Final    MCHC 02/20/2024 33.3  32.0 - 36.0 g/dL Final    RDW 02/20/2024 17.2 (H)  11.5 - 14.5 % Final    " Platelets 02/20/2024 300  150 - 450 K/uL Final    MPV 02/20/2024 9.2  9.2 - 12.9 fL Final    Immature Granulocytes 02/20/2024 0.3  0.0 - 0.5 % Final    Gran # (ANC) 02/20/2024 4.1  1.8 - 7.7 K/uL Final    Immature Grans (Abs) 02/20/2024 0.03  0.00 - 0.04 K/uL Final    Comment: Mild elevation in immature granulocytes is non specific and   can be seen in a variety of conditions including stress response,   acute inflammation, trauma and pregnancy. Correlation with other   laboratory and clinical findings is essential.      Lymph # 02/20/2024 3.2  1.0 - 4.8 K/uL Final    Mono # 02/20/2024 1.1 (H)  0.3 - 1.0 K/uL Final    Eos # 02/20/2024 0.3  0.0 - 0.5 K/uL Final    Baso # 02/20/2024 0.04  0.00 - 0.20 K/uL Final    nRBC 02/20/2024 0  0 /100 WBC Final    Gran % 02/20/2024 47.0  38.0 - 73.0 % Final    Lymph % 02/20/2024 35.9  18.0 - 48.0 % Final    Mono % 02/20/2024 12.4  4.0 - 15.0 % Final    Eosinophil % 02/20/2024 3.9  0.0 - 8.0 % Final    Basophil % 02/20/2024 0.5  0.0 - 1.9 % Final    Differential Method 02/20/2024 Automated   Final    Sodium 02/20/2024 138  136 - 145 mmol/L Final    Potassium 02/20/2024 4.5  3.5 - 5.1 mmol/L Final    Chloride 02/20/2024 104  95 - 110 mmol/L Final    CO2 02/20/2024 23  23 - 29 mmol/L Final    Glucose 02/20/2024 215 (H)  70 - 110 mg/dL Final    BUN 02/20/2024 18  8 - 23 mg/dL Final    Creatinine 02/20/2024 1.5 (H)  0.5 - 1.4 mg/dL Final    Calcium 02/20/2024 9.6  8.7 - 10.5 mg/dL Final    Total Protein 02/20/2024 7.5  6.0 - 8.4 g/dL Final    Albumin 02/20/2024 3.7  3.5 - 5.2 g/dL Final    Total Bilirubin 02/20/2024 0.3  0.1 - 1.0 mg/dL Final    Comment: For infants and newborns, interpretation of results should be based  on gestational age, weight and in agreement with clinical  observations.    Premature Infant recommended reference ranges:  Up to 24 hours.............<8.0 mg/dL  Up to 48 hours............<12.0 mg/dL  3-5 days..................<15.0 mg/dL  6-29  days.................<15.0 mg/dL      Alkaline Phosphatase 02/20/2024 66  55 - 135 U/L Final    AST 02/20/2024 25  10 - 40 U/L Final    ALT 02/20/2024 27  10 - 44 U/L Final    eGFR 02/20/2024 48.9 (A)  >60 mL/min/1.73 m^2 Final    Anion Gap 02/20/2024 11  8 - 16 mmol/L Final    TSH 02/20/2024 3.430  0.400 - 4.000 uIU/mL Final         Imaging:     CT Abdomen and Pelvis 3/01/24  There are coronary artery calcifications present. There are aortic annulus calcifications. There is extensive dense airspace consolidative change noted within the left lower lobe with associated air bronchograms noted, similar in extent when compared with the CT of the chest performed 02/09/2024 and possibly representative of radiation change. The patient's known left lower lobe mass is obscured by consolidative change.. There is a small volume of left basilar pleural fluid, and there is a calcified pleural plaque present at the left lung base posteriorly, possibly due to previous asbestos exposure. There is gynecomastia.     The liver is enlarged in size measuring 18.7 cm in craniocaudad dimension. No imaging evidence of fatty infiltration or early arterial phase enhancing nodules. The portal vein is patent. No biliary dilatation. The gallbladder is unremarkable. The most significant abnormality relates to the presence of an approximately 40 x 36 mm hypoenhancing pancreatic tail mass (series 4, image 96), unchanged in size when compared with the previous examination. There are postoperative changes of prior splenectomy.     There is a 20 x 30 mm peripancreatic lymph node present along the cranial aspect of the proximal pancreatic body on series 4, image 92, slightly larger in size when compared with 02/09/2024 and definitely larger when compared with 07/03/2023. There is an unchanged 13 mm short axis left periaortic lymph node on series 4, image 80, stable when compared to 02/09/2024 but enlarged when compared with 07/03/2023. There are  multiple additional retroperitoneal lymph nodes present which have enlarged in the interim since 07/03/2023 including a 10 mm short axis left retroperitoneal lymph node on series 4, image 65. There is mesenteric haziness present within the central abdomen (for example series 4, image 53-80, slightly more pronounced than was noted at the time of the prior CT of the abdomen. There is increasing lymphadenopathy noted in the retroperitoneum adjacent to the distal infrarenal abdominal aorta. No abdominal aortic aneurysm. There is atherosclerotic calcification present within the abdominal aorta and common iliac arteries. The adrenal glands are unremarkable.     The kidneys show multiple areas of cortical scarring and are similar in appearance to the previous CT of the abdomen with multiple scattered hypodensities noted throughout both kidneys, unchanged. There are no new enhancing renal masses appreciated. No hydronephrosis or stones. The ureters are normal in caliber and course without stones. The urinary bladder shows mild nodular extrinsic impression upon its base in the midline, possibly due to mild BPH. No bladder stones or discrete bladder mass.     The appendix is visualized and shows no inflammatory changes. No bowel inflammatory change or bowel obstruction. No inguinal hernia or inguinal lymphadenopathy. There is an 18 x 15 mm sebaceous cyst present within the left paramidline ventral pelvic wall on series 5, image 164, most likely a sebaceous cyst. There are enlarged inguinal lymph nodes present bilaterally. One enlarged right inguinal lymph node measures 15 mm on series 5, image 192. Another measures 10 mm in short axis on series 5, image 194.     There is degenerative change of the thoracolumbar spine and hips. There is a 21 mm sclerotic bone lesion present within thepostero-inferomedial left femoral head on series 601, image 92. A sclerotic focus of osseous metastatic disease cannot be excluded on the basis  of this examination. There is degenerative change of the sacroiliac joints. No new sclerotic bone lesions appreciated.       Assessment:       1. Pancreatic mass    2. Lymphadenopathy, abdominal    3. Secondary and unspecified malignant neoplasm of intrathoracic lymph nodes    4. Squamous cell carcinoma of lung, unspecified laterality    5. Other specified disorders involving the immune mechanism, not elsewhere classified    6. Chronic kidney disease, stage 3a    7. Hyperlipidemia, unspecified hyperlipidemia type    8. Type 2 diabetes mellitus with diabetic polyneuropathy, with long-term current use of insulin    9. Hypertension, unspecified type    10. Immunodeficiency due to drug therapy                                   Plan:     Pancreatic Mass - seen on CT scans and biopsy during EUS on 03/03/2023 with path showing no evidence of malignancy  -PET/CT 4/18/23 showed uptake in the tail of the pancreas  -Possibly due to chronic pancreatitis  -Pt with stable pancreatic mass on CT abdomen 7/03/23 and 3/01/24  -Patient with enlarging surrounding lymphadenopathy  -Patient undergo PET-CT and to see Dr. Rosenthal for consideration of EUS      Intraabdominal Lymphadenopathy - see above    NSCLC - patient was recently diagnosed with at least stage III [id number] SCC of the left lung  -PET/CT 4/18/23 shows continued left lung mass, mediastinal LAD  -MRI brain 4/18/23 showed no acute findings  -[facility] Blood testing showed TP 53 mutation  -[facility] tissue testing showed PD-L1 of 20%, TP53, KDM6A, CDKN2a and MTAP  -PORT placed 4/24/23 under the care of Dr. Love  -PT completed 6 cycles of Carboplatin and Paclitaxel on 6/02/23  -Radiation completed 6/07/23  -CT chest on 6/26/23 showed a decrease in LLL mass  -Will proceed with Durvalumab for 1 year of treatment  -CT chest 11/21/23 shows decreased lung mass and radiation changes  -CT Chest on 02/09/2024 showed continued radiation changes in the lung but did show intra-abdominal  lymphadenopathy (see below)  -Pt s/p cycle 9 of Durvalumab    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  -No sign of infection  -Will monitor    CKD - pt with stage IIIa CKD  -Creatinine 1.5 2/20/24  -Stable  -Will monitor    HTN - pt on amlodipine, clonidine, metoprolol  -BP controlled  -Will monitor    Gout - pt on allopurinol  -Currently asymptomatic  -Will monitor    HLD - pt on rosuvastatin, zetia  -Management per PCP    DMII - pt on toujeo, farxiga, and bydureon  -Management per PCP    Route Chart for Scheduling    Med Onc Chart Routing      Follow up with physician Other. PT needs a PET/CT ASAP with return appt with me once completed.  Pt needs an appt with Dr Pieter Aguirre [email or web address] for pancreatic mass.   Follow up with RADHIKA    Infusion scheduling note    Injection scheduling note    Labs    Imaging    Pharmacy appointment    Other referrals              Treatment Plan Information   OP DURVALUMAB 1500 MG Q4W   Lisandro Lawrence MD   Upcoming Treatment Dates - OP DURVALUMAB 1500 MG Q4W    3/20/2024       Chemotherapy       durvalumab (IMFINZI) 1,500 mg in sodium chloride 0.9% SolP 280 mL chemo infusion  4/17/2024       Chemotherapy       durvalumab (IMFINZI) 1,500 mg in sodium chloride 0.9% SolP 280 mL chemo infusion  5/15/2024       Chemotherapy       durvalumab (IMFINZI) 1,500 mg in sodium chloride 0.9% SolP 280 mL chemo infusion  6/12/2024       Chemotherapy       durvalumab (IMFINZI) 1,500 mg in sodium chloride 0.9% SolP 280 mL chemo infusion    Supportive Plan Information  IV FLUIDS AND ELECTROLYTES   Lisandro Lawrence MD   Upcoming Treatment Dates - IV FLUIDS AND ELECTROLYTES    No upcoming days in selected categories.      Lisandro Lawrence MD  [facility]  Hematology and Oncology  [facility] [address] [place] [address]   O: [phone]  F: [phone]

## 2024-03-04 ENCOUNTER — OFFICE VISIT (OUTPATIENT)
Dept: HEMATOLOGY/ONCOLOGY | Facility: CLINIC | Age: 74
End: 2024-03-04
Payer: MEDICARE

## 2024-03-04 VITALS
DIASTOLIC BLOOD PRESSURE: 68 MMHG | HEART RATE: 91 BPM | WEIGHT: 227.31 LBS | OXYGEN SATURATION: 98 % | SYSTOLIC BLOOD PRESSURE: 102 MMHG | HEIGHT: 74 IN | BODY MASS INDEX: 29.17 KG/M2 | RESPIRATION RATE: 18 BRPM | TEMPERATURE: 97 F

## 2024-03-04 DIAGNOSIS — I10 HYPERTENSION, UNSPECIFIED TYPE: ICD-10-CM

## 2024-03-04 DIAGNOSIS — C77.1 SECONDARY AND UNSPECIFIED MALIGNANT NEOPLASM OF INTRATHORACIC LYMPH NODES: ICD-10-CM

## 2024-03-04 DIAGNOSIS — D84.821 IMMUNODEFICIENCY DUE TO DRUG THERAPY: ICD-10-CM

## 2024-03-04 DIAGNOSIS — C34.90 SQUAMOUS CELL CARCINOMA OF LUNG, UNSPECIFIED LATERALITY: ICD-10-CM

## 2024-03-04 DIAGNOSIS — K86.89 PANCREATIC MASS: Primary | ICD-10-CM

## 2024-03-04 DIAGNOSIS — E11.42 TYPE 2 DIABETES MELLITUS WITH DIABETIC POLYNEUROPATHY, WITH LONG-TERM CURRENT USE OF INSULIN: ICD-10-CM

## 2024-03-04 DIAGNOSIS — Z79.4 TYPE 2 DIABETES MELLITUS WITH DIABETIC POLYNEUROPATHY, WITH LONG-TERM CURRENT USE OF INSULIN: ICD-10-CM

## 2024-03-04 DIAGNOSIS — D89.89 OTHER SPECIFIED DISORDERS INVOLVING THE IMMUNE MECHANISM, NOT ELSEWHERE CLASSIFIED: ICD-10-CM

## 2024-03-04 DIAGNOSIS — E78.5 HYPERLIPIDEMIA, UNSPECIFIED HYPERLIPIDEMIA TYPE: ICD-10-CM

## 2024-03-04 DIAGNOSIS — R59.0 LYMPHADENOPATHY, ABDOMINAL: ICD-10-CM

## 2024-03-04 DIAGNOSIS — N18.31 CHRONIC KIDNEY DISEASE, STAGE 3A: ICD-10-CM

## 2024-03-04 DIAGNOSIS — Z79.899 IMMUNODEFICIENCY DUE TO DRUG THERAPY: ICD-10-CM

## 2024-03-04 PROCEDURE — G2211 COMPLEX E/M VISIT ADD ON: HCPCS | Mod: S$PBB,,, | Performed by: INTERNAL MEDICINE

## 2024-03-04 PROCEDURE — 99999 PR PBB SHADOW E&M-EST. PATIENT-LVL IV: CPT | Mod: PBBFAC,,, | Performed by: INTERNAL MEDICINE

## 2024-03-04 PROCEDURE — 99214 OFFICE O/P EST MOD 30 MIN: CPT | Mod: PBBFAC,PN | Performed by: INTERNAL MEDICINE

## 2024-03-04 PROCEDURE — 99215 OFFICE O/P EST HI 40 MIN: CPT | Mod: S$PBB,,, | Performed by: INTERNAL MEDICINE

## 2024-03-05 ENCOUNTER — OFFICE VISIT (OUTPATIENT)
Dept: HEMATOLOGY/ONCOLOGY | Facility: CLINIC | Age: 74
End: 2024-03-05
Payer: MEDICARE

## 2024-03-05 VITALS
HEIGHT: 74 IN | BODY MASS INDEX: 29.22 KG/M2 | HEART RATE: 76 BPM | WEIGHT: 227.69 LBS | SYSTOLIC BLOOD PRESSURE: 132 MMHG | TEMPERATURE: 97 F | DIASTOLIC BLOOD PRESSURE: 77 MMHG | OXYGEN SATURATION: 99 %

## 2024-03-05 DIAGNOSIS — T50.905A DRUG-INDUCED XEROSTOMIA: ICD-10-CM

## 2024-03-05 DIAGNOSIS — K11.7 DRUG-INDUCED XEROSTOMIA: ICD-10-CM

## 2024-03-05 DIAGNOSIS — C34.90 SQUAMOUS CELL CARCINOMA OF LUNG, UNSPECIFIED LATERALITY: Primary | ICD-10-CM

## 2024-03-05 PROCEDURE — 99213 OFFICE O/P EST LOW 20 MIN: CPT | Mod: PBBFAC,PN | Performed by: NURSE PRACTITIONER

## 2024-03-05 PROCEDURE — 99999 PR PBB SHADOW E&M-EST. PATIENT-LVL III: CPT | Mod: PBBFAC,,, | Performed by: NURSE PRACTITIONER

## 2024-03-05 PROCEDURE — 99214 OFFICE O/P EST MOD 30 MIN: CPT | Mod: S$PBB,,, | Performed by: NURSE PRACTITIONER

## 2024-03-05 NOTE — PROGRESS NOTES
Feng Thakkar  73 y.o. is here to seek an integrative approach to discuss side effects related to lung cancer treatment. Feng Thakkar  was referred by S. No ref. provider found,NP     HPI  Mr. Thakkar is here today for his first chemotherapy. He reports he only sleeps approximately 4-5 hours at night and he naps during the day after his meals. This has been his routine for about 7-8 years. He reports he is fine with his sleep routine and he has the energy to do what he wants. He reports a good appetite, but he does eat less. He reports he is active, but not as active as he used to be.   His wife of 21 years  in 2019. He has 2 children. He has a good support sytem. He is retired.     2023  Mr. Thakkar is here today getting chemotherapy. He reports a sore throat which started Tuesday after radiation. He is not eating well due to pain. He states he is drinking a boost daily, although today it took 3 hours to drink one boost. His daughter in law picked up the medications given by Dr. Hadley and Dr. Lawrence today. He states before Tuesday he was doing very well.  He continues sleeping approximately 5 hours nightly, but this is his routine and does not want to change it at this time. He has a higher stress and anxiety level due to pain and the decrease ability to eat and drink.     2023  Mr. Thakkar reports he is doing better since finishing radiation in . He had a sore throat and was only able to drink boosts. He now has a sore throat occasionally, but not when he eats. He has a good appetite and is eating healthy.  He reports over the holiday weekend he was able to eat a variety of foods, most of which he usually does not eat due to Diabetes. He is drinking fluids throughout the day. He reports he does not sleep well and then he takes a nap during the day. He likes his sleeping routine. He reports now that his pain is gone, he does not have any anxiety. Overall, he states he is doing very well.     Today's  Visit  Mr. Thakkar reports overall he is doing well. He is back to eating his normal diet and has a good appetite. He continues to sleep 4-5 hours at night with a nap during the day. He is fine with this routine and does not want to change his routine. He denies fatigue.  He is currently getting Imfinizi and reports he is tolerating it well. He does report dry mouth. He continues to report low stress and anxiety. He is more active than he was previously. He is riding his stationary bike.    Pillars Assessment    Sleep  How many hours of sleep per night? 5 hours at night 1-2 hour nap  Do you have trouble falling asleep, staying asleep or waking up earlier than you need to? yes  Do you have daytime fatigue? no  Do you need medication for sleep? no  Do you use any supplements or other interventions for sleep? no    Resilience  Rate your current level of stress- low    Nutrition   Food allergies or sensitivities: no  Do you adhere to a particular type of diet? Diabetic diet  Do you have any concerns with your eating habits? No    Exercise  How would you describe your physical activity level? moderate  What do you do for physical activity? Stationary bike    Past Medical History  Past Medical History:   Diagnosis Date    Diabetes mellitus     Gout, unspecified     High cholesterol     HTN (hypertension)     Lung cancer         Past Surgical History   Past Surgical History:   Procedure Laterality Date    ENDOSCOPIC ULTRASOUND OF UPPER GASTROINTESTINAL TRACT N/A 3/3/2023    Procedure: ULTRASOUND, UPPER GI TRACT, ENDOSCOPIC;  Surgeon: Cora Mcgrath MD;  Location: [facility] ENDO;  Service: Endoscopy;  Laterality: N/A;  2/24/23-Instructions mailed to address on file-DS    INSERTION OF TUNNELED CENTRAL VENOUS CATHETER (CVC) WITH SUBCUTANEOUS PORT N/A 4/24/2023    Procedure: [id number];  Surgeon: Paulino Love MD;  Location: [facility] OR;  Service: General;  Laterality: N/A;    PANCREATECTOMY      ROBOTIC BRONCHOSCOPY  N/A 3/17/2023    Procedure: ROBOTIC BRONCHOSCOPY;  Surgeon: Cristiane Albright MD;  Location: [facility] OR [address] [place] [address];  Service: Pulmonary;  Laterality: N/A;      Family History   Family History   Problem Relation Age of Onset    Breast cancer Sister         60      Social History  Social History     Socioeconomic History    Marital status:    Tobacco Use    Smoking status: Former     Current packs/day: 0.00     Average packs/day: 1.5 packs/day for 54.0 years (81.0 ttl pk-yrs)     Types: Cigarettes     Start date: 1968     Quit date: 10/20/2012     Years since quittin.3    Smokeless tobacco: Former   Substance and Sexual Activity    Alcohol use: Yes     Alcohol/week: 3.0 standard drinks of alcohol     Types: 3 Cans of beer per week     Comment: 3 a day    Drug use: Never    Sexual activity: Not Currently      Allergies  Review of patient's allergies indicates:  No Known Allergies   Current Medications:    Current Outpatient Medications:     allopurinoL (ZYLOPRIM) 100 MG tablet, Take 100 mg by mouth once daily., Disp: , Rfl:     amLODIPine (NORVASC) 2.5 MG tablet, Take 2.5 mg by mouth once daily., Disp: , Rfl:     BYDUREON BCISE 2 mg/0.85 mL AtIn, Inject 2 mg into the skin every 7 days. Every Friday, Disp: , Rfl:     cloNIDine (CATAPRES) 0.2 MG tablet, Take 0.2 mg by mouth once. Daily, Disp: , Rfl:     ezetimibe (ZETIA) 10 mg tablet, Take 10 mg by mouth once daily., Disp: , Rfl:     FARXIGA 10 mg tablet, Take 10 mg by mouth every morning., Disp: , Rfl:     finasteride (PROSCAR) 5 mg tablet, Take 5 mg by mouth once daily., Disp: , Rfl:     FREESTYLE TEST Strp, USE 1 STRIP TO CHECK GLUCOSE ONCE DAILY, Disp: , Rfl:     levothyroxine (SYNTHROID) 25 MCG tablet, Take 1 tablet (25 mcg total) by mouth before breakfast., Disp: 30 tablet, Rfl: 11    LIDOcaine-prilocaine (EMLA) cream, Apply topically to port site one hour prior to access, cover, Disp: 30 g, Rfl: 1    metoprolol succinate (TOPROL-XL) 100 MG 24 hr tablet,  "Take 100 mg by mouth once daily., Disp: , Rfl:     omeprazole (PRILOSEC) 40 MG capsule, Take 1 capsule (40 mg total) by mouth once daily. (Patient not taking: Reported on 2023), Disp: 30 capsule, Rfl: 1    rosuvastatin (CRESTOR) 40 MG Tab, Take 10 mg by mouth every evening., Disp: , Rfl:     TOUJEO SOLOSTAR U-300 INSULIN 300 unit/mL (1.5 mL) InPn pen, SMARTSI Unit(s) SUB-Q Every Evening, Disp: , Rfl:      Review of Systems  Review of Systems   Constitutional: Negative.    HENT: Negative.     Eyes: Negative.    Respiratory: Negative.     Cardiovascular: Negative.    Gastrointestinal: Negative.    Genitourinary: Negative.    Musculoskeletal: Negative.    Skin: Negative.    Neurological: Negative.    Endo/Heme/Allergies: Negative.    Psychiatric/Behavioral: Negative.        Physical Exam      Vitals:    24 1006   BP: 132/77   Pulse: 76   Temp: 97.3 °F (36.3 °C)   TempSrc: Temporal   SpO2: 99%   Weight: 103.3 kg (227 lb 11.2 oz)   Height: 6' 2" (1.88 m)   Body mass index is 29.23 kg/m².      Physical Exam  Vitals reviewed.   Constitutional:       Appearance: Normal appearance.   Neurological:      Mental Status: He is alert.   Psychiatric:         Mood and Affect: Mood normal.         Behavior: Behavior normal.      ASSESSMENT :  1. Squamous cell carcinoma of lung, unspecified laterality    2. Drug-induced xerostomia      PLAN:  Reviewed all information discussed at today's visit and all questions were answered.    Counseled on healthy lifestyle and behavior modification: encouraged an increase in fruits and vegetables, increase protein intake. Continue to drink plenty of fluids.   See Nutrition as needed during treatment.    Recommended Xylimelts for dry mouth  I discussed and recommended the following support services:  Johan Chi and Yoga I suggested Johan Chi and/or Yoga as these practices reduce stress, increases flexibility and muscle strength, improves balance and promotes serenity in the power of " movement to help fight disease and boost your immune system.   Music and relaxation therapy and Meditation which can decrease stress by lowering blood pressure, slowing breathing, and helping you be more present in the moment. It improves sleep by relaxing the body and mind at the end of the day.Meditation also trains you how to focus on one thing at a time, improving concentration. It also promotes emotional well-being by decreasing depression and anxiety, and helping create a more positive outlook on life.    Follow up with [facility] in 6 months    I spent a total of 30 minutes on the day of the visit.This includes face to face time and non-face to face time preparing to see the patient (eg, review of tests), obtaining and/or reviewing separately obtained history, documenting clinical information in the electronic or other health record, independently interpreting results and communicating results to the patient/family/caregiver, or care coordinator.

## 2024-03-07 ENCOUNTER — HOSPITAL ENCOUNTER (OUTPATIENT)
Dept: RADIOLOGY | Facility: HOSPITAL | Age: 74
Discharge: HOME | End: 2024-03-07
Attending: INTERNAL MEDICINE
Payer: MEDICARE

## 2024-03-07 DIAGNOSIS — K86.89 PANCREATIC MASS: ICD-10-CM

## 2024-03-07 LAB — GLUCOSE SERPL-MCNC: 126 MG/DL (ref 70–110)

## 2024-03-07 PROCEDURE — A9552 F18 FDG: HCPCS | Mod: PN | Performed by: INTERNAL MEDICINE

## 2024-03-07 PROCEDURE — 78815 PET IMAGE W/CT SKULL-THIGH: CPT | Mod: 26,PS,, | Performed by: RADIOLOGY

## 2024-03-07 PROCEDURE — 78815 PET IMAGE W/CT SKULL-THIGH: CPT | Mod: TC,PS,PN

## 2024-03-07 RX ORDER — FLUDEOXYGLUCOSE F18 500 MCI/ML
12 INJECTION INTRAVENOUS
Status: COMPLETED | OUTPATIENT
Start: 2024-03-07 | End: 2024-03-07

## 2024-03-07 RX ADMIN — FLUDEOXYGLUCOSE F-18 13.52 MILLICURIE: 500 INJECTION INTRAVENOUS at 09:03

## 2024-03-07 NOTE — PROGRESS NOTES
PET Imaging Questionnaire    Are you a Diabetic? Recent Blood Sugar level? Yes    Are you anemic? Bone Marrow Stimulation Meds? No    Have you had a CT Scan, if so when & where was your last one? Yes -     Have you had a PET Scan, if so when & where was your last one? Yes -     Chemotherapy or currently on Chemotherapy? Yes    Radiation therapy? Yes    Surgical History:   Past Surgical History:   Procedure Laterality Date    ENDOSCOPIC ULTRASOUND OF UPPER GASTROINTESTINAL TRACT N/A 3/3/2023    Procedure: ULTRASOUND, UPPER GI TRACT, ENDOSCOPIC;  Surgeon: Cora Mcgrath MD;  Location: [facility];  Service: Endoscopy;  Laterality: N/A;  2/24/23-Instructions mailed to address on file-DS    INSERTION OF TUNNELED CENTRAL VENOUS CATHETER (CVC) WITH SUBCUTANEOUS PORT N/A 4/24/2023    Procedure: [id number];  Surgeon: Paulino Love MD;  Location: [facility];  Service: General;  Laterality: N/A;    PANCREATECTOMY      ROBOTIC BRONCHOSCOPY N/A 3/17/2023    Procedure: ROBOTIC BRONCHOSCOPY;  Surgeon: Cristiane Albright MD;  Location: [address];  Service: Pulmonary;  Laterality: N/A;        Have you been fasting for at least 6 hours? Yes    Is there any chance you may be pregnant or breastfeeding? No    Assay: 16.5 MCi@:9.39   Injection Site:lt ac     Residual: 2.98 mCi@: 9.41   Technologist: Aisha Navraro Injected:13.52mCi

## 2024-03-10 NOTE — PROGRESS NOTES
PATIENT: Feng Thakkar  MRN: [id number]  DATE: 3/11/2024      Diagnosis:   1. Pancreatic mass    2. Abscess    3. Breast anomaly    4. Other abnormal and inconclusive findings on diagnostic imaging of breast    5. Immunodeficiency due to drug therapy    6. Squamous cell carcinoma of lung, unspecified laterality    7. Other specified disorders involving the immune mechanism, not elsewhere classified    8. Lymphadenopathy, abdominal    9. Secondary and unspecified malignant neoplasm of intrathoracic lymph nodes    10. Chronic kidney disease, stage 3a    11. Hyperlipidemia, unspecified hyperlipidemia type    12. Hypertension, unspecified type                  Chief Complaint: Squamous cell carcinoma of lung, unspecified laterality (1 week follow up)      Oncologic History:      Oncologic History     Oncologic Treatment     Pathology           Subjective:    Interval History: Mr. Thakkar is a 73 y.o. male with Gout, HLD, HTN who presents for work up of NSCLC.  Since the last clinic visit patient underwent a PET-CT on 03/07/2024 showing resolution of hypermetabolic left lower lobe mass now showing signs of radiation therapy response; disappearance of hypermetabolic enlarged left hilar lymph nodes; new irregular hypermetabolic right breast tissue measuring 2.5 x 1.4 cm possibly representing the unilateral gynecomastia; interval development of new hypermetabolic lymph nodes in the celiac region, periaortic region, right retrocrural area, and bilateral inguinal regions; lentiform cutaneous subcutaneous well-circumscribed mass along the anterior midline aspect of the lower pelvis which has increased in size and become hypermetabolic now measuring 2.1 x 1.1 cm possibly representing a sebaceous cyst.      Currently the patient endorses pain in the left lower abdomen with drainage from a subcutaneous cyst.  The patient denies CP, cough, SOB, nausea, vomiting, constipation, diarrhea.  The patient denies fever, chills, night sweats,  weight loss, new lumps or bumps, easy bruising or bleeding.    Prior History:  The patient initially developed abdominal pain on 01/05/2023 and underwent CT of the abdomen showing a mass in the left lung base measuring 3.9 x 2.9 cm; 3 x 3.8 cm mass along the pancreatic tail; and shotty retroperitoneal lymph nodes.  The patient underwent repeat CT abdomen and pelvis on 02/24/2023 showing a 4.3 x 4 solid enhancing mass of the left lung base; thickened bladder wall; prostate gland measuring 45.1 x 4.6 cm; abnormal sclerosis in the left femoral head measuring 2.8 x 1.4 x 1.3 cm; and additional soft tissue densities in the posterior left subdiaphragmatic region measuring up to 16 mm.  CT chest on 03/01/2023 showed a 2 cm low-density lesion in the right lobe of the thyroid gland; 4.5 x 4.3 x 3.8 cm mass in the left lower lobe; several micro nodules; Na soft tissue mass adjacent to the pancreatic tail.  Patient underwent EUS on 03/03/2023 showing a 3.5 cm and 1.8 cm round pancreatic tail lesion.  Biopsy returned results showing no evidence of malignancy and abundant hematopoietic elements and dendritic cells.  Patient then underwent EBUS under the care of Dr. Albright on 03/17/2023 showing squamous cell carcinoma in biopsy of the left lower lung lesion; squamous cell carcinoma and station 7 lymph node; positive 11 L lymph node for squamous cell carcinoma.   The patient underwent PET-CT on 04/18/2023 showing a markedly hypermetabolic lobulated subpleural left lower lobe mass measuring 4.5 x 3.9 cm with hypermetabolic lymph nodes in the left hilar region measuring 2.2 x 1.9 cm; and a 4.2 x 3.6 walk circumscribed mass in the pancreatic tail.  MRI brain on 04/18/2023 showed no acute findings.     The patient underwent CT chest on 6/26/23 showing a large right thyroid mass measuring at least 2.6 cm; left renal hypodensity measuring 11 mm favored to be a cyst; significant decrease in size of left lower lobe consolidative mass now  measuring 2.4 x 2.3 cm; stable 3 mm consolidative nodule in the left upper lobe.   The patient underwent CT chest on 11/21/2023 showing patchy ground-glass density within the medial right upper lobe; ground-glass density in the left dependent left upper lobe that the left lower lobe with associated bronchiectasis; previous target peribronchial nodule in the left lower lobe measuring 2.5 cm and 2 minute cm hypodense lesion in the right thyroid nodule.   CT Chest 2/09/24 showing geographic ground-glass disease and interlobular septal thickening in the left upper lobe; several large lymph nodes in the upper abdomen near the celiac takeoff and liver hilum with lymph node ranging from 1.3-1.5 cm.    The patient underwent a CT abdomen and pelvis on 03/01/2024 showing coronary artery calcification; 40 x 36 mm hypoenhancing pancreatic tail mass; 20 x 30 mm peripancreatic lymph node along the cranial aspect of the proximal pancreatic body; stable 13 mm periaortic lymph node; enlargement of multiple retroperitoneal lymph nodes; mesenteric haziness present within the central abdomen; enlarged inguinal lymph nodes bilaterally; and a 21 mm sclerotic bone lesion within the posterior row inferomedial left femoral head.    Past Medical History:   Past Medical History:   Diagnosis Date    Diabetes mellitus     Gout, unspecified     High cholesterol     HTN (hypertension)     Lung cancer        Past Surgical HIstory:   Past Surgical History:   Procedure Laterality Date    ENDOSCOPIC ULTRASOUND OF UPPER GASTROINTESTINAL TRACT N/A 3/3/2023    Procedure: ULTRASOUND, UPPER GI TRACT, ENDOSCOPIC;  Surgeon: Cora Mcgrath MD;  Location: [facility];  Service: Endoscopy;  Laterality: N/A;  2/24/23-Instructions mailed to address on file-DS    INSERTION OF TUNNELED CENTRAL VENOUS CATHETER (CVC) WITH SUBCUTANEOUS PORT N/A 4/24/2023    Procedure: [id number];  Surgeon: Paulino Love MD;  Location: [facility];  Service: General;   Laterality: N/A;    PANCREATECTOMY      ROBOTIC BRONCHOSCOPY N/A 3/17/2023    Procedure: ROBOTIC BRONCHOSCOPY;  Surgeon: Cristiane Albright MD;  Location: [facility] OR [address] [place] [address];  Service: Pulmonary;  Laterality: N/A;       Family History:   Family History   Problem Relation Age of Onset    Breast cancer Sister         60       Social History:  reports that he quit smoking about 11 years ago. His smoking use included cigarettes. He started smoking about 55 years ago. He has a 81.0 pack-year smoking history. He has quit using smokeless tobacco. He reports current alcohol use of about 3.0 standard drinks of alcohol per week. He reports that he does not use drugs.    Allergies:  Review of patient's allergies indicates:  No Known Allergies    Medications:  Current Outpatient Medications   Medication Sig Dispense Refill    allopurinoL (ZYLOPRIM) 100 MG tablet Take 100 mg by mouth once daily.      amLODIPine (NORVASC) 2.5 MG tablet Take 2.5 mg by mouth once daily.      BYDUREON BCISE 2 mg/0.85 mL AtIn Inject 2 mg into the skin every 7 days. Every Friday      cloNIDine (CATAPRES) 0.2 MG tablet Take 0.2 mg by mouth once. Daily      ezetimibe (ZETIA) 10 mg tablet Take 10 mg by mouth once daily.      FARXIGA 10 mg tablet Take 10 mg by mouth every morning.      finasteride (PROSCAR) 5 mg tablet Take 5 mg by mouth once daily.      FREESTYLE TEST Strp USE 1 STRIP TO CHECK GLUCOSE ONCE DAILY      levothyroxine (SYNTHROID) 25 MCG tablet Take 1 tablet (25 mcg total) by mouth before breakfast. 30 tablet 11    LIDOcaine-prilocaine (EMLA) cream Apply topically to port site one hour prior to access, cover 30 g 1    metoprolol succinate (TOPROL-XL) 100 MG 24 hr tablet Take 100 mg by mouth once daily.      rosuvastatin (CRESTOR) 40 MG Tab Take 10 mg by mouth every evening.      TOUJEO SOLOSTAR U-300 INSULIN 300 unit/mL (1.5 mL) InPn pen SMARTSI Unit(s) SUB-Q Every Evening      doxycycline (VIBRAMYCIN) 100 MG Cap Take 1 capsule (100 mg total) by  "mouth every 12 (twelve) hours. 10 capsule 0     No current facility-administered medications for this visit.       Review of Systems   Constitutional:  Negative for chills, diaphoresis, fatigue, fever and unexpected weight change.   Respiratory:  Negative for cough and shortness of breath.    Cardiovascular:  Negative for chest pain and palpitations.   Gastrointestinal:  Positive for abdominal pain (left lower abdomen). Negative for constipation, diarrhea, nausea and vomiting.   Skin:  Positive for wound (drainage from left lower abdomen). Negative for color change and rash.   Neurological:  Negative for headaches.   Hematological:  Negative for adenopathy. Does not bruise/bleed easily.       ECOG Performance Status: 1   Objective:      Vitals:   Vitals:    03/11/24 0817   BP: 96/68   BP Location: Left arm   Patient Position: Sitting   BP Method: Large (Manual)   Pulse: 80   Resp: 20   Temp: 96.8 °F (36 °C)   TempSrc: Temporal   SpO2: 99%   Weight: 103.8 kg (228 lb 13.4 oz)   Height: 6' 2" (1.88 m)             Physical Exam  Constitutional:       General: He is not in acute distress.     Appearance: He is well-developed. He is not diaphoretic.   HENT:      Head: Normocephalic and atraumatic.   Cardiovascular:      Rate and Rhythm: Normal rate and regular rhythm.      Heart sounds: Normal heart sounds. No murmur heard.     No friction rub. No gallop.   Pulmonary:      Effort: Pulmonary effort is normal. No respiratory distress.      Breath sounds: Normal breath sounds. No wheezing or rales.   Chest:      Chest wall: No tenderness.   Abdominal:      General: Bowel sounds are normal. There is no distension.      Palpations: Abdomen is soft. There is no mass.      Tenderness: There is no abdominal tenderness. There is no rebound.   Musculoskeletal:      Cervical back: Normal range of motion.   Lymphadenopathy:      Cervical: No cervical adenopathy.      Upper Body:      Right upper body: No supraclavicular or axillary " adenopathy.      Left upper body: No supraclavicular or axillary adenopathy.   Skin:     General: Skin is warm and dry.      Findings: Lesion (pt with abscess left lower quadrante which epxresses puss and bloody drainage) present. No erythema or rash.   Neurological:      Mental Status: He is alert and oriented to person, place, and time.   Psychiatric:         Behavior: Behavior normal.         Laboratory Data:  Hospital Outpatient Visit on 03/07/2024   Component Date Value Ref Range Status    POC Glucose 03/07/2024 126 (A)  70 - 110 MG/DL Final         Imaging:     PET/CT 3/07/24  Head/neck:     No significant abnormal hypermetabolic foci are identified within the head and neck region. No lymphadenopathy is present.     Chest:     Since the prior PET-CT of 04/18/2023, the markedly hypermetabolic left lower lobe lung mass has become indistinct and replaced by mildly hypermetabolic reticulonodular lung parenchyma consistent with a positive response to radiation therapy. The max SUV is 3.7 compared to 14.0 previously. Previously hypermetabolic enlarged left hilar lymph nodes are not identifiable on today's study and a prior hypermetabolic subcarinal lymph node has disappeared. There is new somewhat irregular hypermetabolic sub [place] ower right breast tissue. On image 114, the breast tissue measures 2.5 x 1.4 cm with a max SUV of 3.0. While this could represent unilateral gynecomastia, the possibility of a breast neoplasm cannot be excluded and further evaluation with diagnostic mammography and possible breast ultrasound is recommended.     Abdomen/pelvis:     Since the prior PET-CT of 04/18/2023 there has been interval development of new hypermetabolic lymph nodes in the celiac region, periaortic retroperitoneum, right retrocrural area, and bilateral inguinal regions. Since the more recent CT of the abdomen/pelvis, all of these lymph nodes have increased in size. A relatively large celiac node on image 151 and image  154 now measures 3.7 x 2.1 cm compared to 2.6 x 1.8 cm previously and has a max SUV of 20.1. A new 0.9 x 0.9 cm right retrocrural node on image 150 has a max SUV of 4.2. A right inguinal node which has grown since the CT of 03/01/2024 and was not present on the prior PET-CT has increased in size. On image 270, this node measures 2.6 x 1.2 cm compared to 1.8 x 1.5 cm previously and has a max SUV of 2.6. There is a lentiform cutaneous/subcutaneous well-circumscribed mass along the anterior midline aspect of the lower pelvis which has increased in size and has become hypermetabolic. On image 247, the mass now measures 2.1 x 1.1 cm compared to 1.6 x 0.9 cm on the CT of 03/01/2024. The mass has a max SUV of 5.7. While this mass could represent a sebaceous cyst which has become complicated, the possibility of a cutaneous metastasis cannot be excluded.     Skeleton:     No significant abnormal hypermetabolic foci are identified within the skeleton. There are no findings to suggest osseous metastatic disease.       Assessment:       1. Pancreatic mass    2. Abscess    3. Breast anomaly    4. Other abnormal and inconclusive findings on diagnostic imaging of breast    5. Immunodeficiency due to drug therapy    6. Squamous cell carcinoma of lung, unspecified laterality    7. Other specified disorders involving the immune mechanism, not elsewhere classified    8. Lymphadenopathy, abdominal    9. Secondary and unspecified malignant neoplasm of intrathoracic lymph nodes    10. Chronic kidney disease, stage 3a    11. Hyperlipidemia, unspecified hyperlipidemia type    12. Hypertension, unspecified type                                     Plan:     Pancreatic Mass - seen on CT scans and biopsy during EUS on 03/03/2023 with path showing no evidence of malignancy  -PET/CT 4/18/23 showed uptake in the tail of the pancreas  -Possibly due to chronic pancreatitis  -Pt with stable pancreatic mass on CT abdomen 7/03/23 and 3/01/24  -Patient  with enlarging surrounding lymphadenopathy  -PET/CT on 3/07/24 shows increasing LAD in the celiac region, periaortic retroperitoneum, right retrocrural area, and bilateral inguinal regions  -Pt to undergo EUS on 3/13/24    Intraabdominal Lymphadenopathy - see above    NSCLC - patient was recently diagnosed with at least stage III [id number] SCC of the left lung  -PET/CT 4/18/23 shows continued left lung mass, mediastinal LAD  -MRI brain 4/18/23 showed no acute findings  -TEMPUS Blood testing showed TP 53 mutation  -TEMPUS tissue testing showed PD-L1 of 20%, TP53, KDM6A, CDKN2a and MTAP  -PORT placed 4/24/23 under the care of Dr. Love  -PT completed 6 cycles of Carboplatin and Paclitaxel on 6/02/23  -Radiation completed 6/07/23  -CT chest on 6/26/23 showed a decrease in LLL mass  -Will proceed with Durvalumab for 1 year of treatment  -CT chest 11/21/23 shows decreased lung mass and radiation changes  -CT Chest on 02/09/2024 showed continued radiation changes in the lung but did show intra-abdominal lymphadenopathy (see below)  -Pt s/p cycle 9 of Durvalumab  -Pt to undergo labs and potential treatment on 3/20/24  Visit today included increased complexity associated with the care of the episodic problem (immunotherapy) addressed and managing the longitudinal care of the patient due to the serious and/or complex managed problem(s) NSCLC    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  -PT with sebaceous cyst on lower left abdomen  -Will monitor    Abscess - pt with abscess/sebaceous cyst LLQ  -Will give 5 days of doxycycline and have pt see general surgery as puss could not be completely drained with manual expressing of puss    Breast Mass - pt with right breast mass versus gynecomastai on PET/CT 3/07/24  -Pt to undergo mammogram and US    CKD - pt with stage IIIa CKD  -Creatinine 1.5 2/20/24  -Stable  -Will monitor    HTN - pt on amlodipine, clonidine, metoprolol  -BP controlled  -Will monitor    Gout - pt  on allopurinol  -Currently asymptomatic  -Will monitor    HLD - pt on rosuvastatin, zetia  -Management per PCP    DMII - pt on toujeo, farxiga, and bydureon  -Management per PCP    Route Chart for Scheduling    Med Onc Chart Routing      Follow up with physician 1 week. Pt needs an appt with general surgery for drainage of a skin abscess.  Pt needs a right diagnostic mammogram ASAP.  Pt needs a CBC, CMP and TSH wit appt with me on 3/19/24.   Follow up with RADHIKA    Infusion scheduling note    Injection scheduling note    Labs    Imaging    Pharmacy appointment    Other referrals              Treatment Plan Information   OP DURVALUMAB 1500 MG Q4W   Lisandro Lawrence MD   Upcoming Treatment Dates - OP DURVALUMAB 1500 MG Q4W    3/20/2024       Chemotherapy       durvalumab (IMFINZI) 1,500 mg in sodium chloride 0.9% SolP 280 mL chemo infusion  4/17/2024       Chemotherapy       durvalumab (IMFINZI) 1,500 mg in sodium chloride 0.9% SolP 280 mL chemo infusion  5/15/2024       Chemotherapy       durvalumab (IMFINZI) 1,500 mg in sodium chloride 0.9% SolP 280 mL chemo infusion  6/12/2024       Chemotherapy       durvalumab (IMFINZI) 1,500 mg in sodium chloride 0.9% SolP 280 mL chemo infusion    Supportive Plan Information  IV FLUIDS AND ELECTROLYTES   Lisandro Lawrence MD   Upcoming Treatment Dates - IV FLUIDS AND ELECTROLYTES    No upcoming days in selected categories.      Lisandro Lawrence MD  [facility]  Hematology and Oncology  [facility] [address] [place] [address]   O: [phone]  F: [phone]

## 2024-03-11 ENCOUNTER — OFFICE VISIT (OUTPATIENT)
Dept: HEMATOLOGY/ONCOLOGY | Facility: CLINIC | Age: 74
End: 2024-03-11
Payer: MEDICARE

## 2024-03-11 VITALS
HEART RATE: 80 BPM | DIASTOLIC BLOOD PRESSURE: 68 MMHG | BODY MASS INDEX: 29.37 KG/M2 | OXYGEN SATURATION: 99 % | TEMPERATURE: 97 F | WEIGHT: 228.81 LBS | RESPIRATION RATE: 20 BRPM | SYSTOLIC BLOOD PRESSURE: 96 MMHG | HEIGHT: 74 IN

## 2024-03-11 DIAGNOSIS — L02.91 ABSCESS: ICD-10-CM

## 2024-03-11 DIAGNOSIS — I10 HYPERTENSION, UNSPECIFIED TYPE: ICD-10-CM

## 2024-03-11 DIAGNOSIS — N18.31 CHRONIC KIDNEY DISEASE, STAGE 3A: ICD-10-CM

## 2024-03-11 DIAGNOSIS — Q83.9 BREAST ANOMALY: Primary | ICD-10-CM

## 2024-03-11 DIAGNOSIS — D89.89 OTHER SPECIFIED DISORDERS INVOLVING THE IMMUNE MECHANISM, NOT ELSEWHERE CLASSIFIED: ICD-10-CM

## 2024-03-11 DIAGNOSIS — Z79.899 IMMUNODEFICIENCY DUE TO DRUG THERAPY: ICD-10-CM

## 2024-03-11 DIAGNOSIS — K86.89 PANCREATIC MASS: Primary | ICD-10-CM

## 2024-03-11 DIAGNOSIS — C77.1 SECONDARY AND UNSPECIFIED MALIGNANT NEOPLASM OF INTRATHORACIC LYMPH NODES: ICD-10-CM

## 2024-03-11 DIAGNOSIS — R92.8 OTHER ABNORMAL AND INCONCLUSIVE FINDINGS ON DIAGNOSTIC IMAGING OF BREAST: ICD-10-CM

## 2024-03-11 DIAGNOSIS — R59.0 LYMPHADENOPATHY, ABDOMINAL: ICD-10-CM

## 2024-03-11 DIAGNOSIS — Q83.9 BREAST ANOMALY: ICD-10-CM

## 2024-03-11 DIAGNOSIS — C34.90 SQUAMOUS CELL CARCINOMA OF LUNG, UNSPECIFIED LATERALITY: ICD-10-CM

## 2024-03-11 DIAGNOSIS — D84.821 IMMUNODEFICIENCY DUE TO DRUG THERAPY: ICD-10-CM

## 2024-03-11 DIAGNOSIS — E78.5 HYPERLIPIDEMIA, UNSPECIFIED HYPERLIPIDEMIA TYPE: ICD-10-CM

## 2024-03-11 PROCEDURE — 99999 PR PBB SHADOW E&M-EST. PATIENT-LVL IV: CPT | Mod: PBBFAC,,, | Performed by: INTERNAL MEDICINE

## 2024-03-11 PROCEDURE — 99215 OFFICE O/P EST HI 40 MIN: CPT | Mod: S$PBB,,, | Performed by: INTERNAL MEDICINE

## 2024-03-11 PROCEDURE — 99214 OFFICE O/P EST MOD 30 MIN: CPT | Mod: PBBFAC,PN | Performed by: INTERNAL MEDICINE

## 2024-03-11 PROCEDURE — G2211 COMPLEX E/M VISIT ADD ON: HCPCS | Mod: S$PBB,,, | Performed by: INTERNAL MEDICINE

## 2024-03-11 RX ORDER — DOXYCYCLINE 100 MG/1
100 CAPSULE ORAL EVERY 12 HOURS
Qty: 10 CAPSULE | Refills: 0 | Status: SHIPPED | OUTPATIENT
Start: 2024-03-11 | End: 2024-03-19 | Stop reason: ALTCHOICE

## 2024-03-14 ENCOUNTER — DOCUMENTATION ONLY (OUTPATIENT)
Dept: HEMATOLOGY/ONCOLOGY | Facility: CLINIC | Age: 74
End: 2024-03-14
Payer: MEDICARE

## 2024-03-14 NOTE — NURSING
Chart reviewed for oncology navigational needs.  Patient is undergoing more workup at the moment. Imaging and follow ups are scheduled. Path from EGD is still pending.  Will continue to follow for any navigational needs.

## 2024-03-18 NOTE — PROGRESS NOTES
PATIENT: Feng Thakkar  MRN: [id number]  DATE: 3/19/2024      Diagnosis:   1. Squamous cell carcinoma of lung, unspecified laterality    2. Secondary and unspecified malignant neoplasm of intrathoracic lymph nodes    3. 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 Subcutaneous emphysema, initial encounter    5. Abscess    6. Immunodeficiency due to drug therapy    7. Breast anomaly    8. Chronic kidney disease, stage 3a    9. Hyperlipidemia, unspecified hyperlipidemia type    10. Hypertension, unspecified type        Chief Complaint: Lung Cancer      Oncologic History:      Oncologic History     Oncologic Treatment     Pathology           Subjective:    Interval History: Mr. Thakkar is a 73 y.o. male with Gout, HLD, HTN who presents for work up of NSCLC.  Since the last clinic visit the patient underwent EUS on 03/13/2024 with gastric polyp and single duodenal polyp seen as well as a few malignant appearing lymph nodes in the celiac region which were biopsied.  Biopsy of a celiac lymph node returned positive for squamous cell carcinoma.  The patient denies CP, cough, SOB, abdominal pain, nausea, vomiting, constipation, diarrhea.  The patient denies fever, chills, night sweats, weight loss, new lumps or bumps, easy bruising or bleeding.    Prior History:  The patient initially developed abdominal pain on 01/05/2023 and underwent CT of the abdomen showing a mass in the left lung base measuring 3.9 x 2.9 cm; 3 x 3.8 cm mass along the pancreatic tail; and shotty retroperitoneal lymph nodes.  The patient underwent repeat CT abdomen and pelvis on 02/24/2023 showing a 4.3 x 4 solid enhancing mass of the left lung base; thickened bladder wall; prostate gland measuring 45.1 x 4.6 cm; abnormal sclerosis in the left femoral head measuring 2.8 x 1.4 x 1.3 cm; and additional soft tissue densities in the posterior left subdiaphragmatic region measuring up to 16 mm.  CT chest on 03/01/2023 showed a 2 cm  low-density lesion in the right lobe of the thyroid gland; 4.5 x 4.3 x 3.8 cm mass in the left lower lobe; several micro nodules; Na soft tissue mass adjacent to the pancreatic tail.  Patient underwent EUS on 03/03/2023 showing a 3.5 cm and 1.8 cm round pancreatic tail lesion.  Biopsy returned results showing no evidence of malignancy and abundant hematopoietic elements and dendritic cells.  Patient then underwent EBUS under the care of Dr. Albright on 03/17/2023 showing squamous cell carcinoma in biopsy of the left lower lung lesion; squamous cell carcinoma and station 7 lymph node; positive 11 L lymph node for squamous cell carcinoma.   The patient underwent PET-CT on 04/18/2023 showing a markedly hypermetabolic lobulated subpleural left lower lobe mass measuring 4.5 x 3.9 cm with hypermetabolic lymph nodes in the left hilar region measuring 2.2 x 1.9 cm; and a 4.2 x 3.6 walk circumscribed mass in the pancreatic tail.  MRI brain on 04/18/2023 showed no acute findings.     The patient underwent CT chest on 6/26/23 showing a large right thyroid mass measuring at least 2.6 cm; left renal hypodensity measuring 11 mm favored to be a cyst; significant decrease in size of left lower lobe consolidative mass now measuring 2.4 x 2.3 cm; stable 3 mm consolidative nodule in the left upper lobe.   The patient underwent CT chest on 11/21/2023 showing patchy ground-glass density within the medial right upper lobe; ground-glass density in the left dependent left upper lobe that the left lower lobe with associated bronchiectasis; previous target peribronchial nodule in the left lower lobe measuring 2.5 cm and 2 minute cm hypodense lesion in the right thyroid nodule.   CT Chest 2/09/24 showing geographic ground-glass disease and interlobular septal thickening in the left upper lobe; several large lymph nodes in the upper abdomen near the celiac takeoff and liver hilum with lymph node ranging from 1.3-1.5 cm.    The patient underwent a  CT abdomen and pelvis on 03/01/2024 showing coronary artery calcification; 40 x 36 mm hypoenhancing pancreatic tail mass; 20 x 30 mm peripancreatic lymph node along the cranial aspect of the proximal pancreatic body; stable 13 mm periaortic lymph node; enlargement of multiple retroperitoneal lymph nodes; mesenteric haziness present within the central abdomen; enlarged inguinal lymph nodes bilaterally; and a 21 mm sclerotic bone lesion within the posterior row inferomedial left femoral head.   The patient underwent a PET-CT on 03/07/2024 showing resolution of hypermetabolic left lower lobe mass now showing signs of radiation therapy response; disappearance of hypermetabolic enlarged left hilar lymph nodes; new irregular hypermetabolic right breast tissue measuring 2.5 x 1.4 cm possibly representing the unilateral gynecomastia; interval development of new hypermetabolic lymph nodes in the celiac region, periaortic region, right retrocrural area, and bilateral inguinal regions; lentiform cutaneous subcutaneous well-circumscribed mass along the anterior midline aspect of the lower pelvis which has increased in size and become hypermetabolic now measuring 2.1 x 1.1 cm possibly representing a sebaceous cyst.      Past Medical History:   Past Medical History:   Diagnosis Date    Diabetes mellitus     Gout, unspecified     High cholesterol     HTN (hypertension)     Lung cancer        Past Surgical HIstory:   Past Surgical History:   Procedure Laterality Date    ENDOSCOPIC ULTRASOUND OF UPPER GASTROINTESTINAL TRACT N/A 3/3/2023    Procedure: ULTRASOUND, UPPER GI TRACT, ENDOSCOPIC;  Surgeon: Cora Mcgrath MD;  Location: [facility];  Service: Endoscopy;  Laterality: N/A;  2/24/23-Instructions mailed to address on file-DS    ENDOSCOPIC ULTRASOUND OF UPPER GASTROINTESTINAL TRACT Left 3/13/2024    Procedure: ULTRASOUND, UPPER GI TRACT, ENDOSCOPIC;  Surgeon: Reji Aguirre MD;  Location: [facility];  Service: Endoscopy;   Laterality: Left;    ESOPHAGOGASTRODUODENOSCOPY N/A 3/13/2024    Procedure: EGD (ESOPHAGOGASTRODUODENOSCOPY);  Surgeon: Reji Aguirre MD;  Location: [facility] ENDO;  Service: Endoscopy;  Laterality: N/A;    INSERTION OF TUNNELED CENTRAL VENOUS CATHETER (CVC) WITH SUBCUTANEOUS PORT N/A 4/24/2023    Procedure: [id number];  Surgeon: Paulino Love MD;  Location: [facility] OR;  Service: General;  Laterality: N/A;    PANCREATECTOMY      ROBOTIC BRONCHOSCOPY N/A 3/17/2023    Procedure: ROBOTIC BRONCHOSCOPY;  Surgeon: Cristiane Albright MD;  [facility];  Service: Pulmonary;  Laterality: N/A;       Family History:   Family History   Problem Relation Age of Onset    Breast cancer Sister         60       Social History:  reports that he quit smoking about 11 years ago. His smoking use included cigarettes. He started smoking about 55 years ago. He has a 81.0 pack-year smoking history. He has quit using smokeless tobacco. He reports current alcohol use of about 3.0 standard drinks of alcohol per week. He reports that he does not use drugs.    Allergies:  Review of patient's allergies indicates:  No Known Allergies    Medications:  Current Outpatient Medications   Medication Sig Dispense Refill    allopurinoL (ZYLOPRIM) 100 MG tablet Take 100 mg by mouth once daily.      amLODIPine (NORVASC) 2.5 MG tablet Take 2.5 mg by mouth once daily.      BYDUREON BCISE 2 mg/0.85 mL AtIn Inject 2 mg into the skin every 7 days. Every Friday      cloNIDine (CATAPRES) 0.2 MG tablet Take 0.2 mg by mouth once. Daily      ezetimibe (ZETIA) 10 mg tablet Take 10 mg by mouth once daily.      FARXIGA 10 mg tablet Take 10 mg by mouth every morning.      finasteride (PROSCAR) 5 mg tablet Take 5 mg by mouth once daily.      FREESTYLE TEST Strp USE 1 STRIP TO CHECK GLUCOSE ONCE DAILY      levothyroxine (SYNTHROID) 25 MCG tablet Take 1 tablet (25 mcg total) by mouth before breakfast. 30 tablet 11    LIDOcaine-prilocaine (EMLA)  "cream Apply topically to port site one hour prior to access, cover 30 g 1    metoprolol succinate (TOPROL-XL) 100 MG 24 hr tablet Take 100 mg by mouth once daily.      rosuvastatin (CRESTOR) 40 MG Tab Take 10 mg by mouth every evening.      TOUJEO SOLOSTAR U-300 INSULIN 300 unit/mL (1.5 mL) InPn pen SMARTSI Unit(s) SUB-Q Every Evening       No current facility-administered medications for this visit.       Review of Systems   Constitutional:  Negative for chills, diaphoresis, fatigue, fever and unexpected weight change.   Respiratory:  Positive for cough. Negative for shortness of breath.    Cardiovascular:  Negative for chest pain and palpitations.   Gastrointestinal:  Negative for abdominal pain, constipation, diarrhea, nausea and vomiting.   Musculoskeletal:  Positive for neck pain.        Pain in left upper back, left lateral neck and left upper arm   Skin:  Negative for color change and rash.   Neurological:  Negative for headaches.   Hematological:  Negative for adenopathy. Does not bruise/bleed easily.       ECOG Performance Status: 1   Objective:      Vitals:   Vitals:    24 1309   BP: 108/70   BP Location: Right arm   Patient Position: Sitting   BP Method: Large (Manual)   Pulse: 88   Resp: (!) 22   Temp: 97.5 °F (36.4 °C)   TempSrc: Temporal   SpO2: 98%   Weight: 104.9 kg (231 lb 4.2 oz)   Height: 6' 2" (1.88 m)               Physical Exam  Constitutional:       General: He is not in acute distress.     Appearance: He is well-developed. He is not diaphoretic.   HENT:      Head: Normocephalic and atraumatic.   Cardiovascular:      Rate and Rhythm: Normal rate and regular rhythm.      Heart sounds: Normal heart sounds. No murmur heard.     No friction rub. No gallop.   Pulmonary:      Effort: Pulmonary effort is normal. No respiratory distress.      Breath sounds: Normal breath sounds. No wheezing or rales.   Chest:      Chest wall: No tenderness.   Abdominal:      General: Bowel sounds are normal. " There is no distension.      Palpations: Abdomen is soft. There is no mass.      Tenderness: There is no abdominal tenderness. There is no rebound.   Musculoskeletal:      Cervical back: Normal range of motion.      Comments: Pt with increased fullness in left supraclavicular region with easy compressibility   Lymphadenopathy:      Cervical: No cervical adenopathy.      Upper Body:      Right upper body: No supraclavicular or axillary adenopathy.      Left upper body: No supraclavicular or axillary adenopathy.   Skin:     General: Skin is warm and dry.   Neurological:      Mental Status: He is alert and oriented to person, place, and time.   Psychiatric:         Behavior: Behavior normal.         Laboratory Data:  Lab Visit on 03/19/2024   Component Date Value Ref Range Status    Specimen UA 03/19/2024 Urine, Clean Catch   Final    Color, UA 03/19/2024 Yellow  Yellow, Straw, Tana Final    Appearance, UA 03/19/2024 Clear  Clear Final    pH, UA 03/19/2024 6.0  5.0 - 8.0 Final    Specific Gravity, UA 03/19/2024 1.015  1.005 - 1.030 Final    Protein, UA 03/19/2024 Negative  Negative Final    Comment: Recommend a 24 hour urine protein or a urine   protein/creatinine ratio if globulin induced proteinuria is  clinically suspected.      Glucose, UA 03/19/2024 3+ (A)  Negative Final    Ketones, UA 03/19/2024 Negative  Negative Final    Bilirubin (UA) 03/19/2024 Negative  Negative Final    Occult Blood UA 03/19/2024 Trace (A)  Negative Final    Nitrite, UA 03/19/2024 Negative  Negative Final    Leukocytes, UA 03/19/2024 Negative  Negative Final    RBC, UA 03/19/2024 0  0 - 4 /hpf Final    WBC, UA 03/19/2024 0  0 - 5 /hpf Final    WBC Clumps, UA 03/19/2024 None  None-Rare Final    Bacteria 03/19/2024 None  None-Occ /hpf Final    Yeast, UA 03/19/2024 None  None Final    Squam Epithel, UA 03/19/2024 2  /hpf Final    Microscopic Comment 03/19/2024 SEE COMMENT   Final    Comment: Other formed elements not mentioned in the report  are not   present in the microscopic examination.      Lab Visit on 03/19/2024   Component Date Value Ref Range Status    WBC 03/19/2024 10.04  3.90 - 12.70 K/uL Final    RBC 03/19/2024 4.82  4.60 - 6.20 M/uL Final    Hemoglobin 03/19/2024 13.7 (L)  14.0 - 18.0 g/dL Final    Hematocrit 03/19/2024 43.2  40.0 - 54.0 % Final    MCV 03/19/2024 90  82 - 98 fL Final    MCH 03/19/2024 28.4  27.0 - 31.0 pg Final    MCHC 03/19/2024 31.7 (L)  32.0 - 36.0 g/dL Final    RDW 03/19/2024 16.1 (H)  11.5 - 14.5 % Final    Platelets 03/19/2024 319  150 - 450 K/uL Final    MPV 03/19/2024 9.5  9.2 - 12.9 fL Final    Immature Granulocytes 03/19/2024 0.7 (H)  0.0 - 0.5 % Final    Gran # (ANC) 03/19/2024 6.0  1.8 - 7.7 K/uL Final    Immature Grans (Abs) 03/19/2024 0.07 (H)  0.00 - 0.04 K/uL Final    Comment: Mild elevation in immature granulocytes is non specific and   can be seen in a variety of conditions including stress response,   acute inflammation, trauma and pregnancy. Correlation with other   laboratory and clinical findings is essential.      Lymph # 03/19/2024 2.5  1.0 - 4.8 K/uL Final    Mono # 03/19/2024 1.1 (H)  0.3 - 1.0 K/uL Final    Eos # 03/19/2024 0.4  0.0 - 0.5 K/uL Final    Baso # 03/19/2024 0.05  0.00 - 0.20 K/uL Final    nRBC 03/19/2024 1 (A)  0 /100 WBC Final    Gran % 03/19/2024 59.5  38.0 - 73.0 % Final    Lymph % 03/19/2024 24.8  18.0 - 48.0 % Final    Mono % 03/19/2024 11.1  4.0 - 15.0 % Final    Eosinophil % 03/19/2024 4.1  0.0 - 8.0 % Final    Basophil % 03/19/2024 0.5  0.0 - 1.9 % Final    Differential Method 03/19/2024 Automated   Final    Sodium 03/19/2024 138  136 - 145 mmol/L Final    Potassium 03/19/2024 4.7  3.5 - 5.1 mmol/L Final    Chloride 03/19/2024 103  95 - 110 mmol/L Final    CO2 03/19/2024 25  23 - 29 mmol/L Final    Glucose 03/19/2024 138 (H)  70 - 110 mg/dL Final    BUN 03/19/2024 18  8 - 23 mg/dL Final    Creatinine 03/19/2024 1.4  0.5 - 1.4 mg/dL Final    Calcium 03/19/2024 9.5  8.7 - 10.5  mg/dL Final    Total Protein 03/19/2024 7.5  6.0 - 8.4 g/dL Final    Albumin 03/19/2024 3.6  3.5 - 5.2 g/dL Final    Total Bilirubin 03/19/2024 0.4  0.1 - 1.0 mg/dL Final    Comment: For infants and newborns, interpretation of results should be based  on gestational age, weight and in agreement with clinical  observations.    Premature Infant recommended reference ranges:  Up to 24 hours.............<8.0 mg/dL  Up to 48 hours............<12.0 mg/dL  3-5 days..................<15.0 mg/dL  6-29 days.................<15.0 mg/dL      Alkaline Phosphatase 03/19/2024 66  55 - 135 U/L Final    AST 03/19/2024 25  10 - 40 U/L Final    ALT 03/19/2024 29  10 - 44 U/L Final    eGFR 03/19/2024 53 (A)  >60 mL/min/1.73 m^2 Final    Anion Gap 03/19/2024 10  8 - 16 mmol/L Final    TSH 03/19/2024 3.247  0.400 - 4.000 uIU/mL Final   Admission on 03/13/2024, Discharged on 03/13/2024   Component Date Value Ref Range Status    POCT Glucose 03/13/2024 106  70 - 110 mg/dL Final         Imaging:     CXR 3/19/24    Patchy airspace disease and volume loss are again noted in the left base. A left subclavian infusion catheter is seen with its tip in the region of the cavoatrial junction. No definite pneumothorax or subcutaneous emphysema is demonstrated. The heart is mildly enlarged. There is apparent relative elevation left hemidiaphragm. Degenerative changes are seen in the spine.        Assessment:       1. Squamous cell carcinoma of lung, unspecified laterality    2. Secondary and unspecified malignant neoplasm of intrathoracic lymph nodes    3. 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 Subcutaneous emphysema, initial encounter    5. Abscess    6. Immunodeficiency due to drug therapy    7. Breast anomaly    8. Chronic kidney disease, stage 3a    9. Hyperlipidemia, unspecified hyperlipidemia type    10. Hypertension, unspecified type         Plan:     NSCLC - patient was recently diagnosed with at least stage III  [id number] SCC of the left lung  -PET/CT 4/18/23 shows continued left lung mass, mediastinal LAD  -MRI brain 4/18/23 showed no acute findings  -TEMPUS Blood testing showed TP 53 mutation  -TEMPUS tissue testing showed PD-L1 of 20%, TP53, KDM6A, CDKN2a and MTAP  -PORT placed 4/24/23 under the care of Dr. Love  -PT completed 6 cycles of Carboplatin and Paclitaxel on 6/02/23  -Radiation completed 6/07/23  -CT chest on 6/26/23 showed a decrease in LLL mass  -Will proceed with Durvalumab for 1 year of treatment  -CT chest 11/21/23 shows decreased lung mass and radiation changes  -CT Chest on 02/09/2024 showed continued radiation changes in the lung but did show intra-abdominal lymphadenopathy (see below)  -Pt s/p cycle 9 of Durvalumab  -Pt underwent EUS on 3/13/24 with biopsy of celiac LN showing metastatic SCC  -Treatment with Ramucirumab and Pembrolizumab discussed based off trial Lung [id number]  -Pt given information on Ramucirumab and Pembrolizumab  -Pt to attend chemo class  Visit today included increased complexity associated with the care of the episodic problem (immunotherapy and ramucirumab) addressed and managing the longitudinal care of the patient due to the serious and/or complex managed problem(s) NSCLC    Pancreatic Mass - seen on CT scans and biopsy during EUS on 03/03/2023 with path showing no evidence of malignancy  -PET/CT 4/18/23 showed uptake in the tail of the pancreas  -Possibly due to chronic pancreatitis  -Pt with stable pancreatic mass on CT abdomen 7/03/23 and 3/01/24  -Patient with enlarging surrounding lymphadenopathy  -PET/CT on 3/07/24 shows increasing LAD in the celiac region, periaortic retroperitoneum, right retrocrural area, and bilateral inguinal regions  -EUS on 3/13/24 not suggestive of a pancreatic mass    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  -PT with sebaceous cyst on lower left abdomen  -Will monitor    Abscess - pt with abscess/sebaceous cyst LLQ  -Pt to see  general surgery today    Breast Mass - pt with right breast mass versus gynecomastai on PET/CT 3/07/24  -Mammogram and Us done today showed benign findings    CKD - pt with stage IIIa CKD  -Creatinine 1.4 3/19/24  -Stable  -Will monitor    HTN - pt on amlodipine, clonidine, metoprolol  -BP controlled  -Will monitor    Gout - pt on allopurinol  -Currently asymptomatic  -Will monitor    HLD - pt on rosuvastatin, zetia  -Management per PCP    DMII - pt on toujeo, farxiga, and bydureon  -Management per PCP    Route Chart for Scheduling    Med Onc Chart Routing      Follow up with physician Other. Pt will not get Durvalumab.  Pt needs approval for Pembrolizumab and Ramucirumab.  Pt needs a STAT CXR today.  PT needs chemo class scheduled.  Pt nneds a urinalysis today.  Pt needs a CBC, CMP, TSH and Urinalysis with appt with me the day before chemo.  Pt needs an appt with palliative care.   Follow up with RADHIKA    Infusion scheduling note    Injection scheduling note    Labs    Imaging    Pharmacy appointment    Other referrals              Treatment Plan Information   OP pembrolizumab 200mg Q3W   Lisandro Lawrence MD   Upcoming Treatment Dates - OP pembrolizumab 200mg Q3W    3/26/2024       Chemotherapy       pembrolizumab (KEYTRUDA) 200 mg in sodium chloride 0.9% SolP 108 mL infusion       ramucirumab (CYRAMZA) 1,049 mg in sodium chloride 0.9% 250 mL chemo infusion  4/16/2024       Chemotherapy       pembrolizumab (KEYTRUDA) 200 mg in sodium chloride 0.9% SolP 108 mL infusion       ramucirumab (CYRAMZA) 1,049 mg in sodium chloride 0.9% 250 mL chemo infusion  5/7/2024       Chemotherapy       pembrolizumab (KEYTRUDA) 200 mg in sodium chloride 0.9% SolP 108 mL infusion       ramucirumab (CYRAMZA) 1,049 mg in sodium chloride 0.9% 250 mL chemo infusion  5/28/2024       Chemotherapy       pembrolizumab (KEYTRUDA) 200 mg in sodium chloride 0.9% SolP 108 mL infusion       ramucirumab (CYRAMZA) 1,049 mg in sodium chloride 0.9% 250  mL chemo infusion    Supportive Plan Information  IV FLUIDS AND ELECTROLYTES   Lisandro Lawrence MD   Upcoming Treatment Dates - IV FLUIDS AND ELECTROLYTES    No upcoming days in selected categories.      Lisandro Lawrence MD  [facility]  Hematology and Oncology  [facility]   [address] [place] [address]   O: [phone]  F: [phone]

## 2024-03-19 ENCOUNTER — HOSPITAL ENCOUNTER (OUTPATIENT)
Dept: RADIOLOGY | Facility: HOSPITAL | Age: 74
Discharge: HOME | End: 2024-03-19
Attending: INTERNAL MEDICINE
Payer: MEDICARE

## 2024-03-19 ENCOUNTER — OFFICE VISIT (OUTPATIENT)
Dept: HEMATOLOGY/ONCOLOGY | Facility: CLINIC | Age: 74
End: 2024-03-19
Payer: MEDICARE

## 2024-03-19 VITALS
OXYGEN SATURATION: 98 % | BODY MASS INDEX: 29.68 KG/M2 | HEART RATE: 88 BPM | HEIGHT: 74 IN | TEMPERATURE: 98 F | WEIGHT: 231.25 LBS | SYSTOLIC BLOOD PRESSURE: 108 MMHG | DIASTOLIC BLOOD PRESSURE: 70 MMHG | RESPIRATION RATE: 22 BRPM

## 2024-03-19 DIAGNOSIS — E78.5 HYPERLIPIDEMIA, UNSPECIFIED HYPERLIPIDEMIA TYPE: ICD-10-CM

## 2024-03-19 DIAGNOSIS — D84.821 IMMUNODEFICIENCY DUE TO DRUG THERAPY: ICD-10-CM

## 2024-03-19 DIAGNOSIS — L02.91 ABSCESS: ICD-10-CM

## 2024-03-19 DIAGNOSIS — T79.7XXA SUBCUTANEOUS EMPHYSEMA, INITIAL ENCOUNTER: ICD-10-CM

## 2024-03-19 DIAGNOSIS — Z79.899 IMMUNODEFICIENCY DUE TO DRUG THERAPY: ICD-10-CM

## 2024-03-19 DIAGNOSIS — C34.90 SQUAMOUS CELL CARCINOMA OF LUNG, UNSPECIFIED LATERALITY: Primary | ICD-10-CM

## 2024-03-19 DIAGNOSIS — I10 HYPERTENSION, UNSPECIFIED TYPE: ICD-10-CM

## 2024-03-19 DIAGNOSIS — C77.2 MALIGNANT NEOPLASM METASTATIC TO INTRA-ABDOMINAL LYMPH NODE: ICD-10-CM

## 2024-03-19 DIAGNOSIS — C77.1 SECONDARY AND UNSPECIFIED MALIGNANT NEOPLASM OF INTRATHORACIC LYMPH NODES: ICD-10-CM

## 2024-03-19 DIAGNOSIS — R92.8 OTHER ABNORMAL AND INCONCLUSIVE FINDINGS ON DIAGNOSTIC IMAGING OF BREAST: ICD-10-CM

## 2024-03-19 DIAGNOSIS — Q83.9 BREAST ANOMALY: ICD-10-CM

## 2024-03-19 DIAGNOSIS — N18.31 CHRONIC KIDNEY DISEASE, STAGE 3A: ICD-10-CM

## 2024-03-19 PROCEDURE — 99999 PR PBB SHADOW E&M-EST. PATIENT-LVL V: CPT | Mod: PBBFAC,,, | Performed by: INTERNAL MEDICINE

## 2024-03-19 PROCEDURE — 99215 OFFICE O/P EST HI 40 MIN: CPT | Mod: PBBFAC,25,PN | Performed by: INTERNAL MEDICINE

## 2024-03-19 PROCEDURE — G2211 COMPLEX E/M VISIT ADD ON: HCPCS | Mod: S$PBB,,, | Performed by: INTERNAL MEDICINE

## 2024-03-19 PROCEDURE — 76642 ULTRASOUND BREAST LIMITED: CPT | Mod: 26,RT,, | Performed by: RADIOLOGY

## 2024-03-19 PROCEDURE — 71046 X-RAY EXAM CHEST 2 VIEWS: CPT | Mod: TC,FY,PO

## 2024-03-19 PROCEDURE — 71046 X-RAY EXAM CHEST 2 VIEWS: CPT | Mod: 26,,, | Performed by: RADIOLOGY

## 2024-03-19 PROCEDURE — 77062 BREAST TOMOSYNTHESIS BI: CPT | Mod: 26,,, | Performed by: RADIOLOGY

## 2024-03-19 PROCEDURE — 76642 ULTRASOUND BREAST LIMITED: CPT | Mod: TC,PO,RT

## 2024-03-19 PROCEDURE — 77066 DX MAMMO INCL CAD BI: CPT | Mod: 26,,, | Performed by: RADIOLOGY

## 2024-03-19 PROCEDURE — 99215 OFFICE O/P EST HI 40 MIN: CPT | Mod: S$PBB,,, | Performed by: INTERNAL MEDICINE

## 2024-03-19 PROCEDURE — 77066 DX MAMMO INCL CAD BI: CPT | Mod: TC,PO

## 2024-03-19 NOTE — PLAN OF CARE
DISCONTINUE ON PATHWAY REGIMEN - Non-Small Cell Lung    [id number]        Durvalumab (Imfinzi)           Additional Orders: For patients weighing less than 30 kg, only   weight-based dosing is recommended for durvalumab. For patients weighing greater   than or equal to 30 kg, weight-based or flat dosing can be used based on   indication. See PI for details. Serious immune-mediated adverse events can occur   with durvalumab. Please monitor your patient and refer to the linked   immune-mediated adverse reaction management materials for more information.    **Always confirm dose/schedule in your pharmacy ordering system**    REASON: Disease Progression  PRIOR TREATMENT: [id number]  TREATMENT RESPONSE: Progressive Disease (PD)    START OFF PATHWAY REGIMEN - Non-Small Cell Lung            Pembrolizumab (Keytruda)           Additional Orders: Serious immune-mediated adverse events can occur with   pembrolizumab. Please monitor your patient and refer to the linked   immune-mediated adverse reaction management materials for more information.    **Always confirm dose/schedule in your pharmacy ordering system**    Patient Characteristics:  Stage IV Metastatic, Squamous, Molecular Analysis Completed, Alteration Present   and Targeted Therapy Exhausted or EGFR Exon 20 Insertion or KRAS G12C or HER2   Present, and No Prior Chemo/Immunotherapy or No Alteration Present, PS = 0, 1,   Second Line - Chemotherapy/Immunotherapy, Prior PD-1/PD-L1 Inhibitor and   Immunotherapy Candidate  Therapeutic Status: Stage IV Metastatic  Histology: Squamous Cell  Molecular Analysis Results: No Alteration Present  ECOG Performance Status: 1  Chemotherapy/Immunotherapy Line of Therapy: Second Line   Chemotherapy/Immunotherapy  Immunotherapy Candidate Status: Candidate for Immunotherapy  Prior Immunotherapy Status: Prior PD-1/PD-L1 Inhibitor + Chemotherapy  Intent of Therapy:  Non-Curative / Palliative Intent, Discussed with Patient

## 2024-03-22 ENCOUNTER — TELEPHONE (OUTPATIENT)
Dept: HEMATOLOGY/ONCOLOGY | Facility: CLINIC | Age: 74
End: 2024-03-22
Payer: MEDICARE

## 2024-03-22 NOTE — NURSING
Reached out to patient and coordinated upcoming appointments for his new treatment. Chemo school, labs, MD, and 1st infusion scheduled.  All dates, times, and location confirmed.

## 2024-03-26 ENCOUNTER — OFFICE VISIT (OUTPATIENT)
Dept: HEMATOLOGY/ONCOLOGY | Facility: CLINIC | Age: 74
End: 2024-03-26
Payer: MEDICARE

## 2024-03-26 ENCOUNTER — LAB VISIT (OUTPATIENT)
Dept: LAB | Facility: HOSPITAL | Age: 74
End: 2024-03-26
Attending: INTERNAL MEDICINE
Payer: MEDICARE

## 2024-03-26 VITALS
DIASTOLIC BLOOD PRESSURE: 70 MMHG | SYSTOLIC BLOOD PRESSURE: 100 MMHG | HEIGHT: 74 IN | OXYGEN SATURATION: 100 % | TEMPERATURE: 98 F | RESPIRATION RATE: 20 BRPM | BODY MASS INDEX: 29.54 KG/M2 | WEIGHT: 230.19 LBS | HEART RATE: 83 BPM

## 2024-03-26 DIAGNOSIS — E78.5 HYPERLIPIDEMIA, UNSPECIFIED HYPERLIPIDEMIA TYPE: ICD-10-CM

## 2024-03-26 DIAGNOSIS — D89.89 OTHER SPECIFIED DISORDERS INVOLVING THE IMMUNE MECHANISM, NOT ELSEWHERE CLASSIFIED: ICD-10-CM

## 2024-03-26 DIAGNOSIS — C34.90 SQUAMOUS CELL CARCINOMA OF LUNG, UNSPECIFIED LATERALITY: Primary | ICD-10-CM

## 2024-03-26 DIAGNOSIS — C77.2 MALIGNANT NEOPLASM METASTATIC TO INTRA-ABDOMINAL LYMPH NODE: ICD-10-CM

## 2024-03-26 DIAGNOSIS — N18.31 CHRONIC KIDNEY DISEASE, STAGE 3A: ICD-10-CM

## 2024-03-26 DIAGNOSIS — D84.821 IMMUNODEFICIENCY DUE TO DRUG THERAPY: ICD-10-CM

## 2024-03-26 DIAGNOSIS — I10 HYPERTENSION, UNSPECIFIED TYPE: ICD-10-CM

## 2024-03-26 DIAGNOSIS — C34.90 SQUAMOUS CELL CARCINOMA OF LUNG, UNSPECIFIED LATERALITY: ICD-10-CM

## 2024-03-26 DIAGNOSIS — G89.3 CANCER RELATED PAIN: ICD-10-CM

## 2024-03-26 DIAGNOSIS — Z79.899 IMMUNODEFICIENCY DUE TO DRUG THERAPY: ICD-10-CM

## 2024-03-26 DIAGNOSIS — C77.1 SECONDARY AND UNSPECIFIED MALIGNANT NEOPLASM OF INTRATHORACIC LYMPH NODES: ICD-10-CM

## 2024-03-26 LAB
ALBUMIN SERPL BCP-MCNC: 3.4 G/DL (ref 3.5–5.2)
ALP SERPL-CCNC: 74 U/L (ref 55–135)
ALT SERPL W/O P-5'-P-CCNC: 21 U/L (ref 10–44)
ANION GAP SERPL CALC-SCNC: 10 MMOL/L (ref 8–16)
AST SERPL-CCNC: 20 U/L (ref 10–40)
BASOPHILS # BLD AUTO: 0.04 K/UL (ref 0–0.2)
BASOPHILS NFR BLD: 0.3 % (ref 0–1.9)
BILIRUB SERPL-MCNC: 0.3 MG/DL (ref 0.1–1)
BUN SERPL-MCNC: 16 MG/DL (ref 8–23)
CALCIUM SERPL-MCNC: 9.6 MG/DL (ref 8.7–10.5)
CHLORIDE SERPL-SCNC: 103 MMOL/L (ref 95–110)
CO2 SERPL-SCNC: 24 MMOL/L (ref 23–29)
CREAT SERPL-MCNC: 1.5 MG/DL (ref 0.5–1.4)
DIFFERENTIAL METHOD BLD: ABNORMAL
EOSINOPHIL # BLD AUTO: 0.4 K/UL (ref 0–0.5)
EOSINOPHIL NFR BLD: 3.3 % (ref 0–8)
ERYTHROCYTE [DISTWIDTH] IN BLOOD BY AUTOMATED COUNT: 15.9 % (ref 11.5–14.5)
EST. GFR  (NO RACE VARIABLE): 48.9 ML/MIN/1.73 M^2
GLUCOSE SERPL-MCNC: 159 MG/DL (ref 70–110)
HCT VFR BLD AUTO: 39.5 % (ref 40–54)
HGB BLD-MCNC: 12.8 G/DL (ref 14–18)
IMM GRANULOCYTES # BLD AUTO: 0.05 K/UL (ref 0–0.04)
IMM GRANULOCYTES NFR BLD AUTO: 0.4 % (ref 0–0.5)
LYMPHOCYTES # BLD AUTO: 3.6 K/UL (ref 1–4.8)
LYMPHOCYTES NFR BLD: 30 % (ref 18–48)
MCH RBC QN AUTO: 28.6 PG (ref 27–31)
MCHC RBC AUTO-ENTMCNC: 32.4 G/DL (ref 32–36)
MCV RBC AUTO: 88 FL (ref 82–98)
MONOCYTES # BLD AUTO: 1.3 K/UL (ref 0.3–1)
MONOCYTES NFR BLD: 11.1 % (ref 4–15)
NEUTROPHILS # BLD AUTO: 6.7 K/UL (ref 1.8–7.7)
NEUTROPHILS NFR BLD: 54.9 % (ref 38–73)
NRBC BLD-RTO: 0 /100 WBC
PLATELET # BLD AUTO: 310 K/UL (ref 150–450)
PMV BLD AUTO: 8.7 FL (ref 9.2–12.9)
POTASSIUM SERPL-SCNC: 4.2 MMOL/L (ref 3.5–5.1)
PROT SERPL-MCNC: 7.9 G/DL (ref 6–8.4)
RBC # BLD AUTO: 4.47 M/UL (ref 4.6–6.2)
SODIUM SERPL-SCNC: 137 MMOL/L (ref 136–145)
TSH SERPL DL<=0.005 MIU/L-ACNC: 3.21 UIU/ML (ref 0.4–4)
WBC # BLD AUTO: 12.11 K/UL (ref 3.9–12.7)

## 2024-03-26 PROCEDURE — 99999 PR PBB SHADOW E&M-EST. PATIENT-LVL IV: CPT | Mod: PBBFAC,,, | Performed by: INTERNAL MEDICINE

## 2024-03-26 PROCEDURE — 85025 COMPLETE CBC W/AUTO DIFF WBC: CPT | Mod: PN | Performed by: INTERNAL MEDICINE

## 2024-03-26 PROCEDURE — 99215 OFFICE O/P EST HI 40 MIN: CPT | Mod: S$PBB,,, | Performed by: INTERNAL MEDICINE

## 2024-03-26 PROCEDURE — 36415 COLL VENOUS BLD VENIPUNCTURE: CPT | Mod: PN | Performed by: INTERNAL MEDICINE

## 2024-03-26 PROCEDURE — 99214 OFFICE O/P EST MOD 30 MIN: CPT | Mod: PBBFAC,PN | Performed by: INTERNAL MEDICINE

## 2024-03-26 PROCEDURE — G2211 COMPLEX E/M VISIT ADD ON: HCPCS | Mod: S$PBB,,, | Performed by: INTERNAL MEDICINE

## 2024-03-26 PROCEDURE — 84443 ASSAY THYROID STIM HORMONE: CPT | Performed by: INTERNAL MEDICINE

## 2024-03-26 PROCEDURE — 80053 COMPREHEN METABOLIC PANEL: CPT | Mod: PN | Performed by: INTERNAL MEDICINE

## 2024-03-26 RX ORDER — OXYCODONE HYDROCHLORIDE 5 MG/1
5 TABLET ORAL EVERY 4 HOURS PRN
Qty: 60 TABLET | Refills: 0 | Status: SHIPPED | OUTPATIENT
Start: 2024-03-26 | End: 2024-05-29 | Stop reason: SDUPTHER

## 2024-03-26 RX ORDER — HEPARIN 100 UNIT/ML
500 SYRINGE INTRAVENOUS
Status: CANCELLED | OUTPATIENT
Start: 2024-03-26

## 2024-03-26 RX ORDER — SODIUM CHLORIDE 0.9 % (FLUSH) 0.9 %
10 SYRINGE (ML) INJECTION
Status: CANCELLED | OUTPATIENT
Start: 2024-03-26

## 2024-03-26 RX ORDER — DIPHENHYDRAMINE HYDROCHLORIDE 50 MG/ML
50 INJECTION INTRAMUSCULAR; INTRAVENOUS ONCE AS NEEDED
Status: CANCELLED | OUTPATIENT
Start: 2024-03-26

## 2024-03-26 RX ORDER — PROCHLORPERAZINE EDISYLATE 5 MG/ML
5 INJECTION INTRAMUSCULAR; INTRAVENOUS ONCE AS NEEDED
Status: CANCELLED | OUTPATIENT
Start: 2024-03-26

## 2024-03-26 RX ORDER — EPINEPHRINE 0.3 MG/.3ML
0.3 INJECTION SUBCUTANEOUS ONCE AS NEEDED
Status: CANCELLED | OUTPATIENT
Start: 2024-03-26

## 2024-03-26 NOTE — PROGRESS NOTES
PATIENT: Feng Thakkar  MRN: [id number]  DATE: 3/26/2024      Diagnosis:   1. Squamous cell carcinoma of lung, unspecified laterality    2. Secondary and unspecified malignant neoplasm of intrathoracic lymph nodes    3. 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 Other specified disorders involving the immune mechanism, not elsewhere classified    5. Cancer related pain    6. Immunodeficiency due to drug therapy    7. Chronic kidney disease, stage 3a    8. Hyperlipidemia, unspecified hyperlipidemia type    9. Hypertension, unspecified type          Chief Complaint: Squamous cell carcinoma of lung, unspecified laterality (1 week follow up)      Oncologic History:      Oncologic History     Oncologic Treatment     Pathology           Subjective:    Interval History: Mr. Thakkar is a 73 y.o. male with Gout, HLD, HTN who presents for work up of NSCLC.  Since the last clinic visit the patient endorses pain in his neck which is worse with coughing.  The patient denies CP, cough, SOB, abdominal pain, nausea, vomiting, constipation, diarrhea.  The patient denies fever, chills, night sweats, weight loss, new lumps or bumps, easy bruising or bleeding.    Prior History:  The patient initially developed abdominal pain on 01/05/2023 and underwent CT of the abdomen showing a mass in the left lung base measuring 3.9 x 2.9 cm; 3 x 3.8 cm mass along the pancreatic tail; and shotty retroperitoneal lymph nodes.  The patient underwent repeat CT abdomen and pelvis on 02/24/2023 showing a 4.3 x 4 solid enhancing mass of the left lung base; thickened bladder wall; prostate gland measuring 45.1 x 4.6 cm; abnormal sclerosis in the left femoral head measuring 2.8 x 1.4 x 1.3 cm; and additional soft tissue densities in the posterior left subdiaphragmatic region measuring up to 16 mm.  CT chest on 03/01/2023 showed a 2 cm low-density lesion in the right lobe of the thyroid gland; 4.5 x 4.3 x 3.8 cm mass in the left lower  lobe; several micro nodules; Na soft tissue mass adjacent to the pancreatic tail.  Patient underwent EUS on 03/03/2023 showing a 3.5 cm and 1.8 cm round pancreatic tail lesion.  Biopsy returned results showing no evidence of malignancy and abundant hematopoietic elements and dendritic cells.  Patient then underwent EBUS under the care of Dr. Albright on 03/17/2023 showing squamous cell carcinoma in biopsy of the left lower lung lesion; squamous cell carcinoma and station 7 lymph node; positive 11 L lymph node for squamous cell carcinoma.   The patient underwent PET-CT on 04/18/2023 showing a markedly hypermetabolic lobulated subpleural left lower lobe mass measuring 4.5 x 3.9 cm with hypermetabolic lymph nodes in the left hilar region measuring 2.2 x 1.9 cm; and a 4.2 x 3.6 walk circumscribed mass in the pancreatic tail.  MRI brain on 04/18/2023 showed no acute findings.     The patient underwent CT chest on 6/26/23 showing a large right thyroid mass measuring at least 2.6 cm; left renal hypodensity measuring 11 mm favored to be a cyst; significant decrease in size of left lower lobe consolidative mass now measuring 2.4 x 2.3 cm; stable 3 mm consolidative nodule in the left upper lobe.   The patient underwent CT chest on 11/21/2023 showing patchy ground-glass density within the medial right upper lobe; ground-glass density in the left dependent left upper lobe that the left lower lobe with associated bronchiectasis; previous target peribronchial nodule in the left lower lobe measuring 2.5 cm and 2 minute cm hypodense lesion in the right thyroid nodule.   CT Chest 2/09/24 showing geographic ground-glass disease and interlobular septal thickening in the left upper lobe; several large lymph nodes in the upper abdomen near the celiac takeoff and liver hilum with lymph node ranging from 1.3-1.5 cm.    The patient underwent a CT abdomen and pelvis on 03/01/2024 showing coronary artery calcification; 40 x 36 mm hypoenhancing  pancreatic tail mass; 20 x 30 mm peripancreatic lymph node along the cranial aspect of the proximal pancreatic body; stable 13 mm periaortic lymph node; enlargement of multiple retroperitoneal lymph nodes; mesenteric haziness present within the central abdomen; enlarged inguinal lymph nodes bilaterally; and a 21 mm sclerotic bone lesion within the posterior row inferomedial left femoral head.   The patient underwent a PET-CT on 03/07/2024 showing resolution of hypermetabolic left lower lobe mass now showing signs of radiation therapy response; disappearance of hypermetabolic enlarged left hilar lymph nodes; new irregular hypermetabolic right breast tissue measuring 2.5 x 1.4 cm possibly representing the unilateral gynecomastia; interval development of new hypermetabolic lymph nodes in the celiac region, periaortic region, right retrocrural area, and bilateral inguinal regions; lentiform cutaneous subcutaneous well-circumscribed mass along the anterior midline aspect of the lower pelvis which has increased in size and become hypermetabolic now measuring 2.1 x 1.1 cm possibly representing a sebaceous cyst.     The patient underwent EUS on 03/13/2024 with gastric polyp and single duodenal polyp seen as well as a few malignant appearing lymph nodes in the celiac region which were biopsied.     Past Medical History:   Past Medical History:   Diagnosis Date    Diabetes mellitus     Gout, unspecified     High cholesterol     HTN (hypertension)     Lung cancer        Past Surgical HIstory:   Past Surgical History:   Procedure Laterality Date    ENDOSCOPIC ULTRASOUND OF UPPER GASTROINTESTINAL TRACT N/A 3/3/2023    Procedure: ULTRASOUND, UPPER GI TRACT, ENDOSCOPIC;  Surgeon: Cora Mcgrath MD;  Location: [facility];  Service: Endoscopy;  Laterality: N/A;  2/24/23-Instructions mailed to address on file-DS    ENDOSCOPIC ULTRASOUND OF UPPER GASTROINTESTINAL TRACT Left 3/13/2024    Procedure: ULTRASOUND, UPPER GI TRACT,  ENDOSCOPIC;  Surgeon: Reji Aguirre MD;  Location: [facility] ENDO;  Service: Endoscopy;  Laterality: Left;    ESOPHAGOGASTRODUODENOSCOPY N/A 3/13/2024    Procedure: EGD (ESOPHAGOGASTRODUODENOSCOPY);  Surgeon: Reji Aguirre MD;  Location: [facility] ENDO;  Service: Endoscopy;  Laterality: N/A;    INSERTION OF TUNNELED CENTRAL VENOUS CATHETER (CVC) WITH SUBCUTANEOUS PORT N/A 4/24/2023    Procedure: [id number];  Surgeon: Paulino Love MD;  Location: [facility] OR;  Service: General;  Laterality: N/A;    PANCREATECTOMY      ROBOTIC BRONCHOSCOPY N/A 3/17/2023    Procedure: ROBOTIC BRONCHOSCOPY;  Surgeon: Cristiane Albright MD;  Location: [address];  Service: Pulmonary;  Laterality: N/A;       Family History:   Family History   Problem Relation Age of Onset    Breast cancer Sister         60       Social History:  reports that he quit smoking about 11 years ago. His smoking use included cigarettes. He started smoking about 55 years ago. He has a 81.0 pack-year smoking history. He has quit using smokeless tobacco. He reports current alcohol use of about 3.0 standard drinks of alcohol per week. He reports that he does not use drugs.    Allergies:  Review of patient's allergies indicates:  No Known Allergies    Medications:  Current Outpatient Medications   Medication Sig Dispense Refill    allopurinoL (ZYLOPRIM) 100 MG tablet Take 100 mg by mouth once daily.      amLODIPine (NORVASC) 2.5 MG tablet Take 2.5 mg by mouth once daily.      BYDUREON BCISE 2 mg/0.85 mL AtIn Inject 2 mg into the skin every 7 days. Every Friday      cloNIDine (CATAPRES) 0.2 MG tablet Take 0.2 mg by mouth once. Daily      ezetimibe (ZETIA) 10 mg tablet Take 10 mg by mouth once daily.      FARXIGA 10 mg tablet Take 10 mg by mouth every morning.      finasteride (PROSCAR) 5 mg tablet Take 5 mg by mouth once daily.      FREESTYLE TEST Strp USE 1 STRIP TO CHECK GLUCOSE ONCE DAILY      levothyroxine (SYNTHROID) 25 MCG tablet Take 1  "tablet (25 mcg total) by mouth before breakfast. 30 tablet 11    LIDOcaine-prilocaine (EMLA) cream Apply topically to port site one hour prior to access, cover 30 g 1    metoprolol succinate (TOPROL-XL) 100 MG 24 hr tablet Take 100 mg by mouth once daily.      rosuvastatin (CRESTOR) 40 MG Tab Take 10 mg by mouth every evening.      TOUJEO SOLOSTAR U-300 INSULIN 300 unit/mL (1.5 mL) InPn pen SMARTSI Unit(s) SUB-Q Every Evening      oxyCODONE (ROXICODONE) 5 MG immediate release tablet Take 1 tablet (5 mg total) by mouth every 4 (four) hours as needed for Pain. 60 tablet 0     No current facility-administered medications for this visit.       Review of Systems   Constitutional:  Negative for chills, diaphoresis, fatigue, fever and unexpected weight change.   Respiratory:  Positive for cough. Negative for shortness of breath.    Cardiovascular:  Negative for chest pain and palpitations.   Gastrointestinal:  Negative for abdominal pain, constipation, diarrhea, nausea and vomiting.   Musculoskeletal:  Positive for neck pain.   Skin:  Negative for color change and rash.   Neurological:  Negative for headaches.   Hematological:  Negative for adenopathy. Does not bruise/bleed easily.       ECOG Performance Status: 1   Objective:      Vitals:   Vitals:    24 1412   BP: 100/70   BP Location: Left arm   Patient Position: Sitting   BP Method: Large (Manual)   Pulse: 83   Resp: 20   Temp: 97.8 °F (36.6 °C)   TempSrc: Temporal   SpO2: 100%   Weight: 104.4 kg (230 lb 2.6 oz)   Height: 6' 2" (1.88 m)                 Physical Exam  Constitutional:       General: He is not in acute distress.     Appearance: He is well-developed. He is not diaphoretic.   HENT:      Head: Normocephalic and atraumatic.   Cardiovascular:      Rate and Rhythm: Normal rate and regular rhythm.      Heart sounds: Normal heart sounds. No murmur heard.     No friction rub. No gallop.   Pulmonary:      Effort: Pulmonary effort is normal. No respiratory " distress.      Breath sounds: Normal breath sounds. No wheezing or rales.   Chest:      Chest wall: No tenderness.   Abdominal:      General: Bowel sounds are normal. There is no distension.      Palpations: Abdomen is soft. There is no mass.      Tenderness: There is no abdominal tenderness. There is no rebound.   Musculoskeletal:      Cervical back: Normal range of motion.   Lymphadenopathy:      Cervical: No cervical adenopathy.      Upper Body:      Right upper body: No supraclavicular or axillary adenopathy.      Left upper body: No supraclavicular or axillary adenopathy.   Skin:     General: Skin is warm and dry.   Neurological:      Mental Status: He is alert and oriented to person, place, and time.   Psychiatric:         Behavior: Behavior normal.         Laboratory Data:  Lab Visit on 03/26/2024   Component Date Value Ref Range Status    TSH 03/26/2024 3.205  0.400 - 4.000 uIU/mL Final    WBC 03/26/2024 12.11  3.90 - 12.70 K/uL Final    RBC 03/26/2024 4.47 (L)  4.60 - 6.20 M/uL Final    Hemoglobin 03/26/2024 12.8 (L)  14.0 - 18.0 g/dL Final    Hematocrit 03/26/2024 39.5 (L)  40.0 - 54.0 % Final    MCV 03/26/2024 88  82 - 98 fL Final    MCH 03/26/2024 28.6  27.0 - 31.0 pg Final    MCHC 03/26/2024 32.4  32.0 - 36.0 g/dL Final    RDW 03/26/2024 15.9 (H)  11.5 - 14.5 % Final    Platelets 03/26/2024 310  150 - 450 K/uL Final    MPV 03/26/2024 8.7 (L)  9.2 - 12.9 fL Final    Immature Granulocytes 03/26/2024 0.4  0.0 - 0.5 % Final    Gran # (ANC) 03/26/2024 6.7  1.8 - 7.7 K/uL Final    Immature Grans (Abs) 03/26/2024 0.05 (H)  0.00 - 0.04 K/uL Final    Comment: Mild elevation in immature granulocytes is non specific and   can be seen in a variety of conditions including stress response,   acute inflammation, trauma and pregnancy. Correlation with other   laboratory and clinical findings is essential.      Lymph # 03/26/2024 3.6  1.0 - 4.8 K/uL Final    Mono # 03/26/2024 1.3 (H)  0.3 - 1.0 K/uL Final    Eos #  03/26/2024 0.4  0.0 - 0.5 K/uL Final    Baso # 03/26/2024 0.04  0.00 - 0.20 K/uL Final    nRBC 03/26/2024 0  0 /100 WBC Final    Gran % 03/26/2024 54.9  38.0 - 73.0 % Final    Lymph % 03/26/2024 30.0  18.0 - 48.0 % Final    Mono % 03/26/2024 11.1  4.0 - 15.0 % Final    Eosinophil % 03/26/2024 3.3  0.0 - 8.0 % Final    Basophil % 03/26/2024 0.3  0.0 - 1.9 % Final    Differential Method 03/26/2024 Automated   Final    Sodium 03/26/2024 137  136 - 145 mmol/L Final    Potassium 03/26/2024 4.2  3.5 - 5.1 mmol/L Final    Chloride 03/26/2024 103  95 - 110 mmol/L Final    CO2 03/26/2024 24  23 - 29 mmol/L Final    Glucose 03/26/2024 159 (H)  70 - 110 mg/dL Final    BUN 03/26/2024 16  8 - 23 mg/dL Final    Creatinine 03/26/2024 1.5 (H)  0.5 - 1.4 mg/dL Final    Calcium 03/26/2024 9.6  8.7 - 10.5 mg/dL Final    Total Protein 03/26/2024 7.9  6.0 - 8.4 g/dL Final    Albumin 03/26/2024 3.4 (L)  3.5 - 5.2 g/dL Final    Total Bilirubin 03/26/2024 0.3  0.1 - 1.0 mg/dL Final    Comment: For infants and newborns, interpretation of results should be based  on gestational age, weight and in agreement with clinical  observations.    Premature Infant recommended reference ranges:  Up to 24 hours.............<8.0 mg/dL  Up to 48 hours............<12.0 mg/dL  3-5 days..................<15.0 mg/dL  6-29 days.................<15.0 mg/dL      Alkaline Phosphatase 03/26/2024 74  55 - 135 U/L Final    AST 03/26/2024 20  10 - 40 U/L Final    ALT 03/26/2024 21  10 - 44 U/L Final    eGFR 03/26/2024 48.9 (A)  >60 mL/min/1.73 m^2 Final    Anion Gap 03/26/2024 10  8 - 16 mmol/L Final         Imaging:     CXR 3/19/24    Patchy airspace disease and volume loss are again noted in the left base. A left subclavian infusion catheter is seen with its tip in the region of the cavoatrial junction. No definite pneumothorax or subcutaneous emphysema is demonstrated. The heart is mildly enlarged. There is apparent relative elevation left hemidiaphragm.  Degenerative changes are seen in the spine.        Assessment:       1. Squamous cell carcinoma of lung, unspecified laterality    2. Secondary and unspecified malignant neoplasm of intrathoracic lymph nodes    3. 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 Other specified disorders involving the immune mechanism, not elsewhere classified    5. Cancer related pain    6. Immunodeficiency due to drug therapy    7. Chronic kidney disease, stage 3a    8. Hyperlipidemia, unspecified hyperlipidemia type    9. Hypertension, unspecified type           Plan:     NSCLC - patient was recently diagnosed with at least stage III [id number] SCC of the left lung  -PET/CT 4/18/23 shows continued left lung mass, mediastinal LAD  -MRI brain 4/18/23 showed no acute findings  -TEMPUS Blood testing showed TP 53 mutation  -[facility]PUS tissue testing showed PD-L1 of 20%, TP53, KDM6A, CDKN2a and MTAP  -PORT placed 4/24/23 under the care of Dr. Love  -PT completed 6 cycles of Carboplatin and Paclitaxel on 6/02/23  -Radiation completed 6/07/23  -CT chest on 6/26/23 showed a decrease in LLL mass  -Will proceed with Durvalumab for 1 year of treatment  -CT chest 11/21/23 shows decreased lung mass and radiation changes  -CT Chest on 02/09/2024 showed continued radiation changes in the lung but did show intra-abdominal lymphadenopathy (see below)  -Pt s/p cycle 9 of Durvalumab  -Pt underwent EUS on 3/13/24 with biopsy of celiac LN showing metastatic SCC  -Treatment with Ramucirumab and Pembrolizumab discussed based off trial Lung [id number]  -Pt given information on Ramucirumab and Pembrolizumab  -PT to undergo chemo class tomorrow   -Patient to start treatment tomorrow.  Visit today included increased complexity associated with the care of the episodic problem (immunotherapy and ramucirumab) addressed and managing the longitudinal care of the patient due to the serious and/or complex managed problem(s) NSCLC    Pancreatic Mass  - seen on CT scans and biopsy during EUS on 03/03/2023 with path showing no evidence of malignancy  -PET/CT 4/18/23 showed uptake in the tail of the pancreas  -Possibly due to chronic pancreatitis  -Pt with stable pancreatic mass on CT abdomen 7/03/23 and 3/01/24  -Patient with enlarging surrounding lymphadenopathy  -PET/CT on 3/07/24 shows increasing LAD in the celiac region, periaortic retroperitoneum, right retrocrural area, and bilateral inguinal regions  -EUS on 3/13/24 not suggestive of a pancreatic mass    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  -No sign of infection  -Will monitor    Cancer Related Pain - will prescribe oxycodone  -Will monitor    CKD - pt with stage IIIa CKD  -Creatinine 1.5 3/26/24  -Stable  -Will monitor    HTN - pt on amlodipine, clonidine, metoprolol  -BP controlled  -Will monitor    Gout - pt on allopurinol  -Currently asymptomatic  -Will monitor    HLD - pt on rosuvastatin, zetia  -Management per PCP    DMII - pt on toujeo, farxiga, and bydureon  -Management per PCP    Route Chart for Scheduling    Med Onc Chart Routing      Follow up with physician 3 weeks. Pt can proceed with treatment.  Pt needs a CBC, CMP, US, and TSH in 3 weeks with an appt with me the day before treatment.   Follow up with RAD[facility]    Infusion scheduling note    Injection scheduling note    Labs    Imaging    Pharmacy appointment    Other referrals              Treatment Plan Information   OP NSCLC ramucirumab 10 mg/kg plus pembrolizumab 200mg Q3W   Lisandor Lawrence MD   Upcoming Treatment Dates - OP NSCLC ramucirumab 10 mg/kg plus pembrolizumab 200mg Q3W    3/26/2024       Pre-Medications       diphenhydrAMINE (BENADRYL) 25 mg in NS 50 mL IVPB       Chemotherapy       ramucirumab (CYRAMZA) in sodium chloride 0.9% 250 mL chemo infusion       pembrolizumab (KEYTRUDA) 200 mg in sodium chloride 0.9% SolP 108 mL infusion  4/16/2024       Pre-Medications       diphenhydrAMINE (BENADRYL) 25 mg in NS 50 mL  IVPB       Chemotherapy       pembrolizumab (KEYTRUDA) 200 mg in sodium chloride 0.9% SolP 108 mL infusion       ramucirumab (CYRAMZA) in sodium chloride 0.9% 250 mL chemo infusion  5/7/2024       Pre-Medications       diphenhydrAMINE (BENADRYL) 25 mg in NS 50 mL IVPB       Chemotherapy       pembrolizumab (KEYTRUDA) 200 mg in sodium chloride 0.9% SolP 108 mL infusion       ramucirumab (CYRAMZA) in sodium chloride 0.9% 250 mL chemo infusion  5/28/2024       Pre-Medications       diphenhydrAMINE (BENADRYL) 25 mg in NS 50 mL IVPB       Chemotherapy       pembrolizumab (KEYTRUDA) 200 mg in sodium chloride 0.9% SolP 108 mL infusion       ramucirumab (CYRAMZA) in sodium chloride 0.9% 250 mL chemo infusion    Supportive Plan Information  IV FLUIDS AND ELECTROLYTES   Lisandro Lawrence MD   Upcoming Treatment Dates - IV FLUIDS AND ELECTROLYTES    No upcoming days in selected categories.      Lisandro Lawrence MD  [facility]  Hematology and Oncology  [facility]   [address] [place] [address]   O: [phone]  F: [phone]

## 2024-03-27 ENCOUNTER — INFUSION (OUTPATIENT)
Dept: INFUSION THERAPY | Facility: HOSPITAL | Age: 74
End: 2024-03-27
Attending: INTERNAL MEDICINE
Payer: MEDICARE

## 2024-03-27 ENCOUNTER — CLINICAL SUPPORT (OUTPATIENT)
Dept: HEMATOLOGY/ONCOLOGY | Facility: CLINIC | Age: 74
End: 2024-03-27
Payer: MEDICARE

## 2024-03-27 VITALS
OXYGEN SATURATION: 96 % | SYSTOLIC BLOOD PRESSURE: 128 MMHG | HEART RATE: 70 BPM | RESPIRATION RATE: 18 BRPM | HEIGHT: 74 IN | DIASTOLIC BLOOD PRESSURE: 86 MMHG | WEIGHT: 231.06 LBS | BODY MASS INDEX: 29.65 KG/M2 | TEMPERATURE: 97 F

## 2024-03-27 VITALS
HEART RATE: 79 BPM | SYSTOLIC BLOOD PRESSURE: 127 MMHG | RESPIRATION RATE: 18 BRPM | TEMPERATURE: 97 F | HEIGHT: 74 IN | BODY MASS INDEX: 29.65 KG/M2 | OXYGEN SATURATION: 96 % | DIASTOLIC BLOOD PRESSURE: 72 MMHG | WEIGHT: 231.06 LBS

## 2024-03-27 DIAGNOSIS — C34.90 SQUAMOUS CELL CARCINOMA OF LUNG, UNSPECIFIED LATERALITY: Primary | ICD-10-CM

## 2024-03-27 DIAGNOSIS — C34.90 SQUAMOUS CELL CARCINOMA OF LUNG, UNSPECIFIED LATERALITY: ICD-10-CM

## 2024-03-27 PROCEDURE — 99215 OFFICE O/P EST HI 40 MIN: CPT | Mod: S$PBB,,, | Performed by: NURSE PRACTITIONER

## 2024-03-27 PROCEDURE — 25000003 PHARM REV CODE 250: Mod: PN | Performed by: INTERNAL MEDICINE

## 2024-03-27 PROCEDURE — 96413 CHEMO IV INFUSION 1 HR: CPT | Mod: PN

## 2024-03-27 PROCEDURE — 96417 CHEMO IV INFUS EACH ADDL SEQ: CPT | Mod: PN

## 2024-03-27 PROCEDURE — 99999 PR PBB SHADOW E&M-EST. PATIENT-LVL IV: CPT | Mod: PBBFAC,,, | Performed by: NURSE PRACTITIONER

## 2024-03-27 PROCEDURE — 63600175 PHARM REV CODE 636 W HCPCS: Mod: PN | Performed by: INTERNAL MEDICINE

## 2024-03-27 PROCEDURE — 99214 OFFICE O/P EST MOD 30 MIN: CPT | Mod: PBBFAC,PN | Performed by: NURSE PRACTITIONER

## 2024-03-27 PROCEDURE — A4216 STERILE WATER/SALINE, 10 ML: HCPCS | Mod: PN | Performed by: INTERNAL MEDICINE

## 2024-03-27 RX ORDER — PROCHLORPERAZINE EDISYLATE 5 MG/ML
5 INJECTION INTRAMUSCULAR; INTRAVENOUS ONCE AS NEEDED
Status: DISCONTINUED | OUTPATIENT
Start: 2024-03-27 | End: 2024-03-27 | Stop reason: HOSPADM

## 2024-03-27 RX ORDER — DIPHENHYDRAMINE HYDROCHLORIDE 50 MG/ML
50 INJECTION INTRAMUSCULAR; INTRAVENOUS ONCE AS NEEDED
Status: DISCONTINUED | OUTPATIENT
Start: 2024-03-27 | End: 2024-03-27 | Stop reason: HOSPADM

## 2024-03-27 RX ORDER — SODIUM CHLORIDE 0.9 % (FLUSH) 0.9 %
10 SYRINGE (ML) INJECTION
Status: DISCONTINUED | OUTPATIENT
Start: 2024-03-27 | End: 2024-03-27 | Stop reason: HOSPADM

## 2024-03-27 RX ADMIN — SODIUM CHLORIDE 200 MG: 9 INJECTION, SOLUTION INTRAVENOUS at 03:03

## 2024-03-27 RX ADMIN — SODIUM CHLORIDE 1000 MG: 9 INJECTION, SOLUTION INTRAVENOUS at 02:03

## 2024-03-27 RX ADMIN — Medication 10 ML: at 04:03

## 2024-03-27 RX ADMIN — SODIUM CHLORIDE: 9 INJECTION, SOLUTION INTRAVENOUS at 01:03

## 2024-03-27 RX ADMIN — DIPHENHYDRAMINE HYDROCHLORIDE 25 MG: 50 INJECTION, SOLUTION INTRAMUSCULAR; INTRAVENOUS at 01:03

## 2024-03-27 NOTE — PLAN OF CARE
Problem: Adult Inpatient Plan of Care  Goal: Plan of Care Review  3/27/2024 1623 by Marcela Luu, RN  Outcome: Ongoing, Progressing  Flowsheets (Taken 3/27/2024 1620)  Plan of Care Reviewed With: patient  3/27/2024 1603 by Marcela Luu, RN  Outcome: Ongoing, Progressing   Pt tolerated his Cyramza and Keytruda infusions well, NAD. No new c/o voiced. Pt given a schedule and reviewed, pt verbalized understanding. Pt ambulated out of the clinic without difficulty.

## 2024-03-27 NOTE — PLAN OF CARE
Problem: Fatigue (Oncology Care)  Goal: Improved Activity Tolerance  Intervention: Promote Improved Energy  Flowsheets (Taken 3/27/2024 1330)  Fatigue Management:   activity schedule adjusted   fatigue-related activity identified   frequent rest breaks encouraged   paced activity encouraged  Sleep/Rest Enhancement:   natural light exposure provided   relaxation techniques promoted   regular sleep/rest pattern promoted  Activity Management:   Ambulated -L4   Ambulated in lutz - L4   Up in stretcher chair - L1     Problem: Adult Inpatient Plan of Care  Goal: Patient-Specific Goal (Individualized)  Outcome: Ongoing, Progressing  Flowsheets (Taken 3/27/2024 1330)  Anxieties, Fears or Concerns: first Cyramza and Keytruda treatments today  Individualized Care Needs: recliner, window seat, dim lights, conversation

## 2024-03-27 NOTE — PROGRESS NOTES
Subjective:      Name: Feng Thakkar  : 1950  MRN: [id number]    CC:  Education    Diagnosis:   1. Squamous cell carcinoma of lung, unspecified laterality    2. Secondary and unspecified malignant neoplasm of intrathoracic lymph nodes    3. 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 Other specified disorders involving the immune mechanism, not elsewhere classified    5. Cancer related pain    6. Immunodeficiency due to drug therapy    7. Chronic kidney disease, stage 3a    8. Hyperlipidemia, unspecified hyperlipidemia type    9. Hypertension, unspecified type       HPI:   Feng Thakkar is a 73 y.o. male who presents for education for a diagnosis of NSCLC.    NSCLC - patient was recently diagnosed with at least stage III [id number] SCC of the left lung  -PET/CT 23 shows continued left lung mass, mediastinal LAD  -MRI brain 23 showed no acute findings  -[facility] Blood testing showed TP 53 mutation  -[facility] tissue testing showed PD-L1 of 20%, TP53, KDM6A, CDKN2a and MTAP  -PORT placed 23 under the care of Dr. Love  -PT completed 6 cycles of Carboplatin and Paclitaxel on 23  -Radiation completed 23  -CT chest on 23 showed a decrease in LLL mass  -Will proceed with Durvalumab for 1 year of treatment  -CT chest 23 shows decreased lung mass and radiation changes  -CT Chest on 2024 showed continued radiation changes in the lung but did show intra-abdominal lymphadenopathy (see below)  -Pt s/p cycle 9 of Durvalumab  -Pt underwent EUS on 3/13/24 with biopsy of celiac LN showing metastatic SCC  -Treatment with Ramucirumab and Pembrolizumab discussed based off trial Lung [id number]  -Pt given information on Ramucirumab and Pembrolizumab    Prior History:  The patient initially developed abdominal pain on 2023 and underwent CT of the abdomen showing a mass in the left lung base measuring 3.9 x 2.9 cm; 3 x 3.8 cm mass along the pancreatic tail; and  shotty retroperitoneal lymph nodes.  The patient underwent repeat CT abdomen and pelvis on 02/24/2023 showing a 4.3 x 4 solid enhancing mass of the left lung base; thickened bladder wall; prostate gland measuring 45.1 x 4.6 cm; abnormal sclerosis in the left femoral head measuring 2.8 x 1.4 x 1.3 cm; and additional soft tissue densities in the posterior left subdiaphragmatic region measuring up to 16 mm.  CT chest on 03/01/2023 showed a 2 cm low-density lesion in the right lobe of the thyroid gland; 4.5 x 4.3 x 3.8 cm mass in the left lower lobe; several micro nodules; Na soft tissue mass adjacent to the pancreatic tail.  Patient underwent EUS on 03/03/2023 showing a 3.5 cm and 1.8 cm round pancreatic tail lesion.  Biopsy returned results showing no evidence of malignancy and abundant hematopoietic elements and dendritic cells.  Patient then underwent EBUS under the care of Dr. Albright on 03/17/2023 showing squamous cell carcinoma in biopsy of the left lower lung lesion; squamous cell carcinoma and station 7 lymph node; positive 11 L lymph node for squamous cell carcinoma.              The patient underwent PET-CT on 04/18/2023 showing a markedly hypermetabolic lobulated subpleural left lower lobe mass measuring 4.5 x 3.9 cm with hypermetabolic lymph nodes in the left hilar region measuring 2.2 x 1.9 cm; and a 4.2 x 3.6 walk circumscribed mass in the pancreatic tail.  MRI brain on 04/18/2023 showed no acute findings.                The patient underwent CT chest on 6/26/23 showing a large right thyroid mass measuring at least 2.6 cm; left renal hypodensity measuring 11 mm favored to be a cyst; significant decrease in size of left lower lobe consolidative mass now measuring 2.4 x 2.3 cm; stable 3 mm consolidative nodule in the left upper lobe.              The patient underwent CT chest on 11/21/2023 showing patchy ground-glass density within the medial right upper lobe; ground-glass density in the left dependent left  upper lobe that the left lower lobe with associated bronchiectasis; previous target peribronchial nodule in the left lower lobe measuring 2.5 cm and 2 minute cm hypodense lesion in the right thyroid nodule.              CT Chest 2/09/24 showing geographic ground-glass disease and interlobular septal thickening in the left upper lobe; several large lymph nodes in the upper abdomen near the celiac takeoff and liver hilum with lymph node ranging from 1.3-1.5 cm.               The patient underwent a CT abdomen and pelvis on 03/01/2024 showing coronary artery calcification; 40 x 36 mm hypoenhancing pancreatic tail mass; 20 x 30 mm peripancreatic lymph node along the cranial aspect of the proximal pancreatic body; stable 13 mm periaortic lymph node; enlargement of multiple retroperitoneal lymph nodes; mesenteric haziness present within the central abdomen; enlarged inguinal lymph nodes bilaterally; and a 21 mm sclerotic bone lesion within the posterior row inferomedial left femoral head.              The patient underwent a PET-CT on 03/07/2024 showing resolution of hypermetabolic left lower lobe mass now showing signs of radiation therapy response; disappearance of hypermetabolic enlarged left hilar lymph nodes; new irregular hypermetabolic right breast tissue measuring 2.5 x 1.4 cm possibly representing the unilateral gynecomastia; interval development of new hypermetabolic lymph nodes in the celiac region, periaortic region, right retrocrural area, and bilateral inguinal regions; lentiform cutaneous subcutaneous well-circumscribed mass along the anterior midline aspect of the lower pelvis which has increased in size and become hypermetabolic now measuring 2.1 x 1.1 cm possibly representing a sebaceous cyst.                The patient underwent EUS on 03/13/2024 with gastric polyp and single duodenal polyp seen as well as a few malignant appearing lymph nodes in the celiac region which were biopsied.     Oncology  History   Squamous cell carcinoma of lung   3/31/2023 Initial Diagnosis    Squamous cell carcinoma of lung     3/31/2023 Cancer Staged    Staging form: Lung, AJCC 8th Edition  - Clinical: Stage IIIA (cT2b, cN2, cM0)     4/27/2023 - 6/7/2023 Radiation Therapy    Treating physician: Leonor Hadley  Total Dose: 60 Gy  Fractions: 30  Treatment Site Ref. ID Energy Dose/Fx (Gy) #Fx Dose Correction (Gy) Total Dose (Gy) Start Date End Date Elapsed Days   IM Lung PTV 6X 2 6 / 30 0 12 4/27/2023 5/4/2023 7   IM Lung_New PTV 6X 2 24 / 24 0 48 5/5/2023 6/7/2023 33      4/28/2023 - 6/2/2023 Chemotherapy    Treatment Summary   Plan Name: OP NSCLC PACLITAXEL + CARBOPLATIN (AUC) QW + RADIATION  Treatment Goal: Curative  Status: Inactive  Start Date: 4/28/2023  End Date: 6/2/2023  Provider: Lisandro Lawrence MD  Chemotherapy: CARBOplatin (PARAPLATIN) 185 mg in sodium chloride 0.9% 303.5 mL chemo infusion, 185 mg (100 % of original dose 183.4 mg), Intravenous, Clinic/HOD 1 time, 6 of 6 cycles  Dose modification:   (original dose 183.4 mg, Cycle 1)  Administration: 185 mg (4/28/2023), 185 mg (5/5/2023), 190 mg (5/12/2023), 190 mg (5/19/2023), 180 mg (5/26/2023), 190 mg (6/2/2023)  PACLitaxeL (TAXOL) 45 mg/m2 = 102 mg in sodium chloride 0.9% 250 mL chemo infusion, 45 mg/m2 = 102 mg, Intravenous, Clinic/HOD 1 time, 6 of 6 cycles  Administration: 102 mg (4/28/2023), 102 mg (5/5/2023), 102 mg (5/12/2023), 102 mg (5/19/2023), 102 mg (5/26/2023), 102 mg (6/2/2023)     7/11/2023 - 2/21/2024 Chemotherapy    Treatment Summary   Plan Name: OP DURVALUMAB 1500 MG Q4W  Treatment Goal: Curative  Status: Inactive  Start Date: 7/11/2023  End Date: 2/21/2024  Provider: Lisandro Lawrence MD  Chemotherapy: [No matching medication found in this treatment plan]     3/26/2024 -  Chemotherapy    Treatment Summary   Plan Name: OP NSCLC ramucirumab 10 mg/kg plus pembrolizumab 200mg Q3W  Treatment Goal: Palliative  Status: Active  Start Date: 3/26/2024  (Planned)  End Date: 3/10/2026 (Planned)  Provider: Lisandro Lawrence MD  Chemotherapy: ramucirumab (CYRAMZA) in sodium chloride 0.9% 250 mL chemo infusion, 10 mg/kg = 1,044 mg (100 % of original dose 10 mg/kg), Intravenous, Clinic/HOD 1 time, 0 of 35 cycles  Dose modification: 10 mg/kg (original dose 10 mg/kg, Cycle 35, Reason: MD Discretion, Comment: Trial Dosing)            Past Medical History:   Diagnosis Date    Diabetes mellitus     Gout, unspecified     High cholesterol     HTN (hypertension)     Lung cancer        Past Surgical History:   Procedure Laterality Date    ENDOSCOPIC ULTRASOUND OF UPPER GASTROINTESTINAL TRACT N/A 3/3/2023    Procedure: ULTRASOUND, UPPER GI TRACT, ENDOSCOPIC;  Surgeon: Cora Mcgrath MD;  Location: [facility];  Service: Endoscopy;  Laterality: N/A;  2/24/23-Instructions mailed to address on file-DS    ENDOSCOPIC ULTRASOUND OF UPPER GASTROINTESTINAL TRACT Left 3/13/2024    Procedure: ULTRASOUND, UPPER GI TRACT, ENDOSCOPIC;  Surgeon: Reji Aguirre MD;  Location: [facility];  Service: Endoscopy;  Laterality: Left;    ESOPHAGOGASTRODUODENOSCOPY N/A 3/13/2024    Procedure: EGD (ESOPHAGOGASTRODUODENOSCOPY);  Surgeon: Reji Aguirre MD;  Location: [facility];  Service: Endoscopy;  Laterality: N/A;    INSERTION OF TUNNELED CENTRAL VENOUS CATHETER (CVC) WITH SUBCUTANEOUS PORT N/A 4/24/2023    Procedure: [id number];  Surgeon: Paulino Love MD;  Location: [facility];  Service: General;  Laterality: N/A;    PANCREATECTOMY      ROBOTIC BRONCHOSCOPY N/A 3/17/2023    Procedure: ROBOTIC BRONCHOSCOPY;  Surgeon: Cristiane Albright MD;  Location: [address];  Service: Pulmonary;  Laterality: N/A;       Family History   Problem Relation Age of Onset    Breast cancer Sister         60       Social History     Socioeconomic History    Marital status:    Tobacco Use    Smoking status: Former     Current packs/day: 0.00     Average packs/day: 1.5 packs/day for 54.0  "years (81.0 ttl pk-yrs)     Types: Cigarettes     Start date: 1968     Quit date: 10/20/2012     Years since quittin.4    Smokeless tobacco: Former   Substance and Sexual Activity    Alcohol use: Yes     Alcohol/week: 3.0 standard drinks of alcohol     Types: 3 Cans of beer per week     Comment: 3 a day    Drug use: Never    Sexual activity: Not Currently       Review of patient's allergies indicates:  No Known Allergies    Review of Systems   All other systems reviewed and are negative.           Objective:     Vitals:    24 1303   BP: 127/72   BP Location: Right arm   Patient Position: Sitting   BP Method: Large (Automatic)   Pulse: 79   Resp: 18   Temp: 96.8 °F (36 °C)   TempSrc: Temporal   SpO2: 96%   Weight: 104.8 kg (231 lb 0.7 oz)   Height: 6' 2" (1.88 m)        Physical Exam  Vitals reviewed.   Constitutional:       General: He is not in acute distress.  HENT:      Head: Normocephalic.   Pulmonary:      Effort: Pulmonary effort is normal.   Neurological:      Mental Status: He is alert and oriented to person, place, and time.   Psychiatric:         Behavior: Behavior normal.         Thought Content: Thought content normal.             Current Outpatient Medications on File Prior to Visit   Medication Sig    allopurinoL (ZYLOPRIM) 100 MG tablet Take 100 mg by mouth once daily.    amLODIPine (NORVASC) 2.5 MG tablet Take 2.5 mg by mouth once daily.    BYDUREON BCISE 2 mg/0.85 mL AtIn Inject 2 mg into the skin every 7 days. Every Friday    cloNIDine (CATAPRES) 0.2 MG tablet Take 0.2 mg by mouth once. Daily    ezetimibe (ZETIA) 10 mg tablet Take 10 mg by mouth once daily.    FARXIGA 10 mg tablet Take 10 mg by mouth every morning.    finasteride (PROSCAR) 5 mg tablet Take 5 mg by mouth once daily.    FREESTYLE TEST Strp USE 1 STRIP TO CHECK GLUCOSE ONCE DAILY    levothyroxine (SYNTHROID) 25 MCG tablet Take 1 tablet (25 mcg total) by mouth before breakfast.    LIDOcaine-prilocaine (EMLA) cream Apply " topically to port site one hour prior to access, cover    metoprolol succinate (TOPROL-XL) 100 MG 24 hr tablet Take 100 mg by mouth once daily.    oxyCODONE (ROXICODONE) 5 MG immediate release tablet Take 1 tablet (5 mg total) by mouth every 4 (four) hours as needed for Pain.    rosuvastatin (CRESTOR) 40 MG Tab Take 10 mg by mouth every evening.    TOUJEO SOLOSTAR U-300 INSULIN 300 unit/mL (1.5 mL) InPn pen SMARTSI Unit(s) SUB-Q Every Evening     No current facility-administered medications on file prior to visit.       CBC:  Lab Results   Component Value Date    WBC 12.11 2024    HGB 12.8 (L) 2024    HCT 39.5 (L) 2024    MCV 88 2024     2024         CMP:  Sodium   Date Value Ref Range Status   2024 137 136 - 145 mmol/L Final     Potassium   Date Value Ref Range Status   2024 4.2 3.5 - 5.1 mmol/L Final     Chloride   Date Value Ref Range Status   2024 103 95 - 110 mmol/L Final     CO2   Date Value Ref Range Status   2024 24 23 - 29 mmol/L Final     Glucose   Date Value Ref Range Status   2024 159 (H) 70 - 110 mg/dL Final     BUN   Date Value Ref Range Status   2024 16 8 - 23 mg/dL Final     Creatinine   Date Value Ref Range Status   2024 1.5 (H) 0.5 - 1.4 mg/dL Final     Calcium   Date Value Ref Range Status   2024 9.6 8.7 - 10.5 mg/dL Final     Total Protein   Date Value Ref Range Status   2024 7.9 6.0 - 8.4 g/dL Final     Albumin   Date Value Ref Range Status   2024 3.4 (L) 3.5 - 5.2 g/dL Final     Total Bilirubin   Date Value Ref Range Status   2024 0.3 0.1 - 1.0 mg/dL Final     Comment:     For infants and newborns, interpretation of results should be based  on gestational age, weight and in agreement with clinical  observations.    Premature Infant recommended reference ranges:  Up to 24 hours.............<8.0 mg/dL  Up to 48 hours............<12.0 mg/dL  3-5 days..................<15.0 mg/dL  6-29  days.................<15.0 mg/dL       Alkaline Phosphatase   Date Value Ref Range Status   03/26/2024 74 55 - 135 U/L Final     AST   Date Value Ref Range Status   03/26/2024 20 10 - 40 U/L Final     ALT   Date Value Ref Range Status   03/26/2024 21 10 - 44 U/L Final     Anion Gap   Date Value Ref Range Status   03/26/2024 10 8 - 16 mmol/L Final       Mammo Digital Diagnostic Bilat with Soto, US Breast Right Limited  Narrative: Result:   Mammo Digital Diagnostic Bilat with Soto  US Breast Right Limited     History:  Patient is 73 y.o. and is seen for diagnostic imaging.    Films Compared:  Compared to: 01/05/2015 US Breast Left Limited and 01/05/2015 MAMMO   DIGITAL DIAGNOSTIC LEFT     Findings:  This procedure was performed using tomosynthesis. Computer-aided detection   was utilized in the interpretation of this examination.  Bilateral  Mammo Digital Diagnostic Bilat with Soto  There is mild gynecomastia seen in both breasts.     There is no evidence of suspicious masses, calcifications, or other   abnormal findings.    US Breast Right Limited  There is no evidence of suspicious masses or other abnormal findings.  Impression: Bilateral  Mammo Digital Diagnostic Bilat with Soto  There is no mammographic evidence of malignancy.    Right  US Breast Right Limited  There is no sonographic evidence of malignancy.    BI-RADS Category:   Overall: 2 - Benign       Recommendation:  No routine imaging follow-up is recommended.  X-Ray Chest PA And Lateral  Narrative: EXAMINATION:  XR CHEST PA AND LATERAL    CLINICAL HISTORY:  Traumatic subcutaneous emphysema, initial encounter    TECHNIQUE:  PA and lateral views of the chest were performed.    COMPARISON:  CT scan dated February 9, 2024.    FINDINGS:  Patchy airspace disease and volume loss are again noted in the left base. A left subclavian infusion catheter is seen with its tip in the region of the cavoatrial junction. No definite pneumothorax or subcutaneous emphysema is  demonstrated. The heart is mildly enlarged. There is apparent relative elevation left hemidiaphragm. Degenerative changes are seen in the spine.  Impression: Left lower lobe volume loss and airspace disease similar to recent CT scan.    Electronically signed by: Vinh Hill MD  Date:    03/19/2024  Time:    14:31            Assessment:       1. Squamous cell carcinoma of lung, unspecified laterality         Plan:     Squamous cell carcinoma of lung, unspecified laterality  -     Ambulatory referral/consult to [facility]         1.  Treatment plan of Ramcirumab (Cyramza) & Pembrolizumab (Keytruda) IV every 3 weeks discussed with patient.  2.  Handouts provided immunotherapy agents.    3.  Discussed the mechanism of action, potential side effects of this treatment.   4.  Dietary modifications reinforced.   Patient to monitor BP twice a day after start of treatment.  Report any continued elevation.  5.  Discussed follow-up with the physician for toxicity monitoring throughout treatment.    6.  Consented the patient to the treatment plan and the patient was educated on the planned duration of the treatment and schedule of the treatment administration.        40 minutes were spent in coordination of patient's care, record review and counseling.

## 2024-04-16 NOTE — PROGRESS NOTES
PATIENT: Feng Thakkar  MRN: [id number]  DATE: 4/17/2024      Diagnosis:   1. Squamous cell carcinoma of lung, unspecified laterality    2. Other specified disorders involving the immune mechanism, not elsewhere classified    3. Secondary and unspecified malignant neoplasm of intrathoracic lymph nodes    4. 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 Cancer related pain    6. Immunodeficiency due to drug therapy    7. Chronic kidney disease, stage 3a    8. Hyperlipidemia, unspecified hyperlipidemia type    9. Hypertension, unspecified type            Chief Complaint: Squamous cell carcinoma of lung, unspecified laterality (3 week follow up)      Oncologic History:      Oncologic History     Oncologic Treatment     Pathology           Subjective:    Interval History: Mr. Thakkar is a 73 y.o. male with Gout, HLD, HTN who presents for work up of NSCLC.  Since the last clinic visit the patient endorses continued tightness in the left arm and neck on occasion.  The patient denies CP, cough, SOB, abdominal pain, nausea, vomiting, constipation, diarrhea.  The patient denies fever, chills, night sweats, weight loss, new lumps or bumps, easy bruising or bleeding.    Prior History:  The patient initially developed abdominal pain on 01/05/2023 and underwent CT of the abdomen showing a mass in the left lung base measuring 3.9 x 2.9 cm; 3 x 3.8 cm mass along the pancreatic tail; and shotty retroperitoneal lymph nodes.  The patient underwent repeat CT abdomen and pelvis on 02/24/2023 showing a 4.3 x 4 solid enhancing mass of the left lung base; thickened bladder wall; prostate gland measuring 45.1 x 4.6 cm; abnormal sclerosis in the left femoral head measuring 2.8 x 1.4 x 1.3 cm; and additional soft tissue densities in the posterior left subdiaphragmatic region measuring up to 16 mm.  CT chest on 03/01/2023 showed a 2 cm low-density lesion in the right lobe of the thyroid gland; 4.5 x 4.3 x 3.8 cm mass in the  left lower lobe; several micro nodules; Na soft tissue mass adjacent to the pancreatic tail.  Patient underwent EUS on 03/03/2023 showing a 3.5 cm and 1.8 cm round pancreatic tail lesion.  Biopsy returned results showing no evidence of malignancy and abundant hematopoietic elements and dendritic cells.  Patient then underwent EBUS under the care of Dr. Albright on 03/17/2023 showing squamous cell carcinoma in biopsy of the left lower lung lesion; squamous cell carcinoma and station 7 lymph node; positive 11 L lymph node for squamous cell carcinoma.   The patient underwent PET-CT on 04/18/2023 showing a markedly hypermetabolic lobulated subpleural left lower lobe mass measuring 4.5 x 3.9 cm with hypermetabolic lymph nodes in the left hilar region measuring 2.2 x 1.9 cm; and a 4.2 x 3.6 walk circumscribed mass in the pancreatic tail.  MRI brain on 04/18/2023 showed no acute findings.     The patient underwent CT chest on 6/26/23 showing a large right thyroid mass measuring at least 2.6 cm; left renal hypodensity measuring 11 mm favored to be a cyst; significant decrease in size of left lower lobe consolidative mass now measuring 2.4 x 2.3 cm; stable 3 mm consolidative nodule in the left upper lobe.   The patient underwent CT chest on 11/21/2023 showing patchy ground-glass density within the medial right upper lobe; ground-glass density in the left dependent left upper lobe that the left lower lobe with associated bronchiectasis; previous target peribronchial nodule in the left lower lobe measuring 2.5 cm and 2 minute cm hypodense lesion in the right thyroid nodule.   CT Chest 2/09/24 showing geographic ground-glass disease and interlobular septal thickening in the left upper lobe; several large lymph nodes in the upper abdomen near the celiac takeoff and liver hilum with lymph node ranging from 1.3-1.5 cm.    The patient underwent a CT abdomen and pelvis on 03/01/2024 showing coronary artery calcification; 40 x 36 mm  hypoenhancing pancreatic tail mass; 20 x 30 mm peripancreatic lymph node along the cranial aspect of the proximal pancreatic body; stable 13 mm periaortic lymph node; enlargement of multiple retroperitoneal lymph nodes; mesenteric haziness present within the central abdomen; enlarged inguinal lymph nodes bilaterally; and a 21 mm sclerotic bone lesion within the posterior row inferomedial left femoral head.   The patient underwent a PET-CT on 03/07/2024 showing resolution of hypermetabolic left lower lobe mass now showing signs of radiation therapy response; disappearance of hypermetabolic enlarged left hilar lymph nodes; new irregular hypermetabolic right breast tissue measuring 2.5 x 1.4 cm possibly representing the unilateral gynecomastia; interval development of new hypermetabolic lymph nodes in the celiac region, periaortic region, right retrocrural area, and bilateral inguinal regions; lentiform cutaneous subcutaneous well-circumscribed mass along the anterior midline aspect of the lower pelvis which has increased in size and become hypermetabolic now measuring 2.1 x 1.1 cm possibly representing a sebaceous cyst.     The patient underwent EUS on 03/13/2024 with gastric polyp and single duodenal polyp seen as well as a few malignant appearing lymph nodes in the celiac region which were biopsied.     Past Medical History:   Past Medical History:   Diagnosis Date    Diabetes mellitus     Gout, unspecified     High cholesterol     HTN (hypertension)     Lung cancer        Past Surgical HIstory:   Past Surgical History:   Procedure Laterality Date    ENDOSCOPIC ULTRASOUND OF UPPER GASTROINTESTINAL TRACT N/A 3/3/2023    Procedure: ULTRASOUND, UPPER GI TRACT, ENDOSCOPIC;  Surgeon: Cora Mcgrath MD;  Location: [facility];  Service: Endoscopy;  Laterality: N/A;  2/24/23-Instructions mailed to address on file-DS    ENDOSCOPIC ULTRASOUND OF UPPER GASTROINTESTINAL TRACT Left 3/13/2024    Procedure: ULTRASOUND, UPPER GI  TRACT, ENDOSCOPIC;  Surgeon: Reji Aguirre MD;  Location: [facility] ENDO;  Service: Endoscopy;  Laterality: Left;    ESOPHAGOGASTRODUODENOSCOPY N/A 3/13/2024    Procedure: EGD (ESOPHAGOGASTRODUODENOSCOPY);  Surgeon: Reji Aguirre MD;  Location: [facility] ENDO;  Service: Endoscopy;  Laterality: N/A;    INSERTION OF TUNNELED CENTRAL VENOUS CATHETER (CVC) WITH SUBCUTANEOUS PORT N/A 4/24/2023    Procedure: [id number];  Surgeon: Paulino Love MD;  Location: [facility] OR;  Service: General;  Laterality: N/A;    PANCREATECTOMY      ROBOTIC BRONCHOSCOPY N/A 3/17/2023    Procedure: ROBOTIC BRONCHOSCOPY;  Surgeon: Cristiane Albright MD;  Location: [address];  Service: Pulmonary;  Laterality: N/A;       Family History:   Family History   Problem Relation Name Age of Onset    Breast cancer Sister          60       Social History:  reports that he quit smoking about 11 years ago. His smoking use included cigarettes. He started smoking about 55 years ago. He has a 81 pack-year smoking history. He has quit using smokeless tobacco. He reports current alcohol use of about 3.0 standard drinks of alcohol per week. He reports that he does not use drugs.    Allergies:  Review of patient's allergies indicates:  No Known Allergies    Medications:  Current Outpatient Medications   Medication Sig Dispense Refill    allopurinoL (ZYLOPRIM) 100 MG tablet Take 100 mg by mouth once daily.      amLODIPine (NORVASC) 2.5 MG tablet Take 2.5 mg by mouth once daily.      BYDUREON BCISE 2 mg/0.85 mL AtIn Inject 2 mg into the skin every 7 days. Every Friday      cloNIDine (CATAPRES) 0.2 MG tablet Take 0.2 mg by mouth once. Daily      ezetimibe (ZETIA) 10 mg tablet Take 10 mg by mouth once daily.      FARXIGA 10 mg tablet Take 10 mg by mouth every morning.      finasteride (PROSCAR) 5 mg tablet Take 5 mg by mouth once daily.      FREESTYLE TEST Strp USE 1 STRIP TO CHECK GLUCOSE ONCE DAILY      levothyroxine (SYNTHROID) 25 MCG tablet  "Take 1 tablet (25 mcg total) by mouth before breakfast. 30 tablet 11    LIDOcaine-prilocaine (EMLA) cream Apply topically to port site one hour prior to access, cover 30 g 1    metoprolol succinate (TOPROL-XL) 100 MG 24 hr tablet Take 100 mg by mouth once daily.      oxyCODONE (ROXICODONE) 5 MG immediate release tablet Take 1 tablet (5 mg total) by mouth every 4 (four) hours as needed for Pain. 60 tablet 0    rosuvastatin (CRESTOR) 40 MG Tab Take 10 mg by mouth every evening.      TOUJEO SOLOSTAR U-300 INSULIN 300 unit/mL (1.5 mL) InPn pen SMARTSI Unit(s) SUB-Q Every Evening       No current facility-administered medications for this visit.       Review of Systems   Constitutional:  Negative for chills, diaphoresis, fatigue, fever and unexpected weight change.   Respiratory:  Negative for cough and shortness of breath.    Cardiovascular:  Negative for chest pain and palpitations.   Gastrointestinal:  Negative for abdominal pain, constipation, diarrhea, nausea and vomiting.   Musculoskeletal:  Positive for neck pain.        Left arm   Skin:  Negative for color change and rash.   Neurological:  Negative for headaches.   Hematological:  Negative for adenopathy. Does not bruise/bleed easily.       ECOG Performance Status: 1   Objective:      Vitals:   Vitals:    24 0934   BP: 128/68   BP Location: Right arm   Patient Position: Sitting   BP Method: Large (Manual)   Pulse: 74   Resp: 16   Temp: 96.7 °F (35.9 °C)   TempSrc: Temporal   SpO2: 98%   Weight: 102.5 kg (225 lb 15.5 oz)   Height: 6' 2" (1.88 m)         Physical Exam  Constitutional:       General: He is not in acute distress.     Appearance: He is well-developed. He is not diaphoretic.   HENT:      Head: Normocephalic and atraumatic.   Cardiovascular:      Rate and Rhythm: Normal rate and regular rhythm.      Heart sounds: Normal heart sounds. No murmur heard.     No friction rub. No gallop.   Pulmonary:      Effort: Pulmonary effort is normal. No " respiratory distress.      Breath sounds: Normal breath sounds. No wheezing or rales.   Chest:      Chest wall: No tenderness.   Abdominal:      General: Bowel sounds are normal. There is no distension.      Palpations: Abdomen is soft. There is no mass.      Tenderness: There is no abdominal tenderness. There is no rebound.   Musculoskeletal:      Cervical back: Normal range of motion.   Lymphadenopathy:      Cervical: No cervical adenopathy.      Upper Body:      Right upper body: No supraclavicular or axillary adenopathy.      Left upper body: No supraclavicular or axillary adenopathy.   Skin:     General: Skin is warm and dry.   Neurological:      Mental Status: He is alert and oriented to person, place, and time.   Psychiatric:         Behavior: Behavior normal.         Laboratory Data:  Lab Visit on 04/17/2024   Component Date Value Ref Range Status    WBC 04/17/2024 13.62 (H)  3.90 - 12.70 K/uL Final    RBC 04/17/2024 4.83  4.60 - 6.20 M/uL Final    Hemoglobin 04/17/2024 14.2  14.0 - 18.0 g/dL Final    Hematocrit 04/17/2024 42.6  40.0 - 54.0 % Final    MCV 04/17/2024 88  82 - 98 fL Final    MCH 04/17/2024 29.4  27.0 - 31.0 pg Final    MCHC 04/17/2024 33.3  32.0 - 36.0 g/dL Final    RDW 04/17/2024 16.2 (H)  11.5 - 14.5 % Final    Platelets 04/17/2024 342  150 - 450 K/uL Final    MPV 04/17/2024 8.9 (L)  9.2 - 12.9 fL Final    Immature Granulocytes 04/17/2024 0.3  0.0 - 0.5 % Final    Gran # (ANC) 04/17/2024 5.4  1.8 - 7.7 K/uL Final    Immature Grans (Abs) 04/17/2024 0.04  0.00 - 0.04 K/uL Final    Comment: Mild elevation in immature granulocytes is non specific and   can be seen in a variety of conditions including stress response,   acute inflammation, trauma and pregnancy. Correlation with other   laboratory and clinical findings is essential.      Lymph # 04/17/2024 6.1 (H)  1.0 - 4.8 K/uL Final    Mono # 04/17/2024 1.6 (H)  0.3 - 1.0 K/uL Final    Eos # 04/17/2024 0.5  0.0 - 0.5 K/uL Final    Baso #  04/17/2024 0.05  0.00 - 0.20 K/uL Final    nRBC 04/17/2024 0  0 /100 WBC Final    Gran % 04/17/2024 39.8  38.0 - 73.0 % Final    Lymph % 04/17/2024 44.6  18.0 - 48.0 % Final    few atypical lymphs present    Mono % 04/17/2024 11.6  4.0 - 15.0 % Final    Eosinophil % 04/17/2024 3.3  0.0 - 8.0 % Final    Basophil % 04/17/2024 0.4  0.0 - 1.9 % Final    Platelet Estimate 04/17/2024 Appears normal   Final    Aniso 04/17/2024 Slight   Final    Poik 04/17/2024 Slight   Final    Poly 04/17/2024 Occasional   Final    Ovalocytes 04/17/2024 Occasional   Final    Jose Maria Cells 04/17/2024 Occasional   Final    Differential Method 04/17/2024 Automated   Final    Sodium 04/17/2024 138  136 - 145 mmol/L Final    Potassium 04/17/2024 4.2  3.5 - 5.1 mmol/L Final    Chloride 04/17/2024 104  95 - 110 mmol/L Final    CO2 04/17/2024 22 (L)  23 - 29 mmol/L Final    Glucose 04/17/2024 153 (H)  70 - 110 mg/dL Final    BUN 04/17/2024 15  8 - 23 mg/dL Final    Creatinine 04/17/2024 1.5 (H)  0.5 - 1.4 mg/dL Final    Calcium 04/17/2024 10.0  8.7 - 10.5 mg/dL Final    Total Protein 04/17/2024 8.3  6.0 - 8.4 g/dL Final    Albumin 04/17/2024 3.6  3.5 - 5.2 g/dL Final    Total Bilirubin 04/17/2024 0.4  0.1 - 1.0 mg/dL Final    Comment: For infants and newborns, interpretation of results should be based  on gestational age, weight and in agreement with clinical  observations.    Premature Infant recommended reference ranges:  Up to 24 hours.............<8.0 mg/dL  Up to 48 hours............<12.0 mg/dL  3-5 days..................<15.0 mg/dL  6-29 days.................<15.0 mg/dL      Alkaline Phosphatase 04/17/2024 64  55 - 135 U/L Final    AST 04/17/2024 27  10 - 40 U/L Final    ALT 04/17/2024 30  10 - 44 U/L Final    eGFR 04/17/2024 48.9 (A)  >60 mL/min/1.73 m^2 Final    Anion Gap 04/17/2024 12  8 - 16 mmol/L Final    Specimen UA 04/17/2024 Urine, Clean Catch   Final    Color, UA 04/17/2024 Yellow  Yellow, Straw, Tana Final    Appearance, UA  04/17/2024 Clear  Clear Final    pH, UA 04/17/2024 6.0  5.0 - 8.0 Final    Specific Gravity, UA 04/17/2024 1.015  1.005 - 1.030 Final    Protein, UA 04/17/2024 Trace (A)  Negative Final    Comment: Recommend a 24 hour urine protein or a urine   protein/creatinine ratio if globulin induced proteinuria is  clinically suspected.      Glucose, UA 04/17/2024 3+ (A)  Negative Final    Ketones, UA 04/17/2024 Negative  Negative Final    Bilirubin (UA) 04/17/2024 Negative  Negative Final    Occult Blood UA 04/17/2024 Trace (A)  Negative Final    Nitrite, UA 04/17/2024 Negative  Negative Final    Leukocytes, UA 04/17/2024 Negative  Negative Final    RBC, UA 04/17/2024 1  0 - 4 /hpf Final    WBC, UA 04/17/2024 2  0 - 5 /hpf Final    Bacteria 04/17/2024 None  None-Occ /hpf Final    Yeast, UA 04/17/2024 None  None Final    Hyaline Casts, UA 04/17/2024 2 (A)  0-1/lpf /lpf Final    Microscopic Comment 04/17/2024 SEE COMMENT   Final    Comment: Other formed elements not mentioned in the report are not   present in the microscopic examination.            Imaging:     CXR 3/19/24    Patchy airspace disease and volume loss are again noted in the left base. A left subclavian infusion catheter is seen with its tip in the region of the cavoatrial junction. No definite pneumothorax or subcutaneous emphysema is demonstrated. The heart is mildly enlarged. There is apparent relative elevation left hemidiaphragm. Degenerative changes are seen in the spine.        Assessment:       1. Squamous cell carcinoma of lung, unspecified laterality    2. Other specified disorders involving the immune mechanism, not elsewhere classified    3. Secondary and unspecified malignant neoplasm of intrathoracic lymph nodes    4. 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 Cancer related pain    6. Immunodeficiency due to drug therapy    7. Chronic kidney disease, stage 3a    8. Hyperlipidemia, unspecified hyperlipidemia type    9.  Hypertension, unspecified type             Plan:     NSCLC - patient was recently diagnosed with at least stage III [id number] SCC of the left lung  -PET/CT 4/18/23 shows continued left lung mass, mediastinal LAD  -MRI brain 4/18/23 showed no acute findings  -TEMPUS Blood testing showed TP 53 mutation  -[facility]PUS tissue testing showed PD-L1 of 20%, TP53, KDM6A, CDKN2a and MTAP  -PORT placed 4/24/23 under the care of Dr. Love  -PT completed 6 cycles of Carboplatin and Paclitaxel on 6/02/23  -Radiation completed 6/07/23  -CT chest on 6/26/23 showed a decrease in LLL mass  -Will proceed with Durvalumab for 1 year of treatment  -CT chest 11/21/23 shows decreased lung mass and radiation changes  -CT Chest on 02/09/2024 showed continued radiation changes in the lung but did show intra-abdominal lymphadenopathy (see below)  -Pt s/p cycle 9 of Durvalumab  -Pt underwent EUS on 3/13/24 with biopsy of celiac LN showing metastatic SCC  -Treatment with Ramucirumab and Pembrolizumab discussed based off trial Lung [id number]  -Will proceed with cycle 2  Visit today included increased complexity associated with the care of the episodic problem (immunotherapy and ramucirumab) addressed and managing the longitudinal care of the patient due to the serious and/or complex managed problem(s) NSCLC    Pancreatic Mass - seen on CT scans and biopsy during EUS on 03/03/2023 with path showing no evidence of malignancy  -PET/CT 4/18/23 showed uptake in the tail of the pancreas  -Possibly due to chronic pancreatitis  -Pt with stable pancreatic mass on CT abdomen 7/03/23 and 3/01/24  -Patient with enlarging surrounding lymphadenopathy  -PET/CT on 3/07/24 shows increasing LAD in the celiac region, periaortic retroperitoneum, right retrocrural area, and bilateral inguinal regions  -EUS on 3/13/24 not suggestive of a pancreatic mass    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  -No sign of infection  -Will monitor    Cancer Related Pain  - Pt on oxycodone  -Will monitor    CKD - pt with stage IIIa CKD  -Creatinine 1.5 4/17/24  -Stable  -Will monitor    HTN - pt on amlodipine, clonidine, metoprolol  -BP controlled  -Will monitor    Gout - pt on allopurinol  -Currently asymptomatic  -Will monitor    HLD - pt on rosuvastatin, zetia  -Management per PCP    DMII - pt on toujeo, farxiga, and bydureon  -Management per PCP    Route Chart for Scheduling    Med Onc Chart Routing      Follow up with physician 3 weeks. Pt can proceed with treatment as long as his UA is OK.  Pt needs a CBC, CMP, TSH and urinalysis in 3 weeks with an appt with me the day before treatment.   Follow up with RADHIKA    Infusion scheduling note    Injection scheduling note    Labs    Imaging    Pharmacy appointment    Other referrals              Treatment Plan Information   OP NSCLC ramucirumab 10 mg/kg plus pembrolizumab 200mg Q3W   Lisandro Lawrence MD   Upcoming Treatment Dates - OP NSCLC ramucirumab 10 mg/kg plus pembrolizumab 200mg Q3W    4/16/2024       Pre-Medications       diphenhydrAMINE (BENADRYL) 25 mg in NS 50 mL IVPB       Chemotherapy       pembrolizumab (KEYTRUDA) 200 mg in sodium chloride 0.9% SolP 108 mL infusion       ramucirumab (CYRAMZA) in sodium chloride 0.9% 250 mL chemo infusion  5/7/2024       Pre-Medications       diphenhydrAMINE (BENADRYL) 25 mg in NS 50 mL IVPB       Chemotherapy       ramucirumab (CYRAMZA) in sodium chloride 0.9% 250 mL chemo infusion       pembrolizumab (KEYTRUDA) 200 mg in sodium chloride 0.9% SolP 108 mL infusion  5/28/2024       Pre-Medications       diphenhydrAMINE (BENADRYL) 25 mg in NS 50 mL IVPB       Chemotherapy       ramucirumab (CYRAMZA) in sodium chloride 0.9% 250 mL chemo infusion       pembrolizumab (KEYTRUDA) 200 mg in sodium chloride 0.9% SolP 108 mL infusion  6/18/2024       Pre-Medications       diphenhydrAMINE (BENADRYL) 25 mg in NS 50 mL IVPB       Chemotherapy       ramucirumab (CYRAMZA) in sodium chloride 0.9% 250 mL  chemo infusion       pembrolizumab (KEYTRUDA) 200 mg in sodium chloride 0.9% SolP 108 mL infusion    Supportive Plan Information  IV FLUIDS AND ELECTROLYTES   Lisandro Lawrence MD   Upcoming Treatment Dates - IV FLUIDS AND ELECTROLYTES    No upcoming days in selected categories.      Lisandro Lawrence MD  [facility]  Hematology and Oncology  [facility]   [address] [place] [address]   O: [phone]  F: [phone]

## 2024-04-17 ENCOUNTER — OFFICE VISIT (OUTPATIENT)
Dept: HEMATOLOGY/ONCOLOGY | Facility: CLINIC | Age: 74
End: 2024-04-17
Payer: MEDICARE

## 2024-04-17 ENCOUNTER — DOCUMENTATION ONLY (OUTPATIENT)
Dept: INFUSION THERAPY | Facility: HOSPITAL | Age: 74
End: 2024-04-17
Payer: MEDICARE

## 2024-04-17 ENCOUNTER — OFFICE VISIT (OUTPATIENT)
Dept: PALLIATIVE MEDICINE | Facility: CLINIC | Age: 74
End: 2024-04-17
Payer: MEDICARE

## 2024-04-17 ENCOUNTER — INFUSION (OUTPATIENT)
Dept: INFUSION THERAPY | Facility: HOSPITAL | Age: 74
End: 2024-04-17
Attending: INTERNAL MEDICINE
Payer: MEDICARE

## 2024-04-17 VITALS
HEIGHT: 74 IN | HEART RATE: 71 BPM | BODY MASS INDEX: 29 KG/M2 | RESPIRATION RATE: 18 BRPM | WEIGHT: 226 LBS | DIASTOLIC BLOOD PRESSURE: 95 MMHG | SYSTOLIC BLOOD PRESSURE: 176 MMHG | TEMPERATURE: 98 F

## 2024-04-17 VITALS
WEIGHT: 226 LBS | HEART RATE: 74 BPM | SYSTOLIC BLOOD PRESSURE: 128 MMHG | TEMPERATURE: 97 F | DIASTOLIC BLOOD PRESSURE: 68 MMHG | OXYGEN SATURATION: 98 % | RESPIRATION RATE: 16 BRPM | HEIGHT: 74 IN | BODY MASS INDEX: 29 KG/M2

## 2024-04-17 DIAGNOSIS — C34.90 SQUAMOUS CELL CARCINOMA OF LUNG, UNSPECIFIED LATERALITY: Primary | ICD-10-CM

## 2024-04-17 DIAGNOSIS — C77.1 SECONDARY AND UNSPECIFIED MALIGNANT NEOPLASM OF INTRATHORACIC LYMPH NODES: ICD-10-CM

## 2024-04-17 DIAGNOSIS — C77.2 MALIGNANT NEOPLASM METASTATIC TO INTRA-ABDOMINAL LYMPH NODE: ICD-10-CM

## 2024-04-17 DIAGNOSIS — E78.5 HYPERLIPIDEMIA, UNSPECIFIED HYPERLIPIDEMIA TYPE: ICD-10-CM

## 2024-04-17 DIAGNOSIS — N18.31 CHRONIC KIDNEY DISEASE, STAGE 3A: ICD-10-CM

## 2024-04-17 DIAGNOSIS — D89.89 OTHER SPECIFIED DISORDERS INVOLVING THE IMMUNE MECHANISM, NOT ELSEWHERE CLASSIFIED: ICD-10-CM

## 2024-04-17 DIAGNOSIS — C34.90 SQUAMOUS CELL CARCINOMA OF LUNG, UNSPECIFIED LATERALITY: ICD-10-CM

## 2024-04-17 DIAGNOSIS — G89.3 CANCER RELATED PAIN: ICD-10-CM

## 2024-04-17 DIAGNOSIS — D84.821 IMMUNODEFICIENCY DUE TO DRUG THERAPY: ICD-10-CM

## 2024-04-17 DIAGNOSIS — I10 HYPERTENSION, UNSPECIFIED TYPE: ICD-10-CM

## 2024-04-17 DIAGNOSIS — Z79.899 IMMUNODEFICIENCY DUE TO DRUG THERAPY: ICD-10-CM

## 2024-04-17 PROCEDURE — 99214 OFFICE O/P EST MOD 30 MIN: CPT | Mod: PBBFAC,PN,25 | Performed by: INTERNAL MEDICINE

## 2024-04-17 PROCEDURE — G2211 COMPLEX E/M VISIT ADD ON: HCPCS | Mod: S$PBB,,, | Performed by: INTERNAL MEDICINE

## 2024-04-17 PROCEDURE — 96367 TX/PROPH/DG ADDL SEQ IV INF: CPT | Mod: PN

## 2024-04-17 PROCEDURE — 99211 OFF/OP EST MAY X REQ PHY/QHP: CPT | Mod: PBBFAC,25,27,PN | Performed by: STUDENT IN AN ORGANIZED HEALTH CARE EDUCATION/TRAINING PROGRAM

## 2024-04-17 PROCEDURE — 99999 PR PBB SHADOW E&M-EST. PATIENT-LVL IV: CPT | Mod: PBBFAC,,, | Performed by: INTERNAL MEDICINE

## 2024-04-17 PROCEDURE — 96413 CHEMO IV INFUSION 1 HR: CPT | Mod: PN

## 2024-04-17 PROCEDURE — 99999 PR PBB SHADOW E&M-EST. PATIENT-LVL I: CPT | Mod: PBBFAC,,, | Performed by: STUDENT IN AN ORGANIZED HEALTH CARE EDUCATION/TRAINING PROGRAM

## 2024-04-17 PROCEDURE — 25000003 PHARM REV CODE 250: Mod: PN | Performed by: INTERNAL MEDICINE

## 2024-04-17 PROCEDURE — 99499 UNLISTED E&M SERVICE: CPT | Mod: S$PBB,,, | Performed by: STUDENT IN AN ORGANIZED HEALTH CARE EDUCATION/TRAINING PROGRAM

## 2024-04-17 PROCEDURE — 96417 CHEMO IV INFUS EACH ADDL SEQ: CPT | Mod: PN

## 2024-04-17 PROCEDURE — 99215 OFFICE O/P EST HI 40 MIN: CPT | Mod: S$PBB,,, | Performed by: INTERNAL MEDICINE

## 2024-04-17 PROCEDURE — 63600175 PHARM REV CODE 636 W HCPCS: Mod: PN | Performed by: INTERNAL MEDICINE

## 2024-04-17 RX ORDER — HEPARIN 100 UNIT/ML
500 SYRINGE INTRAVENOUS
Status: DISCONTINUED | OUTPATIENT
Start: 2024-04-17 | End: 2024-04-17 | Stop reason: HOSPADM

## 2024-04-17 RX ORDER — EPINEPHRINE 0.3 MG/.3ML
0.3 INJECTION SUBCUTANEOUS ONCE AS NEEDED
Status: DISCONTINUED | OUTPATIENT
Start: 2024-04-17 | End: 2024-04-17 | Stop reason: HOSPADM

## 2024-04-17 RX ORDER — DIPHENHYDRAMINE HYDROCHLORIDE 50 MG/ML
50 INJECTION INTRAMUSCULAR; INTRAVENOUS ONCE AS NEEDED
Status: DISCONTINUED | OUTPATIENT
Start: 2024-04-17 | End: 2024-04-17 | Stop reason: HOSPADM

## 2024-04-17 RX ORDER — PROCHLORPERAZINE EDISYLATE 5 MG/ML
5 INJECTION INTRAMUSCULAR; INTRAVENOUS ONCE AS NEEDED
Status: DISCONTINUED | OUTPATIENT
Start: 2024-04-17 | End: 2024-04-17 | Stop reason: HOSPADM

## 2024-04-17 RX ORDER — SODIUM CHLORIDE 0.9 % (FLUSH) 0.9 %
10 SYRINGE (ML) INJECTION
Status: DISCONTINUED | OUTPATIENT
Start: 2024-04-17 | End: 2024-04-17 | Stop reason: HOSPADM

## 2024-04-17 RX ORDER — DIPHENHYDRAMINE HYDROCHLORIDE 50 MG/ML
50 INJECTION INTRAMUSCULAR; INTRAVENOUS ONCE AS NEEDED
Status: CANCELLED | OUTPATIENT
Start: 2024-04-17

## 2024-04-17 RX ORDER — HEPARIN 100 UNIT/ML
500 SYRINGE INTRAVENOUS
Status: CANCELLED | OUTPATIENT
Start: 2024-04-17

## 2024-04-17 RX ORDER — SODIUM CHLORIDE 0.9 % (FLUSH) 0.9 %
10 SYRINGE (ML) INJECTION
Status: CANCELLED | OUTPATIENT
Start: 2024-04-17

## 2024-04-17 RX ORDER — EPINEPHRINE 0.3 MG/.3ML
0.3 INJECTION SUBCUTANEOUS ONCE AS NEEDED
Status: CANCELLED | OUTPATIENT
Start: 2024-04-17

## 2024-04-17 RX ORDER — PROCHLORPERAZINE EDISYLATE 5 MG/ML
5 INJECTION INTRAMUSCULAR; INTRAVENOUS ONCE AS NEEDED
Status: CANCELLED | OUTPATIENT
Start: 2024-04-17

## 2024-04-17 RX ADMIN — SODIUM CHLORIDE 200 MG: 9 INJECTION, SOLUTION INTRAVENOUS at 12:04

## 2024-04-17 RX ADMIN — DIPHENHYDRAMINE HYDROCHLORIDE 25 MG: 50 INJECTION, SOLUTION INTRAMUSCULAR; INTRAVENOUS at 11:04

## 2024-04-17 RX ADMIN — SODIUM CHLORIDE: 9 INJECTION, SOLUTION INTRAVENOUS at 10:04

## 2024-04-17 RX ADMIN — SODIUM CHLORIDE 1000 MG: 9 INJECTION, SOLUTION INTRAVENOUS at 12:04

## 2024-04-17 NOTE — PLAN OF CARE
Tolerated cyramza/keytruda well.  No reactions noted.  No questions or concerns at this time.  Ambulated off unit in NAD.

## 2024-04-17 NOTE — PROGRESS NOTES
Oncology Nutrition   Chemotherapy Infusion Visit    Nutrition Follow Up   RD met with pt at chairside during infusion tx. Pt present for cycle 2 Keytruda/Cyramza. Pt states his appetite is good. His only complaint is he does not sleep but 3-4 hours at night. Pt reports he will nap during the day. Pt denies any N/V/D/C or any food intolerances. Pt weight has been stable.      Wt Readings from Last 10 Encounters:   04/17/24 102.5 kg (225 lb 15.5 oz)   04/17/24 102.5 kg (225 lb 15.5 oz)   03/27/24 104.8 kg (231 lb 0.7 oz)   03/27/24 104.8 kg (231 lb 0.7 oz)   03/26/24 104.4 kg (230 lb 2.6 oz)   03/19/24 104.9 kg (231 lb 4.2 oz)   03/19/24 104.9 kg (231 lb 4.2 oz)   03/13/24 102.8 kg (226 lb 10.1 oz)   03/11/24 103.8 kg (228 lb 13.4 oz)   03/05/24 103.3 kg (227 lb 11.2 oz)       All other nutrition questions/concerns addressed as appropriate. Will continue to monitor prn throughout treatment.     Machelle Gruber RDN, LDN  04/17/2024  10:59 AM

## 2024-05-06 NOTE — PROGRESS NOTES
[facility] left [facility] after infusion and was not present for 2pm palliative care appointment. Call placed to patient who states that he was unaware of appointment and is now home. He states that his left arm pain is stable with use of oxycodone. New patient appointment to be rescheduled. Team aware.

## 2024-05-07 ENCOUNTER — LAB VISIT (OUTPATIENT)
Dept: LAB | Facility: HOSPITAL | Age: 74
End: 2024-05-07
Attending: INTERNAL MEDICINE
Payer: MEDICARE

## 2024-05-07 DIAGNOSIS — D89.89 OTHER SPECIFIED DISORDERS INVOLVING THE IMMUNE MECHANISM, NOT ELSEWHERE CLASSIFIED: ICD-10-CM

## 2024-05-07 DIAGNOSIS — C34.90 SQUAMOUS CELL CARCINOMA OF LUNG, UNSPECIFIED LATERALITY: ICD-10-CM

## 2024-05-07 LAB
ALBUMIN SERPL BCP-MCNC: 3.5 G/DL (ref 3.5–5.2)
ALP SERPL-CCNC: 61 U/L (ref 55–135)
ALT SERPL W/O P-5'-P-CCNC: 24 U/L (ref 10–44)
ANION GAP SERPL CALC-SCNC: 10 MMOL/L (ref 8–16)
AST SERPL-CCNC: 30 U/L (ref 10–40)
BASOPHILS # BLD AUTO: 0.05 K/UL (ref 0–0.2)
BASOPHILS NFR BLD: 0.5 % (ref 0–1.9)
BILIRUB SERPL-MCNC: 0.5 MG/DL (ref 0.1–1)
BILIRUB UR QL STRIP: NEGATIVE
BUN SERPL-MCNC: 13 MG/DL (ref 8–23)
CALCIUM SERPL-MCNC: 9.9 MG/DL (ref 8.7–10.5)
CHLORIDE SERPL-SCNC: 104 MMOL/L (ref 95–110)
CLARITY UR REFRACT.AUTO: CLEAR
CO2 SERPL-SCNC: 22 MMOL/L (ref 23–29)
COLOR UR AUTO: YELLOW
CREAT SERPL-MCNC: 1.5 MG/DL (ref 0.5–1.4)
DIFFERENTIAL METHOD BLD: ABNORMAL
EOSINOPHIL # BLD AUTO: 0.4 K/UL (ref 0–0.5)
EOSINOPHIL NFR BLD: 4.1 % (ref 0–8)
ERYTHROCYTE [DISTWIDTH] IN BLOOD BY AUTOMATED COUNT: 16.4 % (ref 11.5–14.5)
EST. GFR  (NO RACE VARIABLE): 49 ML/MIN/1.73 M^2
GLUCOSE SERPL-MCNC: 122 MG/DL (ref 70–110)
GLUCOSE UR QL STRIP: ABNORMAL
HCT VFR BLD AUTO: 44.4 % (ref 40–54)
HGB BLD-MCNC: 14.3 G/DL (ref 14–18)
HGB UR QL STRIP: NEGATIVE
IMM GRANULOCYTES # BLD AUTO: 0.04 K/UL (ref 0–0.04)
IMM GRANULOCYTES NFR BLD AUTO: 0.4 % (ref 0–0.5)
KETONES UR QL STRIP: NEGATIVE
LEUKOCYTE ESTERASE UR QL STRIP: NEGATIVE
LYMPHOCYTES # BLD AUTO: 3.9 K/UL (ref 1–4.8)
LYMPHOCYTES NFR BLD: 35.8 % (ref 18–48)
MCH RBC QN AUTO: 28.8 PG (ref 27–31)
MCHC RBC AUTO-ENTMCNC: 32.2 G/DL (ref 32–36)
MCV RBC AUTO: 90 FL (ref 82–98)
MONOCYTES # BLD AUTO: 1 K/UL (ref 0.3–1)
MONOCYTES NFR BLD: 9.5 % (ref 4–15)
NEUTROPHILS # BLD AUTO: 5.5 K/UL (ref 1.8–7.7)
NEUTROPHILS NFR BLD: 50.1 % (ref 38–73)
NITRITE UR QL STRIP: NEGATIVE
NRBC BLD-RTO: 1 /100 WBC
PH UR STRIP: 6 [PH] (ref 5–8)
PLATELET # BLD AUTO: 394 K/UL (ref 150–450)
PMV BLD AUTO: 9 FL (ref 9.2–12.9)
POTASSIUM SERPL-SCNC: 4.3 MMOL/L (ref 3.5–5.1)
PROT SERPL-MCNC: 7.9 G/DL (ref 6–8.4)
PROT UR QL STRIP: ABNORMAL
RBC # BLD AUTO: 4.96 M/UL (ref 4.6–6.2)
SODIUM SERPL-SCNC: 136 MMOL/L (ref 136–145)
SP GR UR STRIP: 1.02 (ref 1–1.03)
TSH SERPL DL<=0.005 MIU/L-ACNC: 2.8 UIU/ML (ref 0.4–4)
URN SPEC COLLECT METH UR: ABNORMAL
WBC # BLD AUTO: 10.86 K/UL (ref 3.9–12.7)

## 2024-05-07 PROCEDURE — 36415 COLL VENOUS BLD VENIPUNCTURE: CPT | Mod: PO | Performed by: INTERNAL MEDICINE

## 2024-05-07 PROCEDURE — 80053 COMPREHEN METABOLIC PANEL: CPT | Performed by: INTERNAL MEDICINE

## 2024-05-07 PROCEDURE — 81000 URINALYSIS NONAUTO W/SCOPE: CPT | Mod: PO | Performed by: INTERNAL MEDICINE

## 2024-05-07 PROCEDURE — 84443 ASSAY THYROID STIM HORMONE: CPT | Performed by: INTERNAL MEDICINE

## 2024-05-07 PROCEDURE — 85025 COMPLETE CBC W/AUTO DIFF WBC: CPT | Mod: PO | Performed by: INTERNAL MEDICINE

## 2024-05-07 NOTE — PROGRESS NOTES
PATIENT: Feng Thakkar  MRN: [id number]  DATE: 5/8/2024      Diagnosis:   1. Squamous cell carcinoma of lung, unspecified laterality    2. Other specified disorders involving the immune mechanism, not elsewhere classified    3. Secondary and unspecified malignant neoplasm of intrathoracic lymph nodes    4. 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 Cancer related pain    6. Immunodeficiency due to drug therapy    7. Chronic kidney disease, stage 3a    8. Hyperlipidemia, unspecified hyperlipidemia type    9. Hypertension, unspecified type        Chief Complaint: Squamous Cell Lung Cancer follow up        Subjective:    Interval History: Mr. Thakkar is a 73 y.o. male who returns for follow up for review of labs and treatment. Denies fever, chills, sob, cp, palpitations, swelling, fatigue, numbness, tingling, n/v, diarrhea, constipation, abdominal pain, new bumps, lumps, bleeding, bruising. Pt reports feeling well overall. No new complaints.     Oncologic History:   Oncology History   Squamous cell carcinoma of lung   3/31/2023 Initial Diagnosis    Squamous cell carcinoma of lung     3/31/2023 Cancer Staged    Staging form: Lung, AJCC 8th Edition  - Clinical: Stage IIIA (cT2b, cN2, cM0)     4/27/2023 - 6/7/2023 Radiation Therapy    Treating physician: Leonor Hadley  Total Dose: 60 Gy  Fractions: 30  Treatment Site Ref. ID Energy Dose/Fx (Gy) #Fx Dose Correction (Gy) Total Dose (Gy) Start Date End Date Elapsed Days   IM Lung PTV 6X 2 6 / 30 0 12 4/27/2023 5/4/2023 7   IM Lung_New PTV 6X 2 24 / 24 0 48 5/5/2023 6/7/2023 33        4/28/2023 - 6/2/2023 Chemotherapy    Treatment Summary   Plan Name: OP NSCLC PACLITAXEL + CARBOPLATIN (AUC) QW + RADIATION  Treatment Goal: Curative  Status: Inactive  Start Date: 4/28/2023  End Date: 6/2/2023  Provider: Lisandro Lawrence MD  Chemotherapy: CARBOplatin (PARAPLATIN) 185 mg in sodium chloride 0.9% 303.5 mL chemo infusion, 185 mg (100 % of original dose 183.4  mg), Intravenous, Clinic/HOD 1 time, 6 of 6 cycles  Dose modification:   (original dose 183.4 mg, Cycle 1)  Administration: 185 mg (4/28/2023), 185 mg (5/5/2023), 190 mg (5/12/2023), 190 mg (5/19/2023), 180 mg (5/26/2023), 190 mg (6/2/2023)  PACLitaxeL (TAXOL) 45 mg/m2 = 102 mg in sodium chloride 0.9% 250 mL chemo infusion, 45 mg/m2 = 102 mg, Intravenous, Clinic/HOD 1 time, 6 of 6 cycles  Administration: 102 mg (4/28/2023), 102 mg (5/5/2023), 102 mg (5/12/2023), 102 mg (5/19/2023), 102 mg (5/26/2023), 102 mg (6/2/2023)     7/11/2023 - 2/21/2024 Chemotherapy    Treatment Summary   Plan Name: OP DURVALUMAB 1500 MG Q4W  Treatment Goal: Curative  Status: Inactive  Start Date: 7/11/2023  End Date: 2/21/2024  Provider: Lisandro Lawrence MD  Chemotherapy: [No matching medication found in this treatment plan]     3/27/2024 -  Chemotherapy    Treatment Summary   Plan Name: OP NSCLC ramucirumab 10 mg/kg plus pembrolizumab 200mg Q3W  Treatment Goal: Palliative  Status: Active  Start Date: 3/27/2024  End Date: 3/10/2026 (Planned)  Provider: Lisandro Lawrence MD  Chemotherapy: ramucirumab (CYRAMZA) 1,000 mg in sodium chloride 0.9% 250 mL chemo infusion, 1,048 mg (100 % of original dose 10 mg/kg), Intravenous, Clinic/HOD 1 time, 2 of 35 cycles  Dose modification: 10 mg/kg (original dose 10 mg/kg, Cycle 2, Reason: MD Discretion, Comment: Trial Dosing)  Administration: 1,000 mg (4/17/2024), 1,000 mg (3/27/2024)         Past Medical History:   Past Medical History:   Diagnosis Date    Diabetes mellitus     Gout, unspecified     High cholesterol     HTN (hypertension)     Lung cancer        Past Surgical HIstory:   Past Surgical History:   Procedure Laterality Date    ENDOSCOPIC ULTRASOUND OF UPPER GASTROINTESTINAL TRACT N/A 3/3/2023    Procedure: ULTRASOUND, UPPER GI TRACT, ENDOSCOPIC;  Surgeon: Cora Mcgrath MD;  Location: [facility];  Service: Endoscopy;  Laterality: N/A;  2/24/23-Instructions mailed to address on file-DS     ENDOSCOPIC ULTRASOUND OF UPPER GASTROINTESTINAL TRACT Left 3/13/2024    Procedure: ULTRASOUND, UPPER GI TRACT, ENDOSCOPIC;  Surgeon: Reji Aguirre MD;  Location: [facility] ENDO;  Service: Endoscopy;  Laterality: Left;    ESOPHAGOGASTRODUODENOSCOPY N/A 3/13/2024    Procedure: EGD (ESOPHAGOGASTRODUODENOSCOPY);  Surgeon: Reji Aguirre MD;  Location: [facility] ENDO;  Service: Endoscopy;  Laterality: N/A;    INSERTION OF TUNNELED CENTRAL VENOUS CATHETER (CVC) WITH SUBCUTANEOUS PORT N/A 4/24/2023    Procedure: [id number];  Surgeon: Paulino Love MD;  Location: [facility] OR;  Service: General;  Laterality: N/A;    PANCREATECTOMY      ROBOTIC BRONCHOSCOPY N/A 3/17/2023    Procedure: ROBOTIC BRONCHOSCOPY;  Surgeon: Cristiane Albright MD;  Location: [facility] OR [address] [place] [address];  Service: Pulmonary;  Laterality: N/A;       Family History:   Family History   Problem Relation Name Age of Onset    Breast cancer Sister          60       Social History:  reports that he quit smoking about 11 years ago. His smoking use included cigarettes. He started smoking about 55 years ago. He has a 81 pack-year smoking history. He has quit using smokeless tobacco. He reports current alcohol use of about 3.0 standard drinks of alcohol per week. He reports that he does not use drugs.    Allergies:  Review of patient's allergies indicates:  No Known Allergies    Medications:  Current Outpatient Medications   Medication Sig Dispense Refill    allopurinoL (ZYLOPRIM) 100 MG tablet Take 100 mg by mouth once daily.      amLODIPine (NORVASC) 2.5 MG tablet Take 2.5 mg by mouth once daily.      BYDUREON BCISE 2 mg/0.85 mL AtIn Inject 2 mg into the skin every 7 days. Every Friday      cloNIDine (CATAPRES) 0.2 MG tablet Take 0.2 mg by mouth once. Daily      ezetimibe (ZETIA) 10 mg tablet Take 10 mg by mouth once daily.      FARXIGA 10 mg tablet Take 10 mg by mouth every morning.      finasteride (PROSCAR) 5 mg tablet Take 5 mg by mouth once daily.       "FREESTYLE TEST Strp USE 1 STRIP TO CHECK GLUCOSE ONCE DAILY      levothyroxine (SYNTHROID) 25 MCG tablet Take 1 tablet (25 mcg total) by mouth before breakfast. 30 tablet 11    LIDOcaine-prilocaine (EMLA) cream Apply topically to port site one hour prior to access, cover 30 g 1    metoprolol succinate (TOPROL-XL) 100 MG 24 hr tablet Take 100 mg by mouth once daily.      oxyCODONE (ROXICODONE) 5 MG immediate release tablet Take 1 tablet (5 mg total) by mouth every 4 (four) hours as needed for Pain. 60 tablet 0    rosuvastatin (CRESTOR) 40 MG Tab Take 10 mg by mouth every evening.      TOUJEO SOLOSTAR U-300 INSULIN 300 unit/mL (1.5 mL) InPn pen SMARTSI Unit(s) SUB-Q Every Evening       No current facility-administered medications for this visit.       Review of Systems   Constitutional:  Negative for chills and fever.   HENT: Negative.     Respiratory:  Positive for cough (Chronic dry cough). Negative for shortness of breath.    Cardiovascular:  Negative for chest pain, palpitations and leg swelling.   Gastrointestinal:  Negative for constipation, diarrhea, nausea and vomiting.   Genitourinary: Negative.    Musculoskeletal:  Positive for myalgias (L arm).   Skin: Negative.    Neurological: Negative.    Hematological: Negative.    Psychiatric/Behavioral: Negative.         ECOG Performance Status:   ECOG SCORE             Objective:      Vitals:   Vitals:    24 1049   BP: 112/68   Pulse: 84   Resp: 18   Temp: 97.8 °F (36.6 °C)   TempSrc: Temporal   SpO2: 97%   Weight: 102.9 kg (226 lb 13.7 oz)   Height: 6' 2" (1.88 m)     BMI: Body mass index is 29.13 kg/m².    Physical Exam  HENT:      Head: Normocephalic.      Nose: Nose normal.      Mouth/Throat:      Mouth: Mucous membranes are moist.      Pharynx: Oropharynx is clear.   Eyes:      Pupils: Pupils are equal, round, and reactive to light.   Cardiovascular:      Rate and Rhythm: Normal rate and regular rhythm.      Heart sounds: Normal heart sounds. "   Pulmonary:      Effort: Pulmonary effort is normal.      Breath sounds: Normal breath sounds.   Abdominal:      General: Bowel sounds are normal.   Musculoskeletal:         General: Normal range of motion.      Cervical back: Normal range of motion.   Skin:     General: Skin is warm and dry.   Neurological:      Mental Status: He is alert and oriented to person, place, and time.   Psychiatric:         Mood and Affect: Mood normal.         Behavior: Behavior normal.         Laboratory Data:  Lab Visit on 05/07/2024   Component Date Value Ref Range Status    WBC 05/07/2024 10.86  3.90 - 12.70 K/uL Final    RBC 05/07/2024 4.96  4.60 - 6.20 M/uL Final    Hemoglobin 05/07/2024 14.3  14.0 - 18.0 g/dL Final    Hematocrit 05/07/2024 44.4  40.0 - 54.0 % Final    MCV 05/07/2024 90  82 - 98 fL Final    MCH 05/07/2024 28.8  27.0 - 31.0 pg Final    MCHC 05/07/2024 32.2  32.0 - 36.0 g/dL Final    RDW 05/07/2024 16.4 (H)  11.5 - 14.5 % Final    Platelets 05/07/2024 394  150 - 450 K/uL Final    MPV 05/07/2024 9.0 (L)  9.2 - 12.9 fL Final    Immature Granulocytes 05/07/2024 0.4  0.0 - 0.5 % Final    Gran # (ANC) 05/07/2024 5.5  1.8 - 7.7 K/uL Final    Immature Grans (Abs) 05/07/2024 0.04  0.00 - 0.04 K/uL Final    Lymph # 05/07/2024 3.9  1.0 - 4.8 K/uL Final    Mono # 05/07/2024 1.0  0.3 - 1.0 K/uL Final    Eos # 05/07/2024 0.4  0.0 - 0.5 K/uL Final    Baso # 05/07/2024 0.05  0.00 - 0.20 K/uL Final    nRBC 05/07/2024 1 (A)  0 /100 WBC Final    Gran % 05/07/2024 50.1  38.0 - 73.0 % Final    Lymph % 05/07/2024 35.8  18.0 - 48.0 % Final    Mono % 05/07/2024 9.5  4.0 - 15.0 % Final    Eosinophil % 05/07/2024 4.1  0.0 - 8.0 % Final    Basophil % 05/07/2024 0.5  0.0 - 1.9 % Final    Differential Method 05/07/2024 Automated   Final    Sodium 05/07/2024 136  136 - 145 mmol/L Final    Potassium 05/07/2024 4.3  3.5 - 5.1 mmol/L Final    Chloride 05/07/2024 104  95 - 110 mmol/L Final    CO2 05/07/2024 22 (L)  23 - 29 mmol/L Final    Glucose  05/07/2024 122 (H)  70 - 110 mg/dL Final    BUN 05/07/2024 13  8 - 23 mg/dL Final    Creatinine 05/07/2024 1.5 (H)  0.5 - 1.4 mg/dL Final    Calcium 05/07/2024 9.9  8.7 - 10.5 mg/dL Final    Total Protein 05/07/2024 7.9  6.0 - 8.4 g/dL Final    Albumin 05/07/2024 3.5  3.5 - 5.2 g/dL Final    Total Bilirubin 05/07/2024 0.5  0.1 - 1.0 mg/dL Final    Alkaline Phosphatase 05/07/2024 61  55 - 135 U/L Final    AST 05/07/2024 30  10 - 40 U/L Final    ALT 05/07/2024 24  10 - 44 U/L Final    eGFR 05/07/2024 49 (A)  >60 mL/min/1.73 m^2 Final    Anion Gap 05/07/2024 10  8 - 16 mmol/L Final    TSH 05/07/2024 2.802  0.400 - 4.000 uIU/mL Final    Specimen UA 05/07/2024 Urine, Clean Catch   Final    Color, UA 05/07/2024 Yellow  Yellow, Straw, Tana Final    Appearance, UA 05/07/2024 Clear  Clear Final    pH, UA 05/07/2024 6.0  5.0 - 8.0 Final    Specific Gravity, UA 05/07/2024 1.020  1.005 - 1.030 Final    Protein, UA 05/07/2024 Trace (A)  Negative Final    Glucose, UA 05/07/2024 4+ (A)  Negative Final    Ketones, UA 05/07/2024 Negative  Negative Final    Bilirubin (UA) 05/07/2024 Negative  Negative Final    Occult Blood UA 05/07/2024 Negative  Negative Final    Nitrite, UA 05/07/2024 Negative  Negative Final    Leukocytes, UA 05/07/2024 Negative  Negative Final             Imaging: Reviewed   Assessment:       1. Squamous cell carcinoma of lung, unspecified laterality    2. Other specified disorders involving the immune mechanism, not elsewhere classified    3. Secondary and unspecified malignant neoplasm of intrathoracic lymph nodes    4. 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 Cancer related pain    6. Immunodeficiency due to drug therapy    7. Chronic kidney disease, stage 3a    8. Hyperlipidemia, unspecified hyperlipidemia type    9. Hypertension, unspecified type           Plan:   NSCLC   - patient was recently diagnosed with at least stage III [id number] SCC of the left lung  -PET/CT 4/18/23 shows  continued left lung mass, mediastinal LAD  -MRI brain 4/18/23 showed no acute findings  -[facility] Blood testing showed TP 53 mutation  -[facility] tissue testing showed PD-L1 of 20%, TP53, KDM6A, CDKN2a and MTAP  -PORT placed 4/24/23 under the care of Dr. Love  -PT completed 6 cycles of Carboplatin and Paclitaxel on 6/02/23  -Radiation completed 6/07/23  -CT chest on 6/26/23 showed a decrease in LLL mass  -Will proceed with Durvalumab for 1 year of treatment  -CT chest 11/21/23 shows decreased lung mass and radiation changes  -CT Chest on 02/09/2024 showed continued radiation changes in the lung but did show intra-abdominal lymphadenopathy (see below)  -Pt s/p cycle 9 of Durvalumab  -Pt underwent EUS on 3/13/24 with biopsy of celiac LN showing metastatic SCC  -Treatment with Ramucirumab and Pembrolizumab discussed based off trial Lung [id number]  -Will proceed with cycle 3      Visit today included increased complexity associated with the care of the episodic problem (immunotherapy and ramucirumab) addressed and managing the longitudinal care of the patient due to the serious and/or complex managed problem(s) NSCLC     Pancreatic Mass   - seen on CT scans and biopsy during EUS on 03/03/2023 with path showing no evidence of malignancy  -PET/CT 4/18/23 showed uptake in the tail of the pancreas  -Possibly due to chronic pancreatitis  -Pt with stable pancreatic mass on CT abdomen 7/03/23 and 3/01/24  -Patient with enlarging surrounding lymphadenopathy  -PET/CT on 3/07/24 shows increasing LAD in the celiac region, periaortic retroperitoneum, right retrocrural area, and bilateral inguinal regions  -EUS on 3/13/24 not suggestive of a pancreatic mass     Immunodeficiency due to drug   - due to cancer treatment  -No sign of infection  -Will monitor     Cancer Related Pain   - Pt on oxycodone  -Controlled  -Will monitor     CKD   - pt with stage IIIa CKD  -Creatinine 1.5 5/07/24  -Stable  -Will monitor     HTN   - pt on  amlodipine, clonidine, metoprolol  -BP controlled  -Will monitor     Gout   - pt on allopurinol  -Currently asymptomatic  -Will monitor     HLD   - pt on rosuvastatin, zetia  -Management per PCP     DMII   - pt on toujeo, farxiga, and bydureon  -Management per PCP      Med Onc Chart Routing      Follow up with physician 3 weeks. for possible treatment with labs 1 day prior in [place]   Follow up with [facility]    Infusion scheduling note   Ok to proceed with treatment   Injection scheduling note    Labs    Imaging    Pharmacy appointment    Other referrals                  Plan was discussed with the patient at length, and he verbalized understanding. Feng was given an opportunity to ask questions that were answered to his satisfaction, and he was advised to call in the interval if any problems or questions arise.    Assessment/Plan reviewed and approved by Dr Lawrence    20 minutes were spent in coordination of patient's care, record review and counseling.    TIFFANY Lyons, FNP-C  Hematology & Oncology

## 2024-05-08 ENCOUNTER — HOSPITAL ENCOUNTER (OUTPATIENT)
Dept: RADIOLOGY | Facility: HOSPITAL | Age: 74
Discharge: HOME | End: 2024-05-08
Attending: RADIOLOGY
Payer: MEDICARE

## 2024-05-08 ENCOUNTER — INFUSION (OUTPATIENT)
Dept: INFUSION THERAPY | Facility: HOSPITAL | Age: 74
End: 2024-05-08
Attending: INTERNAL MEDICINE
Payer: MEDICARE

## 2024-05-08 ENCOUNTER — OFFICE VISIT (OUTPATIENT)
Dept: HEMATOLOGY/ONCOLOGY | Facility: CLINIC | Age: 74
End: 2024-05-08
Payer: MEDICARE

## 2024-05-08 VITALS
DIASTOLIC BLOOD PRESSURE: 68 MMHG | RESPIRATION RATE: 18 BRPM | HEIGHT: 74 IN | WEIGHT: 226.88 LBS | SYSTOLIC BLOOD PRESSURE: 112 MMHG | BODY MASS INDEX: 29.12 KG/M2 | OXYGEN SATURATION: 97 % | TEMPERATURE: 98 F | HEART RATE: 84 BPM

## 2024-05-08 VITALS
HEIGHT: 74 IN | BODY MASS INDEX: 29.12 KG/M2 | SYSTOLIC BLOOD PRESSURE: 133 MMHG | WEIGHT: 226.88 LBS | HEART RATE: 60 BPM | TEMPERATURE: 98 F | RESPIRATION RATE: 18 BRPM | OXYGEN SATURATION: 98 % | DIASTOLIC BLOOD PRESSURE: 81 MMHG

## 2024-05-08 DIAGNOSIS — D84.821 IMMUNODEFICIENCY DUE TO DRUG THERAPY: ICD-10-CM

## 2024-05-08 DIAGNOSIS — C34.92 SQUAMOUS CELL CARCINOMA OF LEFT LUNG: ICD-10-CM

## 2024-05-08 DIAGNOSIS — C34.90 SQUAMOUS CELL CARCINOMA OF LUNG, UNSPECIFIED LATERALITY: Primary | ICD-10-CM

## 2024-05-08 DIAGNOSIS — D89.89 OTHER SPECIFIED DISORDERS INVOLVING THE IMMUNE MECHANISM, NOT ELSEWHERE CLASSIFIED: ICD-10-CM

## 2024-05-08 DIAGNOSIS — E78.5 HYPERLIPIDEMIA, UNSPECIFIED HYPERLIPIDEMIA TYPE: ICD-10-CM

## 2024-05-08 DIAGNOSIS — Z79.899 IMMUNODEFICIENCY DUE TO DRUG THERAPY: ICD-10-CM

## 2024-05-08 DIAGNOSIS — C77.1 SECONDARY AND UNSPECIFIED MALIGNANT NEOPLASM OF INTRATHORACIC LYMPH NODES: ICD-10-CM

## 2024-05-08 DIAGNOSIS — I10 HYPERTENSION, UNSPECIFIED TYPE: ICD-10-CM

## 2024-05-08 DIAGNOSIS — C77.2 MALIGNANT NEOPLASM METASTATIC TO INTRA-ABDOMINAL LYMPH NODE: ICD-10-CM

## 2024-05-08 DIAGNOSIS — N18.31 CHRONIC KIDNEY DISEASE, STAGE 3A: ICD-10-CM

## 2024-05-08 DIAGNOSIS — G89.3 CANCER RELATED PAIN: ICD-10-CM

## 2024-05-08 PROCEDURE — 25500020 PHARM REV CODE 255: Mod: PO | Performed by: RADIOLOGY

## 2024-05-08 PROCEDURE — 71260 CT THORAX DX C+: CPT | Mod: TC,PO

## 2024-05-08 PROCEDURE — 25000003 PHARM REV CODE 250: Mod: PN

## 2024-05-08 PROCEDURE — 99213 OFFICE O/P EST LOW 20 MIN: CPT | Mod: S$PBB,,,

## 2024-05-08 PROCEDURE — 63600175 PHARM REV CODE 636 W HCPCS: Mod: PN

## 2024-05-08 PROCEDURE — 99999 PR PBB SHADOW E&M-EST. PATIENT-LVL IV: CPT | Mod: PBBFAC,,,

## 2024-05-08 PROCEDURE — 71260 CT THORAX DX C+: CPT | Mod: 26,,, | Performed by: STUDENT IN AN ORGANIZED HEALTH CARE EDUCATION/TRAINING PROGRAM

## 2024-05-08 PROCEDURE — 96367 TX/PROPH/DG ADDL SEQ IV INF: CPT | Mod: PN

## 2024-05-08 PROCEDURE — G2211 COMPLEX E/M VISIT ADD ON: HCPCS | Mod: S$PBB,,,

## 2024-05-08 PROCEDURE — A4216 STERILE WATER/SALINE, 10 ML: HCPCS | Mod: PN

## 2024-05-08 PROCEDURE — 99214 OFFICE O/P EST MOD 30 MIN: CPT | Mod: PBBFAC,25,PN

## 2024-05-08 PROCEDURE — 96413 CHEMO IV INFUSION 1 HR: CPT | Mod: PN

## 2024-05-08 PROCEDURE — 96417 CHEMO IV INFUS EACH ADDL SEQ: CPT | Mod: PN

## 2024-05-08 RX ORDER — SODIUM CHLORIDE 0.9 % (FLUSH) 0.9 %
10 SYRINGE (ML) INJECTION
Status: DISCONTINUED | OUTPATIENT
Start: 2024-05-08 | End: 2024-05-08 | Stop reason: HOSPADM

## 2024-05-08 RX ORDER — PROCHLORPERAZINE EDISYLATE 5 MG/ML
5 INJECTION INTRAMUSCULAR; INTRAVENOUS ONCE AS NEEDED
Status: DISCONTINUED | OUTPATIENT
Start: 2024-05-08 | End: 2024-05-08 | Stop reason: HOSPADM

## 2024-05-08 RX ORDER — EPINEPHRINE 0.3 MG/.3ML
0.3 INJECTION SUBCUTANEOUS ONCE AS NEEDED
Status: CANCELLED | OUTPATIENT
Start: 2024-05-08

## 2024-05-08 RX ORDER — HEPARIN 100 UNIT/ML
500 SYRINGE INTRAVENOUS
Status: CANCELLED | OUTPATIENT
Start: 2024-05-08

## 2024-05-08 RX ORDER — DIPHENHYDRAMINE HYDROCHLORIDE 50 MG/ML
50 INJECTION INTRAMUSCULAR; INTRAVENOUS ONCE AS NEEDED
Status: DISCONTINUED | OUTPATIENT
Start: 2024-05-08 | End: 2024-05-08 | Stop reason: HOSPADM

## 2024-05-08 RX ORDER — PROCHLORPERAZINE EDISYLATE 5 MG/ML
5 INJECTION INTRAMUSCULAR; INTRAVENOUS ONCE AS NEEDED
Status: CANCELLED | OUTPATIENT
Start: 2024-05-08

## 2024-05-08 RX ORDER — DIPHENHYDRAMINE HYDROCHLORIDE 50 MG/ML
50 INJECTION INTRAMUSCULAR; INTRAVENOUS ONCE AS NEEDED
Status: CANCELLED | OUTPATIENT
Start: 2024-05-08

## 2024-05-08 RX ORDER — EPINEPHRINE 0.3 MG/.3ML
0.3 INJECTION SUBCUTANEOUS ONCE AS NEEDED
Status: DISCONTINUED | OUTPATIENT
Start: 2024-05-08 | End: 2024-05-08 | Stop reason: HOSPADM

## 2024-05-08 RX ORDER — SODIUM CHLORIDE 0.9 % (FLUSH) 0.9 %
10 SYRINGE (ML) INJECTION
Status: CANCELLED | OUTPATIENT
Start: 2024-05-08

## 2024-05-08 RX ADMIN — Medication 10 ML: at 01:05

## 2024-05-08 RX ADMIN — SODIUM CHLORIDE 200 MG: 9 INJECTION, SOLUTION INTRAVENOUS at 01:05

## 2024-05-08 RX ADMIN — IOHEXOL 75 ML: 350 INJECTION, SOLUTION INTRAVENOUS at 10:05

## 2024-05-08 RX ADMIN — DIPHENHYDRAMINE HYDROCHLORIDE 25 MG: 50 INJECTION, SOLUTION INTRAMUSCULAR; INTRAVENOUS at 11:05

## 2024-05-08 RX ADMIN — SODIUM CHLORIDE 1000 MG: 9 INJECTION, SOLUTION INTRAVENOUS at 12:05

## 2024-05-08 RX ADMIN — SODIUM CHLORIDE: 9 INJECTION, SOLUTION INTRAVENOUS at 11:05

## 2024-05-08 NOTE — PLAN OF CARE
Problem: Adult Inpatient Plan of Care  Goal: Patient-Specific Goal (Individualized)  Outcome: Progressing  Flowsheets (Taken 5/8/2024 1350)  Anxieties, Fears or Concerns: None  Individualized Care Needs: Recliner     Problem: Fatigue (Oncology Care)  Goal: Improved Activity Tolerance  Intervention: Promote Improved Energy  Flowsheets (Taken 5/8/2024 1350)  Fatigue Management:   paced activity encouraged   fatigue-related activity identified  Sleep/Rest Enhancement:   relaxation techniques promoted   regular sleep/rest pattern promoted  Activity Management:   Ambulated -L4   Ambulated in lutz - L4  Environmental Support:   calm environment promoted   environmental consistency promoted     Problem: Adult Inpatient Plan of Care  Goal: Plan of Care Review  Outcome: Progressing  Flowsheets (Taken 5/8/2024 1350)  Plan of Care Reviewed With: patient  Tolerated treatment with no known distress.  Ambulated from infusion center with steady gait.

## 2024-05-14 ENCOUNTER — OFFICE VISIT (OUTPATIENT)
Dept: RADIATION ONCOLOGY | Facility: CLINIC | Age: 74
End: 2024-05-14
Payer: MEDICARE

## 2024-05-14 VITALS
HEIGHT: 74 IN | DIASTOLIC BLOOD PRESSURE: 78 MMHG | WEIGHT: 226.19 LBS | HEART RATE: 75 BPM | BODY MASS INDEX: 29.03 KG/M2 | OXYGEN SATURATION: 98 % | SYSTOLIC BLOOD PRESSURE: 146 MMHG | RESPIRATION RATE: 18 BRPM | TEMPERATURE: 98 F

## 2024-05-14 DIAGNOSIS — C77.1 SECONDARY AND UNSPECIFIED MALIGNANT NEOPLASM OF INTRATHORACIC LYMPH NODES: ICD-10-CM

## 2024-05-14 DIAGNOSIS — C34.92 SQUAMOUS CELL CARCINOMA OF LEFT LUNG: Primary | ICD-10-CM

## 2024-05-14 PROCEDURE — 99213 OFFICE O/P EST LOW 20 MIN: CPT | Mod: S$PBB,,, | Performed by: RADIOLOGY

## 2024-05-14 PROCEDURE — 99999 PR PBB SHADOW E&M-EST. PATIENT-LVL IV: CPT | Mod: PBBFAC,,, | Performed by: RADIOLOGY

## 2024-05-14 PROCEDURE — 99214 OFFICE O/P EST MOD 30 MIN: CPT | Mod: PBBFAC,PN | Performed by: RADIOLOGY

## 2024-05-14 NOTE — PROGRESS NOTES
[facility] Department of Radiation Oncology  Follow Up Visit Note    Diagnosis:  Feng Thakkar is a 73 y.o. 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 On maintenance Imfinzi, with POD in abdominal lymph nodes; now on Ramucirumab and Pembro.      Oncologic History:  Oncology History   Squamous cell carcinoma of lung   3/31/2023 Initial Diagnosis    Squamous cell carcinoma of lung     3/31/2023 Cancer Staged    Staging form: Lung, AJCC 8th Edition  - Clinical: Stage IIIA (cT2b, cN2, cM0)     4/27/2023 - 6/7/2023 Radiation Therapy    Treating physician: Leonor Hadley  Total Dose: 60 Gy  Fractions: 30  Treatment Site Ref. ID Energy Dose/Fx (Gy) #Fx Dose Correction (Gy) Total Dose (Gy) Start Date End Date Elapsed Days   IM Lung PTV 6X 2 6 / 30 0 12 4/27/2023 5/4/2023 7   IM Lung_New PTV 6X 2 24 / 24 0 48 5/5/2023 6/7/2023 33        4/28/2023 - 6/2/2023 Chemotherapy    Treatment Summary   Plan Name: OP NSCLC PACLITAXEL + CARBOPLATIN (AUC) QW + RADIATION  Treatment Goal: Curative  Status: Inactive  Start Date: 4/28/2023  End Date: 6/2/2023  Provider: Lisandro Lawrence MD  Chemotherapy: CARBOplatin (PARAPLATIN) 185 mg in sodium chloride 0.9% 303.5 mL chemo infusion, 185 mg (100 % of original dose 183.4 mg), Intravenous, Clinic/HOD 1 time, 6 of 6 cycles  Dose modification:   (original dose 183.4 mg, Cycle 1)  Administration: 185 mg (4/28/2023), 185 mg (5/5/2023), 190 mg (5/12/2023), 190 mg (5/19/2023), 180 mg (5/26/2023), 190 mg (6/2/2023)  PACLitaxeL (TAXOL) 45 mg/m2 = 102 mg in sodium chloride 0.9% 250 mL chemo infusion, 45 mg/m2 = 102 mg, Intravenous, Clinic/HOD 1 time, 6 of 6 cycles  Administration: 102 mg (4/28/2023), 102 mg (5/5/2023), 102 mg (5/12/2023), 102 mg (5/19/2023), 102 mg (5/26/2023), 102 mg (6/2/2023)     7/11/2023 - 2/21/2024 Chemotherapy    Treatment Summary    Plan Name: OP DURVALUMAB 1500 MG Q4W  Treatment Goal: Curative  Status: Inactive  Start Date: 7/11/2023  End Date: 2/21/2024  Provider: Lisandro Lawrence MD  Chemotherapy: [No matching medication found in this treatment plan]     3/27/2024 -  Chemotherapy    Treatment Summary   Plan Name: OP NSCLC ramucirumab 10 mg/kg plus pembrolizumab 200mg Q3W  Treatment Goal: Palliative  Status: Active  Start Date: 3/27/2024  End Date: 3/10/2026 (Planned)  Provider: Lisandro Lawrence MD  Chemotherapy: ramucirumab (CYRAMZA) 1,000 mg in sodium chloride 0.9% 250 mL chemo infusion, 1,048 mg (100 % of original dose 10 mg/kg), Intravenous, Clinic/HOD 1 time, 3 of 35 cycles  Dose modification: 10 mg/kg (original dose 10 mg/kg, Cycle 2, Reason: MD Discretion, Comment: Trial Dosing)  Administration: 1,000 mg (4/17/2024), 1,000 mg (3/27/2024), 1,000 mg (5/8/2024)          Interval History  The patient presents today for a regularly scheduled follow up visit.  He was last seen in our clinic on 12/1/23.   Since that time, he developed worsening cough with fits of chest pain on the left side 2-3 times per day in early 2/2024.    2/9/24 (early) interval CT Chest: changes consistent with post-radiation therapy changes LLL; new upper abdominal adenopathy; stable left pleural effusion    3/1/24 CT A/P: LLL consolidation, stbl, increasing lymphadenopathy in the upper abdomen and retroperitoneum; 4.0cm stable hypoenhancing panc tail mass; stable 2.1cm sclerotic left femoral head lesion    3/7/24 PET/CT:  LLL mildly hypermetabolic reticulonodular parenchyma consistent with post-radiation therapy change; resolution of prior left hilar and subcarinal hypermet lymph nodes, new hypermetabolic subareolar right breat tissue; increase in size and number of hypermetabolic abdominal and pelvic lymph nodes; subcu mass lower pelvis anterior midline with hypermetabolism, possible sebaceous cyst    3/14/24 Biopsy of celiac lymph node: +squamous cell carcinoma      3/19/24 Dx right mammogram/US: BI-RADS 2; mild gynecomastia    Systemic therapy changed to Ramucirumab and Pembro    5/8/24 CT Chest: grossly stable LLL findings; increase in retrocrural and upper abd LNs      HPI:  presented in January 2023 with complaint of abdominal pain. Outside imaging noted pancreatic mass and lungmass.       2/24/23 CT A/P: 4.7cm soft tissue mass associated with tail of pancreas; 4.3cm enhancing mass at left lung base.     3/1/23 CT Chest: 4.5 x 4.3 x 3.8cm mass with irregular lobulated margins; no enlarged lymph nodes, several micronodules which could represent intrapulmonary lymph nodes     3/3/23 Pancreatic body mass FNA: no evidence of malignancy; abundant hematopoetic and dendritic cells consistent with accessory splenic tissue     3/17/23 EBUS FNA:  Left lower lobe mass: +squamous cell carcinoma, moderately diff  + metastases Station 7 and 11L    4/18/23 MRI brain: no evidence of intracranial disease spread    4/18/23 PET/CT: There is a markedly hypermetabolic lobulated subpleural left lower lobe mass consistent with the patient's known squamous cell carcinoma.  The mass is best measured on the fused PET-CT axial images and on image 115 measures 4.5 x 3.9 cm with a max SUV of 14.0.  There also hypermetabolic lymph nodes in the left hilar region and subcarinal region most consistent with neoplastic adenopathy.  A left hilar node on image 111 measures 2.2 x 1.9 cm with a max SUV of 14.3.    4/27/23-6/7/23 definitive concurrent chemotherapy-radiation    6/26/23 CT chest with contrast: no worrisome change    7/3/23 CT A/P: interval decrease in size of known LLL squamous cell carcinoma. Stable pancreatic mass (previously PET negative)    Breathing well.  Swallowing improved, now no pain (off all pain medications), no dysphagia. Notes some xerostomia     11/21/23 CT chest: post-radiation therapy changes in the lungs, mainly left upper lobe and LLL with some bronchiectasis; prior targeted  "LLL nodule is 2.5cm (2.8cm in 2023, 4.5cm pre-treatment).    Ongoing durvalumab.  Plan for 1 year    Abnormal thyroid function with PET-cold nodule noted.  Followed by Endocrinology.  Plan for biopsy.  Started on synthroid    Review of Systems   Review of Systems   Constitutional:  Positive for malaise/fatigue (significantly improved; but ongoing). Negative for chills, fever and weight loss.   HENT:  Negative for hearing loss.    Eyes:  Negative for blurred vision and double vision.   Respiratory:  Positive for cough (dry cough at times- once most days). Negative for sputum production and shortness of breath.    Cardiovascular:  Negative for chest pain.   Gastrointestinal:  Positive for abdominal pain (occasional, usually in AMs). Negative for constipation, diarrhea, nausea and vomiting.   Musculoskeletal:  Positive for myalgias (left arm pain at times). Negative for back pain, falls, joint pain and neck pain.   Neurological:  Positive for sensory change (right foot 2/2 diabetes- intermittent). Negative for dizziness, speech change, focal weakness, weakness and headaches.       Social History:  Social History     Tobacco Use    Smoking status: Former     Current packs/day: 0.00     Average packs/day: 1.5 packs/day for 54.0 years (81.0 ttl pk-yrs)     Types: Cigarettes     Start date: 1968     Quit date: 10/20/2012     Years since quittin.5    Smokeless tobacco: Former   Substance Use Topics    Alcohol use: Yes     Alcohol/week: 3.0 standard drinks of alcohol     Types: 3 Cans of beer per week     Comment: 3 a day    Drug use: Never       Family History:  Cancer-related family history includes Breast cancer in his sister.    Exam:  Vitals:    24 1011   BP: (!) 146/78   BP Location: Right arm   Patient Position: Sitting   Pulse: 75   Resp: 18   Temp: 98 °F (36.7 °C)   SpO2: 98%   Weight: 102.6 kg (226 lb 3.1 oz)   Height: 6' 2" (1.88 m)       Constitutional: Pleasant 73 y.o. male in no acute distress. "  Well nourished. Well groomed.   HEENT: Normocephalic and atraumatic   Lungs: No audible wheezing.  Normal effort. CTAB  Musculoskeletal: No gross MSK deformities. Ambulates  Skin: No rashes appreciated.   Psych: Alert and oriented with appropriate mood and affect.  Neuro:   Grossly normal. 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 On maintenance Imfinzi with POD in abdominal LNs.   Recovering well from acute radiation therapy-related toxicites; imaging radiation pneumonitis with no current clinical symptoms  ECOG: (0) Fully active, able to carry on all predisease performance without restriction    Plan:  Follow up per Dr. Lawrence  Reviewed signs/symptoms of clinical pneumonitis with instructions to contact our office   Continue systemic with Dr. Lawrence  Follow up with Endocrinoloyg for thyroid biopsy  He was given our contact information, and he was told that he could call our clinic at anytime if he has any questions or concerns.  Follow up with other providers as directed    20 minutes spent in chart/case review, face-to-face interaction, discussion with other providers, and treatment planning/coordination of care.

## 2024-05-28 ENCOUNTER — LAB VISIT (OUTPATIENT)
Dept: LAB | Facility: HOSPITAL | Age: 74
End: 2024-05-28
Payer: MEDICARE

## 2024-05-28 DIAGNOSIS — C34.90 SQUAMOUS CELL CARCINOMA OF LUNG, UNSPECIFIED LATERALITY: ICD-10-CM

## 2024-05-28 DIAGNOSIS — D89.89 OTHER SPECIFIED DISORDERS INVOLVING THE IMMUNE MECHANISM, NOT ELSEWHERE CLASSIFIED: ICD-10-CM

## 2024-05-28 LAB
ALBUMIN SERPL BCP-MCNC: 3.5 G/DL (ref 3.5–5.2)
ALP SERPL-CCNC: 47 U/L (ref 55–135)
ALT SERPL W/O P-5'-P-CCNC: 23 U/L (ref 10–44)
AMORPH CRY UR QL COMP ASSIST: ABNORMAL
ANION GAP SERPL CALC-SCNC: 8 MMOL/L (ref 8–16)
AST SERPL-CCNC: 29 U/L (ref 10–40)
BACTERIA #/AREA URNS AUTO: ABNORMAL /HPF
BASOPHILS # BLD AUTO: 0.06 K/UL (ref 0–0.2)
BASOPHILS NFR BLD: 0.7 % (ref 0–1.9)
BILIRUB SERPL-MCNC: 0.4 MG/DL (ref 0.1–1)
BILIRUB UR QL STRIP: NEGATIVE
BUN SERPL-MCNC: 16 MG/DL (ref 8–23)
CALCIUM SERPL-MCNC: 9.6 MG/DL (ref 8.7–10.5)
CHLORIDE SERPL-SCNC: 104 MMOL/L (ref 95–110)
CLARITY UR REFRACT.AUTO: ABNORMAL
CO2 SERPL-SCNC: 24 MMOL/L (ref 23–29)
COLOR UR AUTO: ABNORMAL
CREAT SERPL-MCNC: 1.3 MG/DL (ref 0.5–1.4)
DIFFERENTIAL METHOD BLD: ABNORMAL
EOSINOPHIL # BLD AUTO: 0.4 K/UL (ref 0–0.5)
EOSINOPHIL NFR BLD: 4.7 % (ref 0–8)
ERYTHROCYTE [DISTWIDTH] IN BLOOD BY AUTOMATED COUNT: 16.1 % (ref 11.5–14.5)
EST. GFR  (NO RACE VARIABLE): 58 ML/MIN/1.73 M^2
GLUCOSE SERPL-MCNC: 90 MG/DL (ref 70–110)
GLUCOSE UR QL STRIP: ABNORMAL
HCT VFR BLD AUTO: 46.3 % (ref 40–54)
HGB BLD-MCNC: 14.7 G/DL (ref 14–18)
HGB UR QL STRIP: NEGATIVE
HYALINE CASTS UR QL AUTO: 1 /LPF
IMM GRANULOCYTES # BLD AUTO: 0.02 K/UL (ref 0–0.04)
IMM GRANULOCYTES NFR BLD AUTO: 0.2 % (ref 0–0.5)
KETONES UR QL STRIP: NEGATIVE
LEUKOCYTE ESTERASE UR QL STRIP: NEGATIVE
LYMPHOCYTES # BLD AUTO: 3.7 K/UL (ref 1–4.8)
LYMPHOCYTES NFR BLD: 40.8 % (ref 18–48)
MCH RBC QN AUTO: 28.7 PG (ref 27–31)
MCHC RBC AUTO-ENTMCNC: 31.7 G/DL (ref 32–36)
MCV RBC AUTO: 90 FL (ref 82–98)
MICROSCOPIC COMMENT: ABNORMAL
MONOCYTES # BLD AUTO: 1 K/UL (ref 0.3–1)
MONOCYTES NFR BLD: 11.1 % (ref 4–15)
NEUTROPHILS # BLD AUTO: 3.9 K/UL (ref 1.8–7.7)
NEUTROPHILS NFR BLD: 42.7 % (ref 38–73)
NITRITE UR QL STRIP: NEGATIVE
NRBC BLD-RTO: 0 /100 WBC
PH UR STRIP: 6 [PH] (ref 5–8)
PLATELET # BLD AUTO: 325 K/UL (ref 150–450)
PMV BLD AUTO: 9.3 FL (ref 9.2–12.9)
POTASSIUM SERPL-SCNC: 4.4 MMOL/L (ref 3.5–5.1)
PROT SERPL-MCNC: 7.9 G/DL (ref 6–8.4)
PROT UR QL STRIP: ABNORMAL
RBC # BLD AUTO: 5.12 M/UL (ref 4.6–6.2)
RBC #/AREA URNS AUTO: 1 /HPF (ref 0–4)
SODIUM SERPL-SCNC: 136 MMOL/L (ref 136–145)
SP GR UR STRIP: 1.02 (ref 1–1.03)
SQUAMOUS #/AREA URNS AUTO: 0 /HPF
TSH SERPL DL<=0.005 MIU/L-ACNC: 2.12 UIU/ML (ref 0.4–4)
URN SPEC COLLECT METH UR: ABNORMAL
WBC # BLD AUTO: 9.09 K/UL (ref 3.9–12.7)
WBC #/AREA URNS AUTO: 2 /HPF (ref 0–5)
YEAST UR QL AUTO: ABNORMAL

## 2024-05-28 PROCEDURE — 85025 COMPLETE CBC W/AUTO DIFF WBC: CPT | Mod: PO

## 2024-05-28 PROCEDURE — 81001 URINALYSIS AUTO W/SCOPE: CPT

## 2024-05-28 PROCEDURE — 80053 COMPREHEN METABOLIC PANEL: CPT

## 2024-05-28 PROCEDURE — 84443 ASSAY THYROID STIM HORMONE: CPT

## 2024-05-28 PROCEDURE — 36415 COLL VENOUS BLD VENIPUNCTURE: CPT | Mod: PO

## 2024-05-28 NOTE — PROGRESS NOTES
PATIENT: Feng Thakkar  MRN: [id number]  DATE: 5/29/2024      Diagnosis:   1. Squamous cell carcinoma of lung, unspecified laterality    2. Other specified disorders involving the immune mechanism, not elsewhere classified    3. Secondary and unspecified malignant neoplasm of intrathoracic lymph nodes    4. 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 Cancer related pain    6. Immunodeficiency due to drug therapy    7. Chronic kidney disease, stage 3a    8. Hyperlipidemia, unspecified hyperlipidemia type    9. Hypertension, unspecified type    10. Bacteria in urine              Chief Complaint: Squamous cell carcinoma of lung, unspecified laterality (6 week follow up)      Oncologic History:      Oncologic History     Oncologic Treatment     Pathology           Subjective:    Interval History: Mr. Thakkar is a 73 y.o. male with Gout, HLD, HTN who presents for work up of NSCLC.  Since the last clinic visit the patient endorses pain in his elbows and left shoulder after doing work in his shed.  The patient denies CP, cough, SOB, abdominal pain, nausea, vomiting, constipation, diarrhea.  The patient denies fever, chills, night sweats, weight loss, new lumps or bumps, easy bruising or bleeding.    Prior History:  The patient initially developed abdominal pain on 01/05/2023 and underwent CT of the abdomen showing a mass in the left lung base measuring 3.9 x 2.9 cm; 3 x 3.8 cm mass along the pancreatic tail; and shotty retroperitoneal lymph nodes.  The patient underwent repeat CT abdomen and pelvis on 02/24/2023 showing a 4.3 x 4 solid enhancing mass of the left lung base; thickened bladder wall; prostate gland measuring 45.1 x 4.6 cm; abnormal sclerosis in the left femoral head measuring 2.8 x 1.4 x 1.3 cm; and additional soft tissue densities in the posterior left subdiaphragmatic region measuring up to 16 mm.  CT chest on 03/01/2023 showed a 2 cm low-density lesion in the right lobe of the thyroid  gland; 4.5 x 4.3 x 3.8 cm mass in the left lower lobe; several micro nodules; Na soft tissue mass adjacent to the pancreatic tail.  Patient underwent EUS on 03/03/2023 showing a 3.5 cm and 1.8 cm round pancreatic tail lesion.  Biopsy returned results showing no evidence of malignancy and abundant hematopoietic elements and dendritic cells.  Patient then underwent EBUS under the care of Dr. Albright on 03/17/2023 showing squamous cell carcinoma in biopsy of the left lower lung lesion; squamous cell carcinoma and station 7 lymph node; positive 11 L lymph node for squamous cell carcinoma.   The patient underwent PET-CT on 04/18/2023 showing a markedly hypermetabolic lobulated subpleural left lower lobe mass measuring 4.5 x 3.9 cm with hypermetabolic lymph nodes in the left hilar region measuring 2.2 x 1.9 cm; and a 4.2 x 3.6 walk circumscribed mass in the pancreatic tail.  MRI brain on 04/18/2023 showed no acute findings.     The patient underwent CT chest on 6/26/23 showing a large right thyroid mass measuring at least 2.6 cm; left renal hypodensity measuring 11 mm favored to be a cyst; significant decrease in size of left lower lobe consolidative mass now measuring 2.4 x 2.3 cm; stable 3 mm consolidative nodule in the left upper lobe.   The patient underwent CT chest on 11/21/2023 showing patchy ground-glass density within the medial right upper lobe; ground-glass density in the left dependent left upper lobe that the left lower lobe with associated bronchiectasis; previous target peribronchial nodule in the left lower lobe measuring 2.5 cm and 2 minute cm hypodense lesion in the right thyroid nodule.   CT Chest 2/09/24 showing geographic ground-glass disease and interlobular septal thickening in the left upper lobe; several large lymph nodes in the upper abdomen near the celiac takeoff and liver hilum with lymph node ranging from 1.3-1.5 cm.    The patient underwent a CT abdomen and pelvis on 03/01/2024 showing coronary  artery calcification; 40 x 36 mm hypoenhancing pancreatic tail mass; 20 x 30 mm peripancreatic lymph node along the cranial aspect of the proximal pancreatic body; stable 13 mm periaortic lymph node; enlargement of multiple retroperitoneal lymph nodes; mesenteric haziness present within the central abdomen; enlarged inguinal lymph nodes bilaterally; and a 21 mm sclerotic bone lesion within the posterior row inferomedial left femoral head.   The patient underwent a PET-CT on 03/07/2024 showing resolution of hypermetabolic left lower lobe mass now showing signs of radiation therapy response; disappearance of hypermetabolic enlarged left hilar lymph nodes; new irregular hypermetabolic right breast tissue measuring 2.5 x 1.4 cm possibly representing the unilateral gynecomastia; interval development of new hypermetabolic lymph nodes in the celiac region, periaortic region, right retrocrural area, and bilateral inguinal regions; lentiform cutaneous subcutaneous well-circumscribed mass along the anterior midline aspect of the lower pelvis which has increased in size and become hypermetabolic now measuring 2.1 x 1.1 cm possibly representing a sebaceous cyst.     The patient underwent EUS on 03/13/2024 with gastric polyp and single duodenal polyp seen as well as a few malignant appearing lymph nodes in the celiac region which were biopsied.     Past Medical History:   Past Medical History:   Diagnosis Date    Diabetes mellitus     Gout, unspecified     High cholesterol     HTN (hypertension)     Lung cancer        Past Surgical HIstory:   Past Surgical History:   Procedure Laterality Date    ENDOSCOPIC ULTRASOUND OF UPPER GASTROINTESTINAL TRACT N/A 3/3/2023    Procedure: ULTRASOUND, UPPER GI TRACT, ENDOSCOPIC;  Surgeon: Cora Mcgrath MD;  Location: [facility];  Service: Endoscopy;  Laterality: N/A;  2/24/23-Instructions mailed to address on file-DS    ENDOSCOPIC ULTRASOUND OF UPPER GASTROINTESTINAL TRACT Left 3/13/2024     Procedure: ULTRASOUND, UPPER GI TRACT, ENDOSCOPIC;  Surgeon: Reji Aguirre MD;  Location: [facility] ENDO;  Service: Endoscopy;  Laterality: Left;    ESOPHAGOGASTRODUODENOSCOPY N/A 3/13/2024    Procedure: EGD (ESOPHAGOGASTRODUODENOSCOPY);  Surgeon: Reji Aguirre MD;  Location: [facility] ENDO;  Service: Endoscopy;  Laterality: N/A;    INSERTION OF TUNNELED CENTRAL VENOUS CATHETER (CVC) WITH SUBCUTANEOUS PORT N/A 4/24/2023    Procedure: [id number]-W-CATH;  Surgeon: Paulino Love MD;  Location: [facility] OR;  Service: General;  Laterality: N/A;    PANCREATECTOMY      ROBOTIC BRONCHOSCOPY N/A 3/17/2023    Procedure: ROBOTIC BRONCHOSCOPY;  Surgeon: Cristiane Albright MD;  Location: [address];  Service: Pulmonary;  Laterality: N/A;       Family History:   Family History   Problem Relation Name Age of Onset    Breast cancer Sister          60       Social History:  reports that he quit smoking about 11 years ago. His smoking use included cigarettes. He started smoking about 56 years ago. He has a 81 pack-year smoking history. He has quit using smokeless tobacco. He reports current alcohol use of about 3.0 standard drinks of alcohol per week. He reports that he does not use drugs.    Allergies:  Review of patient's allergies indicates:  No Known Allergies    Medications:  Current Outpatient Medications   Medication Sig Dispense Refill    allopurinoL (ZYLOPRIM) 100 MG tablet Take 100 mg by mouth once daily.      amLODIPine (NORVASC) 2.5 MG tablet Take 2.5 mg by mouth once daily.      BYDUREON BCISE 2 mg/0.85 mL AtIn Inject 2 mg into the skin every 7 days. Every Friday      cloNIDine (CATAPRES) 0.2 MG tablet Take 0.2 mg by mouth once. Daily      ezetimibe (ZETIA) 10 mg tablet Take 10 mg by mouth once daily.      FARXIGA 10 mg tablet Take 10 mg by mouth every morning.      finasteride (PROSCAR) 5 mg tablet Take 5 mg by mouth once daily.      FREESTYLE TEST Strp USE 1 STRIP TO CHECK GLUCOSE ONCE DAILY       "levothyroxine (SYNTHROID) 25 MCG tablet Take 1 tablet (25 mcg total) by mouth before breakfast. 30 tablet 11    LIDOcaine-prilocaine (EMLA) cream Apply topically to port site one hour prior to access, cover 30 g 1    metoprolol succinate (TOPROL-XL) 100 MG 24 hr tablet Take 100 mg by mouth once daily.      rosuvastatin (CRESTOR) 40 MG Tab Take 10 mg by mouth every evening.      TOUJEO SOLOSTAR U-300 INSULIN 300 unit/mL (1.5 mL) InPn pen SMARTSI Unit(s) SUB-Q Every Evening      oxyCODONE (ROXICODONE) 5 MG immediate release tablet Take 1 tablet (5 mg total) by mouth every 4 (four) hours as needed for Pain. (Patient not taking: Reported on 2024) 60 tablet 0     No current facility-administered medications for this visit.       Review of Systems   Constitutional:  Negative for chills, diaphoresis, fatigue, fever and unexpected weight change.   Respiratory:  Negative for cough and shortness of breath.    Cardiovascular:  Negative for chest pain and palpitations.   Gastrointestinal:  Negative for abdominal pain, constipation, diarrhea, nausea and vomiting.   Musculoskeletal:  Positive for arthralgias (knees and left shoulder). Negative for neck pain.   Skin:  Negative for color change and rash.   Neurological:  Negative for headaches.   Hematological:  Negative for adenopathy. Does not bruise/bleed easily.       ECOG Performance Status: 1   Objective:      Vitals:   Vitals:    24 1023   BP: 116/72   BP Location: Right arm   Patient Position: Sitting   BP Method: Large (Manual)   Pulse: 84   Resp: 16   Temp: 97 °F (36.1 °C)   TempSrc: Temporal   SpO2: 99%   Weight: 103.1 kg (227 lb 4.7 oz)   Height: 6' 2" (1.88 m)           Physical Exam  Constitutional:       General: He is not in acute distress.     Appearance: He is well-developed. He is not diaphoretic.   HENT:      Head: Normocephalic and atraumatic.   Cardiovascular:      Rate and Rhythm: Normal rate and regular rhythm.      Heart sounds: Normal heart " sounds. No murmur heard.     No friction rub. No gallop.   Pulmonary:      Effort: Pulmonary effort is normal. No respiratory distress.      Breath sounds: Normal breath sounds. No wheezing or rales.   Chest:      Chest wall: No tenderness.   Abdominal:      General: Bowel sounds are normal. There is no distension.      Palpations: Abdomen is soft. There is no mass.      Tenderness: There is no abdominal tenderness. There is no rebound.   Musculoskeletal:      Cervical back: Normal range of motion.   Lymphadenopathy:      Cervical: No cervical adenopathy.      Upper Body:      Right upper body: No supraclavicular or axillary adenopathy.      Left upper body: No supraclavicular or axillary adenopathy.   Skin:     General: Skin is warm and dry.   Neurological:      Mental Status: He is alert and oriented to person, place, and time.   Psychiatric:         Behavior: Behavior normal.         Laboratory Data:  Infusion on 05/29/2024   Component Date Value Ref Range Status    Specimen UA 05/29/2024 Urine, Clean Catch   Final    Color, UA 05/29/2024 Yellow  Yellow, Straw, Tana Final    Appearance, UA 05/29/2024 Clear  Clear Final    pH, UA 05/29/2024 6.0  5.0 - 8.0 Final    Specific Gravity, UA 05/29/2024 1.010  1.005 - 1.030 Final    Protein, UA 05/29/2024 Negative  Negative Final    Comment: Recommend a 24 hour urine protein or a urine   protein/creatinine ratio if globulin induced proteinuria is  clinically suspected.      Glucose, UA 05/29/2024 3+ (A)  Negative Final    Ketones, UA 05/29/2024 Negative  Negative Final    Bilirubin (UA) 05/29/2024 Negative  Negative Final    Occult Blood UA 05/29/2024 Negative  Negative Final    Nitrite, UA 05/29/2024 Negative  Negative Final    Leukocytes, UA 05/29/2024 Negative  Negative Final    WBC, UA 05/29/2024 3  0 - 5 /hpf Final    Bacteria 05/29/2024 Rare  None-Occ /hpf Final    Yeast, UA 05/29/2024 None  None Final    Squam Epithel, UA 05/29/2024 4  /hpf Final    Microscopic  Comment 05/29/2024 SEE COMMENT   Final    Comment: Other formed elements not mentioned in the report are not   present in the microscopic examination.      Lab Visit on 05/28/2024   Component Date Value Ref Range Status    Sodium 05/28/2024 136  136 - 145 mmol/L Final    Potassium 05/28/2024 4.4  3.5 - 5.1 mmol/L Final    Chloride 05/28/2024 104  95 - 110 mmol/L Final    CO2 05/28/2024 24  23 - 29 mmol/L Final    Glucose 05/28/2024 90  70 - 110 mg/dL Final    BUN 05/28/2024 16  8 - 23 mg/dL Final    Creatinine 05/28/2024 1.3  0.5 - 1.4 mg/dL Final    Calcium 05/28/2024 9.6  8.7 - 10.5 mg/dL Final    Total Protein 05/28/2024 7.9  6.0 - 8.4 g/dL Final    Albumin 05/28/2024 3.5  3.5 - 5.2 g/dL Final    Total Bilirubin 05/28/2024 0.4  0.1 - 1.0 mg/dL Final    Comment: For infants and newborns, interpretation of results should be based  on gestational age, weight and in agreement with clinical  observations.    Premature Infant recommended reference ranges:  Up to 24 hours.............<8.0 mg/dL  Up to 48 hours............<12.0 mg/dL  3-5 days..................<15.0 mg/dL  6-29 days.................<15.0 mg/dL      Alkaline Phosphatase 05/28/2024 47 (L)  55 - 135 U/L Final    AST 05/28/2024 29  10 - 40 U/L Final    ALT 05/28/2024 23  10 - 44 U/L Final    eGFR 05/28/2024 58 (A)  >60 mL/min/1.73 m^2 Final    Anion Gap 05/28/2024 8  8 - 16 mmol/L Final    WBC 05/28/2024 9.09  3.90 - 12.70 K/uL Final    RBC 05/28/2024 5.12  4.60 - 6.20 M/uL Final    Hemoglobin 05/28/2024 14.7  14.0 - 18.0 g/dL Final    Hematocrit 05/28/2024 46.3  40.0 - 54.0 % Final    MCV 05/28/2024 90  82 - 98 fL Final    MCH 05/28/2024 28.7  27.0 - 31.0 pg Final    MCHC 05/28/2024 31.7 (L)  32.0 - 36.0 g/dL Final    RDW 05/28/2024 16.1 (H)  11.5 - 14.5 % Final    Platelets 05/28/2024 325  150 - 450 K/uL Final    MPV 05/28/2024 9.3  9.2 - 12.9 fL Final    Immature Granulocytes 05/28/2024 0.2  0.0 - 0.5 % Final    Gran # (ANC) 05/28/2024 3.9  1.8 - 7.7 K/uL  Final    Immature Grans (Abs) 05/28/2024 0.02  0.00 - 0.04 K/uL Final    Comment: Mild elevation in immature granulocytes is non specific and   can be seen in a variety of conditions including stress response,   acute inflammation, trauma and pregnancy. Correlation with other   laboratory and clinical findings is essential.      Lymph # 05/28/2024 3.7  1.0 - 4.8 K/uL Final    Mono # 05/28/2024 1.0  0.3 - 1.0 K/uL Final    Eos # 05/28/2024 0.4  0.0 - 0.5 K/uL Final    Baso # 05/28/2024 0.06  0.00 - 0.20 K/uL Final    nRBC 05/28/2024 0  0 /100 WBC Final    Gran % 05/28/2024 42.7  38.0 - 73.0 % Final    Lymph % 05/28/2024 40.8  18.0 - 48.0 % Final    Mono % 05/28/2024 11.1  4.0 - 15.0 % Final    Eosinophil % 05/28/2024 4.7  0.0 - 8.0 % Final    Basophil % 05/28/2024 0.7  0.0 - 1.9 % Final    Differential Method 05/28/2024 Automated   Final    TSH 05/28/2024 2.116  0.400 - 4.000 uIU/mL Final    Specimen UA 05/28/2024 Urine, Clean Catch   Final    Color, UA 05/28/2024 Orange (A)  Yellow, Straw, Tana Final    Appearance, UA 05/28/2024 Cloudy (A)  Clear Final    pH, UA 05/28/2024 6.0  5.0 - 8.0 Final    Specific Gravity, UA 05/28/2024 1.025  1.005 - 1.030 Final    Protein, UA 05/28/2024 1+ (A)  Negative Final    Comment: Recommend a 24 hour urine protein or a urine   protein/creatinine ratio if globulin induced proteinuria is  clinically suspected.      Glucose, UA 05/28/2024 4+ (A)  Negative Final    Ketones, UA 05/28/2024 Negative  Negative Final    Bilirubin (UA) 05/28/2024 Negative  Negative Final    Occult Blood UA 05/28/2024 Negative  Negative Final    Nitrite, UA 05/28/2024 Negative  Negative Final    Leukocytes, UA 05/28/2024 Negative  Negative Final    RBC, UA 05/28/2024 1  0 - 4 /hpf Final    WBC, UA 05/28/2024 2  0 - 5 /hpf Final    Bacteria 05/28/2024 Many (A)  None-Occ /hpf Final    Yeast, UA 05/28/2024 None  None Final    Squam Epithel, UA 05/28/2024 0  /hpf Final    Hyaline Casts, UA 05/28/2024 1  0-1/lpf  /lpf Final    Amorphous, UA 05/28/2024 Many (A)  None-Moderate Final    Microscopic Comment 05/28/2024 SEE COMMENT   Final    Comment: Other formed elements not mentioned in the report are not   present in the microscopic examination.            Imaging:     CXR 3/19/24    Patchy airspace disease and volume loss are again noted in the left base. A left subclavian infusion catheter is seen with its tip in the region of the cavoatrial junction. No definite pneumothorax or subcutaneous emphysema is demonstrated. The heart is mildly enlarged. There is apparent relative elevation left hemidiaphragm. Degenerative changes are seen in the spine.        Assessment:       1. Squamous cell carcinoma of lung, unspecified laterality    2. Other specified disorders involving the immune mechanism, not elsewhere classified    3. Secondary and unspecified malignant neoplasm of intrathoracic lymph nodes    4. 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 Cancer related pain    6. Immunodeficiency due to drug therapy    7. Chronic kidney disease, stage 3a    8. Hyperlipidemia, unspecified hyperlipidemia type    9. Hypertension, unspecified type    10. Bacteria in urine               Plan:     NSCLC - patient was recently diagnosed with at least stage III [id number] SCC of the left lung  -PET/CT 4/18/23 shows continued left lung mass, mediastinal LAD  -MRI brain 4/18/23 showed no acute findings  -[facility] Blood testing showed TP 53 mutation  -[facility] tissue testing showed PD-L1 of 20%, TP53, KDM6A, CDKN2a and MTAP  -PORT placed 4/24/23 under the care of Dr. Love  -PT completed 6 cycles of Carboplatin and Paclitaxel on 6/02/23  -Radiation completed 6/07/23  -CT chest on 6/26/23 showed a decrease in LLL mass  -Will proceed with Durvalumab for 1 year of treatment  -CT chest 11/21/23 shows decreased lung mass and radiation changes  -CT Chest on 02/09/2024 showed continued radiation changes in the lung but did show  intra-abdominal lymphadenopathy (see below)  -Pt s/p cycle 9 of Durvalumab  -Pt underwent EUS on 3/13/24 with biopsy of celiac LN showing metastatic SCC  -Treatment with Ramucirumab and Pembrolizumab discussed based off trial Lung [id number]  -Will proceed with cycle 4  -Will repeat scans prior to next appt  Visit today included increased complexity associated with the care of the episodic problem (immunotherapy and ramucirumab) addressed and managing the longitudinal care of the patient due to the serious and/or complex managed problem(s) NSCLC    Pancreatic Mass - seen on CT scans and biopsy during EUS on 03/03/2023 with path showing no evidence of malignancy  -PET/CT 4/18/23 showed uptake in the tail of the pancreas  -Possibly due to chronic pancreatitis  -Pt with stable pancreatic mass on CT abdomen 7/03/23 and 3/01/24  -Patient with enlarging surrounding lymphadenopathy  -PET/CT on 3/07/24 shows increasing LAD in the celiac region, periaortic retroperitoneum, right retrocrural area, and bilateral inguinal regions  -EUS on 3/13/24 not suggestive of a pancreatic mass  -Will monitor    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  -No sign of infection  -Will monitor    Cancer Related Pain - Pt on oxycodone  -Will refill  -Will monitor    CKD - pt with stage IIIa CKD  -Creatinine 1.3 5/28/24  -Stable  -Will monitor    HTN - pt on amlodipine, clonidine, metoprolol  -BP controlled  -Will monitor    Gout - pt on allopurinol  -Currently asymptomatic  -Will monitor    HLD - pt on rosuvastatin, zetia  -Management per PCP    DMII - pt on toujeo, farxiga, and bydureon  -Management per PCP    Joint Pain - likely due to recent physical activity cleaning his shed  -Will monitor    Bacteruria - bacteria seen in urine on 5/28/24  -No bacteria seen on 5/29  -Pt asymptomatic     Route Chart for Scheduling    Med Onc Chart Routing      Follow up with physician 3 weeks. Pt needs a urinalysis and urine culture today.  Pt  can proceed with treatment.  Pt needs a CBC, CMP, TSH and PET/CT in 3 weeks with an appt with me and treatment the day after the labs and scan.   Follow up with RADHIKA    Infusion scheduling note    Injection scheduling note    Labs    Imaging    Pharmacy appointment    Other referrals              Treatment Plan Information   OP NSCLC ramucirumab 10 mg/kg plus pembrolizumab 200mg Q3W   Lisandro Lawrence MD   Upcoming Treatment Dates - OP NSCLC ramucirumab 10 mg/kg plus pembrolizumab 200mg Q3W    6/18/2024       Pre-Medications       diphenhydrAMINE (BENADRYL) 25 mg in NS 50 mL IVPB       Chemotherapy       ramucirumab (CYRAMZA) in sodium chloride 0.9% 250 mL chemo infusion       pembrolizumab (KEYTRUDA) 200 mg in sodium chloride 0.9% SolP 108 mL infusion  7/9/2024       Pre-Medications       diphenhydrAMINE (BENADRYL) 25 mg in NS 50 mL IVPB       Chemotherapy       ramucirumab (CYRAMZA) in sodium chloride 0.9% 250 mL chemo infusion       pembrolizumab (KEYTRUDA) 200 mg in sodium chloride 0.9% SolP 108 mL infusion  7/30/2024       Pre-Medications       diphenhydrAMINE (BENADRYL) 25 mg in NS 50 mL IVPB       Chemotherapy       ramucirumab (CYRAMZA) in sodium chloride 0.9% 250 mL chemo infusion       pembrolizumab (KEYTRUDA) 200 mg in sodium chloride 0.9% SolP 108 mL infusion  8/20/2024       Pre-Medications       diphenhydrAMINE (BENADRYL) 25 mg in NS 50 mL IVPB       Chemotherapy       ramucirumab (CYRAMZA) in sodium chloride 0.9% 250 mL chemo infusion       pembrolizumab (KEYTRUDA) 200 mg in sodium chloride 0.9% SolP 108 mL infusion    Supportive Plan Information  IV FLUIDS AND ELECTROLYTES   Lisandro Lawrence MD   Upcoming Treatment Dates - IV FLUIDS AND ELECTROLYTES    No upcoming days in selected categories.      Lisandro Lawrence MD  [facility]  Hematology and Oncology  [facility]   [address] [place] [address]   O: [phone]  F: [phone]

## 2024-05-29 ENCOUNTER — INFUSION (OUTPATIENT)
Dept: INFUSION THERAPY | Facility: HOSPITAL | Age: 74
End: 2024-05-29
Attending: INTERNAL MEDICINE
Payer: MEDICARE

## 2024-05-29 ENCOUNTER — OFFICE VISIT (OUTPATIENT)
Dept: HEMATOLOGY/ONCOLOGY | Facility: CLINIC | Age: 74
End: 2024-05-29
Payer: MEDICARE

## 2024-05-29 ENCOUNTER — DOCUMENTATION ONLY (OUTPATIENT)
Dept: INFUSION THERAPY | Facility: HOSPITAL | Age: 74
End: 2024-05-29
Payer: MEDICARE

## 2024-05-29 VITALS
DIASTOLIC BLOOD PRESSURE: 72 MMHG | HEIGHT: 74 IN | RESPIRATION RATE: 16 BRPM | HEART RATE: 84 BPM | BODY MASS INDEX: 29.17 KG/M2 | OXYGEN SATURATION: 99 % | WEIGHT: 227.31 LBS | TEMPERATURE: 97 F | SYSTOLIC BLOOD PRESSURE: 116 MMHG

## 2024-05-29 VITALS
DIASTOLIC BLOOD PRESSURE: 79 MMHG | HEART RATE: 69 BPM | SYSTOLIC BLOOD PRESSURE: 138 MMHG | OXYGEN SATURATION: 99 % | WEIGHT: 227.31 LBS | TEMPERATURE: 97 F | RESPIRATION RATE: 16 BRPM | HEIGHT: 74 IN | BODY MASS INDEX: 29.17 KG/M2

## 2024-05-29 DIAGNOSIS — C77.2 MALIGNANT NEOPLASM METASTATIC TO INTRA-ABDOMINAL LYMPH NODE: ICD-10-CM

## 2024-05-29 DIAGNOSIS — Z79.899 IMMUNODEFICIENCY DUE TO DRUG THERAPY: ICD-10-CM

## 2024-05-29 DIAGNOSIS — C34.90 SQUAMOUS CELL CARCINOMA OF LUNG, UNSPECIFIED LATERALITY: Primary | ICD-10-CM

## 2024-05-29 DIAGNOSIS — R82.71 BACTERIA IN URINE: ICD-10-CM

## 2024-05-29 DIAGNOSIS — N18.31 CHRONIC KIDNEY DISEASE, STAGE 3A: ICD-10-CM

## 2024-05-29 DIAGNOSIS — I10 HYPERTENSION, UNSPECIFIED TYPE: ICD-10-CM

## 2024-05-29 DIAGNOSIS — E78.5 HYPERLIPIDEMIA, UNSPECIFIED HYPERLIPIDEMIA TYPE: ICD-10-CM

## 2024-05-29 DIAGNOSIS — D84.821 IMMUNODEFICIENCY DUE TO DRUG THERAPY: ICD-10-CM

## 2024-05-29 DIAGNOSIS — G89.3 CANCER RELATED PAIN: ICD-10-CM

## 2024-05-29 DIAGNOSIS — D89.89 OTHER SPECIFIED DISORDERS INVOLVING THE IMMUNE MECHANISM, NOT ELSEWHERE CLASSIFIED: ICD-10-CM

## 2024-05-29 DIAGNOSIS — C77.1 SECONDARY AND UNSPECIFIED MALIGNANT NEOPLASM OF INTRATHORACIC LYMPH NODES: ICD-10-CM

## 2024-05-29 LAB
BACTERIA #/AREA URNS HPF: NORMAL /HPF
BILIRUB UR QL STRIP: NEGATIVE
CLARITY UR: CLEAR
COLOR UR: YELLOW
GLUCOSE UR QL STRIP: ABNORMAL
HGB UR QL STRIP: NEGATIVE
KETONES UR QL STRIP: NEGATIVE
LEUKOCYTE ESTERASE UR QL STRIP: NEGATIVE
MICROSCOPIC COMMENT: NORMAL
NITRITE UR QL STRIP: NEGATIVE
PH UR STRIP: 6 [PH] (ref 5–8)
PROT UR QL STRIP: NEGATIVE
SP GR UR STRIP: 1.01 (ref 1–1.03)
SQUAMOUS #/AREA URNS HPF: 4 /HPF
URN SPEC COLLECT METH UR: ABNORMAL
WBC #/AREA URNS HPF: 3 /HPF (ref 0–5)
YEAST URNS QL MICRO: NORMAL

## 2024-05-29 PROCEDURE — G2211 COMPLEX E/M VISIT ADD ON: HCPCS | Mod: S$PBB,,, | Performed by: INTERNAL MEDICINE

## 2024-05-29 PROCEDURE — 25000003 PHARM REV CODE 250: Mod: PN | Performed by: INTERNAL MEDICINE

## 2024-05-29 PROCEDURE — 81000 URINALYSIS NONAUTO W/SCOPE: CPT | Mod: PN | Performed by: INTERNAL MEDICINE

## 2024-05-29 PROCEDURE — 96417 CHEMO IV INFUS EACH ADDL SEQ: CPT | Mod: PN

## 2024-05-29 PROCEDURE — 99215 OFFICE O/P EST HI 40 MIN: CPT | Mod: S$PBB,,, | Performed by: INTERNAL MEDICINE

## 2024-05-29 PROCEDURE — A4216 STERILE WATER/SALINE, 10 ML: HCPCS | Mod: PN | Performed by: INTERNAL MEDICINE

## 2024-05-29 PROCEDURE — 99999 PR PBB SHADOW E&M-EST. PATIENT-LVL IV: CPT | Mod: PBBFAC,,, | Performed by: INTERNAL MEDICINE

## 2024-05-29 PROCEDURE — 96367 TX/PROPH/DG ADDL SEQ IV INF: CPT | Mod: PN

## 2024-05-29 PROCEDURE — 99214 OFFICE O/P EST MOD 30 MIN: CPT | Mod: PBBFAC,25,PN | Performed by: INTERNAL MEDICINE

## 2024-05-29 PROCEDURE — 96413 CHEMO IV INFUSION 1 HR: CPT | Mod: PN

## 2024-05-29 PROCEDURE — 63600175 PHARM REV CODE 636 W HCPCS: Mod: JZ,JG,PN | Performed by: INTERNAL MEDICINE

## 2024-05-29 RX ORDER — DIPHENHYDRAMINE HYDROCHLORIDE 50 MG/ML
50 INJECTION INTRAMUSCULAR; INTRAVENOUS ONCE AS NEEDED
Status: CANCELLED | OUTPATIENT
Start: 2024-05-29

## 2024-05-29 RX ORDER — PROCHLORPERAZINE EDISYLATE 5 MG/ML
5 INJECTION INTRAMUSCULAR; INTRAVENOUS ONCE AS NEEDED
Status: DISCONTINUED | OUTPATIENT
Start: 2024-05-29 | End: 2024-05-29 | Stop reason: HOSPADM

## 2024-05-29 RX ORDER — HEPARIN 100 UNIT/ML
500 SYRINGE INTRAVENOUS
Status: CANCELLED | OUTPATIENT
Start: 2024-05-29

## 2024-05-29 RX ORDER — SODIUM CHLORIDE 0.9 % (FLUSH) 0.9 %
10 SYRINGE (ML) INJECTION
Status: CANCELLED | OUTPATIENT
Start: 2024-05-29

## 2024-05-29 RX ORDER — DIPHENHYDRAMINE HYDROCHLORIDE 50 MG/ML
50 INJECTION INTRAMUSCULAR; INTRAVENOUS ONCE AS NEEDED
Status: DISCONTINUED | OUTPATIENT
Start: 2024-05-29 | End: 2024-05-29 | Stop reason: HOSPADM

## 2024-05-29 RX ORDER — PROCHLORPERAZINE EDISYLATE 5 MG/ML
5 INJECTION INTRAMUSCULAR; INTRAVENOUS ONCE AS NEEDED
Status: CANCELLED | OUTPATIENT
Start: 2024-05-29

## 2024-05-29 RX ORDER — SODIUM CHLORIDE 0.9 % (FLUSH) 0.9 %
10 SYRINGE (ML) INJECTION
Status: DISCONTINUED | OUTPATIENT
Start: 2024-05-29 | End: 2024-05-29 | Stop reason: HOSPADM

## 2024-05-29 RX ORDER — OXYCODONE HYDROCHLORIDE 5 MG/1
5 TABLET ORAL EVERY 4 HOURS PRN
Qty: 60 TABLET | Refills: 0 | Status: SHIPPED | OUTPATIENT
Start: 2024-05-29

## 2024-05-29 RX ORDER — EPINEPHRINE 0.3 MG/.3ML
0.3 INJECTION SUBCUTANEOUS ONCE AS NEEDED
Status: CANCELLED | OUTPATIENT
Start: 2024-05-29

## 2024-05-29 RX ORDER — EPINEPHRINE 0.3 MG/.3ML
0.3 INJECTION SUBCUTANEOUS ONCE AS NEEDED
Status: DISCONTINUED | OUTPATIENT
Start: 2024-05-29 | End: 2024-05-29 | Stop reason: HOSPADM

## 2024-05-29 RX ADMIN — Medication 10 ML: at 01:05

## 2024-05-29 RX ADMIN — SODIUM CHLORIDE 200 MG: 9 INJECTION, SOLUTION INTRAVENOUS at 12:05

## 2024-05-29 RX ADMIN — DIPHENHYDRAMINE HYDROCHLORIDE 25 MG: 50 INJECTION, SOLUTION INTRAMUSCULAR; INTRAVENOUS at 11:05

## 2024-05-29 RX ADMIN — SODIUM CHLORIDE: 9 INJECTION, SOLUTION INTRAVENOUS at 11:05

## 2024-05-29 RX ADMIN — SODIUM CHLORIDE 1000 MG: 9 INJECTION, SOLUTION INTRAVENOUS at 12:05

## 2024-05-29 NOTE — PROGRESS NOTES
Oncology Nutrition       Weight Check   Chart reviewed. Weight check completed on pt.     CBW:227#  Weight at initiation of tx:218#    Wt Readings from Last 10 Encounters:   05/29/24 103.1 kg (227 lb 4.7 oz)   05/29/24 103.1 kg (227 lb 4.7 oz)   05/14/24 102.6 kg (226 lb 3.1 oz)   05/08/24 102.9 kg (226 lb 13.7 oz)   05/08/24 102.9 kg (226 lb 13.7 oz)   04/17/24 102.5 kg (225 lb 15.5 oz)   04/17/24 102.5 kg (225 lb 15.5 oz)   03/27/24 104.8 kg (231 lb 0.7 oz)   03/27/24 104.8 kg (231 lb 0.7 oz)   03/26/24 104.4 kg (230 lb 2.6 oz)          RD plan of care: Weight is currently 227#. No significant change at this time. Per nursing nutrition risk report, pt denies any nutritional concerns or challenges at this time. RD to continue to monitor prn; no nutritional interventions are needed at this time.     Machelle Gruber, CAROL, LDN  05/29/2024  12:32 PM

## 2024-05-29 NOTE — PLAN OF CARE
Problem: Adult Inpatient Plan of Care  Goal: Plan of Care Review  Outcome: Progressing  Flowsheets (Taken 5/29/2024 1315)  Plan of Care Reviewed With: patient  Goal: Patient-Specific Goal (Individualized)  Outcome: Progressing  Flowsheets (Taken 5/29/2024 1315)  Anxieties, Fears or Concerns: fatigue  Individualized Care Needs: education, covnersation, sleep, recliner     Problem: Fatigue (Oncology Care)  Goal: Improved Activity Tolerance  Outcome: Progressing  Intervention: Promote Improved Energy  Flowsheets (Taken 5/29/2024 1315)  Fatigue Management:   paced activity encouraged   fatigue-related activity identified   frequent rest breaks encouraged  Sleep/Rest Enhancement:   relaxation techniques promoted   noise level reduced   family presence promoted   awakenings minimized   therapeutic touch utilized   natural light exposure provided  Activity Management:   Ambulated -L4   Up in chair - L3  Environmental Support:   personal routine supported   distractions minimized   calm environment promoted   rest periods encouraged     Problem: Fall Injury Risk  Goal: Absence of Fall and Fall-Related Injury  Outcome: Progressing  Intervention: Promote Injury-Free Environment  Flowsheets (Taken 5/29/2024 1315)  Safety Promotion/Fall Prevention:   instructed to call staff for mobility   supervised activity   room near unit station   high risk medications identified   in recliner, wheels locked   lighting adjusted   medications reviewed   Fall Risk reviewed with patient/family

## 2024-05-29 NOTE — PLAN OF CARE
Pt arrived to clinic today for C4D1 with Cyramza and Keytruda infusions and tolerated well with no changes throughout therapy. Pt aware of side effects and number to call for any needs and discharged to home in NAD. Pt aware of f/u appts.

## 2024-05-29 NOTE — PROGRESS NOTES
LCSW met with patient at chairside while receiving infusion. SW shared role as part of the care team. The patient denied any emotional or practical needs at this time. Contact information was given, and the patient was encouraged to reach out if any needs arise.

## 2024-06-18 ENCOUNTER — LAB VISIT (OUTPATIENT)
Dept: LAB | Facility: HOSPITAL | Age: 74
End: 2024-06-18
Attending: INTERNAL MEDICINE
Payer: MEDICARE

## 2024-06-18 DIAGNOSIS — C34.90 SQUAMOUS CELL CARCINOMA OF LUNG, UNSPECIFIED LATERALITY: ICD-10-CM

## 2024-06-18 LAB
ALBUMIN SERPL BCP-MCNC: 3.2 G/DL (ref 3.5–5.2)
ALP SERPL-CCNC: 56 U/L (ref 55–135)
ALT SERPL W/O P-5'-P-CCNC: 24 U/L (ref 10–44)
ANION GAP SERPL CALC-SCNC: 7 MMOL/L (ref 8–16)
AST SERPL-CCNC: 27 U/L (ref 10–40)
BACTERIA #/AREA URNS AUTO: NORMAL /HPF
BASOPHILS # BLD AUTO: 0.05 K/UL (ref 0–0.2)
BASOPHILS NFR BLD: 0.5 % (ref 0–1.9)
BILIRUB SERPL-MCNC: 0.3 MG/DL (ref 0.1–1)
BILIRUB UR QL STRIP: NEGATIVE
BUN SERPL-MCNC: 12 MG/DL (ref 8–23)
CALCIUM SERPL-MCNC: 9.6 MG/DL (ref 8.7–10.5)
CHLORIDE SERPL-SCNC: 105 MMOL/L (ref 95–110)
CLARITY UR REFRACT.AUTO: CLEAR
CO2 SERPL-SCNC: 25 MMOL/L (ref 23–29)
COLOR UR AUTO: YELLOW
CREAT SERPL-MCNC: 1.3 MG/DL (ref 0.5–1.4)
DIFFERENTIAL METHOD BLD: ABNORMAL
EOSINOPHIL # BLD AUTO: 0.4 K/UL (ref 0–0.5)
EOSINOPHIL NFR BLD: 4.5 % (ref 0–8)
ERYTHROCYTE [DISTWIDTH] IN BLOOD BY AUTOMATED COUNT: 15.9 % (ref 11.5–14.5)
EST. GFR  (NO RACE VARIABLE): 58 ML/MIN/1.73 M^2
GLUCOSE SERPL-MCNC: 121 MG/DL (ref 70–110)
GLUCOSE UR QL STRIP: ABNORMAL
HCT VFR BLD AUTO: 44.9 % (ref 40–54)
HGB BLD-MCNC: 14.2 G/DL (ref 14–18)
HGB UR QL STRIP: NEGATIVE
IMM GRANULOCYTES # BLD AUTO: 0.03 K/UL (ref 0–0.04)
IMM GRANULOCYTES NFR BLD AUTO: 0.3 % (ref 0–0.5)
KETONES UR QL STRIP: NEGATIVE
LEUKOCYTE ESTERASE UR QL STRIP: NEGATIVE
LYMPHOCYTES # BLD AUTO: 3.7 K/UL (ref 1–4.8)
LYMPHOCYTES NFR BLD: 38.8 % (ref 18–48)
MAGNESIUM SERPL-MCNC: 2 MG/DL (ref 1.6–2.6)
MCH RBC QN AUTO: 28.5 PG (ref 27–31)
MCHC RBC AUTO-ENTMCNC: 31.6 G/DL (ref 32–36)
MCV RBC AUTO: 90 FL (ref 82–98)
MICROSCOPIC COMMENT: NORMAL
MONOCYTES # BLD AUTO: 1.2 K/UL (ref 0.3–1)
MONOCYTES NFR BLD: 12.5 % (ref 4–15)
NEUTROPHILS # BLD AUTO: 4.2 K/UL (ref 1.8–7.7)
NEUTROPHILS NFR BLD: 43.7 % (ref 38–73)
NITRITE UR QL STRIP: NEGATIVE
NRBC BLD-RTO: 0 /100 WBC
PH UR STRIP: 6 [PH] (ref 5–8)
PLATELET # BLD AUTO: 348 K/UL (ref 150–450)
PMV BLD AUTO: 9 FL (ref 9.2–12.9)
POTASSIUM SERPL-SCNC: 4.4 MMOL/L (ref 3.5–5.1)
PROT SERPL-MCNC: 7.7 G/DL (ref 6–8.4)
PROT UR QL STRIP: NEGATIVE
RBC # BLD AUTO: 4.99 M/UL (ref 4.6–6.2)
RBC #/AREA URNS AUTO: 1 /HPF (ref 0–4)
SODIUM SERPL-SCNC: 137 MMOL/L (ref 136–145)
SP GR UR STRIP: 1.03 (ref 1–1.03)
SQUAMOUS #/AREA URNS AUTO: 3 /HPF
URN SPEC COLLECT METH UR: ABNORMAL
WBC # BLD AUTO: 9.57 K/UL (ref 3.9–12.7)
WBC #/AREA URNS AUTO: 3 /HPF (ref 0–5)
YEAST UR QL AUTO: NORMAL

## 2024-06-18 PROCEDURE — 36415 COLL VENOUS BLD VENIPUNCTURE: CPT | Mod: PO | Performed by: INTERNAL MEDICINE

## 2024-06-18 PROCEDURE — 80053 COMPREHEN METABOLIC PANEL: CPT | Performed by: INTERNAL MEDICINE

## 2024-06-18 PROCEDURE — 81001 URINALYSIS AUTO W/SCOPE: CPT | Performed by: INTERNAL MEDICINE

## 2024-06-18 PROCEDURE — 85025 COMPLETE CBC W/AUTO DIFF WBC: CPT | Mod: PO | Performed by: INTERNAL MEDICINE

## 2024-06-18 PROCEDURE — 83735 ASSAY OF MAGNESIUM: CPT | Performed by: INTERNAL MEDICINE

## 2024-06-19 ENCOUNTER — INFUSION (OUTPATIENT)
Dept: INFUSION THERAPY | Facility: HOSPITAL | Age: 74
End: 2024-06-19
Attending: INTERNAL MEDICINE
Payer: MEDICARE

## 2024-06-19 ENCOUNTER — OFFICE VISIT (OUTPATIENT)
Dept: HEMATOLOGY/ONCOLOGY | Facility: CLINIC | Age: 74
End: 2024-06-19
Payer: MEDICARE

## 2024-06-19 VITALS
HEART RATE: 72 BPM | TEMPERATURE: 97 F | HEIGHT: 74 IN | WEIGHT: 229.25 LBS | BODY MASS INDEX: 29.42 KG/M2 | DIASTOLIC BLOOD PRESSURE: 81 MMHG | SYSTOLIC BLOOD PRESSURE: 144 MMHG | OXYGEN SATURATION: 99 % | RESPIRATION RATE: 18 BRPM

## 2024-06-19 VITALS
RESPIRATION RATE: 20 BRPM | BODY MASS INDEX: 29.42 KG/M2 | TEMPERATURE: 97 F | HEIGHT: 74 IN | OXYGEN SATURATION: 99 % | HEART RATE: 72 BPM | DIASTOLIC BLOOD PRESSURE: 87 MMHG | WEIGHT: 229.25 LBS | SYSTOLIC BLOOD PRESSURE: 138 MMHG

## 2024-06-19 DIAGNOSIS — C77.1 SECONDARY AND UNSPECIFIED MALIGNANT NEOPLASM OF INTRATHORACIC LYMPH NODES: ICD-10-CM

## 2024-06-19 DIAGNOSIS — C34.90 SQUAMOUS CELL CARCINOMA OF LUNG, UNSPECIFIED LATERALITY: Primary | ICD-10-CM

## 2024-06-19 PROCEDURE — 99999 PR PBB SHADOW E&M-EST. PATIENT-LVL IV: CPT | Mod: PBBFAC,,, | Performed by: NURSE PRACTITIONER

## 2024-06-19 PROCEDURE — A4216 STERILE WATER/SALINE, 10 ML: HCPCS | Mod: PN | Performed by: NURSE PRACTITIONER

## 2024-06-19 PROCEDURE — 96417 CHEMO IV INFUS EACH ADDL SEQ: CPT | Mod: PN

## 2024-06-19 PROCEDURE — 63600175 PHARM REV CODE 636 W HCPCS: Mod: PN | Performed by: NURSE PRACTITIONER

## 2024-06-19 PROCEDURE — 96413 CHEMO IV INFUSION 1 HR: CPT | Mod: PN

## 2024-06-19 PROCEDURE — 99214 OFFICE O/P EST MOD 30 MIN: CPT | Mod: PBBFAC,PN,25 | Performed by: NURSE PRACTITIONER

## 2024-06-19 PROCEDURE — 25000003 PHARM REV CODE 250: Mod: PN | Performed by: NURSE PRACTITIONER

## 2024-06-19 PROCEDURE — 96367 TX/PROPH/DG ADDL SEQ IV INF: CPT | Mod: PN

## 2024-06-19 RX ORDER — SODIUM CHLORIDE 0.9 % (FLUSH) 0.9 %
10 SYRINGE (ML) INJECTION
Status: DISCONTINUED | OUTPATIENT
Start: 2024-06-19 | End: 2024-06-19 | Stop reason: HOSPADM

## 2024-06-19 RX ORDER — PROCHLORPERAZINE EDISYLATE 5 MG/ML
5 INJECTION INTRAMUSCULAR; INTRAVENOUS ONCE AS NEEDED
OUTPATIENT
Start: 2024-06-19

## 2024-06-19 RX ORDER — DIPHENHYDRAMINE HYDROCHLORIDE 50 MG/ML
50 INJECTION INTRAMUSCULAR; INTRAVENOUS ONCE AS NEEDED
OUTPATIENT
Start: 2024-06-19

## 2024-06-19 RX ORDER — HEPARIN 100 UNIT/ML
500 SYRINGE INTRAVENOUS
OUTPATIENT
Start: 2024-06-19

## 2024-06-19 RX ORDER — SODIUM CHLORIDE 0.9 % (FLUSH) 0.9 %
10 SYRINGE (ML) INJECTION
Status: CANCELLED | OUTPATIENT
Start: 2024-06-19

## 2024-06-19 RX ORDER — EPINEPHRINE 0.3 MG/.3ML
0.3 INJECTION SUBCUTANEOUS ONCE AS NEEDED
OUTPATIENT
Start: 2024-06-19

## 2024-06-19 RX ADMIN — SODIUM CHLORIDE: 9 INJECTION, SOLUTION INTRAVENOUS at 11:06

## 2024-06-19 RX ADMIN — SODIUM CHLORIDE 200 MG: 9 INJECTION, SOLUTION INTRAVENOUS at 12:06

## 2024-06-19 RX ADMIN — SODIUM CHLORIDE, PRESERVATIVE FREE 10 ML: 5 INJECTION INTRAVENOUS at 12:06

## 2024-06-19 RX ADMIN — SODIUM CHLORIDE 1000 MG: 9 INJECTION, SOLUTION INTRAVENOUS at 11:06

## 2024-06-19 RX ADMIN — DIPHENHYDRAMINE HYDROCHLORIDE 25 MG: 50 INJECTION, SOLUTION INTRAMUSCULAR; INTRAVENOUS at 11:06

## 2024-06-19 NOTE — PLAN OF CARE
.Pt tolerated keytruda/cyramza infusion well.  No adverse reaction noted.   Port flushed with NS and de-accessed per protocol.  Patient left clinic in no acute distress.

## 2024-06-19 NOTE — PROGRESS NOTES
PATIENT: Feng Thakkar  MRN: [id number]  DATE: 6/19/2024  Dr. Lawrence patient    Diagnosis:   1. Squamous cell carcinoma of lung, unspecified laterality    2. Secondary and unspecified malignant neoplasm of intrathoracic lymph nodes      Chief Complaint: Clearance for C5 Ramucirumab/Pembro      Oncologic History:   Oncology History   Squamous cell carcinoma of lung   3/31/2023 Initial Diagnosis    Squamous cell carcinoma of lung     3/31/2023 Cancer Staged    Staging form: Lung, AJCC 8th Edition  - Clinical: Stage IIIA (cT2b, cN2, cM0)     4/27/2023 - 6/7/2023 Radiation Therapy    Treating physician: Leonor Hadley  Total Dose: 60 Gy  Fractions: 30  Treatment Site Ref. ID Energy Dose/Fx (Gy) #Fx Dose Correction (Gy) Total Dose (Gy) Start Date End Date Elapsed Days   IM Lung PTV 6X 2 6 / 30 0 12 4/27/2023 5/4/2023 7   IM Lung_New PTV 6X 2 24 / 24 0 48 5/5/2023 6/7/2023 33        4/28/2023 - 6/2/2023 Chemotherapy    Treatment Summary   Plan Name: OP NSCLC PACLITAXEL + CARBOPLATIN (AUC) QW + RADIATION  Treatment Goal: Curative  Status: Inactive  Start Date: 4/28/2023  End Date: 6/2/2023  Provider: Lisandro Lawrence MD  Chemotherapy: CARBOplatin (PARAPLATIN) 185 mg in sodium chloride 0.9% 303.5 mL chemo infusion, 185 mg (100 % of original dose 183.4 mg), Intravenous, Clinic/HOD 1 time, 6 of 6 cycles  Dose modification:   (original dose 183.4 mg, Cycle 1)  Administration: 185 mg (4/28/2023), 185 mg (5/5/2023), 190 mg (5/12/2023), 190 mg (5/19/2023), 180 mg (5/26/2023), 190 mg (6/2/2023)  PACLitaxeL (TAXOL) 45 mg/m2 = 102 mg in sodium chloride 0.9% 250 mL chemo infusion, 45 mg/m2 = 102 mg, Intravenous, Clinic/HOD 1 time, 6 of 6 cycles  Administration: 102 mg (4/28/2023), 102 mg (5/5/2023), 102 mg (5/12/2023), 102 mg (5/19/2023), 102 mg (5/26/2023), 102 mg (6/2/2023)     7/11/2023 - 2/21/2024 Chemotherapy    Treatment Summary   Plan Name: OP DURVALUMAB 1500 MG Q4W  Treatment Goal: Curative  Status: Inactive  Start Date:  7/11/2023  End Date: 2/21/2024  Provider: Lisandro Lawrence MD  Chemotherapy: [No matching medication found in this treatment plan]     3/27/2024 -  Chemotherapy    Treatment Summary   Plan Name: OP NSCLC ramucirumab 10 mg/kg plus pembrolizumab 200mg Q3W  Treatment Goal: Palliative  Status: Active  Start Date: 3/27/2024  End Date: 3/10/2026 (Planned)  Provider: Lisandro Lawrence MD  Chemotherapy: ramucirumab (CYRAMZA) 1,000 mg in sodium chloride 0.9% 250 mL chemo infusion, 1,048 mg (100 % of original dose 10 mg/kg), Intravenous, Clinic/HOD 1 time, 4 of 35 cycles  Dose modification: 10 mg/kg (original dose 10 mg/kg, Cycle 2, Reason: MD Discretion, Comment: Trial Dosing)  Administration: 1,000 mg (4/17/2024), 1,000 mg (3/27/2024), 1,000 mg (5/8/2024), 1,000 mg (5/29/2024)             Subjective:    Interval History: Mr. Thakkar is a 73 y.o. male with Gout, HLD, HTN who presents for work up of NSCLC.  Since the last clinic visit, he reports that his urine is clear.  No dysuria.  He has been well & active.  His left shoulder continues to give him some discomfort after working outside.  Pain medicine helps some.  The patient denies CP, cough, SOB, abdominal pain, nausea, vomiting, constipation, diarrhea.  The patient denies fever, chills, night sweats, weight loss, new lumps or bumps, easy bruising or bleeding.    Prior History:  The patient initially developed abdominal pain on 01/05/2023 and underwent CT of the abdomen showing a mass in the left lung base measuring 3.9 x 2.9 cm; 3 x 3.8 cm mass along the pancreatic tail; and shotty retroperitoneal lymph nodes.  The patient underwent repeat CT abdomen and pelvis on 02/24/2023 showing a 4.3 x 4 solid enhancing mass of the left lung base; thickened bladder wall; prostate gland measuring 45.1 x 4.6 cm; abnormal sclerosis in the left femoral head measuring 2.8 x 1.4 x 1.3 cm; and additional soft tissue densities in the posterior left subdiaphragmatic region measuring up to 16  mm.  CT chest on 03/01/2023 showed a 2 cm low-density lesion in the right lobe of the thyroid gland; 4.5 x 4.3 x 3.8 cm mass in the left lower lobe; several micro nodules; Na soft tissue mass adjacent to the pancreatic tail.  Patient underwent EUS on 03/03/2023 showing a 3.5 cm and 1.8 cm round pancreatic tail lesion.  Biopsy returned results showing no evidence of malignancy and abundant hematopoietic elements and dendritic cells.  Patient then underwent EBUS under the care of Dr. Albright on 03/17/2023 showing squamous cell carcinoma in biopsy of the left lower lung lesion; squamous cell carcinoma and station 7 lymph node; positive 11 L lymph node for squamous cell carcinoma.   The patient underwent PET-CT on 04/18/2023 showing a markedly hypermetabolic lobulated subpleural left lower lobe mass measuring 4.5 x 3.9 cm with hypermetabolic lymph nodes in the left hilar region measuring 2.2 x 1.9 cm; and a 4.2 x 3.6 walk circumscribed mass in the pancreatic tail.  MRI brain on 04/18/2023 showed no acute findings.     The patient underwent CT chest on 6/26/23 showing a large right thyroid mass measuring at least 2.6 cm; left renal hypodensity measuring 11 mm favored to be a cyst; significant decrease in size of left lower lobe consolidative mass now measuring 2.4 x 2.3 cm; stable 3 mm consolidative nodule in the left upper lobe.   The patient underwent CT chest on 11/21/2023 showing patchy ground-glass density within the medial right upper lobe; ground-glass density in the left dependent left upper lobe that the left lower lobe with associated bronchiectasis; previous target peribronchial nodule in the left lower lobe measuring 2.5 cm and 2 minute cm hypodense lesion in the right thyroid nodule.   CT Chest 2/09/24 showing geographic ground-glass disease and interlobular septal thickening in the left upper lobe; several large lymph nodes in the upper abdomen near the celiac takeoff and liver hilum with lymph node ranging  from 1.3-1.5 cm.    The patient underwent a CT abdomen and pelvis on 03/01/2024 showing coronary artery calcification; 40 x 36 mm hypoenhancing pancreatic tail mass; 20 x 30 mm peripancreatic lymph node along the cranial aspect of the proximal pancreatic body; stable 13 mm periaortic lymph node; enlargement of multiple retroperitoneal lymph nodes; mesenteric haziness present within the central abdomen; enlarged inguinal lymph nodes bilaterally; and a 21 mm sclerotic bone lesion within the posterior row inferomedial left femoral head.   The patient underwent a PET-CT on 03/07/2024 showing resolution of hypermetabolic left lower lobe mass now showing signs of radiation therapy response; disappearance of hypermetabolic enlarged left hilar lymph nodes; new irregular hypermetabolic right breast tissue measuring 2.5 x 1.4 cm possibly representing the unilateral gynecomastia; interval development of new hypermetabolic lymph nodes in the celiac region, periaortic region, right retrocrural area, and bilateral inguinal regions; lentiform cutaneous subcutaneous well-circumscribed mass along the anterior midline aspect of the lower pelvis which has increased in size and become hypermetabolic now measuring 2.1 x 1.1 cm possibly representing a sebaceous cyst.     The patient underwent EUS on 03/13/2024 with gastric polyp and single duodenal polyp seen as well as a few malignant appearing lymph nodes in the celiac region which were biopsied.     Past Medical History:   Past Medical History:   Diagnosis Date    Diabetes mellitus     Gout, unspecified     High cholesterol     HTN (hypertension)     Lung cancer        Past Surgical HIstory:   Past Surgical History:   Procedure Laterality Date    ENDOSCOPIC ULTRASOUND OF UPPER GASTROINTESTINAL TRACT N/A 3/3/2023    Procedure: ULTRASOUND, UPPER GI TRACT, ENDOSCOPIC;  Surgeon: Cora Mcgrath MD;  Location: [facility];  Service: Endoscopy;  Laterality: N/A;  2/24/23-Instructions mailed to  address on file-DS    ENDOSCOPIC ULTRASOUND OF UPPER GASTROINTESTINAL TRACT Left 3/13/2024    Procedure: ULTRASOUND, UPPER GI TRACT, ENDOSCOPIC;  Surgeon: Reji Aguirre MD;  Location: [facility] ENDO;  Service: Endoscopy;  Laterality: Left;    ESOPHAGOGASTRODUODENOSCOPY N/A 3/13/2024    Procedure: EGD (ESOPHAGOGASTRODUODENOSCOPY);  Surgeon: Reji Aguirre MD;  Location: [facility] ENDO;  Service: Endoscopy;  Laterality: N/A;    INSERTION OF TUNNELED CENTRAL VENOUS CATHETER (CVC) WITH SUBCUTANEOUS PORT N/A 4/24/2023    Procedure: [id number]-CATH;  Surgeon: Paulino Loev MD;  Location: [facility] OR;  Service: General;  Laterality: N/A;    PANCREATECTOMY      ROBOTIC BRONCHOSCOPY N/A 3/17/2023    Procedure: ROBOTIC BRONCHOSCOPY;  Surgeon: Cristiane Albright MD;  Location: [facility] OR [address] [place] [address];  Service: Pulmonary;  Laterality: N/A;       Family History:   Family History   Problem Relation Name Age of Onset    Breast cancer Sister          60       Social History:  reports that he quit smoking about 11 years ago. His smoking use included cigarettes. He started smoking about 56 years ago. He has a 81 pack-year smoking history. He has quit using smokeless tobacco. He reports current alcohol use of about 3.0 standard drinks of alcohol per week. He reports that he does not use drugs.    Allergies:  Review of patient's allergies indicates:  No Known Allergies    Medications:  Current Outpatient Medications   Medication Sig Dispense Refill    allopurinoL (ZYLOPRIM) 100 MG tablet Take 100 mg by mouth once daily.      amLODIPine (NORVASC) 2.5 MG tablet Take 2.5 mg by mouth once daily.      BYDUREON BCISE 2 mg/0.85 mL AtIn Inject 2 mg into the skin every 7 days. Every Friday      cloNIDine (CATAPRES) 0.2 MG tablet Take 0.2 mg by mouth once. Daily      ezetimibe (ZETIA) 10 mg tablet Take 10 mg by mouth once daily.      FARXIGA 10 mg tablet Take 10 mg by mouth every morning.      finasteride (PROSCAR) 5 mg tablet Take 5 mg by  "mouth once daily.      FREESTYLE TEST Strp USE 1 STRIP TO CHECK GLUCOSE ONCE DAILY      levothyroxine (SYNTHROID) 25 MCG tablet Take 1 tablet (25 mcg total) by mouth before breakfast. 30 tablet 11    LIDOcaine-prilocaine (EMLA) cream Apply topically to port site one hour prior to access, cover 30 g 1    metoprolol succinate (TOPROL-XL) 100 MG 24 hr tablet Take 100 mg by mouth once daily.      oxyCODONE (ROXICODONE) 5 MG immediate release tablet Take 1 tablet (5 mg total) by mouth every 4 (four) hours as needed for Pain. (Patient not taking: Reported on 2024) 60 tablet 0    rosuvastatin (CRESTOR) 40 MG Tab Take 10 mg by mouth every evening.      TOUJEO SOLOSTAR U-300 INSULIN 300 unit/mL (1.5 mL) InPn pen SMARTSI Unit(s) SUB-Q Every Evening       No current facility-administered medications for this visit.       Review of Systems   Constitutional:  Negative for chills, diaphoresis, fatigue, fever and unexpected weight change.   Respiratory:  Negative for cough and shortness of breath.    Cardiovascular:  Negative for chest pain and palpitations.   Gastrointestinal:  Negative for abdominal pain, constipation, diarrhea, nausea and vomiting.   Musculoskeletal:  Positive for arthralgias (knees and left shoulder). Negative for neck pain.   Skin:  Negative for color change and rash.   Neurological:  Negative for headaches.   Hematological:  Negative for adenopathy. Does not bruise/bleed easily.       ECOG Performance Status: 1   Objective:      Vitals:   Weight:  Gain of 2 pounds in 3 weeks  Vitals:    24 1019   BP: (!) 144/81   BP Location: Left arm   Patient Position: Sitting   BP Method: Large (Automatic)   Pulse: 72   Resp: 18   Temp: 96.9 °F (36.1 °C)   TempSrc: Temporal   SpO2: 99%   Weight: 104 kg (229 lb 4.5 oz)   Height: 6' 2" (1.88 m)     Physical Exam  Vitals reviewed.   Constitutional:       General: He is not in acute distress.     Appearance: He is well-developed. He is not diaphoretic.   HENT: "      Head: Normocephalic and atraumatic.      Mouth/Throat:      Pharynx: Oropharynx is clear.   Eyes:      Conjunctiva/sclera: Conjunctivae normal.   Cardiovascular:      Rate and Rhythm: Normal rate and regular rhythm.      Heart sounds: Normal heart sounds. No murmur heard.     No friction rub. No gallop.   Pulmonary:      Effort: Pulmonary effort is normal. No respiratory distress.      Breath sounds: Normal breath sounds. No wheezing or rales.   Chest:      Chest wall: No tenderness.   Abdominal:      General: Bowel sounds are normal. There is no distension.      Palpations: Abdomen is soft. There is no mass.      Tenderness: There is no abdominal tenderness. There is no rebound.   Musculoskeletal:      Cervical back: Neck supple.      Right lower leg: No edema.      Left lower leg: No edema.   Lymphadenopathy:      Cervical: No cervical adenopathy.      Upper Body:      Right upper body: No supraclavicular or axillary adenopathy.      Left upper body: No supraclavicular or axillary adenopathy.      Lower Body: No right inguinal adenopathy. No left inguinal adenopathy.   Skin:     General: Skin is warm and dry.      Findings: No rash.          Neurological:      Mental Status: He is alert and oriented to person, place, and time.   Psychiatric:         Behavior: Behavior normal.         Thought Content: Thought content normal.         Laboratory Data:  Lab Visit on 06/18/2024   Component Date Value Ref Range Status    WBC 06/18/2024 9.57  3.90 - 12.70 K/uL Final    RBC 06/18/2024 4.99  4.60 - 6.20 M/uL Final    Hemoglobin 06/18/2024 14.2  14.0 - 18.0 g/dL Final    Hematocrit 06/18/2024 44.9  40.0 - 54.0 % Final    MCV 06/18/2024 90  82 - 98 fL Final    MCH 06/18/2024 28.5  27.0 - 31.0 pg Final    MCHC 06/18/2024 31.6 (L)  32.0 - 36.0 g/dL Final    RDW 06/18/2024 15.9 (H)  11.5 - 14.5 % Final    Platelets 06/18/2024 348  150 - 450 K/uL Final    MPV 06/18/2024 9.0 (L)  9.2 - 12.9 fL Final    Immature Granulocytes  06/18/2024 0.3  0.0 - 0.5 % Final    Gran # (ANC) 06/18/2024 4.2  1.8 - 7.7 K/uL Final    Immature Grans (Abs) 06/18/2024 0.03  0.00 - 0.04 K/uL Final    Comment: Mild elevation in immature granulocytes is non specific and   can be seen in a variety of conditions including stress response,   acute inflammation, trauma and pregnancy. Correlation with other   laboratory and clinical findings is essential.      Lymph # 06/18/2024 3.7  1.0 - 4.8 K/uL Final    Mono # 06/18/2024 1.2 (H)  0.3 - 1.0 K/uL Final    Eos # 06/18/2024 0.4  0.0 - 0.5 K/uL Final    Baso # 06/18/2024 0.05  0.00 - 0.20 K/uL Final    nRBC 06/18/2024 0  0 /100 WBC Final    Gran % 06/18/2024 43.7  38.0 - 73.0 % Final    Lymph % 06/18/2024 38.8  18.0 - 48.0 % Final    Mono % 06/18/2024 12.5  4.0 - 15.0 % Final    Eosinophil % 06/18/2024 4.5  0.0 - 8.0 % Final    Basophil % 06/18/2024 0.5  0.0 - 1.9 % Final    Differential Method 06/18/2024 Automated   Final    Sodium 06/18/2024 137  136 - 145 mmol/L Final    Potassium 06/18/2024 4.4  3.5 - 5.1 mmol/L Final    Chloride 06/18/2024 105  95 - 110 mmol/L Final    CO2 06/18/2024 25  23 - 29 mmol/L Final    Glucose 06/18/2024 121 (H)  70 - 110 mg/dL Final    BUN 06/18/2024 12  8 - 23 mg/dL Final    Creatinine 06/18/2024 1.3  0.5 - 1.4 mg/dL Final    Calcium 06/18/2024 9.6  8.7 - 10.5 mg/dL Final    Total Protein 06/18/2024 7.7  6.0 - 8.4 g/dL Final    Albumin 06/18/2024 3.2 (L)  3.5 - 5.2 g/dL Final    Total Bilirubin 06/18/2024 0.3  0.1 - 1.0 mg/dL Final    Comment: For infants and newborns, interpretation of results should be based  on gestational age, weight and in agreement with clinical  observations.    Premature Infant recommended reference ranges:  Up to 24 hours.............<8.0 mg/dL  Up to 48 hours............<12.0 mg/dL  3-5 days..................<15.0 mg/dL  6-29 days.................<15.0 mg/dL      Alkaline Phosphatase 06/18/2024 56  55 - 135 U/L Final    AST 06/18/2024 27  10 - 40 U/L Final     ALT 06/18/2024 24  10 - 44 U/L Final    eGFR 06/18/2024 58 (A)  >60 mL/min/1.73 m^2 Final    Anion Gap 06/18/2024 7 (L)  8 - 16 mmol/L Final    Magnesium 06/18/2024 2.0  1.6 - 2.6 mg/dL Final    Specimen UA 06/18/2024 Urine, Clean Catch   Final    Color, UA 06/18/2024 Yellow  Yellow, Straw, Tana Final    Appearance, UA 06/18/2024 Clear  Clear Final    pH, UA 06/18/2024 6.0  5.0 - 8.0 Final    Specific Gravity, UA 06/18/2024 1.030  1.005 - 1.030 Final    Protein, UA 06/18/2024 Negative  Negative Final    Comment: Recommend a 24 hour urine protein or a urine   protein/creatinine ratio if globulin induced proteinuria is  clinically suspected.      Glucose, UA 06/18/2024 4+ (A)  Negative Final    Ketones, UA 06/18/2024 Negative  Negative Final    Bilirubin (UA) 06/18/2024 Negative  Negative Final    Occult Blood UA 06/18/2024 Negative  Negative Final    Nitrite, UA 06/18/2024 Negative  Negative Final    Leukocytes, UA 06/18/2024 Negative  Negative Final    RBC, UA 06/18/2024 1  0 - 4 /hpf Final    WBC, UA 06/18/2024 3  0 - 5 /hpf Final    Bacteria 06/18/2024 None  None-Occ /hpf Final    Yeast, UA 06/18/2024 None  None Final    Squam Epithel, UA 06/18/2024 3  /hpf Final    Microscopic Comment 06/18/2024 SEE COMMENT   Final    Comment: Other formed elements not mentioned in the report are not   present in the microscopic examination.        Imaging:   CXR 3/19/24  Patchy airspace disease and volume loss are again noted in the left base. A left subclavian infusion catheter is seen with its tip in the region of the cavoatrial junction. No definite pneumothorax or subcutaneous emphysema is demonstrated. The heart is mildly enlarged. There is apparent relative elevation left hemidiaphragm. Degenerative changes are seen in the spine.        Assessment:       1. Squamous cell carcinoma of lung, unspecified laterality    2. Secondary and unspecified malignant neoplasm of intrathoracic lymph nodes         Plan:     NSCLC -  patient was recently diagnosed with at least stage III [id number] SCC of the left lung  -PET/CT 4/18/23 shows continued left lung mass, mediastinal LAD  -MRI brain 4/18/23 showed no acute findings  -TEMPUS Blood testing showed TP 53 mutation  -TEMPUS tissue testing showed PD-L1 of 20%, TP53, KDM6A, CDKN2a and MTAP  -PORT placed 4/24/23 under the care of Dr. Love  -PT completed 6 cycles of Carboplatin and Paclitaxel on 6/02/23  -Radiation completed 6/07/23  -CT chest on 6/26/23 showed a decrease in LLL mass  -Will proceed with Durvalumab for 1 year of treatment  -CT chest 11/21/23 shows decreased lung mass and radiation changes  -CT Chest on 02/09/2024 showed continued radiation changes in the lung but did show intra-abdominal lymphadenopathy (see below)  -Pt s/p cycle 9 of Durvalumab  -Pt underwent EUS on 3/13/24 with biopsy of celiac LN showing metastatic SCC  -Treatment with Ramucirumab and Pembrolizumab discussed based off trial Lung [id number]  -Will proceed with cycle 4  -Will repeat scans prior to next appt  06/19/24:  Proceed with C5 Ramucirumab/Pembro today; f/u with Dr. Lawrence as scheduled in 3 weeks with CT PET & labs prior.  Visit today included increased complexity associated with the care of the episodic problem (immunotherapy and ramucirumab) addressed and managing the longitudinal care of the patient due to the serious and/or complex managed problem(s) NSCLC    Pancreatic Mass - seen on CT scans and biopsy during EUS on 03/03/2023 with path showing no evidence of malignancy  -PET/CT 4/18/23 showed uptake in the tail of the pancreas  -Possibly due to chronic pancreatitis  -Pt with stable pancreatic mass on CT abdomen 7/03/23 and 3/01/24  -Patient with enlarging surrounding lymphadenopathy  -PET/CT on 3/07/24 shows increasing LAD in the celiac region, periaortic retroperitoneum, right retrocrural area, and bilateral inguinal regions  -EUS on 3/13/24 not suggestive of a pancreatic mass  -Will  monitor    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  -No sign of infection  -Will monitor    Cancer Related Pain - Pt on oxycodone  -stable   -Will monitor    CKD - pt with stage IIIa CKD  -Creatinine 1.3 06/18/24  -Stable  -Will monitor    HTN - pt on amlodipine, clonidine, metoprolol  -BP controlled  -Will monitor    Gout - pt on allopurinol  -Currently asymptomatic  -Will monitor    HLD - pt on rosuvastatin, zetia  -Management per PCP    DMII - pt on toujeo, farxiga, and bydureon  -Management per PCP    Joint Pain - likely due to recent physical activity cleaning his shed  -Will monitor    Bacteruria - bacteria seen in urine on 5/28/24  -No bacteria seen on 5/29  -Pt asymptomatic   -06/19/24:  UA clear; asymptomatic    20 minutes were spent in coordination of patient's care, record review and counseling.  TIFFANY Delacruz, FNP-C  [facility]    Route Chart for Scheduling    Med Onc Chart Routing      Follow up with physician . F/u with Dr. Lawrence as scheduled on 07/10 with CT PET 07/08 & LABS 07/09   Follow up with RADHIKA    Infusion scheduling note   Proceed with C5 today   Injection scheduling note    Labs    Imaging    Pharmacy appointment    Other referrals              Treatment Plan Information   OP NSCLC ramucirumab 10 mg/kg plus pembrolizumab 200mg Q3W   Lisandro Lawrence MD   Upcoming Treatment Dates - OP NSCLC ramucirumab 10 mg/kg plus pembrolizumab 200mg Q3W    6/18/2024       Pre-Medications       diphenhydrAMINE (BENADRYL) 25 mg in NS 50 mL IVPB       Chemotherapy       ramucirumab (CYRAMZA) in sodium chloride 0.9% 250 mL chemo infusion       pembrolizumab (KEYTRUDA) 200 mg in sodium chloride 0.9% SolP 108 mL infusion  7/9/2024       Pre-Medications       diphenhydrAMINE (BENADRYL) 25 mg in NS 50 mL IVPB       Chemotherapy       ramucirumab (CYRAMZA) in sodium chloride 0.9% 250 mL chemo infusion       pembrolizumab (KEYTRUDA) 200 mg in sodium  chloride 0.9% SolP 108 mL infusion  7/30/2024       Pre-Medications       diphenhydrAMINE (BENADRYL) 25 mg in NS 50 mL IVPB       Chemotherapy       ramucirumab (CYRAMZA) in sodium chloride 0.9% 250 mL chemo infusion       pembrolizumab (KEYTRUDA) 200 mg in sodium chloride 0.9% SolP 108 mL infusion  8/20/2024       Pre-Medications       diphenhydrAMINE (BENADRYL) 25 mg in NS 50 mL IVPB       Chemotherapy       ramucirumab (CYRAMZA) in sodium chloride 0.9% 250 mL chemo infusion       pembrolizumab (KEYTRUDA) 200 mg in sodium chloride 0.9% SolP 108 mL infusion    Supportive Plan Information  IV FLUIDS AND ELECTROLYTES   Lisandro Lawrence MD   Upcoming Treatment Dates - IV FLUIDS AND ELECTROLYTES    No upcoming days in selected categories.

## 2024-07-08 ENCOUNTER — HOSPITAL ENCOUNTER (OUTPATIENT)
Dept: RADIOLOGY | Facility: HOSPITAL | Age: 74
Discharge: HOME | End: 2024-07-08
Attending: INTERNAL MEDICINE
Payer: MEDICARE

## 2024-07-08 DIAGNOSIS — C34.90 SQUAMOUS CELL CARCINOMA OF LUNG, UNSPECIFIED LATERALITY: ICD-10-CM

## 2024-07-08 PROCEDURE — 78815 PET IMAGE W/CT SKULL-THIGH: CPT | Mod: 26,PS,, | Performed by: STUDENT IN AN ORGANIZED HEALTH CARE EDUCATION/TRAINING PROGRAM

## 2024-07-08 PROCEDURE — 78815 PET IMAGE W/CT SKULL-THIGH: CPT | Mod: TC,PN

## 2024-07-08 PROCEDURE — A9552 F18 FDG: HCPCS | Mod: PN | Performed by: INTERNAL MEDICINE

## 2024-07-08 RX ORDER — FLUDEOXYGLUCOSE F18 500 MCI/ML
12.2 INJECTION INTRAVENOUS
Status: COMPLETED | OUTPATIENT
Start: 2024-07-08 | End: 2024-07-08

## 2024-07-08 RX ADMIN — FLUDEOXYGLUCOSE F-18 12.2 MILLICURIE: 500 INJECTION INTRAVENOUS at 09:07

## 2024-07-08 NOTE — PROGRESS NOTES
PET Imaging Questionnaire    Are you a Diabetic? Recent Blood Sugar level? Yes    Are you anemic? Bone Marrow Stimulation Meds? No    Have you had a CT Scan, if so when & where was your last one? Yes -     Have you had a PET Scan, if so when & where was your last one? Yes -    Chemotherapy or currently on Chemotherapy? Yes    Radiation therapy? Yes    Surgical History:   Past Surgical History:   Procedure Laterality Date    ENDOSCOPIC ULTRASOUND OF UPPER GASTROINTESTINAL TRACT N/A 3/3/2023    Procedure: ULTRASOUND, UPPER GI TRACT, ENDOSCOPIC;  Surgeon: Cora Mcgrath MD;  Location: [facility];  Service: Endoscopy;  Laterality: N/A;  2/24/23-Instructions mailed to address on file-DS    ENDOSCOPIC ULTRASOUND OF UPPER GASTROINTESTINAL TRACT Left 3/13/2024    Procedure: ULTRASOUND, UPPER GI TRACT, ENDOSCOPIC;  Surgeon: Reji Aguirre MD;  Location: [facility];  Service: Endoscopy;  Laterality: Left;    ESOPHAGOGASTRODUODENOSCOPY N/A 3/13/2024    Procedure: EGD (ESOPHAGOGASTRODUODENOSCOPY);  Surgeon: Reji Aguirre MD;  Location: [facility];  Service: Endoscopy;  Laterality: N/A;    INSERTION OF TUNNELED CENTRAL VENOUS CATHETER (CVC) WITH SUBCUTANEOUS PORT N/A 4/24/2023    Procedure: [id number]-MHUL-V-CATH;  Surgeon: Paulino Love MD;  Location: [facility];  Service: General;  Laterality: N/A;    PANCREATECTOMY      ROBOTIC BRONCHOSCOPY N/A 3/17/2023    Procedure: ROBOTIC BRONCHOSCOPY;  Surgeon: Cristiane Albright MD;  Location: [address];  Service: Pulmonary;  Laterality: N/A;        Have you been fasting for at least 6 hours? Yes    Is there any chance you may be pregnant or breastfeeding? No    Assay: 12.5 MCi@:09:55   Injection Site:LT AC    Residual: 0.3 mCi@: 09:59   Technologist: Clemente Navarro Injected:12.2 mCi

## 2024-07-09 ENCOUNTER — TELEPHONE (OUTPATIENT)
Dept: HEMATOLOGY/ONCOLOGY | Facility: CLINIC | Age: 74
End: 2024-07-09
Payer: MEDICARE

## 2024-07-09 ENCOUNTER — LAB VISIT (OUTPATIENT)
Dept: LAB | Facility: HOSPITAL | Age: 74
End: 2024-07-09
Attending: INTERNAL MEDICINE
Payer: MEDICARE

## 2024-07-09 DIAGNOSIS — C34.90 SQUAMOUS CELL CARCINOMA OF LUNG, UNSPECIFIED LATERALITY: Primary | ICD-10-CM

## 2024-07-09 DIAGNOSIS — D89.89 OTHER SPECIFIED DISORDERS INVOLVING THE IMMUNE MECHANISM, NOT ELSEWHERE CLASSIFIED: ICD-10-CM

## 2024-07-09 DIAGNOSIS — C34.90 SQUAMOUS CELL CARCINOMA OF LUNG, UNSPECIFIED LATERALITY: ICD-10-CM

## 2024-07-09 LAB
ALBUMIN SERPL BCP-MCNC: 3.2 G/DL (ref 3.5–5.2)
ALP SERPL-CCNC: 62 U/L (ref 55–135)
ALT SERPL W/O P-5'-P-CCNC: 26 U/L (ref 10–44)
ANION GAP SERPL CALC-SCNC: 9 MMOL/L (ref 8–16)
AST SERPL-CCNC: 32 U/L (ref 10–40)
BACTERIA #/AREA URNS AUTO: ABNORMAL /HPF
BASOPHILS # BLD AUTO: 0.03 K/UL (ref 0–0.2)
BASOPHILS NFR BLD: 0.3 % (ref 0–1.9)
BILIRUB SERPL-MCNC: 0.3 MG/DL (ref 0.1–1)
BILIRUB UR QL STRIP: NEGATIVE
BUN SERPL-MCNC: 12 MG/DL (ref 8–23)
CALCIUM SERPL-MCNC: 9.7 MG/DL (ref 8.7–10.5)
CHLORIDE SERPL-SCNC: 102 MMOL/L (ref 95–110)
CLARITY UR REFRACT.AUTO: CLEAR
CO2 SERPL-SCNC: 24 MMOL/L (ref 23–29)
COLOR UR AUTO: YELLOW
CREAT SERPL-MCNC: 1.4 MG/DL (ref 0.5–1.4)
DIFFERENTIAL METHOD BLD: ABNORMAL
EOSINOPHIL # BLD AUTO: 0.4 K/UL (ref 0–0.5)
EOSINOPHIL NFR BLD: 3.7 % (ref 0–8)
ERYTHROCYTE [DISTWIDTH] IN BLOOD BY AUTOMATED COUNT: 15.6 % (ref 11.5–14.5)
EST. GFR  (NO RACE VARIABLE): 53 ML/MIN/1.73 M^2
GLUCOSE SERPL-MCNC: 112 MG/DL (ref 70–110)
GLUCOSE UR QL STRIP: ABNORMAL
HCT VFR BLD AUTO: 44.9 % (ref 40–54)
HGB BLD-MCNC: 14.3 G/DL (ref 14–18)
HGB UR QL STRIP: NEGATIVE
HYALINE CASTS UR QL AUTO: 0 /LPF
IMM GRANULOCYTES # BLD AUTO: 0.02 K/UL (ref 0–0.04)
IMM GRANULOCYTES NFR BLD AUTO: 0.2 % (ref 0–0.5)
KETONES UR QL STRIP: NEGATIVE
LEUKOCYTE ESTERASE UR QL STRIP: ABNORMAL
LYMPHOCYTES # BLD AUTO: 3.9 K/UL (ref 1–4.8)
LYMPHOCYTES NFR BLD: 38.3 % (ref 18–48)
MCH RBC QN AUTO: 28.4 PG (ref 27–31)
MCHC RBC AUTO-ENTMCNC: 31.8 G/DL (ref 32–36)
MCV RBC AUTO: 89 FL (ref 82–98)
MICROSCOPIC COMMENT: ABNORMAL
MONOCYTES # BLD AUTO: 1.4 K/UL (ref 0.3–1)
MONOCYTES NFR BLD: 13.6 % (ref 4–15)
NEUTROPHILS # BLD AUTO: 4.4 K/UL (ref 1.8–7.7)
NEUTROPHILS NFR BLD: 44.1 % (ref 38–73)
NITRITE UR QL STRIP: NEGATIVE
NRBC BLD-RTO: 0 /100 WBC
PH UR STRIP: 6 [PH] (ref 5–8)
PLATELET # BLD AUTO: 379 K/UL (ref 150–450)
PMV BLD AUTO: 9.4 FL (ref 9.2–12.9)
POTASSIUM SERPL-SCNC: 4.2 MMOL/L (ref 3.5–5.1)
PROT SERPL-MCNC: 8 G/DL (ref 6–8.4)
PROT UR QL STRIP: ABNORMAL
RBC # BLD AUTO: 5.04 M/UL (ref 4.6–6.2)
RBC #/AREA URNS AUTO: 1 /HPF (ref 0–4)
SODIUM SERPL-SCNC: 135 MMOL/L (ref 136–145)
SP GR UR STRIP: 1.02 (ref 1–1.03)
SQUAMOUS #/AREA URNS AUTO: 2 /HPF
TSH SERPL DL<=0.005 MIU/L-ACNC: 2.83 UIU/ML (ref 0.4–4)
URN SPEC COLLECT METH UR: ABNORMAL
WBC # BLD AUTO: 10.04 K/UL (ref 3.9–12.7)
WBC #/AREA URNS AUTO: 10 /HPF (ref 0–5)
YEAST UR QL AUTO: ABNORMAL

## 2024-07-09 PROCEDURE — 85025 COMPLETE CBC W/AUTO DIFF WBC: CPT | Mod: PO | Performed by: INTERNAL MEDICINE

## 2024-07-09 PROCEDURE — 81001 URINALYSIS AUTO W/SCOPE: CPT | Performed by: INTERNAL MEDICINE

## 2024-07-09 PROCEDURE — 36415 COLL VENOUS BLD VENIPUNCTURE: CPT | Mod: PO | Performed by: INTERNAL MEDICINE

## 2024-07-09 PROCEDURE — 84443 ASSAY THYROID STIM HORMONE: CPT | Performed by: INTERNAL MEDICINE

## 2024-07-09 PROCEDURE — 80053 COMPREHEN METABOLIC PANEL: CPT | Performed by: INTERNAL MEDICINE

## 2024-07-09 NOTE — PROGRESS NOTES
PATIENT: Feng Thakkar  MRN: [id number]  DATE: 7/10/2024      Diagnosis:   1. Squamous cell carcinoma of lung, unspecified laterality    2. 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 Other specified disorders involving the immune mechanism, not elsewhere classified    4. Immunodeficiency due to drug therapy    5. Chronic kidney disease, stage 3a    6. Hyperlipidemia, unspecified hyperlipidemia type    7. Hypertension, unspecified type    8. Cancer related pain                Chief Complaint: Squamous cell carcinoma of lung, unspecified laterality      Oncologic History:      Oncologic History     Oncologic Treatment     Pathology           Subjective:    Interval History: Mr. Thakkar is a 73 y.o. male with Gout, HLD, HTN who presents for work up of NSCLC.  Since the last clinic visit the patient underwent PET-CT on 07/08/2024 showing to metastatic left posterior cervical lymph nodes with the largest measuring 13 x 8 mm; new superior mediastinal metastatic lymph node; increasing size of left lower lobe airspace consolidation with a new nodular focus of hypermetabolic activity; progression in upper abdominal metastatic lymphadenopathy with multiple new and enlarging retroperitoneal metastatic lymph nodes; new retrocrural metastatic lymph node; and new aortocaval lymph node.    Currently the patient endorses occasional headaches as well as shoulder pains and pain in the right leg.  The patient denies CP, cough, SOB, abdominal pain, nausea, vomiting, constipation, diarrhea.  The patient denies fever, chills, night sweats, weight loss, new lumps or bumps, easy bruising or bleeding.    Prior History:  The patient initially developed abdominal pain on 01/05/2023 and underwent CT of the abdomen showing a mass in the left lung base measuring 3.9 x 2.9 cm; 3 x 3.8 cm mass along the pancreatic tail; and shotty retroperitoneal lymph nodes.  The patient underwent repeat CT abdomen and pelvis on 02/24/2023 showing a 4.3  x 4 solid enhancing mass of the left lung base; thickened bladder wall; prostate gland measuring 45.1 x 4.6 cm; abnormal sclerosis in the left femoral head measuring 2.8 x 1.4 x 1.3 cm; and additional soft tissue densities in the posterior left subdiaphragmatic region measuring up to 16 mm.  CT chest on 03/01/2023 showed a 2 cm low-density lesion in the right lobe of the thyroid gland; 4.5 x 4.3 x 3.8 cm mass in the left lower lobe; several micro nodules; Na soft tissue mass adjacent to the pancreatic tail.  Patient underwent EUS on 03/03/2023 showing a 3.5 cm and 1.8 cm round pancreatic tail lesion.  Biopsy returned results showing no evidence of malignancy and abundant hematopoietic elements and dendritic cells.  Patient then underwent EBUS under the care of Dr. Albright on 03/17/2023 showing squamous cell carcinoma in biopsy of the left lower lung lesion; squamous cell carcinoma and station 7 lymph node; positive 11 L lymph node for squamous cell carcinoma.   The patient underwent PET-CT on 04/18/2023 showing a markedly hypermetabolic lobulated subpleural left lower lobe mass measuring 4.5 x 3.9 cm with hypermetabolic lymph nodes in the left hilar region measuring 2.2 x 1.9 cm; and a 4.2 x 3.6 walk circumscribed mass in the pancreatic tail.  MRI brain on 04/18/2023 showed no acute findings.     The patient underwent CT chest on 6/26/23 showing a large right thyroid mass measuring at least 2.6 cm; left renal hypodensity measuring 11 mm favored to be a cyst; significant decrease in size of left lower lobe consolidative mass now measuring 2.4 x 2.3 cm; stable 3 mm consolidative nodule in the left upper lobe.   The patient underwent CT chest on 11/21/2023 showing patchy ground-glass density within the medial right upper lobe; ground-glass density in the left dependent left upper lobe that the left lower lobe with associated bronchiectasis; previous target peribronchial nodule in the left lower lobe measuring 2.5 cm and 2  minute cm hypodense lesion in the right thyroid nodule.   CT Chest 2/09/24 showing geographic ground-glass disease and interlobular septal thickening in the left upper lobe; several large lymph nodes in the upper abdomen near the celiac takeoff and liver hilum with lymph node ranging from 1.3-1.5 cm.    The patient underwent a CT abdomen and pelvis on 03/01/2024 showing coronary artery calcification; 40 x 36 mm hypoenhancing pancreatic tail mass; 20 x 30 mm peripancreatic lymph node along the cranial aspect of the proximal pancreatic body; stable 13 mm periaortic lymph node; enlargement of multiple retroperitoneal lymph nodes; mesenteric haziness present within the central abdomen; enlarged inguinal lymph nodes bilaterally; and a 21 mm sclerotic bone lesion within the posterior row inferomedial left femoral head.   The patient underwent a PET-CT on 03/07/2024 showing resolution of hypermetabolic left lower lobe mass now showing signs of radiation therapy response; disappearance of hypermetabolic enlarged left hilar lymph nodes; new irregular hypermetabolic right breast tissue measuring 2.5 x 1.4 cm possibly representing the unilateral gynecomastia; interval development of new hypermetabolic lymph nodes in the celiac region, periaortic region, right retrocrural area, and bilateral inguinal regions; lentiform cutaneous subcutaneous well-circumscribed mass along the anterior midline aspect of the lower pelvis which has increased in size and become hypermetabolic now measuring 2.1 x 1.1 cm possibly representing a sebaceous cyst.     The patient underwent EUS on 03/13/2024 with gastric polyp and single duodenal polyp seen as well as a few malignant appearing lymph nodes in the celiac region which were biopsied.     Past Medical History:   Past Medical History:   Diagnosis Date    Diabetes mellitus     Gout, unspecified     High cholesterol     HTN (hypertension)     Lung cancer        Past Surgical HIstory:   Past  Surgical History:   Procedure Laterality Date    ENDOSCOPIC ULTRASOUND OF UPPER GASTROINTESTINAL TRACT N/A 3/3/2023    Procedure: ULTRASOUND, UPPER GI TRACT, ENDOSCOPIC;  Surgeon: Cora Mcgrath MD;  Location: [facility];  Service: Endoscopy;  Laterality: N/A;  2/24/23-Instructions mailed to address on file-DS    ENDOSCOPIC ULTRASOUND OF UPPER GASTROINTESTINAL TRACT Left 3/13/2024    Procedure: ULTRASOUND, UPPER GI TRACT, ENDOSCOPIC;  Surgeon: Reji Aguirre MD;  Location: [facility];  Service: Endoscopy;  Laterality: Left;    ESOPHAGOGASTRODUODENOSCOPY N/A 3/13/2024    Procedure: EGD (ESOPHAGOGASTRODUODENOSCOPY);  Surgeon: Reji Aguirre MD;  Location: [facility];  Service: Endoscopy;  Laterality: N/A;    INSERTION OF TUNNELED CENTRAL VENOUS CATHETER (CVC) WITH SUBCUTANEOUS PORT N/A 4/24/2023    Procedure: [id number]-U-CATH;  Surgeon: Paulino Love MD;  Location: [facility];  Service: General;  Laterality: N/A;    PANCREATECTOMY      ROBOTIC BRONCHOSCOPY N/A 3/17/2023    Procedure: ROBOTIC BRONCHOSCOPY;  Surgeon: Cristiane Albright MD;  Location: [address];  Service: Pulmonary;  Laterality: N/A;       Family History:   Family History   Problem Relation Name Age of Onset    Breast cancer Sister          60       Social History:  reports that he quit smoking about 11 years ago. His smoking use included cigarettes. He started smoking about 56 years ago. He has a 81 pack-year smoking history. He has quit using smokeless tobacco. He reports current alcohol use of about 3.0 standard drinks of alcohol per week. He reports that he does not use drugs.    Allergies:  Review of patient's allergies indicates:  No Known Allergies    Medications:  Current Outpatient Medications   Medication Sig Dispense Refill    allopurinoL (ZYLOPRIM) 100 MG tablet Take 100 mg by mouth once daily.      amLODIPine (NORVASC) 2.5 MG tablet Take 2.5 mg by mouth once daily.      BYDUREON BCISE 2 mg/0.85 mL AtIn Inject 2 mg into  "the skin every 7 days. Every Friday      cloNIDine (CATAPRES) 0.2 MG tablet Take 0.2 mg by mouth once. Daily      ezetimibe (ZETIA) 10 mg tablet Take 10 mg by mouth once daily.      FARXIGA 10 mg tablet Take 10 mg by mouth every morning.      finasteride (PROSCAR) 5 mg tablet Take 5 mg by mouth once daily.      FREESTYLE TEST Strp USE 1 STRIP TO CHECK GLUCOSE ONCE DAILY      levothyroxine (SYNTHROID) 50 MCG tablet Take 50 mcg by mouth every morning.      LIDOcaine-prilocaine (EMLA) cream Apply topically to port site one hour prior to access, cover 30 g 1    metoprolol succinate (TOPROL-XL) 100 MG 24 hr tablet Take 100 mg by mouth once daily.      rosuvastatin (CRESTOR) 40 MG Tab Take 10 mg by mouth every evening.      TOUJEO SOLOSTAR U-300 INSULIN 300 unit/mL (1.5 mL) InPn pen SMARTSI Unit(s) SUB-Q Every Evening      oxyCODONE (ROXICODONE) 5 MG immediate release tablet Take 1 tablet (5 mg total) by mouth every 4 (four) hours as needed for Pain. 60 tablet 0     No current facility-administered medications for this visit.       Review of Systems   Constitutional:  Negative for chills, diaphoresis, fatigue, fever and unexpected weight change.   Respiratory:  Negative for cough and shortness of breath.    Cardiovascular:  Negative for chest pain and palpitations.   Gastrointestinal:  Negative for abdominal pain, constipation, diarrhea, nausea and vomiting.   Musculoskeletal:  Positive for arthralgias (right leg and shoulders). Negative for neck pain.   Skin:  Negative for color change and rash.   Neurological:  Positive for headaches.   Hematological:  Negative for adenopathy. Does not bruise/bleed easily.       ECOG Performance Status: 1   Objective:      Vitals:   Vitals:    07/10/24 1112   BP: 124/82   BP Location: Left arm   Patient Position: Sitting   BP Method: Large (Automatic)   Pulse: 74   Resp: 16   Temp: 97.7 °F (36.5 °C)   TempSrc: Temporal   SpO2: 97%   Weight: 102.4 kg (225 lb 12 oz)   Height: 6' 1" " (1.854 m)             Physical Exam  Constitutional:       General: He is not in acute distress.     Appearance: He is well-developed. He is not diaphoretic.   HENT:      Head: Normocephalic and atraumatic.   Cardiovascular:      Rate and Rhythm: Normal rate and regular rhythm.      Heart sounds: Normal heart sounds. No murmur heard.     No friction rub. No gallop.   Pulmonary:      Effort: Pulmonary effort is normal. No respiratory distress.      Breath sounds: Normal breath sounds. No wheezing or rales.   Chest:      Chest wall: No tenderness.   Abdominal:      General: Bowel sounds are normal. There is no distension.      Palpations: Abdomen is soft. There is no mass.      Tenderness: There is no abdominal tenderness. There is no rebound.   Musculoskeletal:      Cervical back: Normal range of motion.   Lymphadenopathy:      Cervical: No cervical adenopathy.      Upper Body:      Right upper body: No supraclavicular or axillary adenopathy.      Left upper body: No supraclavicular or axillary adenopathy.   Skin:     General: Skin is warm and dry.   Neurological:      Mental Status: He is alert and oriented to person, place, and time.   Psychiatric:         Behavior: Behavior normal.         Laboratory Data:  Lab Visit on 07/09/2024   Component Date Value Ref Range Status    WBC 07/09/2024 10.04  3.90 - 12.70 K/uL Final    RBC 07/09/2024 5.04  4.60 - 6.20 M/uL Final    Hemoglobin 07/09/2024 14.3  14.0 - 18.0 g/dL Final    Hematocrit 07/09/2024 44.9  40.0 - 54.0 % Final    MCV 07/09/2024 89  82 - 98 fL Final    MCH 07/09/2024 28.4  27.0 - 31.0 pg Final    MCHC 07/09/2024 31.8 (L)  32.0 - 36.0 g/dL Final    RDW 07/09/2024 15.6 (H)  11.5 - 14.5 % Final    Platelets 07/09/2024 379  150 - 450 K/uL Final    MPV 07/09/2024 9.4  9.2 - 12.9 fL Final    Immature Granulocytes 07/09/2024 0.2  0.0 - 0.5 % Final    Gran # (ANC) 07/09/2024 4.4  1.8 - 7.7 K/uL Final    Immature Grans (Abs) 07/09/2024 0.02  0.00 - 0.04 K/uL Final     Comment: Mild elevation in immature granulocytes is non specific and   can be seen in a variety of conditions including stress response,   acute inflammation, trauma and pregnancy. Correlation with other   laboratory and clinical findings is essential.      Lymph # 07/09/2024 3.9  1.0 - 4.8 K/uL Final    Mono # 07/09/2024 1.4 (H)  0.3 - 1.0 K/uL Final    Eos # 07/09/2024 0.4  0.0 - 0.5 K/uL Final    Baso # 07/09/2024 0.03  0.00 - 0.20 K/uL Final    nRBC 07/09/2024 0  0 /100 WBC Final    Gran % 07/09/2024 44.1  38.0 - 73.0 % Final    Lymph % 07/09/2024 38.3  18.0 - 48.0 % Final    Mono % 07/09/2024 13.6  4.0 - 15.0 % Final    Eosinophil % 07/09/2024 3.7  0.0 - 8.0 % Final    Basophil % 07/09/2024 0.3  0.0 - 1.9 % Final    Differential Method 07/09/2024 Automated   Final    Sodium 07/09/2024 135 (L)  136 - 145 mmol/L Final    Potassium 07/09/2024 4.2  3.5 - 5.1 mmol/L Final    Chloride 07/09/2024 102  95 - 110 mmol/L Final    CO2 07/09/2024 24  23 - 29 mmol/L Final    Glucose 07/09/2024 112 (H)  70 - 110 mg/dL Final    BUN 07/09/2024 12  8 - 23 mg/dL Final    Creatinine 07/09/2024 1.4  0.5 - 1.4 mg/dL Final    Calcium 07/09/2024 9.7  8.7 - 10.5 mg/dL Final    Total Protein 07/09/2024 8.0  6.0 - 8.4 g/dL Final    Albumin 07/09/2024 3.2 (L)  3.5 - 5.2 g/dL Final    Total Bilirubin 07/09/2024 0.3  0.1 - 1.0 mg/dL Final    Comment: For infants and newborns, interpretation of results should be based  on gestational age, weight and in agreement with clinical  observations.    Premature Infant recommended reference ranges:  Up to 24 hours.............<8.0 mg/dL  Up to 48 hours............<12.0 mg/dL  3-5 days..................<15.0 mg/dL  6-29 days.................<15.0 mg/dL      Alkaline Phosphatase 07/09/2024 62  55 - 135 U/L Final    AST 07/09/2024 32  10 - 40 U/L Final    ALT 07/09/2024 26  10 - 44 U/L Final    eGFR 07/09/2024 53 (A)  >60 mL/min/1.73 m^2 Final    Anion Gap 07/09/2024 9  8 - 16 mmol/L Final    TSH  07/09/2024 2.829  0.400 - 4.000 uIU/mL Final    Specimen UA 07/09/2024 Urine, Clean Catch   Final    Color, UA 07/09/2024 Yellow  Yellow, Straw, Tana Final    Appearance, UA 07/09/2024 Clear  Clear Final    pH, UA 07/09/2024 6.0  5.0 - 8.0 Final    Specific Gravity, UA 07/09/2024 1.025  1.005 - 1.030 Final    Protein, UA 07/09/2024 1+ (A)  Negative Final    Comment: Recommend a 24 hour urine protein or a urine   protein/creatinine ratio if globulin induced proteinuria is  clinically suspected.      Glucose, UA 07/09/2024 4+ (A)  Negative Final    Ketones, UA 07/09/2024 Negative  Negative Final    Bilirubin (UA) 07/09/2024 Negative  Negative Final    Occult Blood UA 07/09/2024 Negative  Negative Final    Nitrite, UA 07/09/2024 Negative  Negative Final    Leukocytes, UA 07/09/2024 Trace (A)  Negative Final    RBC, UA 07/09/2024 1  0 - 4 /hpf Final    WBC, UA 07/09/2024 10 (H)  0 - 5 /hpf Final    Bacteria 07/09/2024 Rare  None-Occ /hpf Final    Yeast, UA 07/09/2024 None  None Final    Squam Epithel, UA 07/09/2024 2  /hpf Final    Hyaline Casts, UA 07/09/2024 0  0-1/lpf /lpf Final    Microscopic Comment 07/09/2024 SEE COMMENT   Final    Comment: Other formed elements not mentioned in the report are not   present in the microscopic examination.            Imaging:     PET/CT 7/08/24  Head and neck: There is symmetric and physiologic distribution of radiotracer throughout the included brain. There is no hemorrhage, hydrocephalus, or midline shift. There are 2 metastatic left posterior cervical lymph nodes. The larger of these best visualized on image 61 measures 13 x 8 mm with an SUV max of 5 and was not present on the prior exam.     Chest: There is a left subclavian port in place. There is a superior mediastinal metastatic lymph node best visualized on image 75 with an SUV max of 3.6. This was not present on the prior exam. The hypermetabolic right thyroid nodule is unchanged.     The poorly defined area of airspace  consolidation in the left lower lobe has slightly increased in size with a new nodular focus of hypermetabolic activity best visualized on image 124. This was not present on the prior exam and demonstrates an SUV max of 7.3. There is a small non FDG avid left pleural effusion. There is no right-sided FDG avid pulmonary nodule.     Abdomen and pelvis: There is physiologic radiotracer throughout the liver, spleen, and collecting system. There is progression the upper abdominal metastatic lymphadenopathy. A representative celiac node best visualized on image 152 measures 4.4 x 2.5 cm compared with 2.9 x 1.6 cm previously. There is an SUV max of 20 compared with 20 on the prior exam. There are multiple new and enlarging retroperitoneal metastatic lymph nodes. There are new retrocrural metastatic lymph nodes. A representative new aortocaval lymph node best visualized on image 192 measures 18 x 16 mm with an SUV max of 24. There are no new and enlarging iliac metastatic nodes.     Musculoskeletal: There is no FDG avid lytic or blastic osseous lesion.       Assessment:       1. Squamous cell carcinoma of lung, unspecified laterality    2. 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 Other specified disorders involving the immune mechanism, not elsewhere classified    4. Immunodeficiency due to drug therapy    5. Chronic kidney disease, stage 3a    6. Hyperlipidemia, unspecified hyperlipidemia type    7. Hypertension, unspecified type    8. Cancer related pain                 Plan:     NSCLC - patient was initially diagnosed with stage III [id number] SCC of the left lung now with metastatic disease  -PET/CT 4/18/23 shows continued left lung mass, mediastinal LAD  -MRI brain 4/18/23 showed no acute findings  -[facility]PUS Blood testing showed TP 53 mutation  -[facility]US tissue testing showed PD-L1 of 20%, TP53, KDM6A, CDKN2a and MTAP  -PORT placed 4/24/23 under the care of Dr. Love  -PT completed 6 cycles of Carboplatin  and Paclitaxel on 6/02/23  -Radiation completed 6/07/23  -CT chest on 6/26/23 showed a decrease in LLL mass  -Will proceed with Durvalumab for 1 year of treatment  -CT chest 11/21/23 shows decreased lung mass and radiation changes  -CT Chest on 02/09/2024 showed continued radiation changes in the lung but did show intra-abdominal lymphadenopathy (see below)  -Pt s/p cycle 9 of Durvalumab  -Pt underwent EUS on 3/13/24 with biopsy of celiac LN showing metastatic SCC  -Treatment with Ramucirumab and Pembrolizumab discussed based off trial Lung [id number]  -PT completed 4 cycles  -PET/CT on 7/08/24 showed progression of disease  -Pt to start treatment with Gemcitabine  -PT given information on treatment  -Pt to attend chemo class  Visit today included increased complexity associated with the care of the episodic problem (immunotherapy and ramucirumab) addressed and managing the longitudinal care of the patient due to the serious and/or complex managed problem(s) NSCLC    Pancreatic Mass - seen on CT scans and biopsy during EUS on 03/03/2023 with path showing no evidence of malignancy  -PET/CT 4/18/23 showed uptake in the tail of the pancreas  -Possibly due to chronic pancreatitis  -Pt with stable pancreatic mass on CT abdomen 7/03/23 and 3/01/24  -Patient with enlarging surrounding lymphadenopathy  -PET/CT on 3/07/24 shows increasing LAD in the celiac region, periaortic retroperitoneum, right retrocrural area, and bilateral inguinal regions  -EUS on 3/13/24 not suggestive of a pancreatic mass  -Will monitor    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  -No sign of infection  -Will monitor    Cancer Related Pain - Pt on oxycodone  -Will refill  -Pt to see palliative care  -Will monitor    CKD - pt with stage IIIa CKD  -Creatinine 1.3 5/28/24  -Stable  -Will monitor    HTN - pt on amlodipine, clonidine, metoprolol  -BP controlled  -Will monitor    Gout - pt on allopurinol  -Currently asymptomatic  -Will  monitor    HLD - pt on rosuvastatin, zetia  -Management per PCP    DMII - pt on toujeo, farxiga, and bydureon  -Management per PCP    Joint Pain - likely due to recent physical activity cleaning his shed  -Will monitor    Bacteruria - bacteria seen in urine on 5/28/24  -No bacteria seen on 5/29  -Pt asymptomatic     Route Chart for Scheduling    Med Onc Chart Routing      Follow up with physician Other. Pt will not get treatment.  Pt needs a chemo class.  Pt needs approval for Gemcitabine.  Pt needs a CBC, CMP and tSH with appt with me monse day before treatment.  Pt needs an appt with palliative care.   Follow up with RADHIKA    Infusion scheduling note    Injection scheduling note    Labs    Imaging    Pharmacy appointment    Other referrals              Treatment Plan Information   OP BREAST GEMCITABINE QW   Lisandro Lawrence MD   Upcoming Treatment Dates - OP BREAST GEMCITABINE QW    7/15/2024       Pre-Medications       dexAMETHasone (DECADRON) 10 mg in sodium chloride 0.9% 50 mL IVPB       Chemotherapy       gemcitabine (GEMZAR) 2,300 mg in 0.9% NaCl SolP 250 mL chemo infusion  7/22/2024       Pre-Medications       dexAMETHasone (DECADRON) 10 mg in sodium chloride 0.9% 50 mL IVPB       Chemotherapy       gemcitabine (GEMZAR) 2,300 mg in 0.9% NaCl SolP 250 mL chemo infusion  7/29/2024       Pre-Medications       dexAMETHasone (DECADRON) 10 mg in sodium chloride 0.9% 50 mL IVPB       Chemotherapy       gemcitabine (GEMZAR) 2,300 mg in 0.9% NaCl SolP 250 mL chemo infusion  8/12/2024       Pre-Medications       dexAMETHasone (DECADRON) 10 mg in sodium chloride 0.9% 50 mL IVPB       Chemotherapy       gemcitabine (GEMZAR) 2,300 mg in 0.9% NaCl SolP 250 mL chemo infusion    Supportive Plan Information  IV FLUIDS AND ELECTROLYTES   Lisandro Lawrence MD   Upcoming Treatment Dates - IV FLUIDS AND ELECTROLYTES    No upcoming days in selected categories.      Lisandro Lawrence MD  [facility]  Hematology and Oncology    [facility]   [address]   [place] [address]   O: [phone]  F: [phone]

## 2024-07-09 NOTE — TELEPHONE ENCOUNTER
----- Message from Beverly Gonzalez sent at 7/9/2024 10:42 AM CDT -----  Regarding: lab order for urine  Feng Thakkar was at our lab for bloodwork; he stated he should have a urine order.  We collected a urine specimen from him and instructed him to contact his provider to have an order placed as we were unable to see one.  Thanks, Beverly Staples [facility]

## 2024-07-10 ENCOUNTER — OFFICE VISIT (OUTPATIENT)
Dept: HEMATOLOGY/ONCOLOGY | Facility: CLINIC | Age: 74
End: 2024-07-10
Payer: MEDICARE

## 2024-07-10 VITALS
OXYGEN SATURATION: 97 % | TEMPERATURE: 98 F | WEIGHT: 225.75 LBS | HEART RATE: 74 BPM | HEIGHT: 73 IN | BODY MASS INDEX: 29.92 KG/M2 | RESPIRATION RATE: 16 BRPM | SYSTOLIC BLOOD PRESSURE: 124 MMHG | DIASTOLIC BLOOD PRESSURE: 82 MMHG

## 2024-07-10 DIAGNOSIS — D84.821 IMMUNODEFICIENCY DUE TO DRUG THERAPY: ICD-10-CM

## 2024-07-10 DIAGNOSIS — D89.89 OTHER SPECIFIED DISORDERS INVOLVING THE IMMUNE MECHANISM, NOT ELSEWHERE CLASSIFIED: ICD-10-CM

## 2024-07-10 DIAGNOSIS — E78.5 HYPERLIPIDEMIA, UNSPECIFIED HYPERLIPIDEMIA TYPE: ICD-10-CM

## 2024-07-10 DIAGNOSIS — C34.90 SQUAMOUS CELL CARCINOMA OF LUNG, UNSPECIFIED LATERALITY: Primary | ICD-10-CM

## 2024-07-10 DIAGNOSIS — G89.3 CANCER RELATED PAIN: ICD-10-CM

## 2024-07-10 DIAGNOSIS — N18.31 CHRONIC KIDNEY DISEASE, STAGE 3A: ICD-10-CM

## 2024-07-10 DIAGNOSIS — Z79.899 IMMUNODEFICIENCY DUE TO DRUG THERAPY: ICD-10-CM

## 2024-07-10 DIAGNOSIS — I10 HYPERTENSION, UNSPECIFIED TYPE: ICD-10-CM

## 2024-07-10 DIAGNOSIS — C34.90 NSCLC METASTATIC TO LYMPH NODES OF MULTIPLE SITES: ICD-10-CM

## 2024-07-10 DIAGNOSIS — C77.8 NSCLC METASTATIC TO LYMPH NODES OF MULTIPLE SITES: ICD-10-CM

## 2024-07-10 PROCEDURE — G2211 COMPLEX E/M VISIT ADD ON: HCPCS | Mod: S$PBB,,, | Performed by: INTERNAL MEDICINE

## 2024-07-10 PROCEDURE — 99999 PR PBB SHADOW E&M-EST. PATIENT-LVL IV: CPT | Mod: PBBFAC,,, | Performed by: INTERNAL MEDICINE

## 2024-07-10 PROCEDURE — 99215 OFFICE O/P EST HI 40 MIN: CPT | Mod: S$PBB,,, | Performed by: INTERNAL MEDICINE

## 2024-07-10 PROCEDURE — 99214 OFFICE O/P EST MOD 30 MIN: CPT | Mod: PBBFAC,PN | Performed by: INTERNAL MEDICINE

## 2024-07-10 RX ORDER — OXYCODONE HYDROCHLORIDE 5 MG/1
5 TABLET ORAL EVERY 4 HOURS PRN
Qty: 60 TABLET | Refills: 0 | Status: SHIPPED | OUTPATIENT
Start: 2024-07-10

## 2024-07-10 RX ORDER — LEVOTHYROXINE SODIUM 50 UG/1
50 TABLET ORAL EVERY MORNING
COMMUNITY
Start: 2024-06-26

## 2024-07-10 NOTE — PLAN OF CARE
DISCONTINUE OFF PATHWAY REGIMEN - Non-Small Cell Lung            Gemcitabine     **Always confirm dose/schedule in your pharmacy ordering system**    REASON: Other Reason  PRIOR TREATMENT:   TREATMENT RESPONSE: Unable to Evaluate    START OFF PATHWAY REGIMEN - Non-Small Cell Lung            Gemcitabine     **Always confirm dose/schedule in your pharmacy ordering system**    Patient Characteristics:  Stage IV Metastatic, Squamous, Molecular Analysis Completed, Alteration Present   and Targeted Therapy Exhausted or EGFR Exon 20 Insertion or KRAS G12C or HER2   Present, and No Prior Chemo/Immunotherapy or No Alteration Present, PS = 0, 1,   Third Line - Chemotherapy/Immunotherapy  Therapeutic Status: Stage IV Metastatic  Histology: Squamous Cell  Molecular Analysis Results: No Alteration Present  ECOG Performance Status: 1  Chemotherapy/Immunotherapy Line of Therapy: Third Line   Chemotherapy/Immunotherapy  Intent of Therapy:  Non-Curative / Palliative Intent, Discussed with Patient

## 2024-07-10 NOTE — PLAN OF CARE
DISCONTINUE OFF PATHWAY REGIMEN - Non-Small Cell Lung            Pembrolizumab (Keytruda)           Additional Orders: Serious immune-mediated adverse events can occur with   pembrolizumab. Please monitor your patient and refer to the linked   immune-mediated adverse reaction management materials for more information.    **Always confirm dose/schedule in your pharmacy ordering system**    REASON: Disease Progression  PRIOR TREATMENT:   TREATMENT RESPONSE: Progressive Disease (PD)    START OFF PATHWAY REGIMEN - Non-Small Cell Lung            Gemcitabine     **Always confirm dose/schedule in your pharmacy ordering system**    Patient Characteristics:  Stage IV Metastatic, Squamous, Molecular Analysis Completed, Alteration Present   and Targeted Therapy Exhausted or EGFR Exon 20 Insertion or KRAS G12C or HER2   Present, and No Prior Chemo/Immunotherapy or No Alteration Present, PS = 0, 1,   Third Line - Chemotherapy/Immunotherapy  Therapeutic Status: Stage IV Metastatic  Histology: Squamous Cell  Molecular Analysis Results: No Alteration Present  ECOG Performance Status: 1  Chemotherapy/Immunotherapy Line of Therapy: Third Line   Chemotherapy/Immunotherapy  Intent of Therapy:  Non-Curative / Palliative Intent, Discussed with Patient   Supervision was available/Assistance was available

## 2024-07-11 ENCOUNTER — TELEPHONE (OUTPATIENT)
Dept: HEMATOLOGY/ONCOLOGY | Facility: CLINIC | Age: 74
End: 2024-07-11
Payer: MEDICARE

## 2024-07-11 NOTE — TELEPHONE ENCOUNTER
----- Message from Dori Umana MA sent at 7/10/2024 11:48 AM CDT -----  Follow up with physician Other. Pt will not get treatment.  Pt needs a chemo class.  Pt needs approval for Gemcitabine.  Pt needs a CBC, CMP and tSH with appt with me monse day before treatment.  Pt needs an appt with palliative care.

## 2024-07-11 NOTE — NURSING
Reached out to patient to coordinate chemo school for his treatment change, and 1st infusion of Gemzar.   Lab and MD clearance visit also scheduled.  Patient confirmed all upcoming appointments.

## 2024-07-17 ENCOUNTER — CLINICAL SUPPORT (OUTPATIENT)
Dept: HEMATOLOGY/ONCOLOGY | Facility: CLINIC | Age: 74
End: 2024-07-17
Payer: MEDICARE

## 2024-07-17 VITALS
OXYGEN SATURATION: 99 % | DIASTOLIC BLOOD PRESSURE: 82 MMHG | WEIGHT: 223.75 LBS | RESPIRATION RATE: 17 BRPM | SYSTOLIC BLOOD PRESSURE: 147 MMHG | HEART RATE: 80 BPM | BODY MASS INDEX: 29.65 KG/M2 | HEIGHT: 73 IN | TEMPERATURE: 98 F

## 2024-07-17 DIAGNOSIS — C77.8 NSCLC METASTATIC TO LYMPH NODES OF MULTIPLE SITES: ICD-10-CM

## 2024-07-17 DIAGNOSIS — C34.90 SQUAMOUS CELL CARCINOMA OF LUNG, UNSPECIFIED LATERALITY: Primary | ICD-10-CM

## 2024-07-17 DIAGNOSIS — C34.90 NSCLC METASTATIC TO LYMPH NODES OF MULTIPLE SITES: ICD-10-CM

## 2024-07-17 PROCEDURE — 99214 OFFICE O/P EST MOD 30 MIN: CPT | Mod: PBBFAC,PN

## 2024-07-17 PROCEDURE — 99999 PR PBB SHADOW E&M-EST. PATIENT-LVL IV: CPT | Mod: PBBFAC,,,

## 2024-07-17 PROCEDURE — 99215 OFFICE O/P EST HI 40 MIN: CPT | Mod: S$PBB,,,

## 2024-07-17 RX ORDER — PROCHLORPERAZINE MALEATE 5 MG
5 TABLET ORAL EVERY 6 HOURS PRN
Qty: 30 TABLET | Refills: 1 | Status: CANCELLED | OUTPATIENT
Start: 2024-07-17 | End: 2025-07-17

## 2024-07-17 RX ORDER — ONDANSETRON 8 MG/1
8 TABLET, ORALLY DISINTEGRATING ORAL ONCE
Qty: 1 TABLET | Refills: 0 | Status: CANCELLED | OUTPATIENT
Start: 2024-07-17 | End: 2024-07-17

## 2024-07-17 NOTE — PROGRESS NOTES
Subjective:       Name: Feng Thakkar  : 1950  MRN: [id number]    Chief Complaint   Patient presents with    Class          HPI: Feng Thakkar is a 73 y.o. 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 ID Energy Dose/Fx (Gy) #Fx Dose Correction (Gy) Total Dose (Gy) Start Date End Date Elapsed Days   IM Lung PTV 6X 2  0 12 2023 7   IM Lung_New PTV 6X 2  0 48 2023 33        2023 - 2023 Chemotherapy    Treatment Summary   Plan Name: OP NSCLC PACLITAXEL + CARBOPLATIN (AUC) QW + RADIATION  Treatment Goal: Curative  Status: Inactive  Start Date: 2023  End Date: 2023  Provider: Lisandro Lawrence MD  Chemotherapy: CARBOplatin (PARAPLATIN) 185 mg in sodium chloride 0.9% 303.5 mL chemo infusion, 185 mg (100 % of original dose 183.4 mg), Intravenous, Clinic/HOD 1 time, 6 of 6 cycles  Dose modification:   (original dose 183.4 mg, Cycle 1)  Administration: 185 mg (2023), 185 mg (2023), 190 mg (2023), 190 mg (2023), 180 mg (2023), 190 mg (2023)  PACLitaxeL (TAXOL) 45 mg/m2 = 102 mg in sodium chloride 0.9% 250 mL chemo infusion, 45 mg/m2 = 102 mg, Intravenous, Clinic/HOD 1 time, 6 of 6 cycles  Administration: 102 mg (2023), 102 mg (2023), 102 mg (2023), 102 mg (2023), 102 mg (2023), 102 mg (2023)     2023 - 2024 Chemotherapy    Treatment Summary   Plan Name: OP DURVALUMAB 1500 MG Q4W  Treatment Goal: Curative  Status: Inactive  Start Date: 2023  End Date: 2024  Provider: Lisandro Lawrence MD  Chemotherapy: [No  matching medication found in this treatment plan]     3/27/2024 - 6/19/2024 Chemotherapy    Treatment Summary   Plan Name: OP NSCLC ramucirumab 10 mg/kg plus pembrolizumab 200mg Q3W  Treatment Goal: Palliative  Status: Inactive  Start Date: 3/27/2024  End Date: 6/19/2024  Provider: Lisandro Lawrence MD  Chemotherapy: ramucirumab (CYRAMZA) 1,000 mg in sodium chloride 0.9% 250 mL chemo infusion, 1,048 mg (100 % of original dose 10 mg/kg), Intravenous, Clinic/HOD 1 time, 5 of 35 cycles  Dose modification: 10 mg/kg (original dose 10 mg/kg, Cycle 2, Reason: MD Discretion, Comment: Trial Dosing)  Administration: 1,000 mg (4/17/2024), 1,000 mg (3/27/2024), 1,000 mg (5/8/2024), 1,000 mg (5/29/2024), 1,000 mg (6/19/2024)     7/15/2024 -  Chemotherapy    Treatment Summary   Plan Name: OP NSCLC GEMCITABINE QW  Treatment Goal: Palliative  Status: Active  Start Date: 7/15/2024 (Planned)  End Date: 6/2/2025 (Planned)  Provider: Lisandro Lawrence MD  Chemotherapy: gemcitabine (GEMZAR) 2,300 mg in 0.9% NaCl SolP 250 mL chemo infusion, 1,000 mg/m2 = 2,300 mg, Intravenous, Clinic/HOD 1 time, 0 of 12 cycles          Past Medical History:   Diagnosis Date    Diabetes mellitus     Gout, unspecified     High cholesterol     HTN (hypertension)     Lung cancer        Past Surgical History:   Procedure Laterality Date    ENDOSCOPIC ULTRASOUND OF UPPER GASTROINTESTINAL TRACT N/A 3/3/2023    Procedure: ULTRASOUND, UPPER GI TRACT, ENDOSCOPIC;  Surgeon: Cora Mcgrath MD;  Location: [facility] ENDO;  Service: Endoscopy;  Laterality: N/A;  2/24/23-Instructions mailed to address on file-DS    ENDOSCOPIC ULTRASOUND OF UPPER GASTROINTESTINAL TRACT Left 3/13/2024    Procedure: ULTRASOUND, UPPER GI TRACT, ENDOSCOPIC;  Surgeon: Reji Aguirre MD;  Location: [facility];  Service: Endoscopy;  Laterality: Left;    ESOPHAGOGASTRODUODENOSCOPY N/A 3/13/2024    Procedure: EGD (ESOPHAGOGASTRODUODENOSCOPY);  Surgeon: Reji Aguirre MD;  Location: [facility]  "ENDO;  Service: Endoscopy;  Laterality: N/A;    INSERTION OF TUNNELED CENTRAL VENOUS CATHETER (CVC) WITH SUBCUTANEOUS PORT N/A 2023    Procedure: BMDOCIPAP-YEJZ-B-CATH;  Surgeon: Paulino Love MD;  Location: [facility] OR;  Service: General;  Laterality: N/A;    PANCREATECTOMY      ROBOTIC BRONCHOSCOPY N/A 3/17/2023    Procedure: ROBOTIC BRONCHOSCOPY;  Surgeon: Cristiane Albright MD;  Location: [facility] OR [address] [place] [address];  Service: Pulmonary;  Laterality: N/A;       Family History   Problem Relation Name Age of Onset    Breast cancer Sister          60       Social History     Socioeconomic History    Marital status:    Tobacco Use    Smoking status: Former     Current packs/day: 0.00     Average packs/day: 1.5 packs/day for 54.0 years (81.0 ttl pk-yrs)     Types: Cigarettes     Start date: 1968     Quit date: 10/20/2012     Years since quittin.7    Smokeless tobacco: Former   Substance and Sexual Activity    Alcohol use: Yes     Alcohol/week: 3.0 standard drinks of alcohol     Types: 3 Cans of beer per week     Comment: 3 a day    Drug use: Never    Sexual activity: Not Currently       Review of patient's allergies indicates:  No Known Allergies    Review of Systems   All other systems reviewed and are negative.           Objective:     Vitals:    24 1300   BP: (!) 147/82   BP Location: Right arm   Patient Position: Sitting   BP Method: Medium (Automatic)   Pulse: 80   Resp: 17   Temp: 97.5 °F (36.4 °C)   TempSrc: Temporal   SpO2: 99%   Weight: 101.5 kg (223 lb 12.3 oz)   Height: 6' 1" (1.854 m)        Physical Exam  HENT:      Head: Normocephalic and atraumatic.   Pulmonary:      Effort: Pulmonary effort is normal.   Musculoskeletal:         General: Normal range of motion.   Skin:     General: Skin is warm and dry.   Neurological:      Mental Status: He is alert and oriented to person, place, and time.   Psychiatric:         Mood and Affect: Mood normal.                Current Outpatient Medications " on File Prior to Visit   Medication Sig    allopurinoL (ZYLOPRIM) 100 MG tablet Take 100 mg by mouth once daily.    amLODIPine (NORVASC) 2.5 MG tablet Take 2.5 mg by mouth once daily.    BYDUREON BCISE 2 mg/0.85 mL AtIn Inject 2 mg into the skin every 7 days. Every Friday    cloNIDine (CATAPRES) 0.2 MG tablet Take 0.2 mg by mouth once. Daily    ezetimibe (ZETIA) 10 mg tablet Take 10 mg by mouth once daily.    FARXIGA 10 mg tablet Take 10 mg by mouth every morning.    finasteride (PROSCAR) 5 mg tablet Take 5 mg by mouth once daily.    FREESTYLE TEST Strp USE 1 STRIP TO CHECK GLUCOSE ONCE DAILY    levothyroxine (SYNTHROID) 50 MCG tablet Take 50 mcg by mouth every morning.    metoprolol succinate (TOPROL-XL) 100 MG 24 hr tablet Take 100 mg by mouth once daily.    oxyCODONE (ROXICODONE) 5 MG immediate release tablet Take 1 tablet (5 mg total) by mouth every 4 (four) hours as needed for Pain.    rosuvastatin (CRESTOR) 40 MG Tab Take 10 mg by mouth every evening.    TOUJEO SOLOSTAR U-300 INSULIN 300 unit/mL (1.5 mL) InPn pen SMARTSI Unit(s) SUB-Q Every Evening    LIDOcaine-prilocaine (EMLA) cream Apply topically to port site one hour prior to access, cover (Patient not taking: Reported on 2024)     No current facility-administered medications on file prior to visit.       CBC:  Lab Results   Component Value Date    WBC 10.04 2024    HGB 14.3 2024    HCT 44.9 2024    MCV 89 2024     2024         CMP:  Sodium   Date Value Ref Range Status   2024 135 (L) 136 - 145 mmol/L Final     Potassium   Date Value Ref Range Status   2024 4.2 3.5 - 5.1 mmol/L Final     Chloride   Date Value Ref Range Status   2024 102 95 - 110 mmol/L Final     CO2   Date Value Ref Range Status   2024 24 23 - 29 mmol/L Final     Glucose   Date Value Ref Range Status   2024 112 (H) 70 - 110 mg/dL Final     BUN   Date Value Ref Range Status   2024 12 8 - 23 mg/dL Final      Creatinine   Date Value Ref Range Status   07/09/2024 1.4 0.5 - 1.4 mg/dL Final     Calcium   Date Value Ref Range Status   07/09/2024 9.7 8.7 - 10.5 mg/dL Final     Total Protein   Date Value Ref Range Status   07/09/2024 8.0 6.0 - 8.4 g/dL Final     Albumin   Date Value Ref Range Status   07/09/2024 3.2 (L) 3.5 - 5.2 g/dL Final     Total Bilirubin   Date Value Ref Range Status   07/09/2024 0.3 0.1 - 1.0 mg/dL Final     Comment:     For infants and newborns, interpretation of results should be based  on gestational age, weight and in agreement with clinical  observations.    Premature Infant recommended reference ranges:  Up to 24 hours.............<8.0 mg/dL  Up to 48 hours............<12.0 mg/dL  3-5 days..................<15.0 mg/dL  6-29 days.................<15.0 mg/dL       Alkaline Phosphatase   Date Value Ref Range Status   07/09/2024 62 55 - 135 U/L Final     AST   Date Value Ref Range Status   07/09/2024 32 10 - 40 U/L Final     ALT   Date Value Ref Range Status   07/09/2024 26 10 - 44 U/L Final     Anion Gap   Date Value Ref Range Status   07/09/2024 9 8 - 16 mmol/L Final       NM PET CT FDG Skull Base to Mid Thigh  Narrative: EXAMINATION:  NM PET CT FDG SKULL BASE TO MID THIGH    CLINICAL HISTORY:  Non-small cell lung cancer (NSCLC), metastatic, assess treatment response; Malignant neoplasm of unspecified part of unspecified bronchus or lung    TECHNIQUE:  12.2 mCi of F18-FDG was administered intravenously in the left antecubital vein.  After an approximately 60 min distribution time, PET/CT images were acquired from the skull base to the mid thigh. Transmission images were acquired to correct for attenuation using a whole body low-dose CT scan without contrast with the arms positioned above the head.    COMPARISON:  PET-CT 03/07/2024.    FINDINGS:  Head and neck: There is symmetric and physiologic distribution of radiotracer throughout the included brain.  There is no hemorrhage, hydrocephalus, or  midline shift.  There are 2 metastatic left posterior cervical lymph nodes.  The larger of these best visualized on image 61 measures 13 x 8 mm with an SUV max of 5 and was not present on the prior exam.    Chest: There is a left subclavian port in place.  There is a superior mediastinal metastatic lymph node best visualized on image 75 with an SUV max of 3.6.  This was not present on the prior exam.  The hypermetabolic right thyroid nodule is unchanged.    The poorly defined area of airspace consolidation in the left lower lobe has slightly increased in size with a new nodular focus of hypermetabolic activity best visualized on image 124.  This was not present on the prior exam and demonstrates an SUV max of 7.3.  There is a small non FDG avid left pleural effusion.  There is no right-sided FDG avid pulmonary nodule.    Abdomen and pelvis: There is physiologic radiotracer throughout the liver, spleen, and collecting system.  There is progression the upper abdominal metastatic lymphadenopathy.  A representative celiac node best visualized on image 152 measures 4.4 x 2.5 cm compared with 2.9 x 1.6 cm previously.  There is an SUV max of 20 compared with 20 on the prior exam.  There are multiple new and enlarging retroperitoneal metastatic lymph nodes.  There are new retrocrural metastatic lymph nodes.  A representative new aortocaval lymph node best visualized on image 192 measures 18 x 16 mm with an SUV max of 24.  There are no new and enlarging iliac metastatic nodes.    Musculoskeletal: There is no FDG avid lytic or blastic osseous lesion.  Impression: 1. Slight interval enlargement of the left lower lobe airspace opacity with a new internal focus of hypermetabolic activity favored to represent local recurrence.  2. New metastatic left posterior cervical lymphadenopathy and superior mediastinal metastatic adenopathy.  3. Progression abdominal metastatic disease with new and enlarging periaortic, pelvic, and  retrocrural metastatic adenopathy.    Electronically signed by: Austin Combs MD  Date:    07/08/2024  Time:    12:21       ECOG SCORE    0 - Fully active-able to carry on all pre-disease performance without restriction              Assessment:       1. Squamous cell carcinoma of lung, unspecified laterality    2. 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 Squamous cell carcinoma of lung, unspecified laterality  -     Ambulatory referral/consult to Chemo School    2. 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 Treatment plan of Gemcitabine weekly x 12 cycles discussed with patient.  2. Handouts provided on chemotherapy agents. Premeds, supportive meds explained.  3. Discussed the mechanism of action, potential side effects of this treatment as well as ways to manage them at home.   4. Dietary modifications discussed. Detail instructions to be provided by dietician.   5. Chemotherapy portfolio supplied with contact information.   6. Discussed follow-up with the physician for toxicity monitoring throughout treatment.    7. Scripts were escribed prior and explained for home use. (Pt states he does not need any nausea medications, that he has some at home already)  8. Consented the patient to the treatment plan and the patient was educated on the planned duration of the treatment and schedule of the treatment administration.  9. Cycle 1, Day 1 scheduled for 7/23/24; patient has home prescriptions.  10. Encouraged to call office - phone number provided - to assist with any concerns.    Plan was discussed with the patient at length, and he verbalized understanding. Feng was given an opportunity to ask questions that were answered to his satisfaction, and he was advised to call in the interval if any problems or questions arise.    Signed:  Elva Malhotra NP   Hematology and  Oncology  [facility] - HEMATOLOGY ONCOLOGY  [facility], [place]

## 2024-07-22 NOTE — PROGRESS NOTES
PATIENT: Feng Thakkar  MRN: [id number]  DATE: 7/23/2024      Diagnosis:   1. Squamous cell carcinoma of lung, unspecified laterality    2. 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 Immunodeficiency due to drug therapy    4. Chronic kidney disease, stage 3a    5. Hyperlipidemia, unspecified hyperlipidemia type    6. Hypertension, unspecified type    7. Cancer related pain                  Chief Complaint: Squamous cell carcinoma of lung, unspecified laterality (2 week follow up)      Oncologic History:      Oncologic History     Oncologic Treatment     Pathology           Subjective:    Interval History: Mr. Thakkar is a 73 y.o. male with Gout, HLD, HTN who presents for work up of NSCLC.  Since the last clinic visit the patient states he has lower abdominal pain as well as continued right leg and right arm pain.  The patient states he has been taking oxycodone 5 times a day.  The patient denies CP, cough, SOB, nausea, vomiting, constipation, diarrhea.  The patient denies fever, chills, night sweats, weight loss, new lumps or bumps, easy bruising or bleeding.    Prior History:  The patient initially developed abdominal pain on 01/05/2023 and underwent CT of the abdomen showing a mass in the left lung base measuring 3.9 x 2.9 cm; 3 x 3.8 cm mass along the pancreatic tail; and shotty retroperitoneal lymph nodes.  The patient underwent repeat CT abdomen and pelvis on 02/24/2023 showing a 4.3 x 4 solid enhancing mass of the left lung base; thickened bladder wall; prostate gland measuring 45.1 x 4.6 cm; abnormal sclerosis in the left femoral head measuring 2.8 x 1.4 x 1.3 cm; and additional soft tissue densities in the posterior left subdiaphragmatic region measuring up to 16 mm.  CT chest on 03/01/2023 showed a 2 cm low-density lesion in the right lobe of the thyroid gland; 4.5 x 4.3 x 3.8 cm mass in the left lower lobe; several micro nodules; Na soft tissue mass adjacent to the pancreatic tail.  Patient  underwent EUS on 03/03/2023 showing a 3.5 cm and 1.8 cm round pancreatic tail lesion.  Biopsy returned results showing no evidence of malignancy and abundant hematopoietic elements and dendritic cells.  Patient then underwent EBUS under the care of Dr. Albright on 03/17/2023 showing squamous cell carcinoma in biopsy of the left lower lung lesion; squamous cell carcinoma and station 7 lymph node; positive 11 L lymph node for squamous cell carcinoma.   The patient underwent PET-CT on 04/18/2023 showing a markedly hypermetabolic lobulated subpleural left lower lobe mass measuring 4.5 x 3.9 cm with hypermetabolic lymph nodes in the left hilar region measuring 2.2 x 1.9 cm; and a 4.2 x 3.6 walk circumscribed mass in the pancreatic tail.  MRI brain on 04/18/2023 showed no acute findings.     The patient underwent CT chest on 6/26/23 showing a large right thyroid mass measuring at least 2.6 cm; left renal hypodensity measuring 11 mm favored to be a cyst; significant decrease in size of left lower lobe consolidative mass now measuring 2.4 x 2.3 cm; stable 3 mm consolidative nodule in the left upper lobe.   The patient underwent CT chest on 11/21/2023 showing patchy ground-glass density within the medial right upper lobe; ground-glass density in the left dependent left upper lobe that the left lower lobe with associated bronchiectasis; previous target peribronchial nodule in the left lower lobe measuring 2.5 cm and 2 minute cm hypodense lesion in the right thyroid nodule.   CT Chest 2/09/24 showing geographic ground-glass disease and interlobular septal thickening in the left upper lobe; several large lymph nodes in the upper abdomen near the celiac takeoff and liver hilum with lymph node ranging from 1.3-1.5 cm.    The patient underwent a CT abdomen and pelvis on 03/01/2024 showing coronary artery calcification; 40 x 36 mm hypoenhancing pancreatic tail mass; 20 x 30 mm peripancreatic lymph node along the cranial aspect of the  proximal pancreatic body; stable 13 mm periaortic lymph node; enlargement of multiple retroperitoneal lymph nodes; mesenteric haziness present within the central abdomen; enlarged inguinal lymph nodes bilaterally; and a 21 mm sclerotic bone lesion within the posterior row inferomedial left femoral head.   The patient underwent a PET-CT on 03/07/2024 showing resolution of hypermetabolic left lower lobe mass now showing signs of radiation therapy response; disappearance of hypermetabolic enlarged left hilar lymph nodes; new irregular hypermetabolic right breast tissue measuring 2.5 x 1.4 cm possibly representing the unilateral gynecomastia; interval development of new hypermetabolic lymph nodes in the celiac region, periaortic region, right retrocrural area, and bilateral inguinal regions; lentiform cutaneous subcutaneous well-circumscribed mass along the anterior midline aspect of the lower pelvis which has increased in size and become hypermetabolic now measuring 2.1 x 1.1 cm possibly representing a sebaceous cyst.     The patient underwent EUS on 03/13/2024 with gastric polyp and single duodenal polyp seen as well as a few malignant appearing lymph nodes in the celiac region which were biopsied.    The patient underwent PET-CT on 07/08/2024 showing to metastatic left posterior cervical lymph nodes with the largest measuring 13 x 8 mm; new superior mediastinal metastatic lymph node; increasing size of left lower lobe airspace consolidation with a new nodular focus of hypermetabolic activity; progression in upper abdominal metastatic lymphadenopathy with multiple new and enlarging retroperitoneal metastatic lymph nodes; new retrocrural metastatic lymph node; and new aortocaval lymph node.    Past Medical History:   Past Medical History:   Diagnosis Date    Diabetes mellitus     Gout, unspecified     High cholesterol     HTN (hypertension)     Lung cancer        Past Surgical HIstory:   Past Surgical History:    Procedure Laterality Date    ENDOSCOPIC ULTRASOUND OF UPPER GASTROINTESTINAL TRACT N/A 3/3/2023    Procedure: ULTRASOUND, UPPER GI TRACT, ENDOSCOPIC;  Surgeon: Cora Mcgrath MD;  Location: [facility];  Service: Endoscopy;  Laterality: N/A;  2/24/23-Instructions mailed to address on file-DS    ENDOSCOPIC ULTRASOUND OF UPPER GASTROINTESTINAL TRACT Left 3/13/2024    Procedure: ULTRASOUND, UPPER GI TRACT, ENDOSCOPIC;  Surgeon: Reji Aguirre MD;  Location: [facility];  Service: Endoscopy;  Laterality: Left;    ESOPHAGOGASTRODUODENOSCOPY N/A 3/13/2024    Procedure: EGD (ESOPHAGOGASTRODUODENOSCOPY);  Surgeon: Reji Aguirre MD;  Location: [facility];  Service: Endoscopy;  Laterality: N/A;    INSERTION OF TUNNELED CENTRAL VENOUS CATHETER (CVC) WITH SUBCUTANEOUS PORT N/A 4/24/2023    Procedure: [id number];  Surgeon: Paulino Love MD;  Location: [facility];  Service: General;  Laterality: N/A;    PANCREATECTOMY      ROBOTIC BRONCHOSCOPY N/A 3/17/2023    Procedure: ROBOTIC BRONCHOSCOPY;  Surgeon: Cristiane Albright MD;  Location: [address];  Service: Pulmonary;  Laterality: N/A;       Family History:   Family History   Problem Relation Name Age of Onset    Breast cancer Sister          60       Social History:  reports that he quit smoking about 11 years ago. His smoking use included cigarettes. He started smoking about 56 years ago. He has a 81 pack-year smoking history. He has quit using smokeless tobacco. He reports current alcohol use of about 3.0 standard drinks of alcohol per week. He reports that he does not use drugs.    Allergies:  Review of patient's allergies indicates:  No Known Allergies    Medications:  Current Outpatient Medications   Medication Sig Dispense Refill    allopurinoL (ZYLOPRIM) 100 MG tablet Take 100 mg by mouth once daily.      amLODIPine (NORVASC) 2.5 MG tablet Take 2.5 mg by mouth once daily.      BYDUREON BCISE 2 mg/0.85 mL AtIn Inject 2 mg into the skin every 7  days. Every Friday      cloNIDine (CATAPRES) 0.2 MG tablet Take 0.2 mg by mouth once. Daily      ezetimibe (ZETIA) 10 mg tablet Take 10 mg by mouth once daily.      FARXIGA 10 mg tablet Take 10 mg by mouth every morning.      finasteride (PROSCAR) 5 mg tablet Take 5 mg by mouth once daily.      FREESTYLE TEST Strp USE 1 STRIP TO CHECK GLUCOSE ONCE DAILY      levothyroxine (SYNTHROID) 50 MCG tablet Take 50 mcg by mouth every morning.      LIDOcaine-prilocaine (EMLA) cream Apply topically to port site one hour prior to access, cover 30 g 1    metoprolol succinate (TOPROL-XL) 100 MG 24 hr tablet Take 100 mg by mouth once daily.      oxyCODONE (ROXICODONE) 5 MG immediate release tablet Take 1 tablet (5 mg total) by mouth every 4 (four) hours as needed for Pain. (Patient not taking: Reported on 2024) 60 tablet 0    rosuvastatin (CRESTOR) 40 MG Tab Take 10 mg by mouth every evening.      TOUJEO SOLOSTAR U-300 INSULIN 300 unit/mL (1.5 mL) InPn pen SMARTSI Unit(s) SUB-Q Every Evening      morphine (MS CONTIN) 15 MG 12 hr tablet Take 1 tablet (15 mg total) by mouth 2 (two) times daily. 60 tablet 0     No current facility-administered medications for this visit.       Review of Systems   Constitutional:  Negative for chills, diaphoresis, fatigue, fever and unexpected weight change.   Respiratory:  Negative for cough and shortness of breath.    Cardiovascular:  Negative for chest pain and palpitations.   Gastrointestinal:  Positive for abdominal pain. Negative for constipation, diarrhea, nausea and vomiting.   Musculoskeletal:         Pain in right shoulder and right thigh   Skin:  Negative for color change and rash.   Neurological:  Negative for headaches.   Hematological:  Negative for adenopathy. Does not bruise/bleed easily.       ECOG Performance Status: 1   Objective:      Vitals:   Vitals:    24 1128   BP: 96/70   BP Location: Left arm   Patient Position: Sitting   BP Method: Large (Manual)   Pulse: 69  "  Resp: 20   Temp: 97.6 °F (36.4 °C)   TempSrc: Temporal   SpO2: 100%   Weight: 99.5 kg (219 lb 5.7 oz)   Height: 6' 1" (1.854 m)               Physical Exam  Constitutional:       General: He is not in acute distress.     Appearance: He is well-developed. He is not diaphoretic.   HENT:      Head: Normocephalic and atraumatic.   Cardiovascular:      Rate and Rhythm: Normal rate and regular rhythm.      Heart sounds: Normal heart sounds. No murmur heard.     No friction rub. No gallop.   Pulmonary:      Effort: Pulmonary effort is normal. No respiratory distress.      Breath sounds: Normal breath sounds. No wheezing or rales.   Chest:      Chest wall: No tenderness.   Abdominal:      General: Bowel sounds are normal. There is no distension.      Palpations: Abdomen is soft. There is no mass.      Tenderness: There is no abdominal tenderness. There is no rebound.   Musculoskeletal:      Cervical back: Normal range of motion.   Lymphadenopathy:      Cervical: No cervical adenopathy.      Upper Body:      Right upper body: No supraclavicular or axillary adenopathy.      Left upper body: No supraclavicular or axillary adenopathy.   Skin:     General: Skin is warm and dry.   Neurological:      Mental Status: He is alert and oriented to person, place, and time.   Psychiatric:         Behavior: Behavior normal.         Laboratory Data:  Lab Visit on 07/23/2024   Component Date Value Ref Range Status    WBC 07/23/2024 11.43  3.90 - 12.70 K/uL Final    RBC 07/23/2024 4.98  4.60 - 6.20 M/uL Final    Hemoglobin 07/23/2024 14.3  14.0 - 18.0 g/dL Final    Hematocrit 07/23/2024 42.8  40.0 - 54.0 % Final    MCV 07/23/2024 86  82 - 98 fL Final    MCH 07/23/2024 28.7  27.0 - 31.0 pg Final    MCHC 07/23/2024 33.4  32.0 - 36.0 g/dL Final    RDW 07/23/2024 15.2 (H)  11.5 - 14.5 % Final    Platelets 07/23/2024 453 (H)  150 - 450 K/uL Final    MPV 07/23/2024 8.9 (L)  9.2 - 12.9 fL Final    Immature Granulocytes 07/23/2024 0.2  0.0 - 0.5 % " Final    Gran # (ANC) 07/23/2024 5.6  1.8 - 7.7 K/uL Final    Immature Grans (Abs) 07/23/2024 0.02  0.00 - 0.04 K/uL Final    Comment: Mild elevation in immature granulocytes is non specific and   can be seen in a variety of conditions including stress response,   acute inflammation, trauma and pregnancy. Correlation with other   laboratory and clinical findings is essential.      Lymph # 07/23/2024 3.9  1.0 - 4.8 K/uL Final    Mono # 07/23/2024 1.4 (H)  0.3 - 1.0 K/uL Final    Eos # 07/23/2024 0.5  0.0 - 0.5 K/uL Final    Baso # 07/23/2024 0.07  0.00 - 0.20 K/uL Final    nRBC 07/23/2024 0  0 /100 WBC Final    Gran % 07/23/2024 48.7  38.0 - 73.0 % Final    Lymph % 07/23/2024 34.2  18.0 - 48.0 % Final    Mono % 07/23/2024 11.8  4.0 - 15.0 % Final    Eosinophil % 07/23/2024 4.5  0.0 - 8.0 % Final    Basophil % 07/23/2024 0.6  0.0 - 1.9 % Final    Differential Method 07/23/2024 Automated   Final    Sodium 07/23/2024 136  136 - 145 mmol/L Final    Potassium 07/23/2024 4.6  3.5 - 5.1 mmol/L Final    Chloride 07/23/2024 104  95 - 110 mmol/L Final    CO2 07/23/2024 22 (L)  23 - 29 mmol/L Final    Glucose 07/23/2024 127 (H)  70 - 110 mg/dL Final    BUN 07/23/2024 17  8 - 23 mg/dL Final    Creatinine 07/23/2024 1.5 (H)  0.5 - 1.4 mg/dL Final    Calcium 07/23/2024 9.9  8.7 - 10.5 mg/dL Final    Total Protein 07/23/2024 8.2  6.0 - 8.4 g/dL Final    Albumin 07/23/2024 3.2 (L)  3.5 - 5.2 g/dL Final    Total Bilirubin 07/23/2024 0.3  0.1 - 1.0 mg/dL Final    Comment: For infants and newborns, interpretation of results should be based  on gestational age, weight and in agreement with clinical  observations.    Premature Infant recommended reference ranges:  Up to 24 hours.............<8.0 mg/dL  Up to 48 hours............<12.0 mg/dL  3-5 days..................<15.0 mg/dL  6-29 days.................<15.0 mg/dL      Alkaline Phosphatase 07/23/2024 61  55 - 135 U/L Final    AST 07/23/2024 28  10 - 40 U/L Final    ALT 07/23/2024 23   10 - 44 U/L Final    eGFR 07/23/2024 48.9 (A)  >60 mL/min/1.73 m^2 Final    Anion Gap 07/23/2024 10  8 - 16 mmol/L Final    TSH 07/23/2024 2.359  0.400 - 4.000 uIU/mL Final         Imaging:     PET/CT 7/08/24  Head and neck: There is symmetric and physiologic distribution of radiotracer throughout the included brain. There is no hemorrhage, hydrocephalus, or midline shift. There are 2 metastatic left posterior cervical lymph nodes. The larger of these best visualized on image 61 measures 13 x 8 mm with an SUV max of 5 and was not present on the prior exam.     Chest: There is a left subclavian port in place. There is a superior mediastinal metastatic lymph node best visualized on image 75 with an SUV max of 3.6. This was not present on the prior exam. The hypermetabolic right thyroid nodule is unchanged.     The poorly defined area of airspace consolidation in the left lower lobe has slightly increased in size with a new nodular focus of hypermetabolic activity best visualized on image 124. This was not present on the prior exam and demonstrates an SUV max of 7.3. There is a small non FDG avid left pleural effusion. There is no right-sided FDG avid pulmonary nodule.     Abdomen and pelvis: There is physiologic radiotracer throughout the liver, spleen, and collecting system. There is progression the upper abdominal metastatic lymphadenopathy. A representative celiac node best visualized on image 152 measures 4.4 x 2.5 cm compared with 2.9 x 1.6 cm previously. There is an SUV max of 20 compared with 20 on the prior exam. There are multiple new and enlarging retroperitoneal metastatic lymph nodes. There are new retrocrural metastatic lymph nodes. A representative new aortocaval lymph node best visualized on image 192 measures 18 x 16 mm with an SUV max of 24. There are no new and enlarging iliac metastatic nodes.     Musculoskeletal: There is no FDG avid lytic or blastic osseous lesion.       Assessment:       1.  Squamous cell carcinoma of lung, unspecified laterality    2. 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 Immunodeficiency due to drug therapy    4. Chronic kidney disease, stage 3a    5. Hyperlipidemia, unspecified hyperlipidemia type    6. Hypertension, unspecified type    7. Cancer related pain                   Plan:     NSCLC - patient was initially diagnosed with stage III [id number] SCC of the left lung now with metastatic disease  -PET/CT 4/18/23 shows continued left lung mass, mediastinal LAD  -MRI brain 4/18/23 showed no acute findings  -TEMPUS Blood testing showed TP 53 mutation  -TEMPUS tissue testing showed PD-L1 of 20%, TP53, KDM6A, CDKN2a and MTAP  -PORT placed 4/24/23 under the care of Dr. Love  -PT completed 6 cycles of Carboplatin and Paclitaxel on 6/02/23  -Radiation completed 6/07/23  -CT chest on 6/26/23 showed a decrease in LLL mass  -Will proceed with Durvalumab for 1 year of treatment  -CT chest 11/21/23 shows decreased lung mass and radiation changes  -CT Chest on 02/09/2024 showed continued radiation changes in the lung but did show intra-abdominal lymphadenopathy (see below)  -Pt s/p cycle 9 of Durvalumab  -Pt underwent EUS on 3/13/24 with biopsy of celiac LN showing metastatic SCC  -Treatment with Ramucirumab and Pembrolizumab discussed based off trial Lung [id number]  -PT completed 4 cycles  -PET/CT on 7/08/24 showed progression of disease  -Pt to start treatment with Gemcitabine today  Visit today included increased complexity associated with the care of the episodic problem (immunotherapy and ramucirumab) addressed and managing the longitudinal care of the patient due to the serious and/or complex managed problem(s) NSCLC    Pancreatic Mass - seen on CT scans and biopsy during EUS on 03/03/2023 with path showing no evidence of malignancy  -PET/CT 4/18/23 showed uptake in the tail of the pancreas  -Possibly due to chronic pancreatitis  -Pt with stable pancreatic mass on CT  abdomen 7/03/23 and 3/01/24  -Patient with enlarging surrounding lymphadenopathy  -PET/CT on 3/07/24 shows increasing LAD in the celiac region, periaortic retroperitoneum, right retrocrural area, and bilateral inguinal regions  -EUS on 3/13/24 not suggestive of a pancreatic mass  -Will monitor    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  -No sign of infection  -Will monitor    Cancer Related Pain - Pt on oxycodone  -Will prescribe MS Contin  -PT to see palliative care    CKD - pt with stage IIIa CKD  -Creatinine 1.5 7/23/24  -Stable  -Will monitor    HTN - pt on amlodipine, clonidine, metoprolol  -BP controlled  -Will monitor    Gout - pt on allopurinol  -Currently asymptomatic  -Will monitor    HLD - pt on rosuvastatin, zetia  -Management per PCP    DMII - pt on toujeo, farxiga, and bydureon  -Management per PCP    Route Chart for Scheduling    Med Onc Chart Routing      Follow up with physician 2 weeks. Pt can proceed with treatment.  Pt needs an appt with palliative care for pain control ASAP.  Pt needs a CBC, CMP and appt with NP in 1 week with treatment.  Pt needs [facility] same labs and an appt with me in 2 weeks.   Follow up with RADHIKA 1 week.   Infusion scheduling note    Injection scheduling note    Labs    Imaging    Pharmacy appointment    Other referrals              Treatment Plan Information   OP NSCLC GEMCITABINE QW   Lisandro Lawrence MD   Upcoming Treatment Dates - OP NSCLC GEMCITABINE QW    7/24/2024       Pre-Medications       ondansetron injection 8 mg       Chemotherapy       gemcitabine (GEMZAR) in 0.9% NaCl SolP 250 mL chemo infusion  7/31/2024       Pre-Medications       ondansetron injection 8 mg       Chemotherapy       gemcitabine (GEMZAR) in 0.9% NaCl SolP 250 mL chemo infusion  8/14/2024       Pre-Medications       ondansetron injection 8 mg       Chemotherapy       gemcitabine (GEMZAR) in 0.9% NaCl SolP 250 mL chemo infusion  8/21/2024       Pre-Medications       ondansetron  injection 8 mg       Chemotherapy       gemcitabine (GEMZAR) in 0.9% NaCl SolP 250 mL chemo infusion    Supportive Plan Information  IV FLUIDS AND ELECTROLYTES   Lisandro Lawrence MD   Upcoming Treatment Dates - IV FLUIDS AND ELECTROLYTES    No upcoming days in selected categories.      Lisandro Lawrence MD  [facility]  Hematology and Oncology  [facility]   [address] [place] [address]   O: [phone]  F: [phone]

## 2024-07-23 ENCOUNTER — INFUSION (OUTPATIENT)
Dept: INFUSION THERAPY | Facility: HOSPITAL | Age: 74
End: 2024-07-23
Attending: INTERNAL MEDICINE
Payer: MEDICARE

## 2024-07-23 ENCOUNTER — OFFICE VISIT (OUTPATIENT)
Dept: HEMATOLOGY/ONCOLOGY | Facility: CLINIC | Age: 74
End: 2024-07-23
Payer: MEDICARE

## 2024-07-23 ENCOUNTER — LAB VISIT (OUTPATIENT)
Dept: LAB | Facility: HOSPITAL | Age: 74
End: 2024-07-23
Attending: INTERNAL MEDICINE
Payer: MEDICARE

## 2024-07-23 VITALS
SYSTOLIC BLOOD PRESSURE: 96 MMHG | TEMPERATURE: 98 F | OXYGEN SATURATION: 100 % | HEIGHT: 73 IN | DIASTOLIC BLOOD PRESSURE: 70 MMHG | BODY MASS INDEX: 29.08 KG/M2 | HEART RATE: 69 BPM | RESPIRATION RATE: 20 BRPM | WEIGHT: 219.38 LBS

## 2024-07-23 VITALS
TEMPERATURE: 98 F | SYSTOLIC BLOOD PRESSURE: 129 MMHG | OXYGEN SATURATION: 97 % | BODY MASS INDEX: 29.08 KG/M2 | DIASTOLIC BLOOD PRESSURE: 74 MMHG | HEART RATE: 65 BPM | HEIGHT: 73 IN | WEIGHT: 219.38 LBS | RESPIRATION RATE: 19 BRPM

## 2024-07-23 DIAGNOSIS — C34.90 SQUAMOUS CELL CARCINOMA OF LUNG, UNSPECIFIED LATERALITY: ICD-10-CM

## 2024-07-23 DIAGNOSIS — G89.3 CANCER RELATED PAIN: ICD-10-CM

## 2024-07-23 DIAGNOSIS — C77.8 NSCLC METASTATIC TO LYMPH NODES OF MULTIPLE SITES: ICD-10-CM

## 2024-07-23 DIAGNOSIS — C34.90 NSCLC METASTATIC TO LYMPH NODES OF MULTIPLE SITES: ICD-10-CM

## 2024-07-23 DIAGNOSIS — C77.8 NSCLC METASTATIC TO LYMPH NODES OF MULTIPLE SITES: Primary | ICD-10-CM

## 2024-07-23 DIAGNOSIS — C34.90 SQUAMOUS CELL CARCINOMA OF LUNG, UNSPECIFIED LATERALITY: Primary | ICD-10-CM

## 2024-07-23 DIAGNOSIS — Z79.899 IMMUNODEFICIENCY DUE TO DRUG THERAPY: ICD-10-CM

## 2024-07-23 DIAGNOSIS — I10 HYPERTENSION, UNSPECIFIED TYPE: ICD-10-CM

## 2024-07-23 DIAGNOSIS — D84.821 IMMUNODEFICIENCY DUE TO DRUG THERAPY: ICD-10-CM

## 2024-07-23 DIAGNOSIS — E78.5 HYPERLIPIDEMIA, UNSPECIFIED HYPERLIPIDEMIA TYPE: ICD-10-CM

## 2024-07-23 DIAGNOSIS — N18.31 CHRONIC KIDNEY DISEASE, STAGE 3A: ICD-10-CM

## 2024-07-23 DIAGNOSIS — D89.89 OTHER SPECIFIED DISORDERS INVOLVING THE IMMUNE MECHANISM, NOT ELSEWHERE CLASSIFIED: ICD-10-CM

## 2024-07-23 DIAGNOSIS — C34.90 NSCLC METASTATIC TO LYMPH NODES OF MULTIPLE SITES: Primary | ICD-10-CM

## 2024-07-23 LAB
ALBUMIN SERPL BCP-MCNC: 3.2 G/DL (ref 3.5–5.2)
ALP SERPL-CCNC: 61 U/L (ref 55–135)
ALT SERPL W/O P-5'-P-CCNC: 23 U/L (ref 10–44)
ANION GAP SERPL CALC-SCNC: 10 MMOL/L (ref 8–16)
AST SERPL-CCNC: 28 U/L (ref 10–40)
BASOPHILS # BLD AUTO: 0.07 K/UL (ref 0–0.2)
BASOPHILS NFR BLD: 0.6 % (ref 0–1.9)
BILIRUB SERPL-MCNC: 0.3 MG/DL (ref 0.1–1)
BUN SERPL-MCNC: 17 MG/DL (ref 8–23)
CALCIUM SERPL-MCNC: 9.9 MG/DL (ref 8.7–10.5)
CHLORIDE SERPL-SCNC: 104 MMOL/L (ref 95–110)
CO2 SERPL-SCNC: 22 MMOL/L (ref 23–29)
CREAT SERPL-MCNC: 1.5 MG/DL (ref 0.5–1.4)
DIFFERENTIAL METHOD BLD: ABNORMAL
EOSINOPHIL # BLD AUTO: 0.5 K/UL (ref 0–0.5)
EOSINOPHIL NFR BLD: 4.5 % (ref 0–8)
ERYTHROCYTE [DISTWIDTH] IN BLOOD BY AUTOMATED COUNT: 15.2 % (ref 11.5–14.5)
EST. GFR  (NO RACE VARIABLE): 48.9 ML/MIN/1.73 M^2
GLUCOSE SERPL-MCNC: 127 MG/DL (ref 70–110)
HCT VFR BLD AUTO: 42.8 % (ref 40–54)
HGB BLD-MCNC: 14.3 G/DL (ref 14–18)
IMM GRANULOCYTES # BLD AUTO: 0.02 K/UL (ref 0–0.04)
IMM GRANULOCYTES NFR BLD AUTO: 0.2 % (ref 0–0.5)
LYMPHOCYTES # BLD AUTO: 3.9 K/UL (ref 1–4.8)
LYMPHOCYTES NFR BLD: 34.2 % (ref 18–48)
MCH RBC QN AUTO: 28.7 PG (ref 27–31)
MCHC RBC AUTO-ENTMCNC: 33.4 G/DL (ref 32–36)
MCV RBC AUTO: 86 FL (ref 82–98)
MONOCYTES # BLD AUTO: 1.4 K/UL (ref 0.3–1)
MONOCYTES NFR BLD: 11.8 % (ref 4–15)
NEUTROPHILS # BLD AUTO: 5.6 K/UL (ref 1.8–7.7)
NEUTROPHILS NFR BLD: 48.7 % (ref 38–73)
NRBC BLD-RTO: 0 /100 WBC
PLATELET # BLD AUTO: 453 K/UL (ref 150–450)
PMV BLD AUTO: 8.9 FL (ref 9.2–12.9)
POTASSIUM SERPL-SCNC: 4.6 MMOL/L (ref 3.5–5.1)
PROT SERPL-MCNC: 8.2 G/DL (ref 6–8.4)
RBC # BLD AUTO: 4.98 M/UL (ref 4.6–6.2)
SODIUM SERPL-SCNC: 136 MMOL/L (ref 136–145)
TSH SERPL DL<=0.005 MIU/L-ACNC: 2.36 UIU/ML (ref 0.4–4)
WBC # BLD AUTO: 11.43 K/UL (ref 3.9–12.7)

## 2024-07-23 PROCEDURE — 99999 PR PBB SHADOW E&M-EST. PATIENT-LVL IV: CPT | Mod: PBBFAC,,, | Performed by: INTERNAL MEDICINE

## 2024-07-23 PROCEDURE — 99214 OFFICE O/P EST MOD 30 MIN: CPT | Mod: PBBFAC,25,PN | Performed by: INTERNAL MEDICINE

## 2024-07-23 PROCEDURE — 96413 CHEMO IV INFUSION 1 HR: CPT | Mod: PN

## 2024-07-23 PROCEDURE — 84443 ASSAY THYROID STIM HORMONE: CPT | Performed by: INTERNAL MEDICINE

## 2024-07-23 PROCEDURE — 96375 TX/PRO/DX INJ NEW DRUG ADDON: CPT | Mod: PN

## 2024-07-23 PROCEDURE — 99215 OFFICE O/P EST HI 40 MIN: CPT | Mod: S$PBB,,, | Performed by: INTERNAL MEDICINE

## 2024-07-23 PROCEDURE — G2211 COMPLEX E/M VISIT ADD ON: HCPCS | Mod: S$PBB,,, | Performed by: INTERNAL MEDICINE

## 2024-07-23 PROCEDURE — 63600175 PHARM REV CODE 636 W HCPCS: Mod: PN | Performed by: INTERNAL MEDICINE

## 2024-07-23 PROCEDURE — 80053 COMPREHEN METABOLIC PANEL: CPT | Mod: PN | Performed by: INTERNAL MEDICINE

## 2024-07-23 PROCEDURE — 36415 COLL VENOUS BLD VENIPUNCTURE: CPT | Mod: PN | Performed by: INTERNAL MEDICINE

## 2024-07-23 PROCEDURE — 25000003 PHARM REV CODE 250: Mod: PN | Performed by: INTERNAL MEDICINE

## 2024-07-23 PROCEDURE — 85025 COMPLETE CBC W/AUTO DIFF WBC: CPT | Mod: PN | Performed by: INTERNAL MEDICINE

## 2024-07-23 RX ORDER — PROCHLORPERAZINE EDISYLATE 5 MG/ML
5 INJECTION INTRAMUSCULAR; INTRAVENOUS ONCE AS NEEDED
Status: DISCONTINUED | OUTPATIENT
Start: 2024-07-23 | End: 2024-07-23 | Stop reason: HOSPADM

## 2024-07-23 RX ORDER — HEPARIN 100 UNIT/ML
500 SYRINGE INTRAVENOUS
Status: DISCONTINUED | OUTPATIENT
Start: 2024-07-23 | End: 2024-07-23 | Stop reason: HOSPADM

## 2024-07-23 RX ORDER — SODIUM CHLORIDE 0.9 % (FLUSH) 0.9 %
10 SYRINGE (ML) INJECTION
Status: CANCELLED | OUTPATIENT
Start: 2024-07-23

## 2024-07-23 RX ORDER — ONDANSETRON HYDROCHLORIDE 2 MG/ML
8 INJECTION, SOLUTION INTRAVENOUS
Status: COMPLETED | OUTPATIENT
Start: 2024-07-23 | End: 2024-07-23

## 2024-07-23 RX ORDER — PROCHLORPERAZINE EDISYLATE 5 MG/ML
5 INJECTION INTRAMUSCULAR; INTRAVENOUS ONCE AS NEEDED
Status: CANCELLED
Start: 2024-07-23

## 2024-07-23 RX ORDER — ONDANSETRON HYDROCHLORIDE 2 MG/ML
8 INJECTION, SOLUTION INTRAVENOUS
Status: CANCELLED
Start: 2024-07-23

## 2024-07-23 RX ORDER — SODIUM CHLORIDE 0.9 % (FLUSH) 0.9 %
10 SYRINGE (ML) INJECTION
Status: DISCONTINUED | OUTPATIENT
Start: 2024-07-23 | End: 2024-07-23 | Stop reason: HOSPADM

## 2024-07-23 RX ORDER — HEPARIN 100 UNIT/ML
500 SYRINGE INTRAVENOUS
Status: CANCELLED | OUTPATIENT
Start: 2024-07-23

## 2024-07-23 RX ORDER — MORPHINE SULFATE 15 MG/1
15 TABLET, FILM COATED, EXTENDED RELEASE ORAL 2 TIMES DAILY
Qty: 60 TABLET | Refills: 0 | Status: SHIPPED | OUTPATIENT
Start: 2024-07-23

## 2024-07-23 RX ADMIN — GEMCITABINE 2200 MG: 38 INJECTION, SOLUTION INTRAVENOUS at 02:07

## 2024-07-23 RX ADMIN — ONDANSETRON 8 MG: 2 INJECTION INTRAMUSCULAR; INTRAVENOUS at 01:07

## 2024-07-23 RX ADMIN — SODIUM CHLORIDE: 9 INJECTION, SOLUTION INTRAVENOUS at 01:07

## 2024-07-23 NOTE — PLAN OF CARE
Problem: Fatigue (Oncology Care)  Goal: Improved Activity Tolerance  Outcome: Progressing  Intervention: Promote Improved Energy  Flowsheets (Taken 7/23/2024 1344)  Fatigue Management: activity schedule adjusted  Environmental Support: calm environment promoted     Problem: Adult Inpatient Plan of Care  Goal: Plan of Care Review  Flowsheets (Taken 7/23/2024 1344)  Plan of Care Reviewed With: patient  Goal: Patient-Specific Goal (Individualized)  Flowsheets (Taken 7/23/2024 1344)  Anxieties, Fears or Concerns: fatigue  Individualized Care Needs: recliner, rest promoted   Pt here for C1D1 of Gemzar, education given and questions answered. Pt tolerated treatment well, no signs or symptoms of reaction, discharged home.

## 2024-07-24 ENCOUNTER — HOSPITAL ENCOUNTER (OUTPATIENT)
Dept: RADIOLOGY | Facility: HOSPITAL | Age: 74
Discharge: HOME | End: 2024-07-24
Attending: INTERNAL MEDICINE
Payer: MEDICARE

## 2024-07-24 DIAGNOSIS — C34.90 SQUAMOUS CELL CARCINOMA OF LUNG, UNSPECIFIED LATERALITY: ICD-10-CM

## 2024-07-24 PROCEDURE — 70551 MRI BRAIN STEM W/O DYE: CPT | Mod: TC,PO

## 2024-07-24 PROCEDURE — 70551 MRI BRAIN STEM W/O DYE: CPT | Mod: 26,,, | Performed by: RADIOLOGY

## 2024-07-24 RX ORDER — GADOBUTROL 604.72 MG/ML
9 INJECTION INTRAVENOUS
Status: DISCONTINUED | OUTPATIENT
Start: 2024-07-24 | End: 2024-07-25 | Stop reason: HOSPADM

## 2024-07-29 ENCOUNTER — LAB VISIT (OUTPATIENT)
Dept: LAB | Facility: HOSPITAL | Age: 74
End: 2024-07-29
Attending: INTERNAL MEDICINE
Payer: MEDICARE

## 2024-07-29 DIAGNOSIS — C34.90 SQUAMOUS CELL CARCINOMA OF LUNG, UNSPECIFIED LATERALITY: ICD-10-CM

## 2024-07-29 LAB
ALBUMIN SERPL BCP-MCNC: 2.8 G/DL (ref 3.5–5.2)
ALP SERPL-CCNC: 55 U/L (ref 55–135)
ALT SERPL W/O P-5'-P-CCNC: 36 U/L (ref 10–44)
ANION GAP SERPL CALC-SCNC: 9 MMOL/L (ref 8–16)
AST SERPL-CCNC: 42 U/L (ref 10–40)
BASOPHILS # BLD AUTO: 0.02 K/UL (ref 0–0.2)
BASOPHILS NFR BLD: 0.3 % (ref 0–1.9)
BILIRUB SERPL-MCNC: 0.4 MG/DL (ref 0.1–1)
BUN SERPL-MCNC: 20 MG/DL (ref 8–23)
CALCIUM SERPL-MCNC: 9.7 MG/DL (ref 8.7–10.5)
CHLORIDE SERPL-SCNC: 107 MMOL/L (ref 95–110)
CO2 SERPL-SCNC: 22 MMOL/L (ref 23–29)
CREAT SERPL-MCNC: 1.8 MG/DL (ref 0.5–1.4)
DIFFERENTIAL METHOD BLD: ABNORMAL
EOSINOPHIL # BLD AUTO: 0.2 K/UL (ref 0–0.5)
EOSINOPHIL NFR BLD: 2.2 % (ref 0–8)
ERYTHROCYTE [DISTWIDTH] IN BLOOD BY AUTOMATED COUNT: 15 % (ref 11.5–14.5)
EST. GFR  (NO RACE VARIABLE): 39 ML/MIN/1.73 M^2
GLUCOSE SERPL-MCNC: 154 MG/DL (ref 70–110)
HCT VFR BLD AUTO: 41 % (ref 40–54)
HGB BLD-MCNC: 13.2 G/DL (ref 14–18)
IMM GRANULOCYTES # BLD AUTO: 0.02 K/UL (ref 0–0.04)
IMM GRANULOCYTES NFR BLD AUTO: 0.3 % (ref 0–0.5)
LYMPHOCYTES # BLD AUTO: 2.2 K/UL (ref 1–4.8)
LYMPHOCYTES NFR BLD: 31.8 % (ref 18–48)
MCH RBC QN AUTO: 28.4 PG (ref 27–31)
MCHC RBC AUTO-ENTMCNC: 32.2 G/DL (ref 32–36)
MCV RBC AUTO: 88 FL (ref 82–98)
MONOCYTES # BLD AUTO: 0.3 K/UL (ref 0.3–1)
MONOCYTES NFR BLD: 4.3 % (ref 4–15)
NEUTROPHILS # BLD AUTO: 4.2 K/UL (ref 1.8–7.7)
NEUTROPHILS NFR BLD: 61.4 % (ref 38–73)
NRBC BLD-RTO: 0 /100 WBC
PLATELET # BLD AUTO: 309 K/UL (ref 150–450)
PMV BLD AUTO: 9.6 FL (ref 9.2–12.9)
POTASSIUM SERPL-SCNC: 4.3 MMOL/L (ref 3.5–5.1)
PROT SERPL-MCNC: 7.9 G/DL (ref 6–8.4)
RBC # BLD AUTO: 4.64 M/UL (ref 4.6–6.2)
SODIUM SERPL-SCNC: 138 MMOL/L (ref 136–145)
WBC # BLD AUTO: 6.91 K/UL (ref 3.9–12.7)

## 2024-07-29 PROCEDURE — 36415 COLL VENOUS BLD VENIPUNCTURE: CPT | Mod: PO | Performed by: INTERNAL MEDICINE

## 2024-07-29 PROCEDURE — 85025 COMPLETE CBC W/AUTO DIFF WBC: CPT | Mod: PO | Performed by: INTERNAL MEDICINE

## 2024-07-29 PROCEDURE — 80053 COMPREHEN METABOLIC PANEL: CPT | Performed by: INTERNAL MEDICINE

## 2024-07-30 ENCOUNTER — INFUSION (OUTPATIENT)
Dept: INFUSION THERAPY | Facility: HOSPITAL | Age: 74
End: 2024-07-30
Attending: INTERNAL MEDICINE
Payer: MEDICARE

## 2024-07-30 ENCOUNTER — OFFICE VISIT (OUTPATIENT)
Dept: HEMATOLOGY/ONCOLOGY | Facility: CLINIC | Age: 74
End: 2024-07-30
Payer: MEDICARE

## 2024-07-30 VITALS
TEMPERATURE: 96 F | RESPIRATION RATE: 16 BRPM | BODY MASS INDEX: 28.75 KG/M2 | WEIGHT: 216.94 LBS | SYSTOLIC BLOOD PRESSURE: 130 MMHG | HEIGHT: 73 IN | DIASTOLIC BLOOD PRESSURE: 78 MMHG | HEART RATE: 74 BPM | OXYGEN SATURATION: 100 %

## 2024-07-30 VITALS
TEMPERATURE: 96 F | SYSTOLIC BLOOD PRESSURE: 94 MMHG | RESPIRATION RATE: 18 BRPM | DIASTOLIC BLOOD PRESSURE: 63 MMHG | HEIGHT: 73 IN | WEIGHT: 216.94 LBS | HEART RATE: 78 BPM | OXYGEN SATURATION: 100 % | BODY MASS INDEX: 28.75 KG/M2

## 2024-07-30 DIAGNOSIS — D64.81 ANEMIA ASSOCIATED WITH CHEMOTHERAPY: ICD-10-CM

## 2024-07-30 DIAGNOSIS — C34.90 NSCLC METASTATIC TO LYMPH NODES OF MULTIPLE SITES: Primary | ICD-10-CM

## 2024-07-30 DIAGNOSIS — N18.31 CHRONIC KIDNEY DISEASE, STAGE 3A: ICD-10-CM

## 2024-07-30 DIAGNOSIS — C77.1 SECONDARY AND UNSPECIFIED MALIGNANT NEOPLASM OF INTRATHORACIC LYMPH NODES: ICD-10-CM

## 2024-07-30 DIAGNOSIS — T45.1X5A ANEMIA ASSOCIATED WITH CHEMOTHERAPY: ICD-10-CM

## 2024-07-30 DIAGNOSIS — C34.90 NSCLC METASTATIC TO LYMPH NODES OF MULTIPLE SITES: ICD-10-CM

## 2024-07-30 DIAGNOSIS — C77.8 NSCLC METASTATIC TO LYMPH NODES OF MULTIPLE SITES: Primary | ICD-10-CM

## 2024-07-30 DIAGNOSIS — E86.0 DEHYDRATION: Primary | ICD-10-CM

## 2024-07-30 DIAGNOSIS — C77.8 NSCLC METASTATIC TO LYMPH NODES OF MULTIPLE SITES: ICD-10-CM

## 2024-07-30 PROCEDURE — 63600175 PHARM REV CODE 636 W HCPCS: Mod: PN | Performed by: NURSE PRACTITIONER

## 2024-07-30 PROCEDURE — 25000003 PHARM REV CODE 250: Mod: PN | Performed by: NURSE PRACTITIONER

## 2024-07-30 PROCEDURE — 96375 TX/PRO/DX INJ NEW DRUG ADDON: CPT | Mod: PN

## 2024-07-30 PROCEDURE — 99999 PR PBB SHADOW E&M-EST. PATIENT-LVL IV: CPT | Mod: PBBFAC,,, | Performed by: NURSE PRACTITIONER

## 2024-07-30 PROCEDURE — 96361 HYDRATE IV INFUSION ADD-ON: CPT | Mod: PN

## 2024-07-30 PROCEDURE — 96413 CHEMO IV INFUSION 1 HR: CPT | Mod: PN

## 2024-07-30 PROCEDURE — 99214 OFFICE O/P EST MOD 30 MIN: CPT | Mod: PBBFAC,PN,25 | Performed by: NURSE PRACTITIONER

## 2024-07-30 RX ORDER — HEPARIN 100 UNIT/ML
500 SYRINGE INTRAVENOUS
Status: CANCELLED | OUTPATIENT
Start: 2024-07-30

## 2024-07-30 RX ORDER — SODIUM CHLORIDE 0.9 % (FLUSH) 0.9 %
10 SYRINGE (ML) INJECTION
Status: DISCONTINUED | OUTPATIENT
Start: 2024-07-30 | End: 2024-07-30 | Stop reason: HOSPADM

## 2024-07-30 RX ORDER — ONDANSETRON HYDROCHLORIDE 2 MG/ML
8 INJECTION, SOLUTION INTRAVENOUS
Status: CANCELLED
Start: 2024-07-30

## 2024-07-30 RX ORDER — PROCHLORPERAZINE EDISYLATE 5 MG/ML
5 INJECTION INTRAMUSCULAR; INTRAVENOUS ONCE AS NEEDED
Status: CANCELLED
Start: 2024-07-30

## 2024-07-30 RX ORDER — PROCHLORPERAZINE EDISYLATE 5 MG/ML
5 INJECTION INTRAMUSCULAR; INTRAVENOUS ONCE AS NEEDED
Status: DISCONTINUED | OUTPATIENT
Start: 2024-07-30 | End: 2024-07-30 | Stop reason: HOSPADM

## 2024-07-30 RX ORDER — SODIUM CHLORIDE 9 MG/ML
1000 INJECTION, SOLUTION INTRAVENOUS
Status: COMPLETED | OUTPATIENT
Start: 2024-07-30 | End: 2024-07-30

## 2024-07-30 RX ORDER — ONDANSETRON HYDROCHLORIDE 2 MG/ML
8 INJECTION, SOLUTION INTRAVENOUS
Status: COMPLETED | OUTPATIENT
Start: 2024-07-30 | End: 2024-07-30

## 2024-07-30 RX ORDER — SODIUM CHLORIDE 0.9 % (FLUSH) 0.9 %
10 SYRINGE (ML) INJECTION
Status: CANCELLED | OUTPATIENT
Start: 2024-07-30

## 2024-07-30 RX ADMIN — SODIUM CHLORIDE 1000 ML: 9 INJECTION, SOLUTION INTRAVENOUS at 02:07

## 2024-07-30 RX ADMIN — GEMCITABINE 2200 MG: 38 INJECTION, SOLUTION INTRAVENOUS at 02:07

## 2024-07-30 RX ADMIN — ONDANSETRON 8 MG: 2 INJECTION INTRAMUSCULAR; INTRAVENOUS at 02:07

## 2024-07-30 NOTE — PLAN OF CARE
Pt arrived to clinic today for C1D8 Gemzar infusion with 1L NS and tolerated well with no changes throughout therapy. Pt aware of side effects and number to call for any needs and discharged to home in NAD. Pt aware of f/u appts.

## 2024-07-30 NOTE — PROGRESS NOTES
PATIENT: Feng Thakkar  MRN: [id number]  DATE: 7/30/2024    Diagnosis:   1. 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 Secondary and unspecified malignant neoplasm of intrathoracic lymph nodes    3. Anemia associated with chemotherapy    4. Chronic kidney disease, stage 3a      Chief Complaint: Clearance for C1D8 Gemzar      Oncologic History:   Oncology History   NSCLC metastatic to lymph nodes of multiple sites   3/31/2023 Initial Diagnosis    Squamous cell carcinoma of lung     3/31/2023 Cancer Staged    Staging form: Lung, AJCC 8th Edition  - Clinical: Stage IIIA (cT2b, cN2, cM0)     4/27/2023 - 6/7/2023 Radiation Therapy    Treating physician: Leonor Hadley  Total Dose: 60 Gy  Fractions: 30  Treatment Site Ref. ID Energy Dose/Fx (Gy) #Fx Dose Correction (Gy) Total Dose (Gy) Start Date End Date Elapsed Days   IM Lung PTV 6X 2 6 / 30 0 12 4/27/2023 5/4/2023 7   IM Lung_New PTV 6X 2 24 / 24 0 48 5/5/2023 6/7/2023 33        4/28/2023 - 6/2/2023 Chemotherapy    Treatment Summary   Plan Name: OP NSCLC PACLITAXEL + CARBOPLATIN (AUC) QW + RADIATION  Treatment Goal: Curative  Status: Inactive  Start Date: 4/28/2023  End Date: 6/2/2023  Provider: Lisandro Lawrence MD  Chemotherapy: CARBOplatin (PARAPLATIN) 185 mg in sodium chloride 0.9% 303.5 mL chemo infusion, 185 mg (100 % of original dose 183.4 mg), Intravenous, Clinic/HOD 1 time, 6 of 6 cycles  Dose modification:   (original dose 183.4 mg, Cycle 1)  Administration: 185 mg (4/28/2023), 185 mg (5/5/2023), 190 mg (5/12/2023), 190 mg (5/19/2023), 180 mg (5/26/2023), 190 mg (6/2/2023)  PACLitaxeL (TAXOL) 45 mg/m2 = 102 mg in sodium chloride 0.9% 250 mL chemo infusion, 45 mg/m2 = 102 mg, Intravenous, Clinic/HOD 1 time, 6 of 6 cycles  Administration: 102 mg (4/28/2023), 102 mg (5/5/2023), 102 mg (5/12/2023), 102 mg (5/19/2023), 102 mg (5/26/2023), 102 mg (6/2/2023)     7/11/2023 - 2/21/2024 Chemotherapy    Treatment Summary   Plan Name: OP DURVALUMAB 1500 MG  "Q4W  Treatment Goal: Curative  Status: Inactive  Start Date: 7/11/2023  End Date: 2/21/2024  Provider: Lisandro Lawrence MD  Chemotherapy: [No matching medication found in this treatment plan]     3/27/2024 - 6/19/2024 Chemotherapy    Treatment Summary   Plan Name: OP NSCLC ramucirumab 10 mg/kg plus pembrolizumab 200mg Q3W  Treatment Goal: Palliative  Status: Inactive  Start Date: 3/27/2024  End Date: 6/19/2024  Provider: Lisandro Lawrence MD  Chemotherapy: ramucirumab (CYRAMZA) 1,000 mg in sodium chloride 0.9% 250 mL chemo infusion, 1,048 mg (100 % of original dose 10 mg/kg), Intravenous, Clinic/HOD 1 time, 5 of 35 cycles  Dose modification: 10 mg/kg (original dose 10 mg/kg, Cycle 2, Reason: MD Discretion, Comment: Trial Dosing)  Administration: 1,000 mg (4/17/2024), 1,000 mg (3/27/2024), 1,000 mg (5/8/2024), 1,000 mg (5/29/2024), 1,000 mg (6/19/2024)     7/23/2024 -  Chemotherapy    Treatment Summary   Plan Name: OP NSCLC GEMCITABINE QW  Treatment Goal: Palliative  Status: Active  Start Date: 7/23/2024  End Date: 6/10/2025 (Planned)  Provider: Lisandro Lawrence MD  Chemotherapy: gemcitabine 2,200 mg in 0.9% NaCl SolP 307.9 mL chemo infusion, 2,260 mg, Intravenous, Clinic/HOD 1 time, 1 of 12 cycles  Administration: 2,200 mg (7/23/2024)           Subjective:    Interval History: Mr. Thakkar is a 73 y.o. male with Gout, HLD, HTN who presents for work up of NSCLC.  Since the last clinic visit the patient states he experienced chills, fevers & body aches after his 1st infusion of Gemzar.  Reviewed side effects post 1st infusion.  Appetite fair; eating, but not as much.  Labs reviewed. Hydration mostly with caffeine (sweet tea).  Discussed the importance of daily "hydrating" with examples.  Reviewed the effects on kidney function. Bowels slowing some - increase hydration as well as Senakot for stool softener.  Continues to use Oxycodone 4 to 5 times a day.  The patient denies CP, cough, SOB, nausea, vomiting, diarrhea.  The " patient denies fever, chills, night sweats, weight loss, new lumps or bumps, easy bruising or bleeding.    Prior History:  The patient initially developed abdominal pain on 01/05/2023 and underwent CT of the abdomen showing a mass in the left lung base measuring 3.9 x 2.9 cm; 3 x 3.8 cm mass along the pancreatic tail; and shotty retroperitoneal lymph nodes.  The patient underwent repeat CT abdomen and pelvis on 02/24/2023 showing a 4.3 x 4 solid enhancing mass of the left lung base; thickened bladder wall; prostate gland measuring 45.1 x 4.6 cm; abnormal sclerosis in the left femoral head measuring 2.8 x 1.4 x 1.3 cm; and additional soft tissue densities in the posterior left subdiaphragmatic region measuring up to 16 mm.  CT chest on 03/01/2023 showed a 2 cm low-density lesion in the right lobe of the thyroid gland; 4.5 x 4.3 x 3.8 cm mass in the left lower lobe; several micro nodules; Na soft tissue mass adjacent to the pancreatic tail.  Patient underwent EUS on 03/03/2023 showing a 3.5 cm and 1.8 cm round pancreatic tail lesion.  Biopsy returned results showing no evidence of malignancy and abundant hematopoietic elements and dendritic cells.  Patient then underwent EBUS under the care of Dr. Albright on 03/17/2023 showing squamous cell carcinoma in biopsy of the left lower lung lesion; squamous cell carcinoma and station 7 lymph node; positive 11 L lymph node for squamous cell carcinoma.   The patient underwent PET-CT on 04/18/2023 showing a markedly hypermetabolic lobulated subpleural left lower lobe mass measuring 4.5 x 3.9 cm with hypermetabolic lymph nodes in the left hilar region measuring 2.2 x 1.9 cm; and a 4.2 x 3.6 walk circumscribed mass in the pancreatic tail.  MRI brain on 04/18/2023 showed no acute findings.     The patient underwent CT chest on 6/26/23 showing a large right thyroid mass measuring at least 2.6 cm; left renal hypodensity measuring 11 mm favored to be a cyst; significant decrease in size  of left lower lobe consolidative mass now measuring 2.4 x 2.3 cm; stable 3 mm consolidative nodule in the left upper lobe.   The patient underwent CT chest on 11/21/2023 showing patchy ground-glass density within the medial right upper lobe; ground-glass density in the left dependent left upper lobe that the left lower lobe with associated bronchiectasis; previous target peribronchial nodule in the left lower lobe measuring 2.5 cm and 2 minute cm hypodense lesion in the right thyroid nodule.   CT Chest 2/09/24 showing geographic ground-glass disease and interlobular septal thickening in the left upper lobe; several large lymph nodes in the upper abdomen near the celiac takeoff and liver hilum with lymph node ranging from 1.3-1.5 cm.    The patient underwent a CT abdomen and pelvis on 03/01/2024 showing coronary artery calcification; 40 x 36 mm hypoenhancing pancreatic tail mass; 20 x 30 mm peripancreatic lymph node along the cranial aspect of the proximal pancreatic body; stable 13 mm periaortic lymph node; enlargement of multiple retroperitoneal lymph nodes; mesenteric haziness present within the central abdomen; enlarged inguinal lymph nodes bilaterally; and a 21 mm sclerotic bone lesion within the posterior row inferomedial left femoral head.   The patient underwent a PET-CT on 03/07/2024 showing resolution of hypermetabolic left lower lobe mass now showing signs of radiation therapy response; disappearance of hypermetabolic enlarged left hilar lymph nodes; new irregular hypermetabolic right breast tissue measuring 2.5 x 1.4 cm possibly representing the unilateral gynecomastia; interval development of new hypermetabolic lymph nodes in the celiac region, periaortic region, right retrocrural area, and bilateral inguinal regions; lentiform cutaneous subcutaneous well-circumscribed mass along the anterior midline aspect of the lower pelvis which has increased in size and become hypermetabolic now measuring 2.1 x 1.1  cm possibly representing a sebaceous cyst.     The patient underwent EUS on 03/13/2024 with gastric polyp and single duodenal polyp seen as well as a few malignant appearing lymph nodes in the celiac region which were biopsied.    The patient underwent PET-CT on 07/08/2024 showing to metastatic left posterior cervical lymph nodes with the largest measuring 13 x 8 mm; new superior mediastinal metastatic lymph node; increasing size of left lower lobe airspace consolidation with a new nodular focus of hypermetabolic activity; progression in upper abdominal metastatic lymphadenopathy with multiple new and enlarging retroperitoneal metastatic lymph nodes; new retrocrural metastatic lymph node; and new aortocaval lymph node.    Past Medical History:   Past Medical History:   Diagnosis Date    Diabetes mellitus     Gout, unspecified     High cholesterol     HTN (hypertension)     Lung cancer        Past Surgical HIstory:   Past Surgical History:   Procedure Laterality Date    ENDOSCOPIC ULTRASOUND OF UPPER GASTROINTESTINAL TRACT N/A 3/3/2023    Procedure: ULTRASOUND, UPPER GI TRACT, ENDOSCOPIC;  Surgeon: Cora Mcgrath MD;  Location: [facility];  Service: Endoscopy;  Laterality: N/A;  2/24/23-Instructions mailed to address on file-DS    ENDOSCOPIC ULTRASOUND OF UPPER GASTROINTESTINAL TRACT Left 3/13/2024    Procedure: ULTRASOUND, UPPER GI TRACT, ENDOSCOPIC;  Surgeon: Reji Aguirre MD;  Location: [facility];  Service: Endoscopy;  Laterality: Left;    ESOPHAGOGASTRODUODENOSCOPY N/A 3/13/2024    Procedure: EGD (ESOPHAGOGASTRODUODENOSCOPY);  Surgeon: Reji Aguirre MD;  Location: [facility] ENDO;  Service: Endoscopy;  Laterality: N/A;    INSERTION OF TUNNELED CENTRAL VENOUS CATHETER (CVC) WITH SUBCUTANEOUS PORT N/A 4/24/2023    Procedure: BMUICBYDA-ONXM-A-CATH;  Surgeon: Paulino Love MD;  Location: [facility];  Service: General;  Laterality: N/A;    PANCREATECTOMY      ROBOTIC BRONCHOSCOPY N/A 3/17/2023     Procedure: ROBOTIC BRONCHOSCOPY;  Surgeon: Cristiane Albright MD;  Location: [facility] OR [address] [place] [address];  Service: Pulmonary;  Laterality: N/A;       Family History:   Family History   Problem Relation Name Age of Onset    Breast cancer Sister          60       Social History:  reports that he quit smoking about 11 years ago. His smoking use included cigarettes. He started smoking about 56 years ago. He has a 81 pack-year smoking history. He has quit using smokeless tobacco. He reports current alcohol use of about 3.0 standard drinks of alcohol per week. He reports that he does not use drugs.    Allergies:  Review of patient's allergies indicates:  No Known Allergies    Medications:  Current Outpatient Medications   Medication Sig Dispense Refill    allopurinoL (ZYLOPRIM) 100 MG tablet Take 100 mg by mouth once daily.      amLODIPine (NORVASC) 2.5 MG tablet Take 2.5 mg by mouth once daily.      BYDUREON BCISE 2 mg/0.85 mL AtIn Inject 2 mg into the skin every 7 days. Every Friday      cloNIDine (CATAPRES) 0.2 MG tablet Take 0.2 mg by mouth once. Daily      ezetimibe (ZETIA) 10 mg tablet Take 10 mg by mouth once daily.      FARXIGA 10 mg tablet Take 10 mg by mouth every morning.      finasteride (PROSCAR) 5 mg tablet Take 5 mg by mouth once daily.      FREESTYLE TEST Strp USE 1 STRIP TO CHECK GLUCOSE ONCE DAILY      levothyroxine (SYNTHROID) 50 MCG tablet Take 50 mcg by mouth every morning.      LIDOcaine-prilocaine (EMLA) cream Apply topically to port site one hour prior to access, cover 30 g 1    metoprolol succinate (TOPROL-XL) 100 MG 24 hr tablet Take 100 mg by mouth once daily.      morphine (MS CONTIN) 15 MG 12 hr tablet Take 1 tablet (15 mg total) by mouth 2 (two) times daily. 60 tablet 0    oxyCODONE (ROXICODONE) 5 MG immediate release tablet Take 1 tablet (5 mg total) by mouth every 4 (four) hours as needed for Pain. (Patient not taking: Reported on 7/23/2024) 60 tablet 0    rosuvastatin (CRESTOR) 40 MG Tab Take 10 mg by  "mouth every evening.      TOUJEO SOLOSTAR U-300 INSULIN 300 unit/mL (1.5 mL) InPn pen SMARTSI Unit(s) SUB-Q Every Evening       No current facility-administered medications for this visit.       Review of Systems   Constitutional:  Negative for chills, diaphoresis, fatigue, fever and unexpected weight change.   Respiratory:  Negative for cough and shortness of breath.    Cardiovascular:  Negative for chest pain and palpitations.   Gastrointestinal:  Negative for abdominal pain, constipation, diarrhea, nausea and vomiting.   Musculoskeletal:         Pain in right shoulder and right thigh   Skin:  Negative for color change and rash.   Neurological:  Negative for headaches.   Hematological:  Negative for adenopathy. Does not bruise/bleed easily.       ECOG Performance Status: 1   Objective:      Vitals: Weight:  Loss of 2 1/2 pounds in 1 week  Vitals:    24 1307   BP: 94/63   BP Location: Left arm   Patient Position: Sitting   BP Method: Medium (Automatic)   Pulse: 78   Resp: 18   Temp: 96.2 °F (35.7 °C)   TempSrc: Temporal   SpO2: 100%   Weight: 98.4 kg (216 lb 14.9 oz)   Height: 6' 1" (1.854 m)     Physical Exam  Vitals reviewed.   Constitutional:       General: He is not in acute distress.     Appearance: He is well-developed. He is not diaphoretic.   HENT:      Head: Normocephalic and atraumatic.      Mouth/Throat:      Comments: White coating to tongue  Eyes:      Conjunctiva/sclera: Conjunctivae normal.   Cardiovascular:      Rate and Rhythm: Normal rate and regular rhythm.      Pulses: Normal pulses.      Heart sounds: Normal heart sounds. No murmur heard.     No gallop.   Pulmonary:      Effort: Pulmonary effort is normal. No respiratory distress.      Breath sounds: Normal breath sounds. No wheezing or rales.   Chest:      Chest wall: No tenderness.   Abdominal:      General: Bowel sounds are normal. There is no distension.      Palpations: Abdomen is soft. There is no mass.      Tenderness: There is " no abdominal tenderness. There is no rebound.   Musculoskeletal:      Cervical back: Neck supple.      Right lower leg: No edema.      Left lower leg: No edema.   Lymphadenopathy:      Cervical: No cervical adenopathy.      Upper Body:      Right upper body: No supraclavicular or axillary adenopathy.      Left upper body: No supraclavicular or axillary adenopathy.   Skin:     General: Skin is warm and dry.   Neurological:      Mental Status: He is alert and oriented to person, place, and time.   Psychiatric:         Behavior: Behavior normal.         Thought Content: Thought content normal.         Laboratory Data:  Lab Visit on 07/29/2024   Component Date Value Ref Range Status    WBC 07/29/2024 6.91  3.90 - 12.70 K/uL Final    RBC 07/29/2024 4.64  4.60 - 6.20 M/uL Final    Hemoglobin 07/29/2024 13.2 (L)  14.0 - 18.0 g/dL Final    Hematocrit 07/29/2024 41.0  40.0 - 54.0 % Final    MCV 07/29/2024 88  82 - 98 fL Final    MCH 07/29/2024 28.4  27.0 - 31.0 pg Final    MCHC 07/29/2024 32.2  32.0 - 36.0 g/dL Final    RDW 07/29/2024 15.0 (H)  11.5 - 14.5 % Final    Platelets 07/29/2024 309  150 - 450 K/uL Final    MPV 07/29/2024 9.6  9.2 - 12.9 fL Final    Immature Granulocytes 07/29/2024 0.3  0.0 - 0.5 % Final    Gran # (ANC) 07/29/2024 4.2  1.8 - 7.7 K/uL Final    Immature Grans (Abs) 07/29/2024 0.02  0.00 - 0.04 K/uL Final    Comment: Mild elevation in immature granulocytes is non specific and   can be seen in a variety of conditions including stress response,   acute inflammation, trauma and pregnancy. Correlation with other   laboratory and clinical findings is essential.      Lymph # 07/29/2024 2.2  1.0 - 4.8 K/uL Final    Mono # 07/29/2024 0.3  0.3 - 1.0 K/uL Final    Eos # 07/29/2024 0.2  0.0 - 0.5 K/uL Final    Baso # 07/29/2024 0.02  0.00 - 0.20 K/uL Final    nRBC 07/29/2024 0  0 /100 WBC Final    Gran % 07/29/2024 61.4  38.0 - 73.0 % Final    Lymph % 07/29/2024 31.8  18.0 - 48.0 % Final    Mono % 07/29/2024 4.3   4.0 - 15.0 % Final    Eosinophil % 07/29/2024 2.2  0.0 - 8.0 % Final    Basophil % 07/29/2024 0.3  0.0 - 1.9 % Final    Differential Method 07/29/2024 Automated   Final    Sodium 07/29/2024 138  136 - 145 mmol/L Final    Potassium 07/29/2024 4.3  3.5 - 5.1 mmol/L Final    Chloride 07/29/2024 107  95 - 110 mmol/L Final    CO2 07/29/2024 22 (L)  23 - 29 mmol/L Final    Glucose 07/29/2024 154 (H)  70 - 110 mg/dL Final    BUN 07/29/2024 20  8 - 23 mg/dL Final    Creatinine 07/29/2024 1.8 (H)  0.5 - 1.4 mg/dL Final    Calcium 07/29/2024 9.7  8.7 - 10.5 mg/dL Final    Total Protein 07/29/2024 7.9  6.0 - 8.4 g/dL Final    Albumin 07/29/2024 2.8 (L)  3.5 - 5.2 g/dL Final    Total Bilirubin 07/29/2024 0.4  0.1 - 1.0 mg/dL Final    Comment: For infants and newborns, interpretation of results should be based  on gestational age, weight and in agreement with clinical  observations.    Premature Infant recommended reference ranges:  Up to 24 hours.............<8.0 mg/dL  Up to 48 hours............<12.0 mg/dL  3-5 days..................<15.0 mg/dL  6-29 days.................<15.0 mg/dL      Alkaline Phosphatase 07/29/2024 55  55 - 135 U/L Final    AST 07/29/2024 42 (H)  10 - 40 U/L Final    ALT 07/29/2024 36  10 - 44 U/L Final    eGFR 07/29/2024 39 (A)  >60 mL/min/1.73 m^2 Final    Anion Gap 07/29/2024 9  8 - 16 mmol/L Final     Imaging:     PET/CT 7/08/24  Head and neck: There is symmetric and physiologic distribution of radiotracer throughout the included brain. There is no hemorrhage, hydrocephalus, or midline shift. There are 2 metastatic left posterior cervical lymph nodes. The larger of these best visualized on image 61 measures 13 x 8 mm with an SUV max of 5 and was not present on the prior exam.     Chest: There is a left subclavian port in place. There is a superior mediastinal metastatic lymph node best visualized on image 75 with an SUV max of 3.6. This was not present on the prior exam. The hypermetabolic right  thyroid nodule is unchanged.     The poorly defined area of airspace consolidation in the left lower lobe has slightly increased in size with a new nodular focus of hypermetabolic activity best visualized on image 124. This was not present on the prior exam and demonstrates an SUV max of 7.3. There is a small non FDG avid left pleural effusion. There is no right-sided FDG avid pulmonary nodule.     Abdomen and pelvis: There is physiologic radiotracer throughout the liver, spleen, and collecting system. There is progression the upper abdominal metastatic lymphadenopathy. A representative celiac node best visualized on image 152 measures 4.4 x 2.5 cm compared with 2.9 x 1.6 cm previously. There is an SUV max of 20 compared with 20 on the prior exam. There are multiple new and enlarging retroperitoneal metastatic lymph nodes. There are new retrocrural metastatic lymph nodes. A representative new aortocaval lymph node best visualized on image 192 measures 18 x 16 mm with an SUV max of 24. There are no new and enlarging iliac metastatic nodes.     Musculoskeletal: There is no FDG avid lytic or blastic osseous lesion.       Assessment:       1. 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 Secondary and unspecified malignant neoplasm of intrathoracic lymph nodes    3. Anemia associated with chemotherapy    4. Chronic kidney disease, stage 3a         Plan:     NSCLC - patient was initially diagnosed with stage III [id number] SCC of the left lung now with metastatic disease  -PET/CT 4/18/23 shows continued left lung mass, mediastinal LAD  -MRI brain 4/18/23 showed no acute findings  -TEMPUS Blood testing showed TP 53 mutation  -TEMPUS tissue testing showed PD-L1 of 20%, TP53, KDM6A, CDKN2a and MTAP  -PORT placed 4/24/23 under the care of Dr. Love  -PT completed 6 cycles of Carboplatin and Paclitaxel on 6/02/23  -Radiation completed 6/07/23  -CT chest on 6/26/23 showed a decrease in LLL mass  -Will proceed with  Durvalumab for 1 year of treatment  -CT chest 11/21/23 shows decreased lung mass and radiation changes  -CT Chest on 02/09/2024 showed continued radiation changes in the lung but did show intra-abdominal lymphadenopathy (see below)  -Pt s/p cycle 9 of Durvalumab  -Pt underwent EUS on 3/13/24 with biopsy of celiac LN showing metastatic SCC  -Treatment with Ramucirumab and Pembrolizumab discussed based off trial Lung [id number]  -PT completed 4 cycles  -PET/CT on 7/08/24 showed progression of disease  -Pt to start treatment with Gemcitabine today  07/30/24:  Proceed with C1D8 Gemzar; f/u in 1 week with Dr. Lawrence on 08/06 with lab on 08/05  Visit today included increased complexity associated with the care of the episodic problem (immunotherapy and ramucirumab) addressed and managing the longitudinal care of the patient due to the serious and/or complex managed problem(s) NSCLC    Pancreatic Mass - seen on CT scans and biopsy during EUS on 03/03/2023 with path showing no evidence of malignancy  -PET/CT 4/18/23 showed uptake in the tail of the pancreas  -Possibly due to chronic pancreatitis  -Pt with stable pancreatic mass on CT abdomen 7/03/23 and 3/01/24  -Patient with enlarging surrounding lymphadenopathy  -PET/CT on 3/07/24 shows increasing LAD in the celiac region, periaortic retroperitoneum, right retrocrural area, and bilateral inguinal regions  -EUS on 3/13/24 not suggestive of a pancreatic mass  -Will monitor    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  -No sign of infection  07/30/24:  Oral candidiasis - [facility] sent; written handout provided  -Will monitor    Cancer Related Pain - Pt on oxycodone  -Will prescribe MS Contin  -PT to see palliative care    CKD - pt with stage IIIa CKD  -Creatinine 1.8 7/29/24  -Increase daily hydration; limit caffeine; add bolus of NS today  -Will monitor    HTN - pt on amlodipine, clonidine, metoprolol  -BP controlled  -Will monitor    Gout - pt on  allopurinol  -Currently asymptomatic  -Will monitor    HLD - pt on rosuvastatin, zetia  -Management per PCP    DMII - pt on toujeo, farxiga, and bydureon  -Management per PCP    TIFFANY Delacruz, MARCP-C  [facility]  30 minutes were spent in coordination of patient's care, record review and counseling.    Route Chart for Scheduling    Med Onc Chart Routing      Follow up with physician . F/u next week with Dr. Lawrence on 08/06 with labs on 08/05   Follow up with [facility]    Infusion scheduling note   Proceed with C1D8 Gemzar + 1000 ml NS over 2 hours   Injection scheduling note    Labs    Imaging    Pharmacy appointment    Other referrals              Treatment Plan Information   OP NSCLC GEMCITABINE QW   Lisandro Lawrence MD   Upcoming Treatment Dates - OP NSCLC GEMCITABINE QW    7/30/2024       Pre-Medications       ondansetron injection 8 mg       Chemotherapy       gemcitabine (GEMZAR) in 0.9% NaCl SolP 250 mL chemo infusion  8/6/2024       Pre-Medications       ondansetron injection 8 mg       Chemotherapy       gemcitabine (GEMZAR) in 0.9% NaCl SolP 250 mL chemo infusion  8/20/2024       Pre-Medications       ondansetron injection 8 mg       Chemotherapy       gemcitabine (GEMZAR) in 0.9% NaCl SolP 250 mL chemo infusion  8/27/2024       Pre-Medications       ondansetron injection 8 mg       Chemotherapy       gemcitabine (GEMZAR) in 0.9% NaCl SolP 250 mL chemo infusion    Supportive Plan Information  IV FLUIDS AND ELECTROLYTES   Lisandro Lawrence MD   Upcoming Treatment Dates - IV FLUIDS AND ELECTROLYTES    No upcoming days in selected categories.

## 2024-07-30 NOTE — TELEPHONE ENCOUNTER
Re routed Rx [facility] refill request to be filled at [facility], and called to let pt know. Pt verbalized understanding.    ----- Message from Izabela Miguel sent at 7/30/2024  2:42 PM CDT -----  Type: Needs Medical Advice  Who Called:  Debbie Lyman in [facility]  Symptoms (please be specific):    How long has patient had these symptoms:    Pharmacy name and phone #:    Best Call Back Number: [phone]  Additional Information: Debbie called to inform the office that they no longer do compound solutions. Please send to another Pharm.

## 2024-07-30 NOTE — PLAN OF CARE
Problem: Adult Inpatient Plan of Care  Goal: Plan of Care Review  Outcome: Progressing  Flowsheets (Taken 7/30/2024 1620)  Plan of Care Reviewed With: patient  Goal: Patient-Specific Goal (Individualized)  Outcome: Progressing  Flowsheets (Taken 7/30/2024 1620)  Anxieties, Fears or Concerns: fatigue, lack of appetite, weakness, dry mouth  Individualized Care Needs: education, conversation, recliner, sleep, tv     Problem: Fatigue (Oncology Care)  Goal: Improved Activity Tolerance  Outcome: Progressing  Intervention: Promote Improved Energy  Flowsheets (Taken 7/30/2024 1620)  Fatigue Management:   paced activity encouraged   frequent rest breaks encouraged   fatigue-related activity identified  Sleep/Rest Enhancement:   therapeutic touch utilized   awakenings minimized   consistent schedule promoted   room darkened   relaxation techniques promoted   noise level reduced   family presence promoted   natural light exposure provided  Activity Management:   Ambulated -L4   Up in chair - L3  Environmental Support:   rest periods encouraged   distractions minimized   calm environment promoted   personal routine supported     Problem: Fall Injury Risk  Goal: Absence of Fall and Fall-Related Injury  Outcome: Progressing  Intervention: Promote Injury-Free Environment  Flowsheets (Taken 7/30/2024 1620)  Safety Promotion/Fall Prevention:   room near unit station   supervised activity   patient expresses understanding of fall risk and prevention   family to remain at bedside   in recliner, wheels locked   instructed to call staff for mobility   lighting adjusted   medications reviewed   Fall Risk reviewed with patient/family

## 2024-08-05 ENCOUNTER — LAB VISIT (OUTPATIENT)
Dept: LAB | Facility: HOSPITAL | Age: 74
End: 2024-08-05
Attending: INTERNAL MEDICINE
Payer: MEDICARE

## 2024-08-05 DIAGNOSIS — C34.90 SQUAMOUS CELL CARCINOMA OF LUNG, UNSPECIFIED LATERALITY: ICD-10-CM

## 2024-08-05 LAB
ALBUMIN SERPL BCP-MCNC: 2.4 G/DL (ref 3.5–5.2)
ALP SERPL-CCNC: 79 U/L (ref 55–135)
ALT SERPL W/O P-5'-P-CCNC: 90 U/L (ref 10–44)
ANION GAP SERPL CALC-SCNC: 9 MMOL/L (ref 8–16)
AST SERPL-CCNC: 121 U/L (ref 10–40)
BASOPHILS # BLD AUTO: 0.01 K/UL (ref 0–0.2)
BASOPHILS NFR BLD: 0.1 % (ref 0–1.9)
BILIRUB SERPL-MCNC: 0.3 MG/DL (ref 0.1–1)
BUN SERPL-MCNC: 29 MG/DL (ref 8–23)
CALCIUM SERPL-MCNC: 9.4 MG/DL (ref 8.7–10.5)
CHLORIDE SERPL-SCNC: 106 MMOL/L (ref 95–110)
CO2 SERPL-SCNC: 23 MMOL/L (ref 23–29)
CREAT SERPL-MCNC: 2.2 MG/DL (ref 0.5–1.4)
DIFFERENTIAL METHOD BLD: ABNORMAL
EOSINOPHIL # BLD AUTO: 0.1 K/UL (ref 0–0.5)
EOSINOPHIL NFR BLD: 1.6 % (ref 0–8)
ERYTHROCYTE [DISTWIDTH] IN BLOOD BY AUTOMATED COUNT: 14.9 % (ref 11.5–14.5)
EST. GFR  (NO RACE VARIABLE): 31 ML/MIN/1.73 M^2
GLUCOSE SERPL-MCNC: 180 MG/DL (ref 70–110)
HCT VFR BLD AUTO: 35.3 % (ref 40–54)
HGB BLD-MCNC: 11.6 G/DL (ref 14–18)
IMM GRANULOCYTES # BLD AUTO: 0.05 K/UL (ref 0–0.04)
IMM GRANULOCYTES NFR BLD AUTO: 0.7 % (ref 0–0.5)
LYMPHOCYTES # BLD AUTO: 1.7 K/UL (ref 1–4.8)
LYMPHOCYTES NFR BLD: 24.8 % (ref 18–48)
MCH RBC QN AUTO: 28.6 PG (ref 27–31)
MCHC RBC AUTO-ENTMCNC: 32.9 G/DL (ref 32–36)
MCV RBC AUTO: 87 FL (ref 82–98)
MONOCYTES # BLD AUTO: 0.3 K/UL (ref 0.3–1)
MONOCYTES NFR BLD: 5 % (ref 4–15)
NEUTROPHILS # BLD AUTO: 4.7 K/UL (ref 1.8–7.7)
NEUTROPHILS NFR BLD: 68.5 % (ref 38–73)
NRBC BLD-RTO: 1 /100 WBC
PLATELET # BLD AUTO: 202 K/UL (ref 150–450)
PMV BLD AUTO: 9 FL (ref 9.2–12.9)
POTASSIUM SERPL-SCNC: 4.6 MMOL/L (ref 3.5–5.1)
PROT SERPL-MCNC: 7.7 G/DL (ref 6–8.4)
RBC # BLD AUTO: 4.05 M/UL (ref 4.6–6.2)
SODIUM SERPL-SCNC: 138 MMOL/L (ref 136–145)
WBC # BLD AUTO: 6.82 K/UL (ref 3.9–12.7)

## 2024-08-05 PROCEDURE — 85025 COMPLETE CBC W/AUTO DIFF WBC: CPT | Mod: PO | Performed by: INTERNAL MEDICINE

## 2024-08-05 PROCEDURE — 80053 COMPREHEN METABOLIC PANEL: CPT | Performed by: INTERNAL MEDICINE

## 2024-08-05 PROCEDURE — 36415 COLL VENOUS BLD VENIPUNCTURE: CPT | Mod: PO | Performed by: INTERNAL MEDICINE

## 2024-08-06 ENCOUNTER — INFUSION (OUTPATIENT)
Dept: INFUSION THERAPY | Facility: HOSPITAL | Age: 74
End: 2024-08-06
Attending: INTERNAL MEDICINE
Payer: MEDICARE

## 2024-08-06 ENCOUNTER — OFFICE VISIT (OUTPATIENT)
Dept: HEMATOLOGY/ONCOLOGY | Facility: CLINIC | Age: 74
End: 2024-08-06
Payer: MEDICARE

## 2024-08-06 VITALS
RESPIRATION RATE: 20 BRPM | DIASTOLIC BLOOD PRESSURE: 60 MMHG | TEMPERATURE: 97 F | BODY MASS INDEX: 27.91 KG/M2 | OXYGEN SATURATION: 100 % | HEIGHT: 73 IN | SYSTOLIC BLOOD PRESSURE: 98 MMHG | HEART RATE: 86 BPM | WEIGHT: 210.56 LBS

## 2024-08-06 VITALS
DIASTOLIC BLOOD PRESSURE: 65 MMHG | HEIGHT: 73 IN | OXYGEN SATURATION: 100 % | TEMPERATURE: 98 F | BODY MASS INDEX: 27.91 KG/M2 | SYSTOLIC BLOOD PRESSURE: 116 MMHG | WEIGHT: 210.56 LBS | RESPIRATION RATE: 20 BRPM | HEART RATE: 75 BPM

## 2024-08-06 DIAGNOSIS — R60.0 LOWER EXTREMITY EDEMA: ICD-10-CM

## 2024-08-06 DIAGNOSIS — E78.5 HYPERLIPIDEMIA, UNSPECIFIED HYPERLIPIDEMIA TYPE: ICD-10-CM

## 2024-08-06 DIAGNOSIS — C34.90 NSCLC METASTATIC TO LYMPH NODES OF MULTIPLE SITES: Primary | ICD-10-CM

## 2024-08-06 DIAGNOSIS — M79.89 LOCALIZED SWELLING OF LOWER EXTREMITY: ICD-10-CM

## 2024-08-06 DIAGNOSIS — N18.31 CHRONIC KIDNEY DISEASE, STAGE 3A: ICD-10-CM

## 2024-08-06 DIAGNOSIS — D84.821 IMMUNODEFICIENCY DUE TO DRUG THERAPY: ICD-10-CM

## 2024-08-06 DIAGNOSIS — C77.1 SECONDARY AND UNSPECIFIED MALIGNANT NEOPLASM OF INTRATHORACIC LYMPH NODES: ICD-10-CM

## 2024-08-06 DIAGNOSIS — M10.9 GOUT, UNSPECIFIED CAUSE, UNSPECIFIED CHRONICITY, UNSPECIFIED SITE: ICD-10-CM

## 2024-08-06 DIAGNOSIS — C77.8 NSCLC METASTATIC TO LYMPH NODES OF MULTIPLE SITES: Primary | ICD-10-CM

## 2024-08-06 DIAGNOSIS — I10 HYPERTENSION, UNSPECIFIED TYPE: ICD-10-CM

## 2024-08-06 DIAGNOSIS — Z79.899 IMMUNODEFICIENCY DUE TO DRUG THERAPY: ICD-10-CM

## 2024-08-06 DIAGNOSIS — N17.9 AKI (ACUTE KIDNEY INJURY): ICD-10-CM

## 2024-08-06 PROCEDURE — 99214 OFFICE O/P EST MOD 30 MIN: CPT | Mod: PBBFAC,25,PN | Performed by: INTERNAL MEDICINE

## 2024-08-06 PROCEDURE — 99215 OFFICE O/P EST HI 40 MIN: CPT | Mod: S$PBB,,, | Performed by: INTERNAL MEDICINE

## 2024-08-06 PROCEDURE — 99999 PR PBB SHADOW E&M-EST. PATIENT-LVL IV: CPT | Mod: PBBFAC,,, | Performed by: INTERNAL MEDICINE

## 2024-08-06 PROCEDURE — 96375 TX/PRO/DX INJ NEW DRUG ADDON: CPT | Mod: PN

## 2024-08-06 PROCEDURE — G2211 COMPLEX E/M VISIT ADD ON: HCPCS | Mod: S$PBB,,, | Performed by: INTERNAL MEDICINE

## 2024-08-06 PROCEDURE — 96413 CHEMO IV INFUSION 1 HR: CPT | Mod: PN

## 2024-08-06 PROCEDURE — 25000003 PHARM REV CODE 250: Mod: PN | Performed by: INTERNAL MEDICINE

## 2024-08-06 PROCEDURE — 63600175 PHARM REV CODE 636 W HCPCS: Mod: PN | Performed by: INTERNAL MEDICINE

## 2024-08-06 RX ORDER — HEPARIN 100 UNIT/ML
500 SYRINGE INTRAVENOUS
Status: DISCONTINUED | OUTPATIENT
Start: 2024-08-06 | End: 2024-08-06 | Stop reason: HOSPADM

## 2024-08-06 RX ORDER — SODIUM CHLORIDE 0.9 % (FLUSH) 0.9 %
10 SYRINGE (ML) INJECTION
Status: DISCONTINUED | OUTPATIENT
Start: 2024-08-06 | End: 2024-08-06 | Stop reason: HOSPADM

## 2024-08-06 RX ORDER — HEPARIN 100 UNIT/ML
500 SYRINGE INTRAVENOUS
Status: CANCELLED | OUTPATIENT
Start: 2024-08-06

## 2024-08-06 RX ORDER — SODIUM CHLORIDE 0.9 % (FLUSH) 0.9 %
10 SYRINGE (ML) INJECTION
Status: CANCELLED | OUTPATIENT
Start: 2024-08-06

## 2024-08-06 RX ORDER — SODIUM CHLORIDE 0.9 % (FLUSH) 0.9 %
10 SYRINGE (ML) INJECTION
OUTPATIENT
Start: 2024-08-06

## 2024-08-06 RX ORDER — PROCHLORPERAZINE EDISYLATE 5 MG/ML
5 INJECTION INTRAMUSCULAR; INTRAVENOUS ONCE AS NEEDED
Status: DISCONTINUED | OUTPATIENT
Start: 2024-08-06 | End: 2024-08-06 | Stop reason: HOSPADM

## 2024-08-06 RX ORDER — PROCHLORPERAZINE EDISYLATE 5 MG/ML
5 INJECTION INTRAMUSCULAR; INTRAVENOUS ONCE AS NEEDED
Status: CANCELLED
Start: 2024-08-06

## 2024-08-06 RX ORDER — ONDANSETRON HYDROCHLORIDE 2 MG/ML
8 INJECTION, SOLUTION INTRAVENOUS
Status: CANCELLED
Start: 2024-08-06

## 2024-08-06 RX ORDER — ONDANSETRON HYDROCHLORIDE 2 MG/ML
8 INJECTION, SOLUTION INTRAVENOUS
Status: COMPLETED | OUTPATIENT
Start: 2024-08-06 | End: 2024-08-06

## 2024-08-06 RX ORDER — HEPARIN 100 UNIT/ML
500 SYRINGE INTRAVENOUS
OUTPATIENT
Start: 2024-08-06

## 2024-08-06 RX ADMIN — ONDANSETRON 8 MG: 2 INJECTION INTRAMUSCULAR; INTRAVENOUS at 02:08

## 2024-08-06 RX ADMIN — GEMCITABINE 2200 MG: 38 INJECTION, SOLUTION INTRAVENOUS at 02:08

## 2024-08-06 RX ADMIN — SODIUM CHLORIDE: 9 INJECTION, SOLUTION INTRAVENOUS at 01:08

## 2024-08-08 ENCOUNTER — DOCUMENTATION ONLY (OUTPATIENT)
Dept: HEMATOLOGY/ONCOLOGY | Facility: CLINIC | Age: 74
End: 2024-08-08
Payer: MEDICARE

## 2024-08-19 ENCOUNTER — LAB VISIT (OUTPATIENT)
Dept: LAB | Facility: HOSPITAL | Age: 74
End: 2024-08-19
Attending: INTERNAL MEDICINE
Payer: MEDICARE

## 2024-08-19 DIAGNOSIS — C77.8 NSCLC METASTATIC TO LYMPH NODES OF MULTIPLE SITES: ICD-10-CM

## 2024-08-19 DIAGNOSIS — C34.90 NSCLC METASTATIC TO LYMPH NODES OF MULTIPLE SITES: ICD-10-CM

## 2024-08-19 LAB
ALBUMIN SERPL BCP-MCNC: 3 G/DL (ref 3.5–5.2)
ALP SERPL-CCNC: 109 U/L (ref 55–135)
ALT SERPL W/O P-5'-P-CCNC: 165 U/L (ref 10–44)
ANION GAP SERPL CALC-SCNC: 11 MMOL/L (ref 8–16)
AST SERPL-CCNC: 154 U/L (ref 10–40)
BASOPHILS # BLD AUTO: 0.02 K/UL (ref 0–0.2)
BASOPHILS NFR BLD: 0.2 % (ref 0–1.9)
BILIRUB SERPL-MCNC: 0.3 MG/DL (ref 0.1–1)
BUN SERPL-MCNC: 19 MG/DL (ref 8–23)
CALCIUM SERPL-MCNC: 9.9 MG/DL (ref 8.7–10.5)
CHLORIDE SERPL-SCNC: 108 MMOL/L (ref 95–110)
CO2 SERPL-SCNC: 24 MMOL/L (ref 23–29)
CREAT SERPL-MCNC: 1.6 MG/DL (ref 0.5–1.4)
DIFFERENTIAL METHOD BLD: ABNORMAL
EOSINOPHIL # BLD AUTO: 0 K/UL (ref 0–0.5)
EOSINOPHIL NFR BLD: 0.1 % (ref 0–8)
ERYTHROCYTE [DISTWIDTH] IN BLOOD BY AUTOMATED COUNT: 15.3 % (ref 11.5–14.5)
EST. GFR  (NO RACE VARIABLE): 45 ML/MIN/1.73 M^2
GLUCOSE SERPL-MCNC: 89 MG/DL (ref 70–110)
HCT VFR BLD AUTO: 39.3 % (ref 40–54)
HGB BLD-MCNC: 12.6 G/DL (ref 14–18)
IMM GRANULOCYTES # BLD AUTO: 0.18 K/UL (ref 0–0.04)
IMM GRANULOCYTES NFR BLD AUTO: 1.7 % (ref 0–0.5)
LYMPHOCYTES # BLD AUTO: 2.1 K/UL (ref 1–4.8)
LYMPHOCYTES NFR BLD: 20.4 % (ref 18–48)
MCH RBC QN AUTO: 28 PG (ref 27–31)
MCHC RBC AUTO-ENTMCNC: 32.1 G/DL (ref 32–36)
MCV RBC AUTO: 87 FL (ref 82–98)
MONOCYTES # BLD AUTO: 1.6 K/UL (ref 0.3–1)
MONOCYTES NFR BLD: 15.3 % (ref 4–15)
NEUTROPHILS # BLD AUTO: 6.7 K/UL (ref 1.8–7.7)
NEUTROPHILS NFR BLD: 64 % (ref 38–73)
NRBC BLD-RTO: 5 /100 WBC
PLATELET # BLD AUTO: 645 K/UL (ref 150–450)
PMV BLD AUTO: 9.8 FL (ref 9.2–12.9)
POTASSIUM SERPL-SCNC: 4.2 MMOL/L (ref 3.5–5.1)
PROT SERPL-MCNC: 8.6 G/DL (ref 6–8.4)
RBC # BLD AUTO: 4.5 M/UL (ref 4.6–6.2)
SODIUM SERPL-SCNC: 143 MMOL/L (ref 136–145)
WBC # BLD AUTO: 10.5 K/UL (ref 3.9–12.7)

## 2024-08-19 PROCEDURE — 80053 COMPREHEN METABOLIC PANEL: CPT | Performed by: INTERNAL MEDICINE

## 2024-08-19 PROCEDURE — 85025 COMPLETE CBC W/AUTO DIFF WBC: CPT | Mod: PO | Performed by: INTERNAL MEDICINE

## 2024-08-19 PROCEDURE — 36415 COLL VENOUS BLD VENIPUNCTURE: CPT | Mod: PO | Performed by: INTERNAL MEDICINE

## 2024-08-19 NOTE — PROGRESS NOTES
PATIENT: Feng Thakkar  MRN: [id number]  DATE: 8/20/2024      Diagnosis:   1. 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 Right upper quadrant abdominal pain    3. Secondary and unspecified malignant neoplasm of intrathoracic lymph nodes    4. Chronic kidney disease, stage 3a    5. Hypertension, unspecified type    6. Immunodeficiency due to drug therapy    7. Pancreatic mass    8. Cancer related pain    9. Transaminitis                      Chief Complaint: NSCLC metastatic to lymph nodes of multiple sites (2 week follow up)      Oncologic History:      Oncologic History     Oncologic Treatment     Pathology           Subjective:    Interval History: Mr. Thakkar is a 73 y.o. male with Gout, HLD, HTN who presents for work up of NSCLC.  Since the last clinic visit the patient endorses continued swelling in his swelling in his ankles on occasion.  The patient also endorses decreased appetite and abdominal pain.  The patient denies CP, cough, SOB, nausea, vomiting, constipation, diarrhea.  The patient denies fever, chills, night sweats, weight loss, new lumps or bumps, easy bruising or bleeding.    Prior History:  The patient initially developed abdominal pain on 01/05/2023 and underwent CT of the abdomen showing a mass in the left lung base measuring 3.9 x 2.9 cm; 3 x 3.8 cm mass along the pancreatic tail; and shotty retroperitoneal lymph nodes.  The patient underwent repeat CT abdomen and pelvis on 02/24/2023 showing a 4.3 x 4 solid enhancing mass of the left lung base; thickened bladder wall; prostate gland measuring 45.1 x 4.6 cm; abnormal sclerosis in the left femoral head measuring 2.8 x 1.4 x 1.3 cm; and additional soft tissue densities in the posterior left subdiaphragmatic region measuring up to 16 mm.  CT chest on 03/01/2023 showed a 2 cm low-density lesion in the right lobe of the thyroid gland; 4.5 x 4.3 x 3.8 cm mass in the left lower lobe; several micro nodules; Na soft tissue mass adjacent to  the pancreatic tail.  Patient underwent EUS on 03/03/2023 showing a 3.5 cm and 1.8 cm round pancreatic tail lesion.  Biopsy returned results showing no evidence of malignancy and abundant hematopoietic elements and dendritic cells.  Patient then underwent EBUS under the care of Dr. Albright on 03/17/2023 showing squamous cell carcinoma in biopsy of the left lower lung lesion; squamous cell carcinoma and station 7 lymph node; positive 11 L lymph node for squamous cell carcinoma.   The patient underwent PET-CT on 04/18/2023 showing a markedly hypermetabolic lobulated subpleural left lower lobe mass measuring 4.5 x 3.9 cm with hypermetabolic lymph nodes in the left hilar region measuring 2.2 x 1.9 cm; and a 4.2 x 3.6 walk circumscribed mass in the pancreatic tail.  MRI brain on 04/18/2023 showed no acute findings.     The patient underwent CT chest on 6/26/23 showing a large right thyroid mass measuring at least 2.6 cm; left renal hypodensity measuring 11 mm favored to be a cyst; significant decrease in size of left lower lobe consolidative mass now measuring 2.4 x 2.3 cm; stable 3 mm consolidative nodule in the left upper lobe.   The patient underwent CT chest on 11/21/2023 showing patchy ground-glass density within the medial right upper lobe; ground-glass density in the left dependent left upper lobe that the left lower lobe with associated bronchiectasis; previous target peribronchial nodule in the left lower lobe measuring 2.5 cm and 2 minute cm hypodense lesion in the right thyroid nodule.   CT Chest 2/09/24 showing geographic ground-glass disease and interlobular septal thickening in the left upper lobe; several large lymph nodes in the upper abdomen near the celiac takeoff and liver hilum with lymph node ranging from 1.3-1.5 cm.    The patient underwent a CT abdomen and pelvis on 03/01/2024 showing coronary artery calcification; 40 x 36 mm hypoenhancing pancreatic tail mass; 20 x 30 mm peripancreatic lymph node  along the cranial aspect of the proximal pancreatic body; stable 13 mm periaortic lymph node; enlargement of multiple retroperitoneal lymph nodes; mesenteric haziness present within the central abdomen; enlarged inguinal lymph nodes bilaterally; and a 21 mm sclerotic bone lesion within the posterior row inferomedial left femoral head.   The patient underwent a PET-CT on 03/07/2024 showing resolution of hypermetabolic left lower lobe mass now showing signs of radiation therapy response; disappearance of hypermetabolic enlarged left hilar lymph nodes; new irregular hypermetabolic right breast tissue measuring 2.5 x 1.4 cm possibly representing the unilateral gynecomastia; interval development of new hypermetabolic lymph nodes in the celiac region, periaortic region, right retrocrural area, and bilateral inguinal regions; lentiform cutaneous subcutaneous well-circumscribed mass along the anterior midline aspect of the lower pelvis which has increased in size and become hypermetabolic now measuring 2.1 x 1.1 cm possibly representing a sebaceous cyst.     The patient underwent EUS on 03/13/2024 with gastric polyp and single duodenal polyp seen as well as a few malignant appearing lymph nodes in the celiac region which were biopsied.    The patient underwent PET-CT on 07/08/2024 showing to metastatic left posterior cervical lymph nodes with the largest measuring 13 x 8 mm; new superior mediastinal metastatic lymph node; increasing size of left lower lobe airspace consolidation with a new nodular focus of hypermetabolic activity; progression in upper abdominal metastatic lymphadenopathy with multiple new and enlarging retroperitoneal metastatic lymph nodes; new retrocrural metastatic lymph node; and new aortocaval lymph node.    Past Medical History:   Past Medical History:   Diagnosis Date    Diabetes mellitus     Gout, unspecified     High cholesterol     HTN (hypertension)     Lung cancer        Past Surgical  HIstory:   Past Surgical History:   Procedure Laterality Date    ENDOSCOPIC ULTRASOUND OF UPPER GASTROINTESTINAL TRACT N/A 3/3/2023    Procedure: ULTRASOUND, UPPER GI TRACT, ENDOSCOPIC;  Surgeon: Cora Mcgrath MD;  Location: [facility] ENDO;  Service: Endoscopy;  Laterality: N/A;  2/24/23-Instructions mailed to address on file-DS    ENDOSCOPIC ULTRASOUND OF UPPER GASTROINTESTINAL TRACT Left 3/13/2024    Procedure: ULTRASOUND, UPPER GI TRACT, ENDOSCOPIC;  Surgeon: Reji Aguirre MD;  Location: [facility];  Service: Endoscopy;  Laterality: Left;    ESOPHAGOGASTRODUODENOSCOPY N/A 3/13/2024    Procedure: EGD (ESOPHAGOGASTRODUODENOSCOPY);  Surgeon: Reji Aguirre MD;  Location: [facility];  Service: Endoscopy;  Laterality: N/A;    INSERTION OF TUNNELED CENTRAL VENOUS CATHETER (CVC) WITH SUBCUTANEOUS PORT N/A 4/24/2023    Procedure: WOUHIYBIT-BOZJ-R-CATH;  Surgeon: Paulino Love MD;  Location: [facility] OR;  Service: General;  Laterality: N/A;    PANCREATECTOMY      ROBOTIC BRONCHOSCOPY N/A 3/17/2023    Procedure: ROBOTIC BRONCHOSCOPY;  Surgeon: Cristiane Albright MD;  Location: [address];  Service: Pulmonary;  Laterality: N/A;       Family History:   Family History   Problem Relation Name Age of Onset    Breast cancer Sister          60       Social History:  reports that he quit smoking about 11 years ago. His smoking use included cigarettes. He started smoking about 56 years ago. He has a 81 pack-year smoking history. He has quit using smokeless tobacco. He reports current alcohol use of about 3.0 standard drinks of alcohol per week. He reports that he does not use drugs.    Allergies:  Review of patient's allergies indicates:  No Known Allergies    Medications:  Current Outpatient Medications   Medication Sig Dispense Refill    allopurinoL (ZYLOPRIM) 100 MG tablet Take 100 mg by mouth once daily.      BYDUREON BCISE 2 mg/0.85 mL AtIn Inject 2 mg into the skin every 7 days. Every Friday       cloNIDine (CATAPRES) 0.2 MG tablet Take 0.2 mg by mouth once. Daily      ezetimibe (ZETIA) 10 mg tablet Take 10 mg by mouth once daily.      FARXIGA 10 mg tablet Take 10 mg by mouth every morning.      finasteride (PROSCAR) 5 mg tablet Take 5 mg by mouth once daily.      FREESTYLE TEST Strp USE 1 STRIP TO CHECK GLUCOSE ONCE DAILY      levothyroxine (SYNTHROID) 50 MCG tablet Take 50 mcg by mouth every morning.      LIDOcaine-prilocaine (EMLA) cream Apply topically to port site one hour prior to access, cover 30 g 1    magic mouthwash diphen/antac/lidoc/nysta Swish and spit 10 mls by mouth every 4 to 6 hours as needed for mouth pain, ulcers or yeast 240 mL 1    metoprolol succinate (TOPROL-XL) 100 MG 24 hr tablet Take 100 mg by mouth once daily.      morphine (MS CONTIN) 15 MG 12 hr tablet Take 1 tablet (15 mg total) by mouth 2 (two) times daily. 60 tablet 0    rosuvastatin (CRESTOR) 40 MG Tab Take 10 mg by mouth every evening.      TOUJEO SOLOSTAR U-300 INSULIN 300 unit/mL (1.5 mL) InPn pen SMARTSI Unit(s) SUB-Q Every Evening       No current facility-administered medications for this visit.       Review of Systems   Constitutional:  Positive for appetite change. Negative for chills, diaphoresis, fatigue, fever and unexpected weight change.   Respiratory:  Negative for cough and shortness of breath.         Hemoptysis    Cardiovascular:  Positive for leg swelling (ankles). Negative for chest pain and palpitations.   Gastrointestinal:  Positive for abdominal pain. Negative for constipation, diarrhea, nausea and vomiting.   Musculoskeletal:         Pain in right shoulder and right thigh   Skin:  Negative for color change and rash.   Neurological:  Negative for headaches.   Hematological:  Negative for adenopathy. Does not bruise/bleed easily.       ECOG Performance Status: 1   Objective:      Vitals:   Vitals:    24 1253   BP: 98/72   BP Location: Right arm   Patient Position: Sitting   BP Method:  "Large (Manual)   Pulse: 95   Resp: 16   Temp: 97.3 °F (36.3 °C)   TempSrc: Temporal   SpO2: 99%   Weight: 92.1 kg (203 lb 0.7 oz)   Height: 6' 1" (1.854 m)                   Physical Exam  Constitutional:       General: He is not in acute distress.     Appearance: He is well-developed. He is not diaphoretic.   HENT:      Head: Normocephalic and atraumatic.   Cardiovascular:      Rate and Rhythm: Normal rate and regular rhythm.      Heart sounds: Normal heart sounds. No murmur heard.     No friction rub. No gallop.   Pulmonary:      Effort: Pulmonary effort is normal. No respiratory distress.      Breath sounds: Normal breath sounds. No wheezing or rales.   Chest:      Chest wall: No tenderness.   Abdominal:      General: Bowel sounds are normal. There is no distension.      Palpations: Abdomen is soft. There is no mass.      Tenderness: There is abdominal tenderness (RUQ). There is no rebound.   Musculoskeletal:      Cervical back: Normal range of motion.   Lymphadenopathy:      Cervical: No cervical adenopathy.      Upper Body:      Right upper body: No supraclavicular or axillary adenopathy.      Left upper body: No supraclavicular or axillary adenopathy.   Skin:     General: Skin is warm and dry.   Neurological:      Mental Status: He is alert and oriented to person, place, and time.   Psychiatric:         Behavior: Behavior normal.       Laboratory Data:  Lab Visit on 08/19/2024   Component Date Value Ref Range Status    WBC 08/19/2024 10.50  3.90 - 12.70 K/uL Final    RBC 08/19/2024 4.50 (L)  4.60 - 6.20 M/uL Final    Hemoglobin 08/19/2024 12.6 (L)  14.0 - 18.0 g/dL Final    Hematocrit 08/19/2024 39.3 (L)  40.0 - 54.0 % Final    MCV 08/19/2024 87  82 - 98 fL Final    MCH 08/19/2024 28.0  27.0 - 31.0 pg Final    MCHC 08/19/2024 32.1  32.0 - 36.0 g/dL Final    RDW 08/19/2024 15.3 (H)  11.5 - 14.5 % Final    Platelets 08/19/2024 645 (H)  150 - 450 K/uL Final    MPV 08/19/2024 9.8  9.2 - 12.9 fL Final    " Immature Granulocytes 08/19/2024 1.7 (H)  0.0 - 0.5 % Final    Gran # (ANC) 08/19/2024 6.7  1.8 - 7.7 K/uL Final    Immature Grans (Abs) 08/19/2024 0.18 (H)  0.00 - 0.04 K/uL Final    Comment: Mild elevation in immature granulocytes is non specific and   can be seen in a variety of conditions including stress response,   acute inflammation, trauma and pregnancy. Correlation with other   laboratory and clinical findings is essential.      Lymph # 08/19/2024 2.1  1.0 - 4.8 K/uL Final    Mono # 08/19/2024 1.6 (H)  0.3 - 1.0 K/uL Final    Eos # 08/19/2024 0.0  0.0 - 0.5 K/uL Final    Baso # 08/19/2024 0.02  0.00 - 0.20 K/uL Final    nRBC 08/19/2024 5 (A)  0 /100 WBC Final    Gran % 08/19/2024 64.0  38.0 - 73.0 % Final    Lymph % 08/19/2024 20.4  18.0 - 48.0 % Final    Mono % 08/19/2024 15.3 (H)  4.0 - 15.0 % Final    Eosinophil % 08/19/2024 0.1  0.0 - 8.0 % Final    Basophil % 08/19/2024 0.2  0.0 - 1.9 % Final    Differential Method 08/19/2024 Automated   Final    Sodium 08/19/2024 143  136 - 145 mmol/L Final    Potassium 08/19/2024 4.2  3.5 - 5.1 mmol/L Final    Chloride 08/19/2024 108  95 - 110 mmol/L Final    CO2 08/19/2024 24  23 - 29 mmol/L Final    Glucose 08/19/2024 89  70 - 110 mg/dL Final    BUN 08/19/2024 19  8 - 23 mg/dL Final    Creatinine 08/19/2024 1.6 (H)  0.5 - 1.4 mg/dL Final    Calcium 08/19/2024 9.9  8.7 - 10.5 mg/dL Final    Total Protein 08/19/2024 8.6 (H)  6.0 - 8.4 g/dL Final    Albumin 08/19/2024 3.0 (L)  3.5 - 5.2 g/dL Final    Total Bilirubin 08/19/2024 0.3  0.1 - 1.0 mg/dL Final    Comment: For infants and newborns, interpretation of results should be based  on gestational age, weight and in agreement with clinical  observations.    Premature Infant recommended reference ranges:  Up to 24 hours.............<8.0 mg/dL  Up to 48 hours............<12.0 mg/dL  3-5 days..................<15.0 mg/dL  6-29 days.................<15.0 mg/dL      Alkaline Phosphatase 08/19/2024  109  55 - 135 U/L Final    AST 08/19/2024 154 (H)  10 - 40 U/L Final    ALT 08/19/2024 165 (H)  10 - 44 U/L Final    eGFR 08/19/2024 45 (A)  >60 mL/min/1.73 m^2 Final    Anion Gap 08/19/2024 11  8 - 16 mmol/L Final         Imaging:     PET/CT 7/08/24  Head and neck: There is symmetric and physiologic distribution of radiotracer throughout the included brain. There is no hemorrhage, hydrocephalus, or midline shift. There are 2 metastatic left posterior cervical lymph nodes. The larger of these best visualized on image 61 measures 13 x 8 mm with an SUV max of 5 and was not present on the prior exam.     Chest: There is a left subclavian port in place. There is a superior mediastinal metastatic lymph node best visualized on image 75 with an SUV max of 3.6. This was not present on the prior exam. The hypermetabolic right thyroid nodule is unchanged.     The poorly defined area of airspace consolidation in the left lower lobe has slightly increased in size with a new nodular focus of hypermetabolic activity best visualized on image 124. This was not present on the prior exam and demonstrates an SUV max of 7.3. There is a small non FDG avid left pleural effusion. There is no right-sided FDG avid pulmonary nodule.     Abdomen and pelvis: There is physiologic radiotracer throughout the liver, spleen, and collecting system. There is progression the upper abdominal metastatic lymphadenopathy. A representative celiac node best visualized on image 152 measures 4.4 x 2.5 cm compared with 2.9 x 1.6 cm previously. There is an SUV max of 20 compared with 20 on the prior exam. There are multiple new and enlarging retroperitoneal metastatic lymph nodes. There are new retrocrural metastatic lymph nodes. A representative new aortocaval lymph node best visualized on image 192 measures 18 x 16 mm with an SUV max of 24. There are no new and enlarging iliac metastatic nodes.     Musculoskeletal: There is no FDG avid lytic or blastic  osseous lesion.       Assessment:       1. 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 Right upper quadrant abdominal pain    3. Secondary and unspecified malignant neoplasm of intrathoracic lymph nodes    4. Chronic kidney disease, stage 3a    5. Hypertension, unspecified type    6. Immunodeficiency due to drug therapy    7. Pancreatic mass    8. Cancer related pain    9. Transaminitis                       Plan:     NSCLC - patient was initially diagnosed with stage III [id number] SCC of the left lung now with metastatic disease  -PET/CT 4/18/23 shows continued left lung mass, mediastinal LAD  -MRI brain 4/18/23 showed no acute findings  -TEMPUS Blood testing showed TP 53 mutation  -TEMPUS tissue testing showed PD-L1 of 20%, TP53, KDM6A, CDKN2a and MTAP  -PORT placed 4/24/23 under the care of Dr. Love  -PT completed 6 cycles of Carboplatin and Paclitaxel on 6/02/23  -Radiation completed 6/07/23  -CT chest on 6/26/23 showed a decrease in LLL mass  -Will proceed with Durvalumab for 1 year of treatment  -CT chest 11/21/23 shows decreased lung mass and radiation changes  -CT Chest on 02/09/2024 showed continued radiation changes in the lung but did show intra-abdominal lymphadenopathy (see below)  -Pt s/p cycle 9 of Durvalumab  -Pt underwent EUS on 3/13/24 with biopsy of celiac LN showing metastatic SCC  -Treatment with Ramucirumab and Pembrolizumab discussed based off trial Lung [id number]  -PT completed 4 cycles  -PET/CT on 7/08/24 showed progression of disease  -Pt completed C1D15 Gemcitabine  -Will postpone next cycle until abdominal pain clarified  Visit today included increased complexity associated with the care of the episodic problem (immunotherapy and ramucirumab) addressed and managing the longitudinal care of the patient due to the serious and/or complex managed problem(s) NSCLC    Abdominal Pain/Transaminitis - the patient with increasing abdominal pain with associated right upper quadrant  pain on palpation as well as transaminitis   -Liver enzymes increasing  -Will obtain CT abdomen to evaluate    Pancreatic Mass - seen on CT scans and biopsy during EUS on 03/03/2023 with path showing no evidence of malignancy  -PET/CT 4/18/23 showed uptake in the tail of the pancreas  -Possibly due to chronic pancreatitis  -Pt with stable pancreatic mass on CT abdomen 7/03/23 and 3/01/24  -Patient with enlarging surrounding lymphadenopathy  -PET/CT on 3/07/24 shows increasing LAD in the celiac region, periaortic retroperitoneum, right retrocrural area, and bilateral inguinal regions  -EUS on 3/13/24 not suggestive of a pancreatic mass  -Will monitor    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  -No sign of infection  -Will monitor    Cancer Related Pain - palliative care managing    CKD - pt with stage IIIa CKD  -Creatinine 1.6 8/19/24  -Stable  -Will monitor    HTN - pt on clonidine, metoprolol  -BP on lower side  -Will monitor    Route Chart for Scheduling    Med Onc Chart Routing      Follow up with physician 2 weeks. Pt needs a STAT CT abdomen.  His treatment needs to be moved to later in monse week once his CT scan is completed.  Pt needs a CBC and CMP and appt with NP next week with treatment.  Pt needs an appt with me in 2 weeks with CBC adn CMP and treatment.   Follow up with RADHIKA 1 week.   Infusion scheduling note    Injection scheduling note    Labs    Imaging    Pharmacy appointment    Other referrals          Treatment Plan Information   OP NSCLC GEMCITABINE QW   Lisandro Lawrence MD   Upcoming Treatment Dates - OP NSCLC GEMCITABINE QW    8/22/2024       Pre-Medications       ondansetron injection 8 mg       Chemotherapy       gemcitabine (GEMZAR) in 0.9% NaCl SolP 250 mL chemo infusion  8/29/2024       Pre-Medications       ondansetron injection 8 mg       Chemotherapy       gemcitabine (GEMZAR) in 0.9% NaCl SolP 250 mL chemo infusion  9/5/2024       Pre-Medications       ondansetron injection  8 mg       Chemotherapy       gemcitabine (GEMZAR) in 0.9% NaCl SolP 250 mL chemo infusion  9/19/2024       Pre-Medications       ondansetron injection 8 mg       Chemotherapy       gemcitabine (GEMZAR) in 0.9% NaCl SolP 250 mL chemo infusion    Supportive Plan Information  IV FLUIDS AND ELECTROLYTES   Lisandro Lawrence MD   Upcoming Treatment Dates - IV FLUIDS AND ELECTROLYTES    No upcoming days in selected categories.      Lisandro Lawrence MD  [facility]  Hematology and Oncology  [facility] [address] [place] [address]   O: [phone]  F: [phone]

## 2024-08-20 ENCOUNTER — OFFICE VISIT (OUTPATIENT)
Dept: HEMATOLOGY/ONCOLOGY | Facility: CLINIC | Age: 74
End: 2024-08-20
Payer: MEDICARE

## 2024-08-20 VITALS
BODY MASS INDEX: 26.91 KG/M2 | HEIGHT: 73 IN | HEART RATE: 95 BPM | WEIGHT: 203.06 LBS | RESPIRATION RATE: 16 BRPM | TEMPERATURE: 97 F | SYSTOLIC BLOOD PRESSURE: 98 MMHG | OXYGEN SATURATION: 99 % | DIASTOLIC BLOOD PRESSURE: 72 MMHG

## 2024-08-20 DIAGNOSIS — C77.1 SECONDARY AND UNSPECIFIED MALIGNANT NEOPLASM OF INTRATHORACIC LYMPH NODES: ICD-10-CM

## 2024-08-20 DIAGNOSIS — C34.90 NSCLC METASTATIC TO LYMPH NODES OF MULTIPLE SITES: Primary | ICD-10-CM

## 2024-08-20 DIAGNOSIS — C77.8 NSCLC METASTATIC TO LYMPH NODES OF MULTIPLE SITES: Primary | ICD-10-CM

## 2024-08-20 DIAGNOSIS — I10 HYPERTENSION, UNSPECIFIED TYPE: ICD-10-CM

## 2024-08-20 DIAGNOSIS — N18.31 CHRONIC KIDNEY DISEASE, STAGE 3A: ICD-10-CM

## 2024-08-20 DIAGNOSIS — K86.89 PANCREATIC MASS: ICD-10-CM

## 2024-08-20 DIAGNOSIS — G89.3 CANCER RELATED PAIN: ICD-10-CM

## 2024-08-20 DIAGNOSIS — Z79.899 IMMUNODEFICIENCY DUE TO DRUG THERAPY: ICD-10-CM

## 2024-08-20 DIAGNOSIS — R10.11 RIGHT UPPER QUADRANT ABDOMINAL PAIN: ICD-10-CM

## 2024-08-20 DIAGNOSIS — D84.821 IMMUNODEFICIENCY DUE TO DRUG THERAPY: ICD-10-CM

## 2024-08-20 DIAGNOSIS — R74.01 TRANSAMINITIS: ICD-10-CM

## 2024-08-20 PROCEDURE — 99215 OFFICE O/P EST HI 40 MIN: CPT | Mod: S$PBB,,, | Performed by: INTERNAL MEDICINE

## 2024-08-20 PROCEDURE — 99999 PR PBB SHADOW E&M-EST. PATIENT-LVL IV: CPT | Mod: PBBFAC,,, | Performed by: INTERNAL MEDICINE

## 2024-08-20 PROCEDURE — G2211 COMPLEX E/M VISIT ADD ON: HCPCS | Mod: S$PBB,,, | Performed by: INTERNAL MEDICINE

## 2024-08-20 PROCEDURE — 99214 OFFICE O/P EST MOD 30 MIN: CPT | Mod: PBBFAC,PN | Performed by: INTERNAL MEDICINE

## 2024-08-21 ENCOUNTER — TELEPHONE (OUTPATIENT)
Dept: HEMATOLOGY/ONCOLOGY | Facility: CLINIC | Age: 74
End: 2024-08-21
Payer: MEDICARE

## 2024-08-21 NOTE — TELEPHONE ENCOUNTER
Called and spoke with pt to confirm his appt with Dr. Lawrence for tomorrow at 2pm. Pt verbalized understanding and that he would be in attendance.

## 2024-08-22 ENCOUNTER — OFFICE VISIT (OUTPATIENT)
Dept: HEMATOLOGY/ONCOLOGY | Facility: CLINIC | Age: 74
End: 2024-08-22
Payer: MEDICARE

## 2024-08-22 ENCOUNTER — INFUSION (OUTPATIENT)
Dept: INFUSION THERAPY | Facility: HOSPITAL | Age: 74
End: 2024-08-22
Attending: INTERNAL MEDICINE
Payer: MEDICARE

## 2024-08-22 ENCOUNTER — DOCUMENTATION ONLY (OUTPATIENT)
Dept: INFUSION THERAPY | Facility: HOSPITAL | Age: 74
End: 2024-08-22

## 2024-08-22 VITALS
RESPIRATION RATE: 22 BRPM | OXYGEN SATURATION: 98 % | BODY MASS INDEX: 27.03 KG/M2 | SYSTOLIC BLOOD PRESSURE: 127 MMHG | WEIGHT: 203.94 LBS | DIASTOLIC BLOOD PRESSURE: 74 MMHG | HEART RATE: 64 BPM | HEIGHT: 73 IN | TEMPERATURE: 97 F

## 2024-08-22 VITALS
BODY MASS INDEX: 27.03 KG/M2 | HEART RATE: 87 BPM | DIASTOLIC BLOOD PRESSURE: 60 MMHG | WEIGHT: 203.94 LBS | RESPIRATION RATE: 22 BRPM | SYSTOLIC BLOOD PRESSURE: 106 MMHG | OXYGEN SATURATION: 98 % | TEMPERATURE: 97 F | HEIGHT: 73 IN

## 2024-08-22 DIAGNOSIS — N18.31 CHRONIC KIDNEY DISEASE, STAGE 3A: ICD-10-CM

## 2024-08-22 DIAGNOSIS — C34.90 NSCLC METASTATIC TO LYMPH NODES OF MULTIPLE SITES: Primary | ICD-10-CM

## 2024-08-22 DIAGNOSIS — R10.11 RIGHT UPPER QUADRANT ABDOMINAL PAIN: ICD-10-CM

## 2024-08-22 DIAGNOSIS — G89.3 CANCER RELATED PAIN: ICD-10-CM

## 2024-08-22 DIAGNOSIS — D84.821 IMMUNODEFICIENCY DUE TO DRUG THERAPY: ICD-10-CM

## 2024-08-22 DIAGNOSIS — K86.89 PANCREATIC MASS: ICD-10-CM

## 2024-08-22 DIAGNOSIS — Z79.899 IMMUNODEFICIENCY DUE TO DRUG THERAPY: ICD-10-CM

## 2024-08-22 DIAGNOSIS — C77.8 NSCLC METASTATIC TO LYMPH NODES OF MULTIPLE SITES: Primary | ICD-10-CM

## 2024-08-22 DIAGNOSIS — I10 HYPERTENSION, UNSPECIFIED TYPE: ICD-10-CM

## 2024-08-22 DIAGNOSIS — R74.01 TRANSAMINITIS: ICD-10-CM

## 2024-08-22 DIAGNOSIS — C77.1 SECONDARY AND UNSPECIFIED MALIGNANT NEOPLASM OF INTRATHORACIC LYMPH NODES: ICD-10-CM

## 2024-08-22 PROCEDURE — 99999 PR PBB SHADOW E&M-EST. PATIENT-LVL IV: CPT | Mod: PBBFAC,,, | Performed by: INTERNAL MEDICINE

## 2024-08-22 PROCEDURE — 99214 OFFICE O/P EST MOD 30 MIN: CPT | Mod: PBBFAC,PN | Performed by: INTERNAL MEDICINE

## 2024-08-22 PROCEDURE — 96375 TX/PRO/DX INJ NEW DRUG ADDON: CPT | Mod: PN

## 2024-08-22 PROCEDURE — 99215 OFFICE O/P EST HI 40 MIN: CPT | Mod: S$PBB,,, | Performed by: INTERNAL MEDICINE

## 2024-08-22 PROCEDURE — 25000003 PHARM REV CODE 250: Mod: PN | Performed by: INTERNAL MEDICINE

## 2024-08-22 PROCEDURE — 63600175 PHARM REV CODE 636 W HCPCS: Mod: PN | Performed by: INTERNAL MEDICINE

## 2024-08-22 PROCEDURE — 96413 CHEMO IV INFUSION 1 HR: CPT | Mod: PN

## 2024-08-22 PROCEDURE — G2211 COMPLEX E/M VISIT ADD ON: HCPCS | Mod: S$PBB,,, | Performed by: INTERNAL MEDICINE

## 2024-08-22 RX ORDER — SODIUM CHLORIDE 0.9 % (FLUSH) 0.9 %
10 SYRINGE (ML) INJECTION
Status: CANCELLED | OUTPATIENT
Start: 2024-08-22

## 2024-08-22 RX ORDER — ONDANSETRON HYDROCHLORIDE 2 MG/ML
8 INJECTION, SOLUTION INTRAVENOUS
Status: CANCELLED
Start: 2024-08-22

## 2024-08-22 RX ORDER — ONDANSETRON HYDROCHLORIDE 2 MG/ML
8 INJECTION, SOLUTION INTRAVENOUS
Status: COMPLETED | OUTPATIENT
Start: 2024-08-22 | End: 2024-08-22

## 2024-08-22 RX ORDER — PROCHLORPERAZINE EDISYLATE 5 MG/ML
5 INJECTION INTRAMUSCULAR; INTRAVENOUS ONCE AS NEEDED
Status: DISCONTINUED | OUTPATIENT
Start: 2024-08-22 | End: 2024-08-22 | Stop reason: HOSPADM

## 2024-08-22 RX ORDER — SODIUM CHLORIDE 0.9 % (FLUSH) 0.9 %
10 SYRINGE (ML) INJECTION
Status: DISCONTINUED | OUTPATIENT
Start: 2024-08-22 | End: 2024-08-22 | Stop reason: HOSPADM

## 2024-08-22 RX ORDER — HEPARIN 100 UNIT/ML
500 SYRINGE INTRAVENOUS
OUTPATIENT
Start: 2024-08-22

## 2024-08-22 RX ORDER — PROCHLORPERAZINE EDISYLATE 5 MG/ML
5 INJECTION INTRAMUSCULAR; INTRAVENOUS ONCE AS NEEDED
Status: CANCELLED
Start: 2024-08-22

## 2024-08-22 RX ADMIN — ONDANSETRON 8 MG: 2 INJECTION INTRAMUSCULAR; INTRAVENOUS at 02:08

## 2024-08-22 RX ADMIN — SODIUM CHLORIDE: 9 INJECTION, SOLUTION INTRAVENOUS at 02:08

## 2024-08-22 RX ADMIN — GEMCITABINE 2200 MG: 38 INJECTION, SOLUTION INTRAVENOUS at 03:08

## 2024-08-22 NOTE — PLAN OF CARE
Pt here for IV infusion of Gemzar. Pt tolerated well, no reactions noted. Education reviewed r/t medication. Pt questions answered at this time. Pt ambulates off unit, denies any needs before departure. Pt aware of follow up appointments.     Problem: Adult Inpatient Plan of Care  Goal: Plan of Care Review  Outcome: Progressing  Flowsheets (Taken 8/22/2024 1457)  Plan of Care Reviewed With:   patient   child  Goal: Patient-Specific Goal (Individualized)  Outcome: Progressing  Flowsheets (Taken 8/22/2024 1457)  Individualized Care Needs: recliner, daughter at chair side, tv  Anxieties, Fears or Concerns: none voiced  Patient/Family-Specific Goals (Include Timeframe): no s/s of reaction  Goal: Optimal Comfort and Wellbeing  Outcome: Progressing  Intervention: Monitor Pain and Promote Comfort  Flowsheets (Taken 8/22/2024 1457)  Pain Management Interventions:   relaxation techniques promoted   care clustered  Intervention: Provide Person-Centered Care  Flowsheets (Taken 8/22/2024 1457)  Trust Relationship/Rapport:   care explained   empathic listening provided   reassurance provided   choices provided   questions answered   thoughts/feelings acknowledged   emotional support provided   questions encouraged     Problem: Oncology Care  Goal: Effective Coping  Outcome: Progressing  Goal: Improved Activity Tolerance  Outcome: Progressing  Intervention: Promote Improved Energy  Flowsheets (Taken 8/22/2024 1457)  Fatigue Management:   paced activity encouraged   fatigue-related activity identified   frequent rest breaks encouraged  Sleep/Rest Enhancement:   regular sleep/rest pattern promoted   family presence promoted  Activity Management:   Up in chair - L3   Ambulated in lutz - L4  Environmental Support:   calm environment promoted   rest periods encouraged   environmental consistency promoted   distractions minimized

## 2024-08-22 NOTE — PROGRESS NOTES
PATIENT: Feng Thakkar  MRN: [id number]  DATE: 8/22/2024      Diagnosis:   1. 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 Right upper quadrant abdominal pain    3. Secondary and unspecified malignant neoplasm of intrathoracic lymph nodes    4. Chronic kidney disease, stage 3a    5. Hypertension, unspecified type    6. Immunodeficiency due to drug therapy    7. Pancreatic mass    8. Cancer related pain    9. Transaminitis                        Chief Complaint: NSCLC metastatic to lymph nodes of multiple sites (Urgent follow up for CT results )      Oncologic History:      Oncologic History     Oncologic Treatment     Pathology           Subjective:    Interval History: Mr. Thakkar is a 73 y.o. male with Gout, HLD, HTN who presents for work up of NSCLC.  Since the last clinic visit the patient underwent CT abdomen and pelvis on 08/21/2024 showing stable hypodense enhancing mass in the tail of the pancreas measuring 4 x 5 cm; stable upper abdominal adenopathy measuring 3.5 x 2.4 cm; stable lymph node just lateral to the superior mesenteric artery measuring 2.1 x 1.9 cm; lymph node posterior to the left renal vein stable measuring 2.1 x 1.6 cm.  Patient states he has had improvement of his abdominal pain.  The patient states he occasionally feels off balance and continues to have swelling in his legs.  The patient denies CP, cough, SOB, abdominal pain, nausea, vomiting, constipation, diarrhea.  The patient denies fever, chills, night sweats, weight loss, new lumps or bumps, easy bruising or bleeding.    Prior History:  The patient initially developed abdominal pain on 01/05/2023 and underwent CT of the abdomen showing a mass in the left lung base measuring 3.9 x 2.9 cm; 3 x 3.8 cm mass along the pancreatic tail; and shotty retroperitoneal lymph nodes.  The patient underwent repeat CT abdomen and pelvis on 02/24/2023 showing a 4.3 x 4 solid enhancing mass of the left lung base; thickened bladder wall; prostate  gland measuring 45.1 x 4.6 cm; abnormal sclerosis in the left femoral head measuring 2.8 x 1.4 x 1.3 cm; and additional soft tissue densities in the posterior left subdiaphragmatic region measuring up to 16 mm.  CT chest on 03/01/2023 showed a 2 cm low-density lesion in the right lobe of the thyroid gland; 4.5 x 4.3 x 3.8 cm mass in the left lower lobe; several micro nodules; Na soft tissue mass adjacent to the pancreatic tail.  Patient underwent EUS on 03/03/2023 showing a 3.5 cm and 1.8 cm round pancreatic tail lesion.  Biopsy returned results showing no evidence of malignancy and abundant hematopoietic elements and dendritic cells.  Patient then underwent EBUS under the care of Dr. Albright on 03/17/2023 showing squamous cell carcinoma in biopsy of the left lower lung lesion; squamous cell carcinoma and station 7 lymph node; positive 11 L lymph node for squamous cell carcinoma.   The patient underwent PET-CT on 04/18/2023 showing a markedly hypermetabolic lobulated subpleural left lower lobe mass measuring 4.5 x 3.9 cm with hypermetabolic lymph nodes in the left hilar region measuring 2.2 x 1.9 cm; and a 4.2 x 3.6 walk circumscribed mass in the pancreatic tail.  MRI brain on 04/18/2023 showed no acute findings.     The patient underwent CT chest on 6/26/23 showing a large right thyroid mass measuring at least 2.6 cm; left renal hypodensity measuring 11 mm favored to be a cyst; significant decrease in size of left lower lobe consolidative mass now measuring 2.4 x 2.3 cm; stable 3 mm consolidative nodule in the left upper lobe.   The patient underwent CT chest on 11/21/2023 showing patchy ground-glass density within the medial right upper lobe; ground-glass density in the left dependent left upper lobe that the left lower lobe with associated bronchiectasis; previous target peribronchial nodule in the left lower lobe measuring 2.5 cm and 2 minute cm hypodense lesion in the right thyroid nodule.   CT Chest 2/09/24  showing geographic ground-glass disease and interlobular septal thickening in the left upper lobe; several large lymph nodes in the upper abdomen near the celiac takeoff and liver hilum with lymph node ranging from 1.3-1.5 cm.    The patient underwent a CT abdomen and pelvis on 03/01/2024 showing coronary artery calcification; 40 x 36 mm hypoenhancing pancreatic tail mass; 20 x 30 mm peripancreatic lymph node along the cranial aspect of the proximal pancreatic body; stable 13 mm periaortic lymph node; enlargement of multiple retroperitoneal lymph nodes; mesenteric haziness present within the central abdomen; enlarged inguinal lymph nodes bilaterally; and a 21 mm sclerotic bone lesion within the posterior row inferomedial left femoral head.   The patient underwent a PET-CT on 03/07/2024 showing resolution of hypermetabolic left lower lobe mass now showing signs of radiation therapy response; disappearance of hypermetabolic enlarged left hilar lymph nodes; new irregular hypermetabolic right breast tissue measuring 2.5 x 1.4 cm possibly representing the unilateral gynecomastia; interval development of new hypermetabolic lymph nodes in the celiac region, periaortic region, right retrocrural area, and bilateral inguinal regions; lentiform cutaneous subcutaneous well-circumscribed mass along the anterior midline aspect of the lower pelvis which has increased in size and become hypermetabolic now measuring 2.1 x 1.1 cm possibly representing a sebaceous cyst.     The patient underwent EUS on 03/13/2024 with gastric polyp and single duodenal polyp seen as well as a few malignant appearing lymph nodes in the celiac region which were biopsied.    The patient underwent PET-CT on 07/08/2024 showing to metastatic left posterior cervical lymph nodes with the largest measuring 13 x 8 mm; new superior mediastinal metastatic lymph node; increasing size of left lower lobe airspace consolidation with a new nodular focus of  hypermetabolic activity; progression in upper abdominal metastatic lymphadenopathy with multiple new and enlarging retroperitoneal metastatic lymph nodes; new retrocrural metastatic lymph node; and new aortocaval lymph node.    Past Medical History:   Past Medical History:   Diagnosis Date    Diabetes mellitus     Gout, unspecified     High cholesterol     HTN (hypertension)     Lung cancer        Past Surgical HIstory:   Past Surgical History:   Procedure Laterality Date    ENDOSCOPIC ULTRASOUND OF UPPER GASTROINTESTINAL TRACT N/A 3/3/2023    Procedure: ULTRASOUND, UPPER GI TRACT, ENDOSCOPIC;  Surgeon: Cora Mcgrath MD;  Location: [facility];  Service: Endoscopy;  Laterality: N/A;  2/24/23-Instructions mailed to address on file-DS    ENDOSCOPIC ULTRASOUND OF UPPER GASTROINTESTINAL TRACT Left 3/13/2024    Procedure: ULTRASOUND, UPPER GI TRACT, ENDOSCOPIC;  Surgeon: Reji Aguirre MD;  Location: [facility];  Service: Endoscopy;  Laterality: Left;    ESOPHAGOGASTRODUODENOSCOPY N/A 3/13/2024    Procedure: EGD (ESOPHAGOGASTRODUODENOSCOPY);  Surgeon: Reji Aguirre MD;  Location: [facility];  Service: Endoscopy;  Laterality: N/A;    INSERTION OF TUNNELED CENTRAL VENOUS CATHETER (CVC) WITH SUBCUTANEOUS PORT N/A 4/24/2023    Procedure: [id number]-[id number];  Surgeon: Paulino Love MD;  Location: [facility];  Service: General;  Laterality: N/A;    PANCREATECTOMY      ROBOTIC BRONCHOSCOPY N/A 3/17/2023    Procedure: ROBOTIC BRONCHOSCOPY;  Surgeon: Cristiane Albright MD;  Location: [address];  Service: Pulmonary;  Laterality: N/A;       Family History:   Family History   Problem Relation Name Age of Onset    Breast cancer Sister          60       Social History:  reports that he quit smoking about 11 years ago. His smoking use included cigarettes. He started smoking about 56 years ago. He has a 81 pack-year smoking history. He has quit using smokeless tobacco. He reports current alcohol use of about 3.0  standard drinks of alcohol per week. He reports that he does not use drugs.    Allergies:  Review of patient's allergies indicates:  No Known Allergies    Medications:  Current Outpatient Medications   Medication Sig Dispense Refill    allopurinoL (ZYLOPRIM) 100 MG tablet Take 100 mg by mouth once daily.      BYDUREON BCISE 2 mg/0.85 mL AtIn Inject 2 mg into the skin every 7 days. Every Friday      ezetimibe (ZETIA) 10 mg tablet Take 10 mg by mouth once daily.      FARXIGA 10 mg tablet Take 10 mg by mouth every morning.      finasteride (PROSCAR) 5 mg tablet Take 5 mg by mouth once daily.      FREESTYLE TEST Strp USE 1 STRIP TO CHECK GLUCOSE ONCE DAILY      levothyroxine (SYNTHROID) 50 MCG tablet Take 50 mcg by mouth every morning.      LIDOcaine-prilocaine (EMLA) cream Apply topically to port site one hour prior to access, cover 30 g 1    magic mouthwash diphen/antac/lidoc/nysta Swish and spit 10 mls by mouth every 4 to 6 hours as needed for mouth pain, ulcers or yeast 240 mL 1    metoprolol succinate (TOPROL-XL) 100 MG 24 hr tablet Take 100 mg by mouth once daily.      morphine (MS CONTIN) 15 MG 12 hr tablet Take 1 tablet (15 mg total) by mouth 2 (two) times daily. 60 tablet 0    rosuvastatin (CRESTOR) 40 MG Tab Take 10 mg by mouth every evening.      TOUJEO SOLOSTAR U-300 INSULIN 300 unit/mL (1.5 mL) InPn pen SMARTSI Unit(s) SUB-Q Every Evening       No current facility-administered medications for this visit.     Facility-Administered Medications Ordered in Other Visits   Medication Dose Route Frequency Provider Last Rate Last Admin    alteplase injection 2 mg  2 mg Intra-Catheter PRN Lisandro Lawrence MD        gemcitabine (GEMZAR) 2,200 mg in 0.9% NaCl 307.9 mL chemo infusion  1,000 mg/m2 Intravenous 1 time in Clinic/HOD Lisandro Lawrence .8 mL/hr at 24 1503 2,200 mg at 24 1503    prochlorperazine injection Soln 5 mg  5 mg Intravenous Once PRN Lisandro Lawrence MD        sodium chloride  "0.9% flush 10 mL  10 mL Intravenous PRN Lisandro Lawrence MD           Review of Systems   Constitutional:  Negative for chills, diaphoresis, fatigue, fever and unexpected weight change.   Respiratory:  Negative for cough and shortness of breath.    Cardiovascular:  Positive for leg swelling. Negative for chest pain and palpitations.   Gastrointestinal:  Negative for abdominal pain, constipation, diarrhea, nausea and vomiting.   Skin:  Negative for color change and rash.   Neurological:  Positive for light-headedness. Negative for headaches.   Hematological:  Negative for adenopathy. Does not bruise/bleed easily.       ECOG Performance Status: 1   Objective:      Vitals:   Vitals:    08/22/24 1352   BP: 106/60   BP Location: Right arm   Patient Position: Sitting   BP Method: Large (Manual)   Pulse: 87   Resp: (!) 22   Temp: 97 °F (36.1 °C)   TempSrc: Temporal   SpO2: 98%   Weight: 92.5 kg (203 lb 14.8 oz)   Height: 6' 1" (1.854 m)                     Physical Exam  Constitutional:       General: He is not in acute distress.     Appearance: He is well-developed. He is not diaphoretic.   HENT:      Head: Normocephalic and atraumatic.   Cardiovascular:      Rate and Rhythm: Normal rate and regular rhythm.      Heart sounds: Normal heart sounds. No murmur heard.     No friction rub. No gallop.   Pulmonary:      Effort: Pulmonary effort is normal. No respiratory distress.      Breath sounds: Normal breath sounds. No wheezing or rales.   Chest:      Chest wall: No tenderness.   Abdominal:      General: Bowel sounds are normal. There is no distension.      Palpations: Abdomen is soft. There is no mass.      Tenderness: There is no abdominal tenderness. There is no rebound.   Musculoskeletal:      Cervical back: Normal range of motion.   Lymphadenopathy:      Cervical: No cervical adenopathy.      Upper Body:      Right upper body: No supraclavicular or axillary adenopathy.      Left upper body: No supraclavicular or " axillary adenopathy.   Skin:     General: Skin is warm and dry.   Neurological:      Mental Status: He is alert and oriented to person, place, and time.   Psychiatric:         Behavior: Behavior normal.         Laboratory Data:  Lab Visit on 08/19/2024   Component Date Value Ref Range Status    WBC 08/19/2024 10.50  3.90 - 12.70 K/uL Final    RBC 08/19/2024 4.50 (L)  4.60 - 6.20 M/uL Final    Hemoglobin 08/19/2024 12.6 (L)  14.0 - 18.0 g/dL Final    Hematocrit 08/19/2024 39.3 (L)  40.0 - 54.0 % Final    MCV 08/19/2024 87  82 - 98 fL Final    MCH 08/19/2024 28.0  27.0 - 31.0 pg Final    MCHC 08/19/2024 32.1  32.0 - 36.0 g/dL Final    RDW 08/19/2024 15.3 (H)  11.5 - 14.5 % Final    Platelets 08/19/2024 645 (H)  150 - 450 K/uL Final    MPV 08/19/2024 9.8  9.2 - 12.9 fL Final    Immature Granulocytes 08/19/2024 1.7 (H)  0.0 - 0.5 % Final    Gran # (ANC) 08/19/2024 6.7  1.8 - 7.7 K/uL Final    Immature Grans (Abs) 08/19/2024 0.18 (H)  0.00 - 0.04 K/uL Final    Comment: Mild elevation in immature granulocytes is non specific and   can be seen in a variety of conditions including stress response,   acute inflammation, trauma and pregnancy. Correlation with other   laboratory and clinical findings is essential.      Lymph # 08/19/2024 2.1  1.0 - 4.8 K/uL Final    Mono # 08/19/2024 1.6 (H)  0.3 - 1.0 K/uL Final    Eos # 08/19/2024 0.0  0.0 - 0.5 K/uL Final    Baso # 08/19/2024 0.02  0.00 - 0.20 K/uL Final    nRBC 08/19/2024 5 (A)  0 /100 WBC Final    Gran % 08/19/2024 64.0  38.0 - 73.0 % Final    Lymph % 08/19/2024 20.4  18.0 - 48.0 % Final    Mono % 08/19/2024 15.3 (H)  4.0 - 15.0 % Final    Eosinophil % 08/19/2024 0.1  0.0 - 8.0 % Final    Basophil % 08/19/2024 0.2  0.0 - 1.9 % Final    Differential Method 08/19/2024 Automated   Final    Sodium 08/19/2024 143  136 - 145 mmol/L Final    Potassium 08/19/2024 4.2  3.5 - 5.1 mmol/L Final    Chloride 08/19/2024 108  95 - 110 mmol/L Final    CO2 08/19/2024 24  23 - 29 mmol/L  Final    Glucose 08/19/2024 89  70 - 110 mg/dL Final    BUN 08/19/2024 19  8 - 23 mg/dL Final    Creatinine 08/19/2024 1.6 (H)  0.5 - 1.4 mg/dL Final    Calcium 08/19/2024 9.9  8.7 - 10.5 mg/dL Final    Total Protein 08/19/2024 8.6 (H)  6.0 - 8.4 g/dL Final    Albumin 08/19/2024 3.0 (L)  3.5 - 5.2 g/dL Final    Total Bilirubin 08/19/2024 0.3  0.1 - 1.0 mg/dL Final    Comment: For infants and newborns, interpretation of results should be based  on gestational age, weight and in agreement with clinical  observations.    Premature Infant recommended reference ranges:  Up to 24 hours.............<8.0 mg/dL  Up to 48 hours............<12.0 mg/dL  3-5 days..................<15.0 mg/dL  6-29 days.................<15.0 mg/dL      Alkaline Phosphatase 08/19/2024 109  55 - 135 U/L Final    AST 08/19/2024 154 (H)  10 - 40 U/L Final    ALT 08/19/2024 165 (H)  10 - 44 U/L Final    eGFR 08/19/2024 45 (A)  >60 mL/min/1.73 m^2 Final    Anion Gap 08/19/2024 11  8 - 16 mmol/L Final         Imaging:     CT abdomen and pelvis 8/21/24    Abdomen:     - Lower thorax:Focal airspace opacity in the left lower lobe is again identified, similar to that seen prior CT consistent with patient's known history of recurrence. Extensive areas of associated bronchiectasis are noted in the left lower lobe. Small left-sided pleural effusion with associated pleural calcifications is stable. The right lobe base appears clear. No pneumothorax is noted.     In the abdomen, the liver appears normal in size and attenuation without focal masses. No biliary duct dilatation is seen. The portal and hepatic veins appear unremarkable. Small splenule is again identified in the left upper quadrant. Hypodense enhancing mass is again identified arising from the tail of the pancreas measuring 5.0 x 4.0 cm, not significantly changed in comparison to the prior study. Retroperitoneal adenopathy is again identified. Upper abdominal adenopathy measures 3.5 x 2.4 cm is not  significantly changed in comparison to the prior study. Lymph node just lateral to the superior mesenteric artery origin measures 2.1 x 1.9 cm versus 2.3 x 2.1 cm on the prior study. Lymph node just posterior to the left renal vein measures 2.1 x 1.6 cm, not significantly changed. Additional retroperitoneal para-aortic adenopathy is noted and appears not significant changed. The aorta tapers normally with moderate atherosclerotic calcification along its margins.     Views bowel obstruction stomach to be unremarkable. The visualized portions of the small bowel and colon appear unremarkable. No free air or free fluid are identified. No bowel obstruction seen.     Bones: Degenerative changes are identified. No definite evidence of bony metastatic disease is noted.       Assessment:       1. 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 Right upper quadrant abdominal pain    3. Secondary and unspecified malignant neoplasm of intrathoracic lymph nodes    4. Chronic kidney disease, stage 3a    5. Hypertension, unspecified type    6. Immunodeficiency due to drug therapy    7. Pancreatic mass    8. Cancer related pain    9. Transaminitis           Plan:     NSCLC - patient was initially diagnosed with stage III [id number] SCC of the left lung now with metastatic disease  -PET/CT 4/18/23 shows continued left lung mass, mediastinal LAD  -MRI brain 4/18/23 showed no acute findings  -TEMPUS Blood testing showed TP 53 mutation  -TEMPUS tissue testing showed PD-L1 of 20%, TP53, KDM6A, CDKN2a and MTAP  -PORT placed 4/24/23 under the care of Dr. Love  -PT completed 6 cycles of Carboplatin and Paclitaxel on 6/02/23  -Radiation completed 6/07/23  -CT chest on 6/26/23 showed a decrease in LLL mass  -Will proceed with Durvalumab for 1 year of treatment  -CT chest 11/21/23 shows decreased lung mass and radiation changes  -CT Chest on 02/09/2024 showed continued radiation changes in the lung but did show intra-abdominal  lymphadenopathy (see below)  -Pt s/p cycle 9 of Durvalumab  -Pt underwent EUS on 3/13/24 with biopsy of celiac LN showing metastatic SCC  -Treatment with Ramucirumab and Pembrolizumab discussed based off trial Lung [id number]  -PT completed 4 cycles  -PET/CT on 7/08/24 showed progression of disease  -Pt completed C1D15 Gemcitabine  -CT Abdomen 8/21/24 stable  -Will proceed with C2D1  Visit today included increased complexity associated with the care of the episodic problem (immunotherapy and ramucirumab) addressed and managing the longitudinal care of the patient due to the serious and/or complex managed problem(s) NSCLC    Abdominal Pain/Transaminitis - abdominal pain improved  -No etiology determined on CT  -Will monitor    Pancreatic Mass - seen on CT scans and biopsy during EUS on 03/03/2023 with path showing no evidence of malignancy  -PET/CT 4/18/23 showed uptake in the tail of the pancreas  -Possibly due to chronic pancreatitis  -Pt with stable pancreatic mass on CT abdomen 7/03/23 and 3/01/24  -Patient with enlarging surrounding lymphadenopathy  -PET/CT on 3/07/24 shows increasing LAD in the celiac region, periaortic retroperitoneum, right retrocrural area, and bilateral inguinal regions  -EUS on 3/13/24 not suggestive of a pancreatic mass  -Mentioned on CT abdomen 8/21/24  -Will monitor    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  -No sign of infection  -Will monitor    Cancer Related Pain - palliative care managing    CKD - pt with stage IIIa CKD  -Creatinine 1.6 8/19/24  -Stable  -Will monitor    HTN - pt on metoprolol and clonidine  -BP on lower side which may be causing occasional dizziness  -Pt instructed to stop clonidine and to increase fluid intake   -Patient also instructed to use compression stockings  -Will monitor    Route Chart for Scheduling    Med Onc Chart Routing      Follow up with physician 4 weeks. Pt can proceed with treatment. Pt needs a CBC and CMP prior to his appt with  NP in 1 week with treatment.  Pt needs [facility] same labs and appt with NP in 2 weeks.  Pt needs an appt with me in 4 weeks  with CBC and CMP and treatment.   Follow up with RADHIKA 1 week and 2 weeks.   Infusion scheduling note    Injection scheduling note    Labs    Imaging    Pharmacy appointment    Other referrals              Treatment Plan Information   OP NSCLC GEMCITABINE QW   Lisandro Lawrence MD   Upcoming Treatment Dates - OP NSCLC GEMCITABINE QW    8/29/2024       Pre-Medications       ondansetron injection 8 mg       Chemotherapy       gemcitabine in 0.9% NaCl 250 mL chemo infusion  9/5/2024       Pre-Medications       ondansetron injection 8 mg       Chemotherapy       gemcitabine in 0.9% NaCl 250 mL chemo infusion  9/19/2024       Pre-Medications       ondansetron injection 8 mg       Chemotherapy       gemcitabine in 0.9% NaCl 250 mL chemo infusion  9/26/2024       Pre-Medications       ondansetron injection 8 mg       Chemotherapy       gemcitabine in 0.9% NaCl 250 mL chemo infusion    Supportive Plan Information  IV FLUIDS AND ELECTROLYTES   Lisandro Lawrence MD   Upcoming Treatment Dates - IV FLUIDS AND ELECTROLYTES    No upcoming days in selected categories.      Lisandro Lawrence MD  [facility]  Hematology and Oncology  [facility] [address] [place] [address]   O: [phone]  F: [phone]

## 2024-08-22 NOTE — PROGRESS NOTES
Oncology Nutrition   Chemotherapy Infusion Visit    Nutrition Follow Up   RD met with pt and pt's, daughter, at chairside during infusion tx. Pt present for cycle 2 Gemzar. Pt states he felt light headed and nauseous after his first Gemzar infusion. RD discussed increasing fluid intake before and after treatment. Pt's daughter has bought Electrolit for her dad. Pt was asked by RD if he has antiemetics at home for nausea. He says he received some when he first started treatment and may have it at home. RD encouraged pt to drink daily and take antiemetics as needed for nausea. Pt reports fluid retention in hands and feet.       Wt Readings from Last 10 Encounters:   08/22/24 92.5 kg (203 lb 14.8 oz)   08/22/24 92.5 kg (203 lb 14.8 oz)   08/20/24 92.1 kg (203 lb 0.7 oz)   08/06/24 95.5 kg (210 lb 8.6 oz)   08/06/24 95.5 kg (210 lb 8.6 oz)   07/30/24 98.4 kg (216 lb 14.9 oz)   07/30/24 98.4 kg (216 lb 14.9 oz)   07/23/24 99.5 kg (219 lb 5.7 oz)   07/23/24 99.5 kg (219 lb 5.7 oz)   07/17/24 101.5 kg (223 lb 12.3 oz)       All other nutrition questions/concerns addressed as appropriate. Will continue to monitor prn throughout treatment.     Machelle Gruber, JACKLYNN, LDN  08/22/2024  3:44 PM

## 2024-08-26 ENCOUNTER — LAB VISIT (OUTPATIENT)
Dept: LAB | Facility: HOSPITAL | Age: 74
End: 2024-08-26
Attending: INTERNAL MEDICINE
Payer: MEDICARE

## 2024-08-26 DIAGNOSIS — C77.8 NSCLC METASTATIC TO LYMPH NODES OF MULTIPLE SITES: ICD-10-CM

## 2024-08-26 DIAGNOSIS — C34.90 NSCLC METASTATIC TO LYMPH NODES OF MULTIPLE SITES: ICD-10-CM

## 2024-08-26 LAB
ALBUMIN SERPL BCP-MCNC: 2.6 G/DL (ref 3.5–5.2)
ALP SERPL-CCNC: 83 U/L (ref 55–135)
ALT SERPL W/O P-5'-P-CCNC: 84 U/L (ref 10–44)
ANION GAP SERPL CALC-SCNC: 12 MMOL/L (ref 8–16)
AST SERPL-CCNC: 90 U/L (ref 10–40)
BASOPHILS # BLD AUTO: 0.07 K/UL (ref 0–0.2)
BASOPHILS NFR BLD: 1 % (ref 0–1.9)
BILIRUB SERPL-MCNC: 0.5 MG/DL (ref 0.1–1)
BUN SERPL-MCNC: 13 MG/DL (ref 8–23)
CALCIUM SERPL-MCNC: 9.2 MG/DL (ref 8.7–10.5)
CHLORIDE SERPL-SCNC: 103 MMOL/L (ref 95–110)
CO2 SERPL-SCNC: 21 MMOL/L (ref 23–29)
CREAT SERPL-MCNC: 1.3 MG/DL (ref 0.5–1.4)
DIFFERENTIAL METHOD BLD: ABNORMAL
EOSINOPHIL # BLD AUTO: 0.1 K/UL (ref 0–0.5)
EOSINOPHIL NFR BLD: 0.7 % (ref 0–8)
ERYTHROCYTE [DISTWIDTH] IN BLOOD BY AUTOMATED COUNT: 15.9 % (ref 11.5–14.5)
EST. GFR  (NO RACE VARIABLE): 58 ML/MIN/1.73 M^2
GLUCOSE SERPL-MCNC: 180 MG/DL (ref 70–110)
HCT VFR BLD AUTO: 32.3 % (ref 40–54)
HGB BLD-MCNC: 10.4 G/DL (ref 14–18)
IMM GRANULOCYTES # BLD AUTO: 0.01 K/UL (ref 0–0.04)
IMM GRANULOCYTES NFR BLD AUTO: 0.1 % (ref 0–0.5)
LYMPHOCYTES # BLD AUTO: 1.1 K/UL (ref 1–4.8)
LYMPHOCYTES NFR BLD: 16.4 % (ref 18–48)
MCH RBC QN AUTO: 28.1 PG (ref 27–31)
MCHC RBC AUTO-ENTMCNC: 32.2 G/DL (ref 32–36)
MCV RBC AUTO: 87 FL (ref 82–98)
MONOCYTES # BLD AUTO: 0.3 K/UL (ref 0.3–1)
MONOCYTES NFR BLD: 3.6 % (ref 4–15)
NEUTROPHILS # BLD AUTO: 5.4 K/UL (ref 1.8–7.7)
NEUTROPHILS NFR BLD: 78.3 % (ref 38–73)
NRBC BLD-RTO: 0 /100 WBC
PLATELET # BLD AUTO: 628 K/UL (ref 150–450)
PMV BLD AUTO: 10 FL (ref 9.2–12.9)
POTASSIUM SERPL-SCNC: 4.5 MMOL/L (ref 3.5–5.1)
PROT SERPL-MCNC: 7.2 G/DL (ref 6–8.4)
RBC # BLD AUTO: 3.7 M/UL (ref 4.6–6.2)
SODIUM SERPL-SCNC: 136 MMOL/L (ref 136–145)
WBC # BLD AUTO: 6.87 K/UL (ref 3.9–12.7)

## 2024-08-26 PROCEDURE — 85025 COMPLETE CBC W/AUTO DIFF WBC: CPT | Mod: PO | Performed by: INTERNAL MEDICINE

## 2024-08-26 PROCEDURE — 36415 COLL VENOUS BLD VENIPUNCTURE: CPT | Mod: PO | Performed by: INTERNAL MEDICINE

## 2024-08-26 PROCEDURE — 80053 COMPREHEN METABOLIC PANEL: CPT | Performed by: INTERNAL MEDICINE

## 2024-08-28 NOTE — PROGRESS NOTES
PATIENT: Feng Thakkar  MRN: [id number]  DATE: 8/28/2024      Diagnosis:   1. 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 Secondary and unspecified malignant neoplasm of intrathoracic lymph nodes    3. 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 ID Energy Dose/Fx (Gy) #Fx Dose Correction (Gy) Total Dose (Gy) Start Date End Date Elapsed Days   IM Lung PTV 6X 2 6 / 30 0 12 4/27/2023 5/4/2023 7   IM Lung_New PTV 6X 2 24 / 24 0 48 5/5/2023 6/7/2023 33        4/28/2023 - 6/2/2023 Chemotherapy    Treatment Summary   Plan Name: OP NSCLC PACLITAXEL + CARBOPLATIN (AUC) QW + RADIATION  Treatment Goal: Curative  Status: Inactive  Start Date: 4/28/2023  End Date: 6/2/2023  Provider: Lisandro Lawrence MD  Chemotherapy: CARBOplatin (PARAPLATIN) 185 mg in sodium chloride 0.9% 303.5 mL chemo infusion, 185 mg (100 % of original dose 183.4 mg), Intravenous, Clinic/HOD 1 time, 6 of 6 cycles  Dose modification:   (original dose 183.4 mg, Cycle 1)  Administration: 185 mg (4/28/2023), 185 mg (5/5/2023), 190 mg (5/12/2023), 190 mg (5/19/2023), 180 mg (5/26/2023), 190 mg (6/2/2023)  PACLitaxeL (TAXOL) 45 mg/m2 = 102 mg in sodium chloride 0.9% 250 mL chemo infusion, 45 mg/m2 = 102 mg, Intravenous, Clinic/HOD 1 time, 6 of 6 cycles  Administration: 102 mg (4/28/2023), 102 mg (5/5/2023), 102 mg (5/12/2023), 102 mg (5/19/2023), 102 mg (5/26/2023), 102 mg (6/2/2023)     7/11/2023 - 2/21/2024 Chemotherapy    Treatment Summary   Plan Name: OP DURVALUMAB 1500 MG Q4W  Treatment Goal: Curative  Status:  Inactive  Start Date: 7/11/2023  End Date: 2/21/2024  Provider: Lisandro Lawrence MD  Chemotherapy: [No matching medication found in this treatment plan]     3/27/2024 - 6/19/2024 Chemotherapy    Treatment Summary   Plan Name: OP NSCLC ramucirumab 10 mg/kg plus pembrolizumab 200mg Q3W  Treatment Goal: Palliative  Status: Inactive  Start Date: 3/27/2024  End Date: 6/19/2024  Provider: Lisandro Lawrence MD  Chemotherapy: ramucirumab (CYRAMZA) 1,000 mg in sodium chloride 0.9% 250 mL chemo infusion, 1,048 mg (100 % of original dose 10 mg/kg), Intravenous, Clinic/HOD 1 time, 5 of 35 cycles  Dose modification: 10 mg/kg (original dose 10 mg/kg, Cycle 2, Reason: MD Discretion, Comment: Trial Dosing)  Administration: 1,000 mg (4/17/2024), 1,000 mg (3/27/2024), 1,000 mg (5/8/2024), 1,000 mg (5/29/2024), 1,000 mg (6/19/2024)     7/23/2024 -  Chemotherapy    Treatment Summary   Plan Name: OP NSCLC GEMCITABINE QW  Treatment Goal: Palliative  Status: Active  Start Date: 7/23/2024  End Date: 6/12/2025 (Planned)  Provider: Lisandro Lawrence MD  Chemotherapy: gemcitabine 2,200 mg in 0.9% NaCl SolP 307.9 mL chemo infusion, 2,260 mg, Intravenous, Clinic/HOD 1 time, 2 of 12 cycles  Administration: 2,200 mg (7/23/2024), 2,200 mg (7/30/2024), 2,200 mg (8/6/2024)             Subjective:    Interval History: Mr. Thakkar is a 73 y.o. male with Gout, HLD, HTN who presents for work up of NSCLC.  Since the last clinic visit the patient reports that he has not had swelling in his legs as previous.  Taste changes; good appetite - gain 7 pounds in 1 week. Balance has been good; taking his BP.  Discussed Care Companion - patient to have his daughter set up [employer].  The patient denies CP, cough, SOB, abdominal pain, nausea, vomiting, constipation, diarrhea.  The patient denies fever, chills, night sweats, weight loss, new lumps or bumps, easy bruising or bleeding.    Prior History:  The patient initially developed abdominal pain on 01/05/2023 and  underwent CT of the abdomen showing a mass in the left lung base measuring 3.9 x 2.9 cm; 3 x 3.8 cm mass along the pancreatic tail; and shotty retroperitoneal lymph nodes.  The patient underwent repeat CT abdomen and pelvis on 02/24/2023 showing a 4.3 x 4 solid enhancing mass of the left lung base; thickened bladder wall; prostate gland measuring 45.1 x 4.6 cm; abnormal sclerosis in the left femoral head measuring 2.8 x 1.4 x 1.3 cm; and additional soft tissue densities in the posterior left subdiaphragmatic region measuring up to 16 mm.  CT chest on 03/01/2023 showed a 2 cm low-density lesion in the right lobe of the thyroid gland; 4.5 x 4.3 x 3.8 cm mass in the left lower lobe; several micro nodules; Na soft tissue mass adjacent to the pancreatic tail.  Patient underwent EUS on 03/03/2023 showing a 3.5 cm and 1.8 cm round pancreatic tail lesion.  Biopsy returned results showing no evidence of malignancy and abundant hematopoietic elements and dendritic cells.  Patient then underwent EBUS under the care of Dr. Albright on 03/17/2023 showing squamous cell carcinoma in biopsy of the left lower lung lesion; squamous cell carcinoma and station 7 lymph node; positive 11 L lymph node for squamous cell carcinoma.   The patient underwent PET-CT on 04/18/2023 showing a markedly hypermetabolic lobulated subpleural left lower lobe mass measuring 4.5 x 3.9 cm with hypermetabolic lymph nodes in the left hilar region measuring 2.2 x 1.9 cm; and a 4.2 x 3.6 walk circumscribed mass in the pancreatic tail.  MRI brain on 04/18/2023 showed no acute findings.     The patient underwent CT chest on 6/26/23 showing a large right thyroid mass measuring at least 2.6 cm; left renal hypodensity measuring 11 mm favored to be a cyst; significant decrease in size of left lower lobe consolidative mass now measuring 2.4 x 2.3 cm; stable 3 mm consolidative nodule in the left upper lobe.   The patient underwent CT chest on 11/21/2023 showing patchy  ground-glass density within the medial right upper lobe; ground-glass density in the left dependent left upper lobe that the left lower lobe with associated bronchiectasis; previous target peribronchial nodule in the left lower lobe measuring 2.5 cm and 2 minute cm hypodense lesion in the right thyroid nodule.   CT Chest 2/09/24 showing geographic ground-glass disease and interlobular septal thickening in the left upper lobe; several large lymph nodes in the upper abdomen near the celiac takeoff and liver hilum with lymph node ranging from 1.3-1.5 cm.    The patient underwent a CT abdomen and pelvis on 03/01/2024 showing coronary artery calcification; 40 x 36 mm hypoenhancing pancreatic tail mass; 20 x 30 mm peripancreatic lymph node along the cranial aspect of the proximal pancreatic body; stable 13 mm periaortic lymph node; enlargement of multiple retroperitoneal lymph nodes; mesenteric haziness present within the central abdomen; enlarged inguinal lymph nodes bilaterally; and a 21 mm sclerotic bone lesion within the posterior row inferomedial left femoral head.   The patient underwent a PET-CT on 03/07/2024 showing resolution of hypermetabolic left lower lobe mass now showing signs of radiation therapy response; disappearance of hypermetabolic enlarged left hilar lymph nodes; new irregular hypermetabolic right breast tissue measuring 2.5 x 1.4 cm possibly representing the unilateral gynecomastia; interval development of new hypermetabolic lymph nodes in the celiac region, periaortic region, right retrocrural area, and bilateral inguinal regions; lentiform cutaneous subcutaneous well-circumscribed mass along the anterior midline aspect of the lower pelvis which has increased in size and become hypermetabolic now measuring 2.1 x 1.1 cm possibly representing a sebaceous cyst.     The patient underwent EUS on 03/13/2024 with gastric polyp and single duodenal polyp seen as well as a few malignant appearing lymph nodes  in the celiac region which were biopsied.    The patient underwent PET-CT on 07/08/2024 showing to metastatic left posterior cervical lymph nodes with the largest measuring 13 x 8 mm; new superior mediastinal metastatic lymph node; increasing size of left lower lobe airspace consolidation with a new nodular focus of hypermetabolic activity; progression in upper abdominal metastatic lymphadenopathy with multiple new and enlarging retroperitoneal metastatic lymph nodes; new retrocrural metastatic lymph node; and new aortocaval lymph node.    Past Medical History:   Past Medical History:   Diagnosis Date    Diabetes mellitus     Gout, unspecified     High cholesterol     HTN (hypertension)     Lung cancer        Past Surgical HIstory:   Past Surgical History:   Procedure Laterality Date    ENDOSCOPIC ULTRASOUND OF UPPER GASTROINTESTINAL TRACT N/A 3/3/2023    Procedure: ULTRASOUND, UPPER GI TRACT, ENDOSCOPIC;  Surgeon: Cora Mcgrath MD;  Location: [facility];  Service: Endoscopy;  Laterality: N/A;  2/24/23-Instructions mailed to address on file-DS    ENDOSCOPIC ULTRASOUND OF UPPER GASTROINTESTINAL TRACT Left 3/13/2024    Procedure: ULTRASOUND, UPPER GI TRACT, ENDOSCOPIC;  Surgeon: Reji Aguirre MD;  Location: [facility];  Service: Endoscopy;  Laterality: Left;    ESOPHAGOGASTRODUODENOSCOPY N/A 3/13/2024    Procedure: EGD (ESOPHAGOGASTRODUODENOSCOPY);  Surgeon: Reji Aguirre MD;  Location: [facility];  Service: Endoscopy;  Laterality: N/A;    INSERTION OF TUNNELED CENTRAL VENOUS CATHETER (CVC) WITH SUBCUTANEOUS PORT N/A 4/24/2023    Procedure: [id number];  Surgeon: Paulino Love MD;  Location: [facility];  Service: General;  Laterality: N/A;    PANCREATECTOMY      ROBOTIC BRONCHOSCOPY N/A 3/17/2023    Procedure: ROBOTIC BRONCHOSCOPY;  Surgeon: Cristiane Albright MD;  Location: [address];  Service: Pulmonary;  Laterality: N/A;       Family History:   Family History   Problem Relation Name  Age of Onset    Breast cancer Sister          60       Social History:  reports that he quit smoking about 11 years ago. His smoking use included cigarettes. He started smoking about 56 years ago. He has a 81 pack-year smoking history. He has quit using smokeless tobacco. He reports current alcohol use of about 3.0 standard drinks of alcohol per week. He reports that he does not use drugs.    Allergies:  Review of patient's allergies indicates:  No Known Allergies    Medications:  Current Outpatient Medications   Medication Sig Dispense Refill    allopurinoL (ZYLOPRIM) 100 MG tablet Take 100 mg by mouth once daily.      BYDUREON BCISE 2 mg/0.85 mL AtIn Inject 2 mg into the skin every 7 days. Every Friday      ezetimibe (ZETIA) 10 mg tablet Take 10 mg by mouth once daily.      FARXIGA 10 mg tablet Take 10 mg by mouth every morning.      finasteride (PROSCAR) 5 mg tablet Take 5 mg by mouth once daily.      FREESTYLE TEST Strp USE 1 STRIP TO CHECK GLUCOSE ONCE DAILY      levothyroxine (SYNTHROID) 50 MCG tablet Take 50 mcg by mouth every morning.      LIDOcaine-prilocaine (EMLA) cream Apply topically to port site one hour prior to access, cover 30 g 1    magic mouthwash diphen/antac/lidoc/nysta Swish and spit 10 mls by mouth every 4 to 6 hours as needed for mouth pain, ulcers or yeast 240 mL 1    metoprolol succinate (TOPROL-XL) 100 MG 24 hr tablet Take 100 mg by mouth once daily.      morphine (MS CONTIN) 15 MG 12 hr tablet Take 1 tablet (15 mg total) by mouth 2 (two) times daily. 60 tablet 0    rosuvastatin (CRESTOR) 40 MG Tab Take 10 mg by mouth every evening.      TOUJEO SOLOSTAR U-300 INSULIN 300 unit/mL (1.5 mL) InPn pen SMARTSI Unit(s) SUB-Q Every Evening       No current facility-administered medications for this visit.       Review of Systems   Constitutional:  Negative for chills, diaphoresis, fatigue, fever and unexpected weight change.   Respiratory:  Negative for cough and shortness of breath.   "  Cardiovascular:  Positive for leg swelling. Negative for chest pain and palpitations.   Gastrointestinal:  Negative for abdominal pain, constipation, diarrhea, nausea and vomiting.   Skin:  Negative for color change and rash.   Neurological:  Positive for light-headedness. Negative for headaches.   Hematological:  Negative for adenopathy. Does not bruise/bleed easily.       ECOG Performance Status: 1   Objective:      Vitals: Weight:  Gain of 7 pounds in 1 week (no edema)  Vitals:    08/29/24 0810   BP: 131/78   BP Location: Left arm   Patient Position: Sitting   BP Method: Large (Automatic)   Pulse: 75   Resp: 18   Temp: 96.9 °F (36.1 °C)   TempSrc: Temporal   SpO2: 100%   Weight: 95.6 kg (210 lb 12.2 oz)   Height: 6' 1" (1.854 m)       Physical Exam  Constitutional:       General: He is not in acute distress.     Appearance: He is well-developed. He is not diaphoretic.   HENT:      Head: Normocephalic and atraumatic.   Cardiovascular:      Rate and Rhythm: Normal rate and regular rhythm.      Heart sounds: Normal heart sounds. No murmur heard.     No friction rub. No gallop.   Pulmonary:      Effort: Pulmonary effort is normal. No respiratory distress.      Breath sounds: Normal breath sounds. No wheezing or rales.   Chest:      Chest wall: No tenderness.   Abdominal:      General: Bowel sounds are normal. There is no distension.      Palpations: Abdomen is soft. There is no mass.      Tenderness: There is no abdominal tenderness. There is no rebound.   Musculoskeletal:      Cervical back: Normal range of motion.   Lymphadenopathy:      Cervical: No cervical adenopathy.      Upper Body:      Right upper body: No supraclavicular or axillary adenopathy.      Left upper body: No supraclavicular or axillary adenopathy.   Skin:     General: Skin is warm and dry.   Neurological:      Mental Status: He is alert and oriented to person, place, and time.   Psychiatric:         Behavior: Behavior normal.         Laboratory " Data:  Lab Visit on 08/26/2024   Component Date Value Ref Range Status    WBC 08/26/2024 6.87  3.90 - 12.70 K/uL Final    RBC 08/26/2024 3.70 (L)  4.60 - 6.20 M/uL Final    Hemoglobin 08/26/2024 10.4 (L)  14.0 - 18.0 g/dL Final    Hematocrit 08/26/2024 32.3 (L)  40.0 - 54.0 % Final    MCV 08/26/2024 87  82 - 98 fL Final    MCH 08/26/2024 28.1  27.0 - 31.0 pg Final    MCHC 08/26/2024 32.2  32.0 - 36.0 g/dL Final    RDW 08/26/2024 15.9 (H)  11.5 - 14.5 % Final    Platelets 08/26/2024 628 (H)  150 - 450 K/uL Final    MPV 08/26/2024 10.0  9.2 - 12.9 fL Final    Immature Granulocytes 08/26/2024 0.1  0.0 - 0.5 % Final    Gran # (ANC) 08/26/2024 5.4  1.8 - 7.7 K/uL Final    Immature Grans (Abs) 08/26/2024 0.01  0.00 - 0.04 K/uL Final    Comment: Mild elevation in immature granulocytes is non specific and   can be seen in a variety of conditions including stress response,   acute inflammation, trauma and pregnancy. Correlation with other   laboratory and clinical findings is essential.      Lymph # 08/26/2024 1.1  1.0 - 4.8 K/uL Final    Mono # 08/26/2024 0.3  0.3 - 1.0 K/uL Final    Eos # 08/26/2024 0.1  0.0 - 0.5 K/uL Final    Baso # 08/26/2024 0.07  0.00 - 0.20 K/uL Final    nRBC 08/26/2024 0  0 /100 WBC Final    Gran % 08/26/2024 78.3 (H)  38.0 - 73.0 % Final    Lymph % 08/26/2024 16.4 (L)  18.0 - 48.0 % Final    Mono % 08/26/2024 3.6 (L)  4.0 - 15.0 % Final    Eosinophil % 08/26/2024 0.7  0.0 - 8.0 % Final    Basophil % 08/26/2024 1.0  0.0 - 1.9 % Final    Differential Method 08/26/2024 Automated   Final    Sodium 08/26/2024 136  136 - 145 mmol/L Final    Potassium 08/26/2024 4.5  3.5 - 5.1 mmol/L Final    Chloride 08/26/2024 103  95 - 110 mmol/L Final    CO2 08/26/2024 21 (L)  23 - 29 mmol/L Final    Glucose 08/26/2024 180 (H)  70 - 110 mg/dL Final    BUN 08/26/2024 13  8 - 23 mg/dL Final    Creatinine 08/26/2024 1.3  0.5 - 1.4 mg/dL Final    Calcium 08/26/2024 9.2  8.7 - 10.5 mg/dL Final    Total Protein 08/26/2024  7.2  6.0 - 8.4 g/dL Final    Albumin 08/26/2024 2.6 (L)  3.5 - 5.2 g/dL Final    Total Bilirubin 08/26/2024 0.5  0.1 - 1.0 mg/dL Final    Comment: For infants and newborns, interpretation of results should be based  on gestational age, weight and in agreement with clinical  observations.    Premature Infant recommended reference ranges:  Up to 24 hours.............<8.0 mg/dL  Up to 48 hours............<12.0 mg/dL  3-5 days..................<15.0 mg/dL  6-29 days.................<15.0 mg/dL      Alkaline Phosphatase 08/26/2024 83  55 - 135 U/L Final    AST 08/26/2024 90 (H)  10 - 40 U/L Final    ALT 08/26/2024 84 (H)  10 - 44 U/L Final    eGFR 08/26/2024 58 (A)  >60 mL/min/1.73 m^2 Final    Anion Gap 08/26/2024 12  8 - 16 mmol/L Final         Imaging:     CT abdomen and pelvis 8/21/24    Abdomen:     - Lower thorax:Focal airspace opacity in the left lower lobe is again identified, similar to that seen prior CT consistent with patient's known history of recurrence. Extensive areas of associated bronchiectasis are noted in the left lower lobe. Small left-sided pleural effusion with associated pleural calcifications is stable. The right lobe base appears clear. No pneumothorax is noted.     In the abdomen, the liver appears normal in size and attenuation without focal masses. No biliary duct dilatation is seen. The portal and hepatic veins appear unremarkable. Small splenule is again identified in the left upper quadrant. Hypodense enhancing mass is again identified arising from the tail of the pancreas measuring 5.0 x 4.0 cm, not significantly changed in comparison to the prior study. Retroperitoneal adenopathy is again identified. Upper abdominal adenopathy measures 3.5 x 2.4 cm is not significantly changed in comparison to the prior study. Lymph node just lateral to the superior mesenteric artery origin measures 2.1 x 1.9 cm versus 2.3 x 2.1 cm on the prior study. Lymph node just posterior to the left renal vein  measures 2.1 x 1.6 cm, not significantly changed. Additional retroperitoneal para-aortic adenopathy is noted and appears not significant changed. The aorta tapers normally with moderate atherosclerotic calcification along its margins.     Views bowel obstruction stomach to be unremarkable. The visualized portions of the small bowel and colon appear unremarkable. No free air or free fluid are identified. No bowel obstruction seen.     Bones: Degenerative changes are identified. No definite evidence of bony metastatic disease is noted.       Assessment:       1. 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 Secondary and unspecified malignant neoplasm of intrathoracic lymph nodes    3. Chronic kidney disease, stage 3a         Plan:     NSCLC - patient was initially diagnosed with stage III [id number] SCC of the left lung now with metastatic disease  -PET/CT 4/18/23 shows continued left lung mass, mediastinal LAD  -MRI brain 4/18/23 showed no acute findings  -TEMPUS Blood testing showed TP 53 mutation  -TEMPUS tissue testing showed PD-L1 of 20%, TP53, KDM6A, CDKN2a and MTAP  -PORT placed 4/24/23 under the care of Dr. Love  -PT completed 6 cycles of Carboplatin and Paclitaxel on 6/02/23  -Radiation completed 6/07/23  -CT chest on 6/26/23 showed a decrease in LLL mass  -Will proceed with Durvalumab for 1 year of treatment  -CT chest 11/21/23 shows decreased lung mass and radiation changes  -CT Chest on 02/09/2024 showed continued radiation changes in the lung but did show intra-abdominal lymphadenopathy (see below)  -Pt s/p cycle 9 of Durvalumab  -Pt underwent EUS on 3/13/24 with biopsy of celiac LN showing metastatic SCC  -Treatment with Ramucirumab and Pembrolizumab discussed based off trial Lung [id number]  -PT completed 4 cycles  -PET/CT on 7/08/24 showed progression of disease  -Pt completed C1D15 Gemcitabine  -CT Abdomen 8/21/24 stable  -Will proceed with C2D1  08/29/24:  Proceed with C2D8 today; f/u in  1 week as scheduled on 09/05 with labs on 09/03 in [place].  Visit today included increased complexity associated with the care of the episodic problem (immunotherapy and ramucirumab) addressed and managing the longitudinal care of the patient due to the serious and/or complex managed problem(s) NSCLC    Chemotherapy associated anemia.  -Stable; continue to monitor    Chemotherapy associated thrombocytosis  -liver enzymes decreasing; continue to monitor    Abdominal Pain/Transaminitis - abdominal pain improved  -No etiology determined on CT  -08/29/24:  stable abdomen; decrease in liver enzymes.  Will monitor    Pancreatic Mass - seen on CT scans and biopsy during EUS on 03/03/2023 with path showing no evidence of malignancy  -PET/CT 4/18/23 showed uptake in the tail of the pancreas  -Possibly due to chronic pancreatitis  -Pt with stable pancreatic mass on CT abdomen 7/03/23 and 3/01/24  -Patient with enlarging surrounding lymphadenopathy  -PET/CT on 3/07/24 shows increasing LAD in the celiac region, periaortic retroperitoneum, right retrocrural area, and bilateral inguinal regions  -EUS on 3/13/24 not suggestive of a pancreatic mass  -Mentioned on CT abdomen 8/21/24  -Will monitor    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    TIFFANY Delacruz, FNP-C  [facility]  30 minutes were spent in coordination of patient's care,  record review and counseling.      Route Chart for Scheduling    Med Onc Chart Routing      Follow up with physician    Follow up with RADHIKA . 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 Stage IIIA cT2b, cN2, cM0 noted on 3/31/2023   Line of treatment: Third Line  Treatment Goal: Palliative     Upcoming Treatment Dates - OP NSCLC GEMCITABINE QW    8/29/2024       Pre-Medications       ondansetron injection 8 mg       Chemotherapy       gemcitabine in 0.9% NaCl 250 mL chemo infusion  9/5/2024       Pre-Medications       ondansetron injection 8 mg       Chemotherapy       gemcitabine in 0.9% NaCl 250 mL chemo infusion  9/19/2024       Pre-Medications       ondansetron injection 8 mg       Chemotherapy       gemcitabine in 0.9% NaCl 250 mL chemo infusion  9/26/2024       Pre-Medications       ondansetron injection 8 mg       Chemotherapy       gemcitabine in 0.9% NaCl 250 mL chemo infusion    Supportive Plan Information  IV FLUIDS AND ELECTROLYTES Lisandro Lawrence MD   Associated Diagnosis: Dehydration   noted on 5/26/2023   Line of treatment: Supportive Care   Treatment goal: Supportive     Upcoming Treatment Dates - IV FLUIDS AND ELECTROLYTES    No upcoming days in selected categories.

## 2024-08-29 ENCOUNTER — OFFICE VISIT (OUTPATIENT)
Dept: HEMATOLOGY/ONCOLOGY | Facility: CLINIC | Age: 74
End: 2024-08-29
Payer: MEDICARE

## 2024-08-29 ENCOUNTER — INFUSION (OUTPATIENT)
Dept: INFUSION THERAPY | Facility: HOSPITAL | Age: 74
End: 2024-08-29
Attending: INTERNAL MEDICINE
Payer: MEDICARE

## 2024-08-29 ENCOUNTER — DOCUMENTATION ONLY (OUTPATIENT)
Dept: INFUSION THERAPY | Facility: HOSPITAL | Age: 74
End: 2024-08-29
Payer: MEDICARE

## 2024-08-29 VITALS
TEMPERATURE: 97 F | HEART RATE: 78 BPM | WEIGHT: 210.75 LBS | HEIGHT: 73 IN | SYSTOLIC BLOOD PRESSURE: 128 MMHG | OXYGEN SATURATION: 99 % | RESPIRATION RATE: 20 BRPM | BODY MASS INDEX: 27.93 KG/M2 | DIASTOLIC BLOOD PRESSURE: 75 MMHG

## 2024-08-29 VITALS
WEIGHT: 210.75 LBS | RESPIRATION RATE: 18 BRPM | SYSTOLIC BLOOD PRESSURE: 131 MMHG | HEIGHT: 73 IN | BODY MASS INDEX: 27.93 KG/M2 | TEMPERATURE: 97 F | HEART RATE: 75 BPM | DIASTOLIC BLOOD PRESSURE: 78 MMHG | OXYGEN SATURATION: 100 %

## 2024-08-29 DIAGNOSIS — C34.90 NSCLC METASTATIC TO LYMPH NODES OF MULTIPLE SITES: Primary | ICD-10-CM

## 2024-08-29 DIAGNOSIS — C77.8 NSCLC METASTATIC TO LYMPH NODES OF MULTIPLE SITES: Primary | ICD-10-CM

## 2024-08-29 DIAGNOSIS — N18.31 CHRONIC KIDNEY DISEASE, STAGE 3A: ICD-10-CM

## 2024-08-29 DIAGNOSIS — C77.1 SECONDARY AND UNSPECIFIED MALIGNANT NEOPLASM OF INTRATHORACIC LYMPH NODES: ICD-10-CM

## 2024-08-29 PROCEDURE — 99999 PR PBB SHADOW E&M-EST. PATIENT-LVL IV: CPT | Mod: PBBFAC,,, | Performed by: NURSE PRACTITIONER

## 2024-08-29 PROCEDURE — 25000003 PHARM REV CODE 250: Mod: PN | Performed by: NURSE PRACTITIONER

## 2024-08-29 PROCEDURE — A4216 STERILE WATER/SALINE, 10 ML: HCPCS | Mod: PN | Performed by: NURSE PRACTITIONER

## 2024-08-29 PROCEDURE — 96413 CHEMO IV INFUSION 1 HR: CPT | Mod: PN

## 2024-08-29 PROCEDURE — 63600175 PHARM REV CODE 636 W HCPCS: Mod: PN | Performed by: NURSE PRACTITIONER

## 2024-08-29 PROCEDURE — 96375 TX/PRO/DX INJ NEW DRUG ADDON: CPT | Mod: PN

## 2024-08-29 PROCEDURE — 99214 OFFICE O/P EST MOD 30 MIN: CPT | Mod: PBBFAC,PN | Performed by: NURSE PRACTITIONER

## 2024-08-29 RX ORDER — ONDANSETRON HYDROCHLORIDE 2 MG/ML
8 INJECTION, SOLUTION INTRAVENOUS
Status: CANCELLED
Start: 2024-08-29

## 2024-08-29 RX ORDER — PROCHLORPERAZINE EDISYLATE 5 MG/ML
5 INJECTION INTRAMUSCULAR; INTRAVENOUS ONCE AS NEEDED
Status: CANCELLED
Start: 2024-08-29

## 2024-08-29 RX ORDER — SODIUM CHLORIDE 0.9 % (FLUSH) 0.9 %
10 SYRINGE (ML) INJECTION
Status: DISCONTINUED | OUTPATIENT
Start: 2024-08-29 | End: 2024-08-29 | Stop reason: HOSPADM

## 2024-08-29 RX ORDER — HEPARIN 100 UNIT/ML
500 SYRINGE INTRAVENOUS
Status: CANCELLED | OUTPATIENT
Start: 2024-08-29

## 2024-08-29 RX ORDER — SODIUM CHLORIDE 0.9 % (FLUSH) 0.9 %
10 SYRINGE (ML) INJECTION
Status: CANCELLED | OUTPATIENT
Start: 2024-08-29

## 2024-08-29 RX ORDER — ONDANSETRON HYDROCHLORIDE 2 MG/ML
8 INJECTION, SOLUTION INTRAVENOUS
Status: COMPLETED | OUTPATIENT
Start: 2024-08-29 | End: 2024-08-29

## 2024-08-29 RX ADMIN — ONDANSETRON 8 MG: 2 INJECTION INTRAMUSCULAR; INTRAVENOUS at 09:08

## 2024-08-29 RX ADMIN — GEMCITABINE 2200 MG: 38 INJECTION, SOLUTION INTRAVENOUS at 09:08

## 2024-08-29 RX ADMIN — Medication 10 ML: at 09:08

## 2024-08-29 RX ADMIN — SODIUM CHLORIDE: 9 INJECTION, SOLUTION INTRAVENOUS at 08:08

## 2024-08-29 NOTE — PLAN OF CARE
.Pt tolerated gemzar infusion well.  No adverse reaction noted.   Port flushed with Ns and de-accessed per protocol.  Patient left clinic in no acute distress.

## 2024-08-29 NOTE — PROGRESS NOTES
LCSW met with patient at the chairside during infusion. Patient denied any emotional or practical needs at this time. Gas card program and TFP were offered, but patient said he was okay. He shared he is feeling better but he's having some pain in his right ankle. SW recommended he call his primary care physician for advice  encouraged the patient to reach out if any needs arise.

## 2024-08-30 ENCOUNTER — TELEPHONE (OUTPATIENT)
Dept: HEMATOLOGY/ONCOLOGY | Facility: CLINIC | Age: 74
End: 2024-08-30
Payer: MEDICARE

## 2024-08-30 NOTE — TELEPHONE ENCOUNTER
Spoke to pt to remind of on 9/3/24  with Lianna. Pt verbalized understanding and confirmed appt Location was discussed.

## 2024-09-03 ENCOUNTER — DOCUMENTATION ONLY (OUTPATIENT)
Dept: INFUSION THERAPY | Facility: HOSPITAL | Age: 74
End: 2024-09-03
Payer: MEDICARE

## 2024-09-03 ENCOUNTER — OFFICE VISIT (OUTPATIENT)
Dept: HEMATOLOGY/ONCOLOGY | Facility: CLINIC | Age: 74
End: 2024-09-03
Payer: MEDICARE

## 2024-09-03 ENCOUNTER — LAB VISIT (OUTPATIENT)
Dept: LAB | Facility: HOSPITAL | Age: 74
End: 2024-09-03
Attending: INTERNAL MEDICINE
Payer: MEDICARE

## 2024-09-03 VITALS
DIASTOLIC BLOOD PRESSURE: 60 MMHG | HEART RATE: 91 BPM | HEIGHT: 74 IN | OXYGEN SATURATION: 97 % | BODY MASS INDEX: 27.29 KG/M2 | SYSTOLIC BLOOD PRESSURE: 98 MMHG | TEMPERATURE: 97 F | WEIGHT: 212.63 LBS

## 2024-09-03 DIAGNOSIS — G47.00 INSOMNIA, UNSPECIFIED TYPE: ICD-10-CM

## 2024-09-03 DIAGNOSIS — C34.90 NSCLC METASTATIC TO LYMPH NODES OF MULTIPLE SITES: ICD-10-CM

## 2024-09-03 DIAGNOSIS — F43.23 ADJUSTMENT DISORDER WITH MIXED ANXIETY AND DEPRESSED MOOD: ICD-10-CM

## 2024-09-03 DIAGNOSIS — R53.83 CHEMOTHERAPY-INDUCED FATIGUE: Primary | ICD-10-CM

## 2024-09-03 DIAGNOSIS — C77.8 NSCLC METASTATIC TO LYMPH NODES OF MULTIPLE SITES: ICD-10-CM

## 2024-09-03 DIAGNOSIS — T45.1X5A CHEMOTHERAPY-INDUCED FATIGUE: Primary | ICD-10-CM

## 2024-09-03 LAB
ALBUMIN SERPL BCP-MCNC: 2.4 G/DL (ref 3.5–5.2)
ALP SERPL-CCNC: 76 U/L (ref 55–135)
ALT SERPL W/O P-5'-P-CCNC: 48 U/L (ref 10–44)
ANION GAP SERPL CALC-SCNC: 10 MMOL/L (ref 8–16)
AST SERPL-CCNC: 52 U/L (ref 10–40)
BASOPHILS # BLD AUTO: 0.02 K/UL (ref 0–0.2)
BASOPHILS NFR BLD: 0.4 % (ref 0–1.9)
BILIRUB SERPL-MCNC: 0.4 MG/DL (ref 0.1–1)
BUN SERPL-MCNC: 21 MG/DL (ref 8–23)
CALCIUM SERPL-MCNC: 9 MG/DL (ref 8.7–10.5)
CHLORIDE SERPL-SCNC: 103 MMOL/L (ref 95–110)
CO2 SERPL-SCNC: 22 MMOL/L (ref 23–29)
CREAT SERPL-MCNC: 1.8 MG/DL (ref 0.5–1.4)
DIFFERENTIAL METHOD BLD: ABNORMAL
EOSINOPHIL # BLD AUTO: 0.1 K/UL (ref 0–0.5)
EOSINOPHIL NFR BLD: 1.2 % (ref 0–8)
ERYTHROCYTE [DISTWIDTH] IN BLOOD BY AUTOMATED COUNT: 16 % (ref 11.5–14.5)
EST. GFR  (NO RACE VARIABLE): 39 ML/MIN/1.73 M^2
GLUCOSE SERPL-MCNC: 96 MG/DL (ref 70–110)
HCT VFR BLD AUTO: 26.6 % (ref 40–54)
HGB BLD-MCNC: 8.7 G/DL (ref 14–18)
IMM GRANULOCYTES # BLD AUTO: 0.02 K/UL (ref 0–0.04)
IMM GRANULOCYTES NFR BLD AUTO: 0.4 % (ref 0–0.5)
LYMPHOCYTES # BLD AUTO: 1 K/UL (ref 1–4.8)
LYMPHOCYTES NFR BLD: 20.8 % (ref 18–48)
MCH RBC QN AUTO: 28.3 PG (ref 27–31)
MCHC RBC AUTO-ENTMCNC: 32.7 G/DL (ref 32–36)
MCV RBC AUTO: 87 FL (ref 82–98)
MONOCYTES # BLD AUTO: 0.1 K/UL (ref 0.3–1)
MONOCYTES NFR BLD: 1.4 % (ref 4–15)
NEUTROPHILS # BLD AUTO: 3.8 K/UL (ref 1.8–7.7)
NEUTROPHILS NFR BLD: 76.2 % (ref 38–73)
NRBC BLD-RTO: 0 /100 WBC
PLATELET # BLD AUTO: 163 K/UL (ref 150–450)
PMV BLD AUTO: 9.4 FL (ref 9.2–12.9)
POTASSIUM SERPL-SCNC: 4.6 MMOL/L (ref 3.5–5.1)
PROT SERPL-MCNC: 7.2 G/DL (ref 6–8.4)
RBC # BLD AUTO: 3.07 M/UL (ref 4.6–6.2)
SODIUM SERPL-SCNC: 135 MMOL/L (ref 136–145)
WBC # BLD AUTO: 4.95 K/UL (ref 3.9–12.7)

## 2024-09-03 PROCEDURE — 80053 COMPREHEN METABOLIC PANEL: CPT | Performed by: INTERNAL MEDICINE

## 2024-09-03 PROCEDURE — 85025 COMPLETE CBC W/AUTO DIFF WBC: CPT | Mod: PO | Performed by: INTERNAL MEDICINE

## 2024-09-03 PROCEDURE — 99214 OFFICE O/P EST MOD 30 MIN: CPT | Mod: PBBFAC,PN | Performed by: NURSE PRACTITIONER

## 2024-09-03 PROCEDURE — 99214 OFFICE O/P EST MOD 30 MIN: CPT | Mod: S$PBB,,, | Performed by: NURSE PRACTITIONER

## 2024-09-03 PROCEDURE — 99999 PR PBB SHADOW E&M-EST. PATIENT-LVL IV: CPT | Mod: PBBFAC,,, | Performed by: NURSE PRACTITIONER

## 2024-09-03 PROCEDURE — 36415 COLL VENOUS BLD VENIPUNCTURE: CPT | Mod: PO | Performed by: INTERNAL MEDICINE

## 2024-09-03 RX ORDER — COLCHICINE 0.6 MG/1
CAPSULE ORAL
COMMUNITY
Start: 2024-08-30

## 2024-09-03 NOTE — PROGRESS NOTES
LCSW spoke briefly with patient and his daughter-in-law while waiting for another appointment. Mr. Thakkar's ankles on both legs were extremely swollen. He had seen MD and was given fluid pills. SW empathized with the pain and frustration he is currently feeling.

## 2024-09-03 NOTE — PROGRESS NOTES
Feng Thakkar  73 y.o. is here to seek an integrative approach to discuss side effects related to lung cancer treatment.    HPI  Mr. Thakkar is here today for his first chemotherapy. He reports he only sleeps approximately 4-5 hours at night and he naps during the day after his meals. This has been his routine for about 7-8 years. He reports he is fine with his sleep routine and he has the energy to do what he wants. He reports a good appetite, but he does eat less. He reports he is active, but not as active as he used to be.   His wife of 21 years  in 2019. He has 2 children. He has a good support sytem. He is retired.     2023  Mr. Thakkar is here today getting chemotherapy. He reports a sore throat which started Tuesday after radiation. He is not eating well due to pain. He states he is drinking a boost daily, although today it took 3 hours to drink one boost. His daughter in law picked up the medications given by Dr. Hadley and Dr. Lawrence today. He states before Tuesday he was doing very well.  He continues sleeping approximately 5 hours nightly, but this is his routine and does not want to change it at this time. He has a higher stress and anxiety level due to pain and the decrease ability to eat and drink.     2023  Mr. Thakkar reports he is doing better since finishing radiation in . He had a sore throat and was only able to drink boosts. He now has a sore throat occasionally, but not when he eats. He has a good appetite and is eating healthy.  He reports over the holiday weekend he was able to eat a variety of foods, most of which he usually does not eat due to Diabetes. He is drinking fluids throughout the day. He reports he does not sleep well and then he takes a nap during the day. He likes his sleeping routine. He reports now that his pain is gone, he does not have any anxiety. Overall, he states he is doing very well.     3/5/2024  Mr. Thakkar reports overall he is doing well. He is back to  eating his normal diet and has a good appetite. He continues to sleep 4-5 hours at night with a nap during the day. He is fine with this routine and does not want to change his routine. He denies fatigue.  He is currently getting Imfinizi and reports he is tolerating it well. He does report dry mouth. He continues to report low stress and anxiety. He is more active than he was previously. He is riding his stationary bike.    Today's Visit  Mr. Thakkar reports he went to the ED for edema to the lower extremities on Sunday. He reports the swelling occurred 2 days after his infusion. He was sent home with lasix and potassium which he will start today. They were unable to  due to the weekend and the holiday yesterday. His sleep has remained the same and he continues to take a daily nap. He declines wanting any medication to help with sleep. His daughter reports he has been depressed lately and he reports some anxiety mostly at night.  He does not have a good appetite and has to make himself eat. He eats 2 meals a day plus Ensure daily. He states his activity level is now low and he is not riding the stationary bike. He reports he is just too fatigued to exercise.     Pillars Assessment    Sleep  How many hours of sleep per night? 5 hours at night 1-2 hour nap  Do you have trouble falling asleep, staying asleep or waking up earlier than you need to? yes  Do you have daytime fatigue? no  Do you need medication for sleep? no  Do you use any supplements or other interventions for sleep? no    Resilience  Rate your current level of stress- moderate    Nutrition   Food allergies or sensitivities: no  Do you adhere to a particular type of diet? Diabetic diet  Do you have any concerns with your eating habits? No    Exercise  How would you describe your physical activity level? low    Past Medical History  Past Medical History:   Diagnosis Date    Diabetes mellitus     Gout, unspecified     High cholesterol     HTN  (hypertension)     Lung cancer         Past Surgical History   Past Surgical History:   Procedure Laterality Date    ENDOSCOPIC ULTRASOUND OF UPPER GASTROINTESTINAL TRACT N/A 3/3/2023    Procedure: ULTRASOUND, UPPER GI TRACT, ENDOSCOPIC;  Surgeon: Cora Mcgrath MD;  Location: [facility];  Service: Endoscopy;  Laterality: N/A;  23-Instructions mailed to address on file-DS    ENDOSCOPIC ULTRASOUND OF UPPER GASTROINTESTINAL TRACT Left 3/13/2024    Procedure: ULTRASOUND, UPPER GI TRACT, ENDOSCOPIC;  Surgeon: Reji Aguirre MD;  Location: [facility];  Service: Endoscopy;  Laterality: Left;    ESOPHAGOGASTRODUODENOSCOPY N/A 3/13/2024    Procedure: EGD (ESOPHAGOGASTRODUODENOSCOPY);  Surgeon: Reji Aguirre MD;  Location: [facility];  Service: Endoscopy;  Laterality: N/A;    INSERTION OF TUNNELED CENTRAL VENOUS CATHETER (CVC) WITH SUBCUTANEOUS PORT N/A 2023    Procedure: [id number];  Surgeon: Paulino Love MD;  Location: [facility];  Service: General;  Laterality: N/A;    PANCREATECTOMY      ROBOTIC BRONCHOSCOPY N/A 3/17/2023    Procedure: ROBOTIC BRONCHOSCOPY;  Surgeon: Cristiane Albright MD;  Location: [address];  Service: Pulmonary;  Laterality: N/A;      Family History   Family History   Problem Relation Name Age of Onset    Breast cancer Sister          60      Social History  Social History     Socioeconomic History    Marital status:    Tobacco Use    Smoking status: Former     Current packs/day: 0.00     Average packs/day: 1.5 packs/day for 54.0 years (81.0 ttl pk-yrs)     Types: Cigarettes     Start date: 1968     Quit date: 10/20/2012     Years since quittin.8    Smokeless tobacco: Former   Substance and Sexual Activity    Alcohol use: Yes     Alcohol/week: 3.0 standard drinks of alcohol     Types: 3 Cans of beer per week     Comment: 3 a day    Drug use: Never    Sexual activity: Not Currently      Allergies  Review of patient's allergies indicates:  No  Known Allergies   Current Medications:    Current Outpatient Medications:     allopurinoL (ZYLOPRIM) 100 MG tablet, Take 100 mg by mouth once daily., Disp: , Rfl:     BYDUREON BCISE 2 mg/0.85 mL AtIn, Inject 2 mg into the skin every 7 days. Every Friday, Disp: , Rfl:     colchicine (MITIGARE) 0.6 mg Cap, Take by mouth., Disp: , Rfl:     ezetimibe (ZETIA) 10 mg tablet, Take 10 mg by mouth once daily., Disp: , Rfl:     FARXIGA 10 mg tablet, Take 10 mg by mouth every morning., Disp: , Rfl:     finasteride (PROSCAR) 5 mg tablet, Take 5 mg by mouth once daily., Disp: , Rfl:     FREESTYLE TEST Strp, USE 1 STRIP TO CHECK GLUCOSE ONCE DAILY, Disp: , Rfl:     levothyroxine (SYNTHROID) 50 MCG tablet, Take 25 mcg by mouth every morning., Disp: , Rfl:     LIDOcaine-prilocaine (EMLA) cream, Apply topically to port site one hour prior to access, cover, Disp: 30 g, Rfl: 1    magic mouthwash diphen/antac/lidoc/nysta, Swish and spit 10 mls by mouth every 4 to 6 hours as needed for mouth pain, ulcers or yeast, Disp: 240 mL, Rfl: 1    metoprolol succinate (TOPROL-XL) 100 MG 24 hr tablet, Take 100 mg by mouth once daily., Disp: , Rfl:     morphine (MS CONTIN) 15 MG 12 hr tablet, Take 1 tablet (15 mg total) by mouth 2 (two) times daily., Disp: 60 tablet, Rfl: 0    rosuvastatin (CRESTOR) 40 MG Tab, Take 10 mg by mouth every evening., Disp: , Rfl:     TOUJEO SOLOSTAR U-300 INSULIN 300 unit/mL (1.5 mL) InPn pen, SMARTSI Unit(s) SUB-Q Every Evening, Disp: , Rfl:      Review of Systems  Review of Systems   Constitutional:  Positive for malaise/fatigue.   HENT: Negative.     Eyes: Negative.    Respiratory: Negative.     Cardiovascular: Negative.    Gastrointestinal: Negative.    Genitourinary: Negative.    Musculoskeletal: Negative.    Skin: Negative.    Neurological:  Positive for weakness.   Endo/Heme/Allergies: Negative.    Psychiatric/Behavioral:  Positive for depression. The patient is nervous/anxious and has insomnia.       Physical  "Exam      Vitals:    09/03/24 0956   BP: 99/61   BP Location: Left arm   Patient Position: Sitting   BP Method: Medium (Manual)   Pulse: 91   Temp: 97.2 °F (36.2 °C)   TempSrc: Temporal   SpO2: 97%   Weight: 96.4 kg (212 lb 9.6 oz)   Height: 6' 2" (1.88 m)   Body mass index is 27.3 kg/m².  1028: Repeat BP 98/60    Physical Exam  Vitals reviewed.   Constitutional:       Appearance: Normal appearance.   Neurological:      Mental Status: He is alert.   Psychiatric:         Mood and Affect: Mood normal.         Behavior: Behavior normal.      ASSESSMENT :  No diagnosis found.    PLAN:  Reviewed all information discussed at today's visit and all questions were answered.    Counseled on healthy lifestyle and behavior modification:   See Nutrition as needed during treatment.    Continue to work on sleep hygiene  Declines physical therapy at this time  Counseled on the role of physical activity in relation to fatigue.   Counseled patient and daughter that lasix can decrease the blood pressure and for them to monitor with blood pressure cuff  at home  Declines oncology psychology at this time.   I discussed and recommended the following support services:  Johan Chi and Yoga I suggested Johan Chi and/or Yoga as these practices reduce stress, increases flexibility and muscle strength, improves balance and promotes serenity in the power of movement to help fight disease and boost your immune system.   Music and relaxation therapy and Meditation which can decrease stress by lowering blood pressure, slowing breathing, and helping you be more present in the moment. It improves sleep by relaxing the body and mind at the end of the day.Meditation also trains you how to focus on one thing at a time, improving concentration. It also promotes emotional well-being by decreasing depression and anxiety, and helping create a more positive outlook on life.  Art Therapy    Follow up with [facility] in 1 month to discuss anxiety and " depression, will assess again for psychology.     I spent a total of 31 minutes on the day of the visit.This includes face to face time and non-face to face time preparing to see the patient (eg, review of tests), obtaining and/or reviewing separately obtained history, documenting clinical information in the electronic or other health record, independently interpreting results and communicating results to the patient/family/caregiver, or care coordinator

## 2024-09-04 ENCOUNTER — DOCUMENTATION ONLY (OUTPATIENT)
Dept: INFUSION THERAPY | Facility: HOSPITAL | Age: 74
End: 2024-09-04
Payer: MEDICARE

## 2024-09-04 ENCOUNTER — DOCUMENTATION ONLY (OUTPATIENT)
Dept: HEMATOLOGY/ONCOLOGY | Facility: CLINIC | Age: 74
End: 2024-09-04
Payer: MEDICARE

## 2024-09-04 NOTE — PROGRESS NOTES
Late entry, RD visit on 09/03/2024    Nutrition Note  RD met with pt and pt's daughter prior to appointment with Slime Palacios NP. Pt states he has been struggling with decreased appetite. He has been forcing himself to eat. RD and pt discussed adding high calorie foods to his meals. Pt is concerned with his blood glucose levels. RD provided pt with a handout on high protein foods to eat with his carbohydrate foods. Pt appreciative of RD visit.       Wt Readings from Last 10 Encounters:   09/03/24 96.4 kg (212 lb 9.6 oz)   08/29/24 95.6 kg (210 lb 12.2 oz)   08/29/24 95.6 kg (210 lb 12.2 oz)   08/22/24 92.5 kg (203 lb 14.8 oz)   08/22/24 92.5 kg (203 lb 14.8 oz)   08/20/24 92.1 kg (203 lb 0.7 oz)   08/06/24 95.5 kg (210 lb 8.6 oz)   08/06/24 95.5 kg (210 lb 8.6 oz)   07/30/24 98.4 kg (216 lb 14.9 oz)   07/30/24 98.4 kg (216 lb 14.9 oz)     Machelle Gruber, CAROL, LDN

## 2024-09-04 NOTE — PROGRESS NOTES
PATIENT: Feng Thakkar  MRN: [id number]  DATE: 9/4/2024      Diagnosis:   1. 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 ID Energy Dose/Fx (Gy) #Fx Dose Correction (Gy) Total Dose (Gy) Start Date End Date Elapsed Days   IM Lung PTV 6X 2 6 / 30 0 12 4/27/2023 5/4/2023 7   IM Lung_New PTV 6X 2 24 / 24 0 48 5/5/2023 6/7/2023 33        4/28/2023 - 6/2/2023 Chemotherapy    Treatment Summary   Plan Name: OP NSCLC PACLITAXEL + CARBOPLATIN (AUC) QW + RADIATION  Treatment Goal: Curative  Status: Inactive  Start Date: 4/28/2023  End Date: 6/2/2023  Provider: Lisandro Lawrence MD  Chemotherapy: CARBOplatin (PARAPLATIN) 185 mg in sodium chloride 0.9% 303.5 mL chemo infusion, 185 mg (100 % of original dose 183.4 mg), Intravenous, Clinic/HOD 1 time, 6 of 6 cycles  Dose modification:   (original dose 183.4 mg, Cycle 1)  Administration: 185 mg (4/28/2023), 185 mg (5/5/2023), 190 mg (5/12/2023), 190 mg (5/19/2023), 180 mg (5/26/2023), 190 mg (6/2/2023)  PACLitaxeL (TAXOL) 45 mg/m2 = 102 mg in sodium chloride 0.9% 250 mL chemo infusion, 45 mg/m2 = 102 mg, Intravenous, Clinic/HOD 1 time, 6 of 6 cycles  Administration: 102 mg (4/28/2023), 102 mg (5/5/2023), 102 mg (5/12/2023), 102 mg (5/19/2023), 102 mg (5/26/2023), 102 mg (6/2/2023)     7/11/2023 - 2/21/2024 Chemotherapy    Treatment Summary   Plan Name: OP DURVALUMAB 1500 MG Q4W  Treatment Goal: Curative  Status: Inactive  Start Date: 7/11/2023  End Date: 2/21/2024  Provider: Lisandro Lawrence MD  Chemotherapy: [No matching  medication found in this treatment plan]     3/27/2024 - 6/19/2024 Chemotherapy    Treatment Summary   Plan Name: OP NSCLC ramucirumab 10 mg/kg plus pembrolizumab 200mg Q3W  Treatment Goal: Palliative  Status: Inactive  Start Date: 3/27/2024  End Date: 6/19/2024  Provider: Lisandro Lawrence MD  Chemotherapy: ramucirumab (CYRAMZA) 1,000 mg in sodium chloride 0.9% 250 mL chemo infusion, 1,048 mg (100 % of original dose 10 mg/kg), Intravenous, Clinic/HOD 1 time, 5 of 35 cycles  Dose modification: 10 mg/kg (original dose 10 mg/kg, Cycle 2, Reason: MD Discretion, Comment: Trial Dosing)  Administration: 1,000 mg (4/17/2024), 1,000 mg (3/27/2024), 1,000 mg (5/8/2024), 1,000 mg (5/29/2024), 1,000 mg (6/19/2024)     7/23/2024 -  Chemotherapy    Treatment Summary   Plan Name: OP NSCLC GEMCITABINE QW  Treatment Goal: Palliative  Status: Active  Start Date: 7/23/2024  End Date: 6/12/2025 (Planned)  Provider: Lisandro Lawrence MD  Chemotherapy: gemcitabine 2,200 mg in 0.9% NaCl SolP 307.9 mL chemo infusion, 2,260 mg, Intravenous, Clinic/HOD 1 time, 2 of 12 cycles  Administration: 2,200 mg (7/23/2024), 2,200 mg (7/30/2024), 2,200 mg (8/6/2024), 2,200 mg (8/22/2024), 2,200 mg (8/29/2024)             Subjective:    Interval History: Mr. Thakkar is a 73 y.o. male with Gout, HLD, HTN who presents for work up of NSCLC.  Since the last clinic visit the patient has had difficulties with edema to the lower extremities.  08/30/24:  US negative for DVT.  He reported to the ED @ [facility] on 09/01 for bilateral edema in lower legs.  He was given Lasix to take daily x 10 days.  Today is day 4.  His swelling has come down. The patient denies CP, cough, SOB, abdominal pain, nausea, vomiting, constipation, diarrhea.  The patient denies fever, chills, night sweats, weight loss, new lumps or bumps, easy bruising or bleeding.    Prior History:  The patient initially developed abdominal pain on 01/05/2023 and underwent CT of the abdomen showing a  mass in the left lung base measuring 3.9 x 2.9 cm; 3 x 3.8 cm mass along the pancreatic tail; and shotty retroperitoneal lymph nodes.  The patient underwent repeat CT abdomen and pelvis on 02/24/2023 showing a 4.3 x 4 solid enhancing mass of the left lung base; thickened bladder wall; prostate gland measuring 45.1 x 4.6 cm; abnormal sclerosis in the left femoral head measuring 2.8 x 1.4 x 1.3 cm; and additional soft tissue densities in the posterior left subdiaphragmatic region measuring up to 16 mm.  CT chest on 03/01/2023 showed a 2 cm low-density lesion in the right lobe of the thyroid gland; 4.5 x 4.3 x 3.8 cm mass in the left lower lobe; several micro nodules; Na soft tissue mass adjacent to the pancreatic tail.  Patient underwent EUS on 03/03/2023 showing a 3.5 cm and 1.8 cm round pancreatic tail lesion.  Biopsy returned results showing no evidence of malignancy and abundant hematopoietic elements and dendritic cells.  Patient then underwent EBUS under the care of Dr. Albright on 03/17/2023 showing squamous cell carcinoma in biopsy of the left lower lung lesion; squamous cell carcinoma and station 7 lymph node; positive 11 L lymph node for squamous cell carcinoma.   The patient underwent PET-CT on 04/18/2023 showing a markedly hypermetabolic lobulated subpleural left lower lobe mass measuring 4.5 x 3.9 cm with hypermetabolic lymph nodes in the left hilar region measuring 2.2 x 1.9 cm; and a 4.2 x 3.6 walk circumscribed mass in the pancreatic tail.  MRI brain on 04/18/2023 showed no acute findings.     The patient underwent CT chest on 6/26/23 showing a large right thyroid mass measuring at least 2.6 cm; left renal hypodensity measuring 11 mm favored to be a cyst; significant decrease in size of left lower lobe consolidative mass now measuring 2.4 x 2.3 cm; stable 3 mm consolidative nodule in the left upper lobe.   The patient underwent CT chest on 11/21/2023 showing patchy ground-glass density within the medial  right upper lobe; ground-glass density in the left dependent left upper lobe that the left lower lobe with associated bronchiectasis; previous target peribronchial nodule in the left lower lobe measuring 2.5 cm and 2 minute cm hypodense lesion in the right thyroid nodule.   CT Chest 2/09/24 showing geographic ground-glass disease and interlobular septal thickening in the left upper lobe; several large lymph nodes in the upper abdomen near the celiac takeoff and liver hilum with lymph node ranging from 1.3-1.5 cm.    The patient underwent a CT abdomen and pelvis on 03/01/2024 showing coronary artery calcification; 40 x 36 mm hypoenhancing pancreatic tail mass; 20 x 30 mm peripancreatic lymph node along the cranial aspect of the proximal pancreatic body; stable 13 mm periaortic lymph node; enlargement of multiple retroperitoneal lymph nodes; mesenteric haziness present within the central abdomen; enlarged inguinal lymph nodes bilaterally; and a 21 mm sclerotic bone lesion within the posterior row inferomedial left femoral head.   The patient underwent a PET-CT on 03/07/2024 showing resolution of hypermetabolic left lower lobe mass now showing signs of radiation therapy response; disappearance of hypermetabolic enlarged left hilar lymph nodes; new irregular hypermetabolic right breast tissue measuring 2.5 x 1.4 cm possibly representing the unilateral gynecomastia; interval development of new hypermetabolic lymph nodes in the celiac region, periaortic region, right retrocrural area, and bilateral inguinal regions; lentiform cutaneous subcutaneous well-circumscribed mass along the anterior midline aspect of the lower pelvis which has increased in size and become hypermetabolic now measuring 2.1 x 1.1 cm possibly representing a sebaceous cyst.     The patient underwent EUS on 03/13/2024 with gastric polyp and single duodenal polyp seen as well as a few malignant appearing lymph nodes in the celiac region which were  biopsied.    The patient underwent PET-CT on 07/08/2024 showing to metastatic left posterior cervical lymph nodes with the largest measuring 13 x 8 mm; new superior mediastinal metastatic lymph node; increasing size of left lower lobe airspace consolidation with a new nodular focus of hypermetabolic activity; progression in upper abdominal metastatic lymphadenopathy with multiple new and enlarging retroperitoneal metastatic lymph nodes; new retrocrural metastatic lymph node; and new aortocaval lymph node.    Past Medical History:   Past Medical History:   Diagnosis Date    Diabetes mellitus     Gout, unspecified     High cholesterol     HTN (hypertension)     Lung cancer        Past Surgical HIstory:   Past Surgical History:   Procedure Laterality Date    ENDOSCOPIC ULTRASOUND OF UPPER GASTROINTESTINAL TRACT N/A 3/3/2023    Procedure: ULTRASOUND, UPPER GI TRACT, ENDOSCOPIC;  Surgeon: Cora Mcgrath MD;  Location: [facility];  Service: Endoscopy;  Laterality: N/A;  2/24/23-Instructions mailed to address on file-DS    ENDOSCOPIC ULTRASOUND OF UPPER GASTROINTESTINAL TRACT Left 3/13/2024    Procedure: ULTRASOUND, UPPER GI TRACT, ENDOSCOPIC;  Surgeon: Reji Aguirre MD;  Location: [facility];  Service: Endoscopy;  Laterality: Left;    ESOPHAGOGASTRODUODENOSCOPY N/A 3/13/2024    Procedure: EGD (ESOPHAGOGASTRODUODENOSCOPY);  Surgeon: Reji Aguirre MD;  Location: [facility];  Service: Endoscopy;  Laterality: N/A;    INSERTION OF TUNNELED CENTRAL VENOUS CATHETER (CVC) WITH SUBCUTANEOUS PORT N/A 4/24/2023    Procedure: [id number];  Surgeon: Paulino Love MD;  Location: [facility];  Service: General;  Laterality: N/A;    PANCREATECTOMY      ROBOTIC BRONCHOSCOPY N/A 3/17/2023    Procedure: ROBOTIC BRONCHOSCOPY;  Surgeon: Cristiane Albright MD;  Location: [address];  Service: Pulmonary;  Laterality: N/A;     Family History:   Family History   Problem Relation Name Age of Onset    Breast cancer  Sister          60     Social History:  reports that he quit smoking about 11 years ago. His smoking use included cigarettes. He started smoking about 56 years ago. He has a 81 pack-year smoking history. He has quit using smokeless tobacco. He reports current alcohol use of about 3.0 standard drinks of alcohol per week. He reports that he does not use drugs.    Allergies:  Review of patient's allergies indicates:  No Known Allergies    Medications:  Current Outpatient Medications   Medication Sig Dispense Refill    allopurinoL (ZYLOPRIM) 100 MG tablet Take 100 mg by mouth once daily.      BYDUREON BCISE 2 mg/0.85 mL AtIn Inject 2 mg into the skin every 7 days. Every Friday      colchicine (MITIGARE) 0.6 mg Cap Take by mouth.      ezetimibe (ZETIA) 10 mg tablet Take 10 mg by mouth once daily.      FARXIGA 10 mg tablet Take 10 mg by mouth every morning.      finasteride (PROSCAR) 5 mg tablet Take 5 mg by mouth once daily.      FREESTYLE TEST Strp USE 1 STRIP TO CHECK GLUCOSE ONCE DAILY      levothyroxine (SYNTHROID) 50 MCG tablet Take 25 mcg by mouth every morning.      LIDOcaine-prilocaine (EMLA) cream Apply topically to port site one hour prior to access, cover 30 g 1    magic mouthwash diphen/antac/lidoc/nysta Swish and spit 10 mls by mouth every 4 to 6 hours as needed for mouth pain, ulcers or yeast 240 mL 1    metoprolol succinate (TOPROL-XL) 100 MG 24 hr tablet Take 100 mg by mouth once daily.      morphine (MS CONTIN) 15 MG 12 hr tablet Take 1 tablet (15 mg total) by mouth 2 (two) times daily. 60 tablet 0    rosuvastatin (CRESTOR) 40 MG Tab Take 10 mg by mouth every evening.      TOUJEO SOLOSTAR U-300 INSULIN 300 unit/mL (1.5 mL) InPn pen SMARTSI Unit(s) SUB-Q Every Evening       No current facility-administered medications for this visit.       Review of Systems   Constitutional:  Negative for chills, diaphoresis, fatigue, fever and unexpected weight change.   Respiratory:  Negative for cough and shortness  "of breath.    Cardiovascular:  Positive for leg swelling. Negative for chest pain and palpitations.   Gastrointestinal:  Negative for abdominal pain, constipation, diarrhea, nausea and vomiting.   Skin:  Negative for color change and rash.   Neurological:  Positive for light-headedness. Negative for headaches.   Hematological:  Negative for adenopathy. Does not bruise/bleed easily.       ECOG Performance Status: 1   Objective:      Vitals: Weight:   Gain of 2 1/2 pounds in 1 week  Vitals:    09/05/24 1258   BP: 125/69   BP Location: Left arm   Patient Position: Sitting   BP Method: Medium (Automatic)   Pulse: 86   Resp: 18   Temp: 97.1 °F (36.2 °C)   TempSrc: Temporal   SpO2: 97%   Weight: 96.7 kg (213 lb 3 oz)   Height: 6' 1" (1.854 m)     Physical Exam  Vitals reviewed.   Constitutional:       General: He is not in acute distress.     Appearance: He is well-developed. He is not diaphoretic.   HENT:      Head: Normocephalic and atraumatic.      Mouth/Throat:      Pharynx: Oropharynx is clear.   Eyes:      Conjunctiva/sclera: Conjunctivae normal.   Cardiovascular:      Rate and Rhythm: Normal rate and regular rhythm.      Heart sounds: Normal heart sounds. No murmur heard.     No friction rub. No gallop.   Pulmonary:      Effort: Pulmonary effort is normal. No respiratory distress.      Breath sounds: Normal breath sounds. No wheezing or rales.   Chest:      Chest wall: No tenderness.   Abdominal:      General: Bowel sounds are normal. There is no distension.      Palpations: Abdomen is soft. There is no mass.      Tenderness: There is no abdominal tenderness. There is no rebound.   Musculoskeletal:      Cervical back: Normal range of motion.      Right lower leg: Edema present.      Left lower leg: Edema present.   Lymphadenopathy:      Cervical: No cervical adenopathy.      Upper Body:      Right upper body: No supraclavicular or axillary adenopathy.      Left upper body: No supraclavicular or axillary adenopathy. "   Skin:     General: Skin is warm and dry.   Neurological:      Mental Status: He is alert and oriented to person, place, and time.   Psychiatric:         Behavior: Behavior normal.         Laboratory Data:  Lab Visit on 09/03/2024   Component Date Value Ref Range Status    WBC 09/03/2024 4.95  3.90 - 12.70 K/uL Final    RBC 09/03/2024 3.07 (L)  4.60 - 6.20 M/uL Final    Hemoglobin 09/03/2024 8.7 (L)  14.0 - 18.0 g/dL Final    Hematocrit 09/03/2024 26.6 (L)  40.0 - 54.0 % Final    MCV 09/03/2024 87  82 - 98 fL Final    MCH 09/03/2024 28.3  27.0 - 31.0 pg Final    MCHC 09/03/2024 32.7  32.0 - 36.0 g/dL Final    RDW 09/03/2024 16.0 (H)  11.5 - 14.5 % Final    Platelets 09/03/2024 163  150 - 450 K/uL Final    MPV 09/03/2024 9.4  9.2 - 12.9 fL Final    Immature Granulocytes 09/03/2024 0.4  0.0 - 0.5 % Final    Gran # (ANC) 09/03/2024 3.8  1.8 - 7.7 K/uL Final    Immature Grans (Abs) 09/03/2024 0.02  0.00 - 0.04 K/uL Final    Comment: Mild elevation in immature granulocytes is non specific and   can be seen in a variety of conditions including stress response,   acute inflammation, trauma and pregnancy. Correlation with other   laboratory and clinical findings is essential.      Lymph # 09/03/2024 1.0  1.0 - 4.8 K/uL Final    Mono # 09/03/2024 0.1 (L)  0.3 - 1.0 K/uL Final    Eos # 09/03/2024 0.1  0.0 - 0.5 K/uL Final    Baso # 09/03/2024 0.02  0.00 - 0.20 K/uL Final    nRBC 09/03/2024 0  0 /100 WBC Final    Gran % 09/03/2024 76.2 (H)  38.0 - 73.0 % Final    Lymph % 09/03/2024 20.8  18.0 - 48.0 % Final    Mono % 09/03/2024 1.4 (L)  4.0 - 15.0 % Final    Eosinophil % 09/03/2024 1.2  0.0 - 8.0 % Final    Basophil % 09/03/2024 0.4  0.0 - 1.9 % Final    Differential Method 09/03/2024 Automated   Final    Sodium 09/03/2024 135 (L)  136 - 145 mmol/L Final    Potassium 09/03/2024 4.6  3.5 - 5.1 mmol/L Final    Chloride 09/03/2024 103  95 - 110 mmol/L Final    CO2 09/03/2024 22 (L)  23 - 29 mmol/L Final    Glucose 09/03/2024 96   70 - 110 mg/dL Final    BUN 09/03/2024 21  8 - 23 mg/dL Final    Creatinine 09/03/2024 1.8 (H)  0.5 - 1.4 mg/dL Final    Calcium 09/03/2024 9.0  8.7 - 10.5 mg/dL Final    Total Protein 09/03/2024 7.2  6.0 - 8.4 g/dL Final    Albumin 09/03/2024 2.4 (L)  3.5 - 5.2 g/dL Final    Total Bilirubin 09/03/2024 0.4  0.1 - 1.0 mg/dL Final    Comment: For infants and newborns, interpretation of results should be based  on gestational age, weight and in agreement with clinical  observations.    Premature Infant recommended reference ranges:  Up to 24 hours.............<8.0 mg/dL  Up to 48 hours............<12.0 mg/dL  3-5 days..................<15.0 mg/dL  6-29 days.................<15.0 mg/dL      Alkaline Phosphatase 09/03/2024 76  55 - 135 U/L Final    AST 09/03/2024 52 (H)  10 - 40 U/L Final    ALT 09/03/2024 48 (H)  10 - 44 U/L Final    eGFR 09/03/2024 39 (A)  >60 mL/min/1.73 m^2 Final    Anion Gap 09/03/2024 10  8 - 16 mmol/L Final     Imaging:     CT abdomen and pelvis 8/21/24    Abdomen:     - Lower thorax:Focal airspace opacity in the left lower lobe is again identified, similar to that seen prior CT consistent with patient's known history of recurrence. Extensive areas of associated bronchiectasis are noted in the left lower lobe. Small left-sided pleural effusion with associated pleural calcifications is stable. The right lobe base appears clear. No pneumothorax is noted.     In the abdomen, the liver appears normal in size and attenuation without focal masses. No biliary duct dilatation is seen. The portal and hepatic veins appear unremarkable. Small splenule is again identified in the left upper quadrant. Hypodense enhancing mass is again identified arising from the tail of the pancreas measuring 5.0 x 4.0 cm, not significantly changed in comparison to the prior study. Retroperitoneal adenopathy is again identified. Upper abdominal adenopathy measures 3.5 x 2.4 cm is not significantly changed in comparison to the  prior study. Lymph node just lateral to the superior mesenteric artery origin measures 2.1 x 1.9 cm versus 2.3 x 2.1 cm on the prior study. Lymph node just posterior to the left renal vein measures 2.1 x 1.6 cm, not significantly changed. Additional retroperitoneal para-aortic adenopathy is noted and appears not significant changed. The aorta tapers normally with moderate atherosclerotic calcification along its margins.     Views bowel obstruction stomach to be unremarkable. The visualized portions of the small bowel and colon appear unremarkable. No free air or free fluid are identified. No bowel obstruction seen.     Bones: Degenerative changes are identified. No definite evidence of bony metastatic disease is noted.       Assessment:       1. 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 Love  -PT completed 6 cycles of Carboplatin and Paclitaxel on 6/02/23  -Radiation completed 6/07/23  -CT chest on 6/26/23 showed a decrease in LLL mass  -Will proceed with Durvalumab for 1 year of treatment  -CT chest 11/21/23 shows decreased lung mass and radiation changes  -CT Chest on 02/09/2024 showed continued radiation changes in the lung but did show intra-abdominal lymphadenopathy (see below)  -Pt s/p cycle 9 of Durvalumab  -Pt underwent EUS on 3/13/24 with biopsy of celiac LN showing metastatic SCC  -Treatment with Ramucirumab and Pembrolizumab discussed based off trial Lung [id number]  -PT completed 4 cycles  -PET/CT on 7/08/24 showed progression of disease  -Pt completed C1D15 Gemcitabine  -CT Abdomen 8/21/24  stable  -Will proceed with C2D1  08/29/24:  Proceed with C2D8 today; f/u in 1 week as scheduled on 09/05 with labs on 09/03 in [place].  09/05/24:  Proceed with C2D15 today; f/u as scheduled with Dr. Lawrence on 09/17 with labs in [place] 09/16 prior to C3D1.  Visit today included increased complexity associated with the care of the episodic problem (immunotherapy and ramucirumab) addressed and managing the longitudinal care of the patient due to the serious and/or complex managed problem(s) NSCLC    Chemotherapy associated anemia.  -Hgb 8.7 g/dl 09/03/24  Continue to monitor    Chemotherapy associated thrombocytosis  -09/03/24: liver enzymes decreasing; continue to monitor    Abdominal Pain/Transaminitis - abdominal pain improved  -No etiology determined on CT  -08/29/24:  stable abdomen; decrease in liver enzymes.  Will monitor    Pancreatic Mass - seen on CT scans and biopsy during EUS on 03/03/2023 with path showing no evidence of malignancy  -PET/CT 4/18/23 showed uptake in the tail of the pancreas  -Possibly due to chronic pancreatitis  -Pt with stable pancreatic mass on CT abdomen 7/03/23 and 3/01/24  -Patient with enlarging surrounding lymphadenopathy  -PET/CT on 3/07/24 shows increasing LAD in the celiac region, periaortic retroperitoneum, right retrocrural area, and bilateral inguinal regions  -EUS on 3/13/24 not suggestive of a pancreatic mass  -Mentioned on CT abdomen 8/21/24  -Will monitor    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  20 minutes were spent in coordination of patient's care, record review and counseling.      Route Chart for Scheduling    Med Onc Chart Routing      Follow up with physician . F/u with Dr. 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 Stage IIIA cT2b, cN2, cM0 noted on 3/31/2023   Line of treatment: Third Line  Treatment Goal: Palliative     Upcoming Treatment Dates - OP NSCLC GEMCITABINE QW    9/5/2024       Pre-Medications       ondansetron injection 8 mg       Chemotherapy       gemcitabine in 0.9% NaCl 250 mL chemo infusion  9/19/2024       Pre-Medications       ondansetron injection 8 mg       Chemotherapy       gemcitabine in 0.9% NaCl 250 mL chemo infusion  9/26/2024       Pre-Medications       ondansetron injection 8 mg       Chemotherapy       gemcitabine in 0.9% NaCl 250 mL chemo infusion  10/3/2024       Pre-Medications       ondansetron injection 8 mg       Chemotherapy       gemcitabine in 0.9% NaCl 250 mL chemo infusion    Supportive Plan Information  IV FLUIDS AND ELECTROLYTES Lisandro Lawrence MD   Associated Diagnosis: Dehydration   noted on 5/26/2023   Line of treatment: Supportive Care   Treatment goal: Supportive     Upcoming Treatment Dates - IV FLUIDS AND ELECTROLYTES    No upcoming days in selected categories.

## 2024-09-04 NOTE — NURSING
Chart reviewed for oncology navigational needs. Patient remains on treatment and tolerating well per MD note.  Future appts and infusions already scheduled.  Will continue to monitor throughout treatment for any navigational needs

## 2024-09-05 ENCOUNTER — INFUSION (OUTPATIENT)
Dept: INFUSION THERAPY | Facility: HOSPITAL | Age: 74
End: 2024-09-05
Attending: INTERNAL MEDICINE
Payer: MEDICARE

## 2024-09-05 ENCOUNTER — OFFICE VISIT (OUTPATIENT)
Dept: HEMATOLOGY/ONCOLOGY | Facility: CLINIC | Age: 74
End: 2024-09-05
Payer: MEDICARE

## 2024-09-05 VITALS
SYSTOLIC BLOOD PRESSURE: 132 MMHG | RESPIRATION RATE: 16 BRPM | HEIGHT: 73 IN | BODY MASS INDEX: 28.25 KG/M2 | OXYGEN SATURATION: 95 % | WEIGHT: 213.19 LBS | HEART RATE: 95 BPM | TEMPERATURE: 98 F | DIASTOLIC BLOOD PRESSURE: 95 MMHG

## 2024-09-05 VITALS
OXYGEN SATURATION: 97 % | RESPIRATION RATE: 18 BRPM | DIASTOLIC BLOOD PRESSURE: 69 MMHG | TEMPERATURE: 97 F | HEIGHT: 73 IN | WEIGHT: 213.19 LBS | SYSTOLIC BLOOD PRESSURE: 125 MMHG | BODY MASS INDEX: 28.25 KG/M2 | HEART RATE: 86 BPM

## 2024-09-05 DIAGNOSIS — C34.90 NSCLC METASTATIC TO LYMPH NODES OF MULTIPLE SITES: Primary | ICD-10-CM

## 2024-09-05 DIAGNOSIS — C77.8 NSCLC METASTATIC TO LYMPH NODES OF MULTIPLE SITES: Primary | ICD-10-CM

## 2024-09-05 PROCEDURE — 63600175 PHARM REV CODE 636 W HCPCS: Mod: PN | Performed by: NURSE PRACTITIONER

## 2024-09-05 PROCEDURE — 99214 OFFICE O/P EST MOD 30 MIN: CPT | Mod: PBBFAC,PN | Performed by: NURSE PRACTITIONER

## 2024-09-05 PROCEDURE — 25000003 PHARM REV CODE 250: Mod: PN | Performed by: NURSE PRACTITIONER

## 2024-09-05 PROCEDURE — 96413 CHEMO IV INFUSION 1 HR: CPT | Mod: PN

## 2024-09-05 PROCEDURE — 96375 TX/PRO/DX INJ NEW DRUG ADDON: CPT | Mod: PN

## 2024-09-05 PROCEDURE — 99999 PR PBB SHADOW E&M-EST. PATIENT-LVL IV: CPT | Mod: PBBFAC,,, | Performed by: NURSE PRACTITIONER

## 2024-09-05 PROCEDURE — A4216 STERILE WATER/SALINE, 10 ML: HCPCS | Mod: PN | Performed by: NURSE PRACTITIONER

## 2024-09-05 RX ORDER — SODIUM CHLORIDE 0.9 % (FLUSH) 0.9 %
10 SYRINGE (ML) INJECTION
Status: DISCONTINUED | OUTPATIENT
Start: 2024-09-05 | End: 2024-09-05 | Stop reason: HOSPADM

## 2024-09-05 RX ORDER — PROCHLORPERAZINE EDISYLATE 5 MG/ML
5 INJECTION INTRAMUSCULAR; INTRAVENOUS ONCE AS NEEDED
Status: CANCELLED
Start: 2024-09-05

## 2024-09-05 RX ORDER — PROCHLORPERAZINE EDISYLATE 5 MG/ML
5 INJECTION INTRAMUSCULAR; INTRAVENOUS ONCE AS NEEDED
Status: DISCONTINUED | OUTPATIENT
Start: 2024-09-05 | End: 2024-09-05 | Stop reason: HOSPADM

## 2024-09-05 RX ORDER — HEPARIN 100 UNIT/ML
500 SYRINGE INTRAVENOUS
Status: DISCONTINUED | OUTPATIENT
Start: 2024-09-05 | End: 2024-09-05 | Stop reason: HOSPADM

## 2024-09-05 RX ORDER — FUROSEMIDE 20 MG/1
20 TABLET ORAL
COMMUNITY

## 2024-09-05 RX ORDER — SODIUM CHLORIDE 0.9 % (FLUSH) 0.9 %
10 SYRINGE (ML) INJECTION
Status: CANCELLED | OUTPATIENT
Start: 2024-09-05

## 2024-09-05 RX ORDER — ONDANSETRON HYDROCHLORIDE 2 MG/ML
8 INJECTION, SOLUTION INTRAVENOUS
Status: COMPLETED | OUTPATIENT
Start: 2024-09-05 | End: 2024-09-05

## 2024-09-05 RX ORDER — HEPARIN 100 UNIT/ML
500 SYRINGE INTRAVENOUS
Status: CANCELLED | OUTPATIENT
Start: 2024-09-05

## 2024-09-05 RX ORDER — ONDANSETRON HYDROCHLORIDE 2 MG/ML
8 INJECTION, SOLUTION INTRAVENOUS
Status: CANCELLED
Start: 2024-09-05

## 2024-09-05 RX ADMIN — Medication 10 ML: at 02:09

## 2024-09-05 RX ADMIN — SODIUM CHLORIDE: 9 INJECTION, SOLUTION INTRAVENOUS at 01:09

## 2024-09-05 RX ADMIN — ONDANSETRON 8 MG: 2 INJECTION INTRAMUSCULAR; INTRAVENOUS at 02:09

## 2024-09-05 RX ADMIN — GEMCITABINE 2200 MG: 38 INJECTION, SOLUTION INTRAVENOUS at 02:09

## 2024-09-05 NOTE — PLAN OF CARE
Problem: Fatigue (Oncology Care)  Goal: Improved Activity Tolerance  Outcome: Progressing  Intervention: Promote Improved Energy  Flowsheets (Taken 9/5/2024 1508)  Fatigue Management: activity schedule adjusted  Environmental Support: calm environment promoted     Problem: Adult Inpatient Plan of Care  Goal: Plan of Care Review  Outcome: Progressing  Flowsheets (Taken 9/5/2024 1508)  Plan of Care Reviewed With: patient  Goal: Patient-Specific Goal (Individualized)  Outcome: Progressing  Flowsheets (Taken 9/5/2024 1508)  Individualized Care Needs: recliner, blanket  Anxieties, Fears or Concerns: none voiced  Patient/Family-Specific Goals (Include Timeframe): no signs of reaction  Goal: Optimal Comfort and Wellbeing  Outcome: Progressing  Intervention: Provide Person-Centered Care  Flowsheets (Taken 9/5/2024 1508)  Trust Relationship/Rapport: care explained  Pt tolerated infusion well. Discharged home in satisfactory condition.

## 2024-09-10 DIAGNOSIS — C34.90 SQUAMOUS CELL CARCINOMA OF LUNG, UNSPECIFIED LATERALITY: ICD-10-CM

## 2024-09-10 DIAGNOSIS — G89.3 CANCER RELATED PAIN: ICD-10-CM

## 2024-09-10 RX ORDER — MORPHINE SULFATE 15 MG/1
15 TABLET, FILM COATED, EXTENDED RELEASE ORAL 2 TIMES DAILY
Qty: 60 TABLET | Refills: 0 | Status: SHIPPED | OUTPATIENT
Start: 2024-09-10

## 2024-09-23 ENCOUNTER — TELEPHONE (OUTPATIENT)
Dept: HEMATOLOGY/ONCOLOGY | Facility: CLINIC | Age: 74
End: 2024-09-23
Payer: MEDICARE

## 2024-09-24 ENCOUNTER — DOCUMENTATION ONLY (OUTPATIENT)
Dept: HEMATOLOGY/ONCOLOGY | Facility: CLINIC | Age: 74
End: 2024-09-24
Payer: MEDICARE

## 2024-09-24 NOTE — PROGRESS NOTES
Chart reviewed for oncology navigational needs. Patient is now under the care of [facility]osp[facility] agency.   I will sign off patient's care team at this time.

## 2024-09-30 ENCOUNTER — TELEPHONE (OUTPATIENT)
Dept: HEMATOLOGY/ONCOLOGY | Facility: CLINIC | Age: 74
End: 2024-09-30
Payer: MEDICARE

## 2024-09-30 NOTE — TELEPHONE ENCOUNTER
Spoke with pt in regards to appt scheduled for 10/1/24 with Lianna Palacios NP> Pt stated he is in hopsice care now and will not be needing the appt. I informed the pt that if he needs anything from us to please reach ou to us. Pt verbalized understanding and was appreciative of the phone call.

## 2025-03-30 NOTE — NURSING
Spoke with patient about the referral to Pulmonary for evaluation of lung mass.  Patient notified of the scheduled chest CT and Pulmonary appointment for 03/01/23.  Provided Mr. Thakkar with my direct contact information.    Oncology Navigation   Intake  Cancer Type: Thoracic  Internal / External Referral: Internal  Date of Referral: 02/24/23  Initial Nurse Navigator Contact: 02/27/23  Referral to Initial Contact Timeline (days): 3  Date Worked: 02/27/23  First Appointment Available: 03/01/23  Appointment Date: 03/01/23  First Available Date vs. Scheduled Date (days): 0  Reason if booked > 7 days after scheduling: Specific provider / access     Treatment  Current Status: Staging work-up             Procedures: CT; EUS / EGD  CT Schedule Date: 03/01/23  EUS / EGD Date: 03/03/23             Barriers of Care: Other  Other Barriers: Education     Acuity   Needed: 0  Support: 1  Verbalizes Financial Concerns: 0  Transportation: 0  History of noncompliance/frequent no shows and cancellations: 2  Verbalizes the need for more education: 1  Navigation Acuity: 5     Follow Up  No follow-ups on file.        PT agitated and unable to redirect pt.  Pt was placed in 4 point restraints secondary to harming himself and/or staff.. Psychiatrist note reviewed for recommendations.  Given QTc interval of 531; Will give valproic 125mg IVP once stat. PT agitated and unable to redirect pt.  Pt was placed in 4 point restraints secondary to harming himself and/or staff.. Psychiatrist note reviewed for recommendations.  Given QTc interval of 531; Will give valproic 125mg IVP once stat.  D/W Dr Lucas.

## (undated) DEVICE — FORCEP JAW RADIAL PULM 2X100CM

## (undated) DEVICE — PENCIL GOLF STERILE

## (undated) DEVICE — PACK ECLIPSE SET-UP W/O DRAPE

## (undated) DEVICE — ALCOHOL BLEND 95%

## (undated) DEVICE — SYR SLIP TIP 20CC

## (undated) DEVICE — LUBRICANT SURGILUBE 2 OZ

## (undated) DEVICE — NDL FLEXISION BIOPSY 21G

## (undated) DEVICE — ADAPTER SWIVEL

## (undated) DEVICE — SYR 10CC LUER LOCK

## (undated) DEVICE — CATH BRONCHOSCOPE F/BF

## (undated) DEVICE — CONTAINER SPECIMEN STRL 4OZ

## (undated) DEVICE — NDL VIZISHOT 2 FLEX 19G

## (undated) DEVICE — NDL ASPIRATING VIZISHOT 20-40M

## (undated) DEVICE — DRAPE THREE-QTR REINF 53X77IN

## (undated) DEVICE — GOWN POLY REINF BRTH SLV XL

## (undated) DEVICE — BOWL STERILE LARGE 32OZ

## (undated) DEVICE — GAUZE SPONGE 4X4 12PLY

## (undated) DEVICE — NDL VIZISHOT 2 FLEX 22G

## (undated) DEVICE — CYTOSPIN COLLECTION FLUID BLT

## (undated) DEVICE — TUBING SUC UNIV W/CONN 12FT

## (undated) DEVICE — SYS LABEL CORRECT MED